# Patient Record
Sex: MALE | Race: WHITE | NOT HISPANIC OR LATINO | Employment: FULL TIME | URBAN - METROPOLITAN AREA
[De-identification: names, ages, dates, MRNs, and addresses within clinical notes are randomized per-mention and may not be internally consistent; named-entity substitution may affect disease eponyms.]

---

## 2022-04-08 ENCOUNTER — APPOINTMENT (OUTPATIENT)
Dept: CT IMAGING | Facility: HOSPITAL | Age: 57
End: 2022-04-08

## 2022-04-08 ENCOUNTER — HOSPITAL ENCOUNTER (EMERGENCY)
Facility: HOSPITAL | Age: 57
Discharge: HOME | End: 2022-04-08
Attending: EMERGENCY MEDICINE | Admitting: EMERGENCY MEDICINE

## 2022-04-08 VITALS
OXYGEN SATURATION: 96 % | DIASTOLIC BLOOD PRESSURE: 90 MMHG | HEIGHT: 73 IN | RESPIRATION RATE: 18 BRPM | HEART RATE: 96 BPM | BODY MASS INDEX: 38.43 KG/M2 | WEIGHT: 290 LBS | TEMPERATURE: 97.9 F | SYSTOLIC BLOOD PRESSURE: 157 MMHG

## 2022-04-08 DIAGNOSIS — R59.0 ABDOMINAL LYMPHADENOPATHY: ICD-10-CM

## 2022-04-08 DIAGNOSIS — N20.0 RIGHT KIDNEY STONE: ICD-10-CM

## 2022-04-08 DIAGNOSIS — N23 URETERAL COLIC: Primary | ICD-10-CM

## 2022-04-08 LAB
ALBUMIN SERPL-MCNC: 4 G/DL (ref 3.5–5.2)
ALBUMIN/GLOB SERPL: 1.1 G/DL
ALP SERPL-CCNC: 118 U/L (ref 39–117)
ALT SERPL W P-5'-P-CCNC: 25 U/L (ref 1–41)
ANION GAP SERPL CALCULATED.3IONS-SCNC: 11 MMOL/L (ref 5–15)
AST SERPL-CCNC: 21 U/L (ref 1–40)
BACTERIA UR QL AUTO: ABNORMAL /HPF
BASOPHILS # BLD AUTO: 0.02 10*3/MM3 (ref 0–0.2)
BASOPHILS NFR BLD AUTO: 0.4 % (ref 0–1.5)
BILIRUB SERPL-MCNC: 0.5 MG/DL (ref 0–1.2)
BILIRUB UR QL STRIP: NEGATIVE
BUN SERPL-MCNC: 27 MG/DL (ref 6–20)
BUN/CREAT SERPL: 14.3 (ref 7–25)
CALCIUM SPEC-SCNC: 10 MG/DL (ref 8.6–10.5)
CHLORIDE SERPL-SCNC: 101 MMOL/L (ref 98–107)
CLARITY UR: CLEAR
CO2 SERPL-SCNC: 24 MMOL/L (ref 22–29)
COLOR UR: YELLOW
CREAT SERPL-MCNC: 1.89 MG/DL (ref 0.76–1.27)
D-LACTATE SERPL-SCNC: 1.6 MMOL/L (ref 0.5–2)
DEPRECATED RDW RBC AUTO: 41 FL (ref 37–54)
EGFRCR SERPLBLD CKD-EPI 2021: 41.1 ML/MIN/1.73
EOSINOPHIL # BLD AUTO: 0.24 10*3/MM3 (ref 0–0.4)
EOSINOPHIL NFR BLD AUTO: 5.2 % (ref 0.3–6.2)
ERYTHROCYTE [DISTWIDTH] IN BLOOD BY AUTOMATED COUNT: 12.4 % (ref 12.3–15.4)
GLOBULIN UR ELPH-MCNC: 3.8 GM/DL
GLUCOSE SERPL-MCNC: 186 MG/DL (ref 65–99)
GLUCOSE UR STRIP-MCNC: ABNORMAL MG/DL
HCT VFR BLD AUTO: 37.1 % (ref 37.5–51)
HGB BLD-MCNC: 12.5 G/DL (ref 13–17.7)
HGB UR QL STRIP.AUTO: ABNORMAL
HOLD SPECIMEN: NORMAL
HYALINE CASTS UR QL AUTO: ABNORMAL /LPF
IMM GRANULOCYTES # BLD AUTO: 0.02 10*3/MM3 (ref 0–0.05)
IMM GRANULOCYTES NFR BLD AUTO: 0.4 % (ref 0–0.5)
KETONES UR QL STRIP: NEGATIVE
LEUKOCYTE ESTERASE UR QL STRIP.AUTO: NEGATIVE
LIPASE SERPL-CCNC: 28 U/L (ref 13–60)
LYMPHOCYTES # BLD AUTO: 0.73 10*3/MM3 (ref 0.7–3.1)
LYMPHOCYTES NFR BLD AUTO: 15.7 % (ref 19.6–45.3)
MCH RBC QN AUTO: 30.5 PG (ref 26.6–33)
MCHC RBC AUTO-ENTMCNC: 33.7 G/DL (ref 31.5–35.7)
MCV RBC AUTO: 90.5 FL (ref 79–97)
MONOCYTES # BLD AUTO: 0.44 10*3/MM3 (ref 0.1–0.9)
MONOCYTES NFR BLD AUTO: 9.5 % (ref 5–12)
NEUTROPHILS NFR BLD AUTO: 3.2 10*3/MM3 (ref 1.7–7)
NEUTROPHILS NFR BLD AUTO: 68.8 % (ref 42.7–76)
NITRITE UR QL STRIP: NEGATIVE
NRBC BLD AUTO-RTO: 0 /100 WBC (ref 0–0.2)
PH UR STRIP.AUTO: <=5 [PH] (ref 5–8)
PLATELET # BLD AUTO: 194 10*3/MM3 (ref 140–450)
PMV BLD AUTO: 10.2 FL (ref 6–12)
POTASSIUM SERPL-SCNC: 4.9 MMOL/L (ref 3.5–5.2)
PROT SERPL-MCNC: 7.8 G/DL (ref 6–8.5)
PROT UR QL STRIP: ABNORMAL
RBC # BLD AUTO: 4.1 10*6/MM3 (ref 4.14–5.8)
RBC # UR STRIP: ABNORMAL /HPF
REF LAB TEST METHOD: ABNORMAL
SODIUM SERPL-SCNC: 136 MMOL/L (ref 136–145)
SP GR UR STRIP: 1.02 (ref 1–1.03)
SQUAMOUS #/AREA URNS HPF: ABNORMAL /HPF
UROBILINOGEN UR QL STRIP: ABNORMAL
WBC # UR STRIP: ABNORMAL /HPF
WBC NRBC COR # BLD: 4.65 10*3/MM3 (ref 3.4–10.8)
WHOLE BLOOD HOLD SPECIMEN: NORMAL
WHOLE BLOOD HOLD SPECIMEN: NORMAL

## 2022-04-08 PROCEDURE — 74177 CT ABD & PELVIS W/CONTRAST: CPT

## 2022-04-08 PROCEDURE — 96375 TX/PRO/DX INJ NEW DRUG ADDON: CPT

## 2022-04-08 PROCEDURE — 96374 THER/PROPH/DIAG INJ IV PUSH: CPT

## 2022-04-08 PROCEDURE — 25010000002 ONDANSETRON PER 1 MG: Performed by: EMERGENCY MEDICINE

## 2022-04-08 PROCEDURE — 81001 URINALYSIS AUTO W/SCOPE: CPT | Performed by: EMERGENCY MEDICINE

## 2022-04-08 PROCEDURE — 25010000002 IOPAMIDOL 61 % SOLUTION: Performed by: EMERGENCY MEDICINE

## 2022-04-08 PROCEDURE — 85025 COMPLETE CBC W/AUTO DIFF WBC: CPT

## 2022-04-08 PROCEDURE — 80053 COMPREHEN METABOLIC PANEL: CPT | Performed by: EMERGENCY MEDICINE

## 2022-04-08 PROCEDURE — 83605 ASSAY OF LACTIC ACID: CPT | Performed by: EMERGENCY MEDICINE

## 2022-04-08 PROCEDURE — 25010000002 KETOROLAC TROMETHAMINE PER 15 MG: Performed by: NURSE PRACTITIONER

## 2022-04-08 PROCEDURE — 25010000002 HYDROMORPHONE PER 4 MG: Performed by: EMERGENCY MEDICINE

## 2022-04-08 PROCEDURE — 99283 EMERGENCY DEPT VISIT LOW MDM: CPT

## 2022-04-08 PROCEDURE — 83690 ASSAY OF LIPASE: CPT | Performed by: EMERGENCY MEDICINE

## 2022-04-08 RX ORDER — METOPROLOL SUCCINATE 50 MG/1
50 TABLET, EXTENDED RELEASE ORAL DAILY
COMMUNITY
End: 2022-06-03 | Stop reason: DRUGHIGH

## 2022-04-08 RX ORDER — GLIMEPIRIDE 4 MG/1
4 TABLET ORAL 2 TIMES DAILY
COMMUNITY
End: 2022-04-14 | Stop reason: SDUPTHER

## 2022-04-08 RX ORDER — ONDANSETRON 4 MG/1
4 TABLET, ORALLY DISINTEGRATING ORAL EVERY 6 HOURS PRN
Qty: 20 TABLET | Refills: 0 | Status: SHIPPED | OUTPATIENT
Start: 2022-04-08

## 2022-04-08 RX ORDER — HYDROCODONE BITARTRATE AND ACETAMINOPHEN 7.5; 325 MG/1; MG/1
1 TABLET ORAL EVERY 6 HOURS PRN
Qty: 20 TABLET | Refills: 0 | Status: SHIPPED | OUTPATIENT
Start: 2022-04-08

## 2022-04-08 RX ORDER — SODIUM CHLORIDE 9 MG/ML
10 INJECTION INTRAVENOUS AS NEEDED
Status: DISCONTINUED | OUTPATIENT
Start: 2022-04-08 | End: 2022-04-09 | Stop reason: HOSPADM

## 2022-04-08 RX ORDER — KETOROLAC TROMETHAMINE 15 MG/ML
15 INJECTION, SOLUTION INTRAMUSCULAR; INTRAVENOUS ONCE
Status: COMPLETED | OUTPATIENT
Start: 2022-04-08 | End: 2022-04-08

## 2022-04-08 RX ORDER — LISINOPRIL 40 MG/1
40 TABLET ORAL DAILY
COMMUNITY

## 2022-04-08 RX ORDER — ONDANSETRON 2 MG/ML
4 INJECTION INTRAMUSCULAR; INTRAVENOUS ONCE
Status: COMPLETED | OUTPATIENT
Start: 2022-04-08 | End: 2022-04-08

## 2022-04-08 RX ORDER — CLOPIDOGREL BISULFATE 75 MG/1
75 TABLET ORAL DAILY
COMMUNITY
End: 2022-04-14 | Stop reason: SDUPTHER

## 2022-04-08 RX ORDER — HYDROMORPHONE HYDROCHLORIDE 1 MG/ML
0.5 INJECTION, SOLUTION INTRAMUSCULAR; INTRAVENOUS; SUBCUTANEOUS ONCE
Status: COMPLETED | OUTPATIENT
Start: 2022-04-08 | End: 2022-04-08

## 2022-04-08 RX ORDER — DULAGLUTIDE 3 MG/.5ML
INJECTION, SOLUTION SUBCUTANEOUS
COMMUNITY
End: 2022-06-03 | Stop reason: DRUGHIGH

## 2022-04-08 RX ADMIN — SODIUM CHLORIDE 1000 ML: 9 INJECTION, SOLUTION INTRAVENOUS at 20:23

## 2022-04-08 RX ADMIN — ONDANSETRON 4 MG: 2 INJECTION INTRAMUSCULAR; INTRAVENOUS at 20:24

## 2022-04-08 RX ADMIN — KETOROLAC TROMETHAMINE 15 MG: 15 INJECTION, SOLUTION INTRAMUSCULAR; INTRAVENOUS at 22:33

## 2022-04-08 RX ADMIN — IOPAMIDOL 100 ML: 612 INJECTION, SOLUTION INTRAVENOUS at 20:44

## 2022-04-08 RX ADMIN — HYDROMORPHONE HYDROCHLORIDE 0.5 MG: 1 INJECTION, SOLUTION INTRAMUSCULAR; INTRAVENOUS; SUBCUTANEOUS at 20:24

## 2022-04-09 NOTE — DISCHARGE INSTRUCTIONS
Home to rest.  Maintain your very best hydration and nutrition striving to drink 3 L of water a day.  Use the prescription pain medication for that pain that breaks through ibuprofen 600 mg every 8 hours.  Do not take prescription pain medication on empty stomach to avoid nausea.  Nausea medicine has also been sent to your personal pharmacy.  Call the office of urologist, Dr. Rodrigo reddy on Monday if symptoms persist.  Meanwhile, over the weekend, for worsening symptoms or concerns or intractable pain or intractable vomiting not responsive to the prescription medications, return to the emergency department.    Regarding the lymph node presents in your torso as we discussed, Dr. Eden welcomes you to call on Monday morning for follow-up in their office next week.  Thank you

## 2022-04-09 NOTE — ED PROVIDER NOTES
EMERGENCY DEPARTMENT ENCOUNTER    Pt Name: Alvaro Sinha  MRN: [id number]  Pt :   1965  Room Number:    Date of encounter:  2022  PCP: Sylvie Bartlett APRN  ED Provider: LUZMARIA Chand    Historian: patient      HPI:  Chief Complaint: Right lower quadrant abdominal pain        Context: Alvaro Sinha is a 56 y.o. male who presents to the ED c/o right lower quadrant abdominal pain since Saturday evening.  He had some worsening of symptoms on .  Has had poor appetite.  At 4 PM, his symptoms became much worse.  He has had some nausea without vomiting.    Review of systems is negative for fever chills or recent illness.  Negative for chest pain or cough or shortness of breath.  Positive for nausea without vomiting or diarrhea.  Poor appetite has been common.   systems are negative including no dysuria or hematuria.  Negative for abdominal pain or flank pain.  No profound weakness, dizziness or syncope.  No weight loss.  No neurosensory complaints or focal weakness.      PAST MEDICAL HISTORY  Past Medical History:   Diagnosis Date   • Diabetes mellitus (HCC)    • Hypertension          PAST SURGICAL HISTORY  Past Surgical History:   Procedure Laterality Date   • BONE RESECTION, RIB           FAMILY HISTORY  History reviewed. No pertinent family history.      SOCIAL HISTORY  Social History     Socioeconomic History   • Marital status:    Tobacco Use   • Smoking status: Never Smoker   • Smokeless tobacco: Never Used   Vaping Use   • Vaping Use: Never used   Substance and Sexual Activity   • Alcohol use: Yes     Comment: OCCASIONALLY   • Drug use: Not Currently   • Sexual activity: Defer         ALLERGIES  Patient has no known allergies.        REVIEW OF SYSTEMS  Review of Systems       All systems reviewed and negative except for those discussed in HPI.       PHYSICAL EXAM    I have reviewed the triage vital signs and nursing notes.    ED Triage Vitals [22 1905]   Temp  Heart Rate Resp BP SpO2   97.9 °F (36.6 °C) 96 18 (!) 168/126 99 %      Temp src Heart Rate Source Patient Position BP Location FiO2 (%)   Oral Monitor Sitting Right arm --       Physical Exam  GENERAL:   Appears uncomfortable.  He is hypertensive.  He is a very good historian  HENT: Nares patent.  EYES: No scleral icterus.  CV: Regular rhythm, heart rate in the 90s.  No peripheral edema  RESPIRATORY: Normal effort.  No audible wheezes, rales or rhonchi.  ABDOMEN: Soft, mild tenderness over the right lower quadrant.  No guarding.  No CVA tenderness.  MUSCULOSKELETAL: No deformities.   NEURO: Alert, moves all extremities, follows commands.  SKIN: Warm, dry, no rash visualized.        LAB RESULTS  No results found for this or any previous visit (from the past 24 hour(s)).    If labs were ordered, I independently reviewed the results.        RADIOLOGY  No Radiology Exams Resulted Within Past 24 Hours      PROCEDURES    Procedures    No orders to display       MEDICATIONS GIVEN IN ER    Medications   HYDROmorphone (DILAUDID) injection 0.5 mg (0.5 mg Intravenous Given 4/8/22 2024)   ondansetron (ZOFRAN) injection 4 mg (4 mg Intravenous Given 4/8/22 2024)   sodium chloride 0.9 % bolus 1,000 mL (0 mL Intravenous Stopped 4/8/22 2100)   iopamidol (ISOVUE-300) 61 % injection 100 mL (100 mL Intravenous Given 4/8/22 2044)   ketorolac (TORADOL) injection 15 mg (15 mg Intravenous Given 4/8/22 2233)           ED Course as of 04/11/22 0108   Fri Apr 08, 2022 2057 RBC, UA(!): 7-12 [MS]      ED Course User Index  [MS] Carmen Son [place], APRN           AS OF 01:08 EDT VITALS:    BP - 157/90  HR - 96  TEMP - 97.9 °F (36.6 °C) (Oral)  O2 SATS - 96%                  DIAGNOSIS  Final diagnoses:   Ureteral colic   Right kidney stone   Abdominal lymphadenopathy         DISPOSITION    DISCHARGE    Patient discharged in stable condition.    Reviewed implications of results, diagnosis, meds, responsibility to follow up, warning signs and  symptoms of possible worsening, potential complications and reasons to return to ER.    Patient/Family voiced understanding of above instructions.    Discussed plan for discharge, as there is no emergent indication for admission.  Pt/family is agreeable and understands need for follow up and possible repeat testing.  Pt/family is aware that discharge does not mean that nothing is wrong but that it indicates no emergency is currently present that requires admission and they must continue care with follow-up as given below or with a physician of their choice.     FOLLOW-UP  Brett Eden MD  [address]  [address]  [facility] [id number] [phone]          Rodrigo Sethi MD  [address]  [address]  [place] [address]  [phone]               Medication List      New Prescriptions    HYDROcodone-acetaminophen 7.5-325 MG per tablet  Commonly known as: NORCO  Take 1 tablet by mouth Every 6 (Six) Hours As Needed for Moderate Pain .     ondansetron ODT 4 MG disintegrating tablet  Commonly known as: ZOFRAN-ODT  Place 1 tablet on the tongue Every 6 (Six) Hours As Needed for Nausea.           Where to Get Your Medications      These medications were sent to [facility]/pharmacy #[id number] - [place], [place] - [address] AT [facility] - [phone]  - [phone] FX  [address] [address]    Phone: [phone]   · HYDROcodone-acetaminophen 7.5-325 MG per tablet  · ondansetron ODT 4 MG disintegrating tablet                  Carmen Son, LUZMARIA  04/11/22 0109

## 2022-04-11 ENCOUNTER — TELEPHONE (OUTPATIENT)
Dept: ONCOLOGY | Facility: CLINIC | Age: 57
End: 2022-04-11

## 2022-04-11 NOTE — TELEPHONE ENCOUNTER
Caller: keshav harris    Relationship to patient: Emergency Contact (WIFE)    Best call back number: [phone]    Chief complaint: NEEDING A LATER TIME IN THE DAY DUE TO WORK SCHEDULE .    Type of visit: NEW PT HEMATOLOGY APPOINTMENT DR ARROYO     Requested date: 04/27 WOULD LIKE TO REQUEST A LATER TIME DUE TO WORK SCHEDULE , IF CAN COME IN AROUND 11AM WOULD WORK BEST,     IF NOT WILL KEEP SAME APPOINTMENT .     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

## 2022-04-11 NOTE — TELEPHONE ENCOUNTER
I called patient's wife back and let her know Dr. Eden leaves at 12 on Wednesday's and didn't have anything later. She states that is fine and wants to keep this appointment.

## 2022-04-14 ENCOUNTER — OFFICE VISIT (OUTPATIENT)
Dept: UROLOGY | Facility: CLINIC | Age: 57
End: 2022-04-14

## 2022-04-14 VITALS — WEIGHT: 290 LBS | BODY MASS INDEX: 38.43 KG/M2 | HEIGHT: 73 IN

## 2022-04-14 DIAGNOSIS — N20.0 NEPHROLITHIASIS: Primary | ICD-10-CM

## 2022-04-14 PROCEDURE — 99203 OFFICE O/P NEW LOW 30 MIN: CPT | Performed by: UROLOGY

## 2022-04-14 RX ORDER — METOPROLOL SUCCINATE 50 MG/1
50 TABLET, EXTENDED RELEASE ORAL DAILY
COMMUNITY
End: 2022-06-03 | Stop reason: DRUGHIGH

## 2022-04-14 RX ORDER — CLOPIDOGREL BISULFATE 75 MG/1
75 TABLET ORAL DAILY
COMMUNITY

## 2022-04-14 RX ORDER — ERGOCALCIFEROL 1.25 MG/1
1250 CAPSULE ORAL DAILY
COMMUNITY

## 2022-04-14 RX ORDER — COLCHICINE 0.6 MG/1
0.6 TABLET ORAL AS NEEDED
COMMUNITY
Start: 2022-02-19

## 2022-04-14 RX ORDER — LISINOPRIL 40 MG/1
40 TABLET ORAL DAILY
COMMUNITY
End: 2022-04-14 | Stop reason: SDUPTHER

## 2022-04-14 RX ORDER — GLIMEPIRIDE 4 MG/1
4 TABLET ORAL DAILY
COMMUNITY

## 2022-04-14 NOTE — PROGRESS NOTES
Office Note Kidney Stone      Patient Name: Alvaro Sinha  : 1965   MRN: [id number]     Chief Complaint: History of Nephrolithiasis/Ureterolithiasis     Referring Provider: No ref. provider found    History of Present Illness: Alvaro Sinha is a 56 y.o. male who presents today for evaluation due to recent acute stone episode.  He presented to [facility] emergency room 2022 with ureteral colic, right flank pain.  He reported associated nausea and emesis.  CT imaging demonstrated approximately 5 mm distal right ureteral stone with mild hydroureteronephrosis.  He reports that after hospital discharge his right flank pain significantly improved.  He did not capture stone, has not had recurrence in symptoms.  Denies baseline lower urinary tract symptoms.  Denies hematuria.  Denies past urologic evaluation including instrumentation or procedure.          Subjective      Review of System: Review of Systems   Constitutional: Negative for chills, fatigue, fever and unexpected weight change.   HENT: Negative for sore throat.    Eyes: Negative for visual disturbance.   Respiratory: Negative for cough, chest tightness and shortness of breath.    Cardiovascular: Negative for chest pain and leg swelling.   Gastrointestinal: Negative for blood in stool, constipation, diarrhea, nausea, rectal pain and vomiting.   Genitourinary: Negative for decreased urine volume, difficulty urinating, dysuria, enuresis, flank pain, frequency, genital sores, hematuria and urgency.   Musculoskeletal: Negative for back pain and joint swelling.   Skin: Negative for rash and wound.   Neurological: Negative for seizures, speech difficulty, weakness and headaches.   Psychiatric/Behavioral: Negative for confusion, sleep disturbance and suicidal ideas. The patient is not nervous/anxious.         I have reviewed the ROS documented by my clinical staff, updated as appropriate and I agree. Rodrigo Sethi MD    Past Medical  History:   Past Medical History:   Diagnosis Date   • Diabetes mellitus (HCC)    • Hypertension        Past Surgical History:   Past Surgical History:   Procedure Laterality Date   • BONE RESECTION, RIB         Family History: History reviewed. No pertinent family history.    Social History:   Social History     Socioeconomic History   • Marital status:    Tobacco Use   • Smoking status: Never Smoker   • Smokeless tobacco: Never Used   Vaping Use   • Vaping Use: Never used   Substance and Sexual Activity   • Alcohol use: Yes     Comment: OCCASIONALLY   • Drug use: Not Currently   • Sexual activity: Defer       Medications:     Current Outpatient Medications:   •  clopidogrel (Plavix) 75 MG tablet, Take 75 mg by mouth Daily., Disp: , Rfl:   •  colchicine 0.6 MG tablet, Take 0.6 mg by mouth As Needed., Disp: , Rfl:   •  Dulaglutide (Trulicity) 3 MG/0.5ML solution pen-injector, Inject  under the skin into the appropriate area as directed. 3MG ONCE A WEEK, Disp: , Rfl:   •  ergocalciferol (ERGOCALCIFEROL) 1.25 MG (39225 UT) capsule, Take 1,250 mcg by mouth Daily., Disp: , Rfl:   •  glimepiride (AMARYL) 4 MG tablet, Take 4 mg by mouth Daily., Disp: , Rfl:   •  HYDROcodone-acetaminophen (NORCO) 7.5-325 MG per tablet, Take 1 tablet by mouth Every 6 (Six) Hours As Needed for Moderate Pain ., Disp: 20 tablet, Rfl: 0  •  lisinopril (PRINIVIL,ZESTRIL) 40 MG tablet, Take 40 mg by mouth Daily., Disp: , Rfl:   •  metFORMIN (GLUCOPHAGE) 1000 MG tablet, Take 1,000 mg by mouth 2 (Two) Times a Day With Meals., Disp: , Rfl:   •  metoprolol succinate XL (TOPROL-XL) 50 MG 24 hr tablet, Take 50 mg by mouth Daily., Disp: , Rfl:   •  metoprolol succinate XL (TOPROL-XL) 50 MG 24 hr tablet, Take 50 mg by mouth Daily., Disp: , Rfl:   •  ondansetron ODT (ZOFRAN-ODT) 4 MG disintegrating tablet, Place 1 tablet on the tongue Every 6 (Six) Hours As Needed for Nausea., Disp: 20 tablet, Rfl: 0    Allergies:   No Known Allergies    Objective  "    Physical Exam:   Vital Signs:   Vitals:    04/14/22 1134   Weight: 132 kg (290 lb)   Height: 185.4 cm (73\")   PainSc: 0-No pain     Body mass index is 38.26 kg/m².     Physical Exam  Vitals and nursing note reviewed.   Constitutional:       Appearance: Normal appearance.   HENT:      Head: Normocephalic and atraumatic.   Cardiovascular:      Comments: Well perfused  Pulmonary:      Effort: Pulmonary effort is normal.   Abdominal:      General: Abdomen is flat.      Palpations: Abdomen is soft.   Musculoskeletal:         General: Normal range of motion.   Skin:     General: Skin is warm and dry.   Neurological:      General: No focal deficit present.      Mental Status: He is alert and oriented to person, place, and time. Mental status is at baseline.   Psychiatric:         Mood and Affect: Mood normal.         Behavior: Behavior normal.         Thought Content: Thought content normal.         Judgment: Judgment normal.         Labs:   Brief Urine Lab Results  (Last result in the past 365 days)      Color   Clarity   Blood   Leuk Est   Nitrite   Protein   CREAT   Urine HCG        04/08/22 2028 Yellow   Clear   Small (1+)   Negative   Negative   30 mg/dL (1+)                      Lab Results   Component Value Date    GLUCOSE 186 (H) 04/08/2022    CALCIUM 10.0 04/08/2022     04/08/2022    K 4.9 04/08/2022    CO2 24.0 04/08/2022     04/08/2022    BUN 27 (H) 04/08/2022    CREATININE 1.89 (H) 04/08/2022    BCR 14.3 04/08/2022    ANIONGAP 11.0 04/08/2022       Lab Results   Component Value Date    WBC 4.65 04/08/2022    HGB 12.5 (L) 04/08/2022    HCT 37.1 (L) 04/08/2022    MCV 90.5 04/08/2022     04/08/2022         Images:   CT Abdomen Pelvis With Contrast    Result Date: 4/8/2022   5 mm distal right ureteral stone causing moderate-large amount of hydronephrosis.        Measures:   Tobacco:   Alvaro Sinha  reports that he has never smoked. He has never used smokeless tobacco.. I have educated " him on the risk of diseases from using tobacco products.    Assessment / Plan      Assessment/Plan:   Alvaro Sinha is a 56 y.o. male who presented today with nephrolithiasis/ureterolithiasis.  Presentation to [facility] urgency room on 4/8/2022 with acute right flank pain.  Imaging demonstrating 5 mm distal ureteral stone.  He reports resolution in pain after hospital discharge.  He has not captured stone.  Reports no return in symptoms.  No prior stone episode, urologic evaluation including instrumentation or procedure.  Today we discussed the indication for repeat renal ultrasound to ensure improvement in hydronephrosis, resolution, passage of stone.  He is understanding agreeable.  He will follow-up in 4 weeks with renal ultrasound.    Diagnoses and all orders for this visit:    1. Nephrolithiasis (Primary)  -     US Renal Bilateral; Future             Follow Up:   Return in about 1 month (around 5/14/2022) for Recheck.    I spent approximately 30 minutes providing clinical care for this patient; including review of patient's chart and provider documentation, face to face time spent with patient in examination room (obtaining history, performing physical exam, discussing diagnosis and management options), placing orders, and completing patient documentation.     Rodrigo Sethi MD  [facility] [place]

## 2022-04-15 PROBLEM — N20.0 NEPHROLITHIASIS: Status: ACTIVE | Noted: 2022-04-15

## 2022-04-27 ENCOUNTER — CONSULT (OUTPATIENT)
Dept: ONCOLOGY | Facility: CLINIC | Age: 57
End: 2022-04-27

## 2022-04-27 ENCOUNTER — LAB (OUTPATIENT)
Dept: LAB | Facility: HOSPITAL | Age: 57
End: 2022-04-27

## 2022-04-27 VITALS
HEART RATE: 84 BPM | BODY MASS INDEX: 38.3 KG/M2 | TEMPERATURE: 98.1 F | HEIGHT: 73 IN | SYSTOLIC BLOOD PRESSURE: 187 MMHG | RESPIRATION RATE: 18 BRPM | WEIGHT: 289 LBS | OXYGEN SATURATION: 98 % | DIASTOLIC BLOOD PRESSURE: 112 MMHG

## 2022-04-27 DIAGNOSIS — R59.1 LYMPHADENOPATHY: Primary | ICD-10-CM

## 2022-04-27 DIAGNOSIS — R59.1 LYMPHADENOPATHY: ICD-10-CM

## 2022-04-27 LAB
ALBUMIN SERPL-MCNC: 4.1 G/DL (ref 3.5–5.2)
ALBUMIN/GLOB SERPL: 1.1 G/DL
ALP SERPL-CCNC: 155 U/L (ref 39–117)
ALT SERPL W P-5'-P-CCNC: 32 U/L (ref 1–41)
ANION GAP SERPL CALCULATED.3IONS-SCNC: 11 MMOL/L (ref 5–15)
AST SERPL-CCNC: 28 U/L (ref 1–40)
BILIRUB SERPL-MCNC: 0.2 MG/DL (ref 0–1.2)
BUN SERPL-MCNC: 29 MG/DL (ref 6–20)
BUN/CREAT SERPL: 16.5 (ref 7–25)
CALCIUM SPEC-SCNC: 10 MG/DL (ref 8.6–10.5)
CHLORIDE SERPL-SCNC: 104 MMOL/L (ref 98–107)
CO2 SERPL-SCNC: 23 MMOL/L (ref 22–29)
CREAT SERPL-MCNC: 1.76 MG/DL (ref 0.76–1.27)
CYTOLOGIST CVX/VAG CYTO: NORMAL
EGFRCR SERPLBLD CKD-EPI 2021: 44.5 ML/MIN/1.73
ERYTHROCYTE [DISTWIDTH] IN BLOOD BY AUTOMATED COUNT: 13.2 % (ref 12.3–15.4)
GLOBULIN UR ELPH-MCNC: 3.8 GM/DL
GLUCOSE SERPL-MCNC: 197 MG/DL (ref 65–99)
HCT VFR BLD AUTO: 37 % (ref 37.5–51)
HGB BLD-MCNC: 11.5 G/DL (ref 13–17.7)
LDH SERPL-CCNC: 130 U/L (ref 135–225)
LYMPHOCYTES # BLD AUTO: 0.7 10*3/MM3 (ref 0.7–3.1)
LYMPHOCYTES NFR BLD AUTO: 15.8 % (ref 19.6–45.3)
MCH RBC QN AUTO: 27.7 PG (ref 26.6–33)
MCHC RBC AUTO-ENTMCNC: 31.1 G/DL (ref 31.5–35.7)
MCV RBC AUTO: 89.1 FL (ref 79–97)
MONOCYTES # BLD AUTO: 0.3 10*3/MM3 (ref 0.1–0.9)
MONOCYTES NFR BLD AUTO: 6.2 % (ref 5–12)
NEUTROPHILS NFR BLD AUTO: 3.3 10*3/MM3 (ref 1.7–7)
NEUTROPHILS NFR BLD AUTO: 78 % (ref 42.7–76)
PATH INTERP BLD-IMP: NORMAL
PLATELET # BLD AUTO: 205 10*3/MM3 (ref 140–450)
PMV BLD AUTO: 7.3 FL (ref 6–12)
POTASSIUM SERPL-SCNC: 4.6 MMOL/L (ref 3.5–5.2)
PROT SERPL-MCNC: 7.9 G/DL (ref 6–8.5)
RBC # BLD AUTO: 4.15 10*6/MM3 (ref 4.14–5.8)
SODIUM SERPL-SCNC: 138 MMOL/L (ref 136–145)
WBC NRBC COR # BLD: 4.2 10*3/MM3 (ref 3.4–10.8)

## 2022-04-27 PROCEDURE — 83615 LACTATE (LD) (LDH) ENZYME: CPT

## 2022-04-27 PROCEDURE — 85060 BLOOD SMEAR INTERPRETATION: CPT

## 2022-04-27 PROCEDURE — 85025 COMPLETE CBC W/AUTO DIFF WBC: CPT

## 2022-04-27 PROCEDURE — 99204 OFFICE O/P NEW MOD 45 MIN: CPT | Performed by: INTERNAL MEDICINE

## 2022-04-27 PROCEDURE — 80053 COMPREHEN METABOLIC PANEL: CPT

## 2022-04-27 PROCEDURE — 36415 COLL VENOUS BLD VENIPUNCTURE: CPT

## 2022-04-27 RX ORDER — SILDENAFIL 100 MG/1
100 TABLET, FILM COATED ORAL DAILY PRN
COMMUNITY
Start: 2022-04-07

## 2022-04-27 NOTE — PROGRESS NOTES
New Patient Office Visit      Date: 2022     Patient Name: Alvaro Sinha  MRN: [id number]  : 1965  Referring Physician: ER follow-up    Chief Complaint: Establish care for lymphadenopathy    History of Present Illness: Alvaro Sinha is a pleasant 57 y.o. male past medical history of type 2 diabetes, hypertension, gout who presents today for evaluation of lymphadenopathy. The patient is accompanied by their wife who contributes to the history of their care.  The patient was recently seen in the ED for significant back and abdominal discomfort.  He had a CT abdomen/pelvis which was notable for a 5 mm kidney stone along with nonspecific retroperitoneal and inguinal adenopathy.  He denies feeling any enlarged lymph nodes in his groin area.  He denies any unexplained fevers, night sweats, weight loss.  He denies any family history of blood related cancers.  He does note a family history of a grandfather with prostate cancer and may be a family member with colon cancer.  He is not up-to-date on colon cancer screening.  He denies ever having a PSA checked.  He is a non-smoker.  He otherwise feels well today and is compliant with his home medications    Oncology History:    Oncology/Hematology History    No history exists.       Subjective      Review of Systems:     Constitutional: Negative for fevers, chills, or weight loss  Eyes: Negative for blurred vision or discharge         Ear/Nose/Throat: Negative for difficulty swallowing, sore throat, LAD                                                       Respiratory: Negative for cough, SOA, wheezing                                                                                        Cardiovascular: Negative for chest pain or palpitations                                                                  Gastrointestinal: Negative for nausea, vomiting or diarrhea                                                                     Genitourinary:  Negative for dysuria or hematuria                                                                                           Musculoskeletal: Negative for any joint pains or muscle aches                                                                        Neurologic: Negative for any weakness, headaches, dizziness                                                                         Hematologic: Negative for any easy bleeding or bruising                                                                                   Psychiatric: Negative for anxiety or depression                             Past Medical History:   Past Medical History:   Diagnosis Date   • Diabetes mellitus (HCC)    • Hypertension        Past Surgical History:   Past Surgical History:   Procedure Laterality Date   • BONE RESECTION, RIB         Family History: History reviewed. No pertinent family history.    Social History:   Social History     Socioeconomic History   • Marital status:    Tobacco Use   • Smoking status: Never Smoker   • Smokeless tobacco: Never Used   Vaping Use   • Vaping Use: Never used   Substance and Sexual Activity   • Alcohol use: Yes     Comment: OCCASIONALLY   • Drug use: Not Currently   • Sexual activity: Defer       Medications:     Current Outpatient Medications:   •  clopidogrel (PLAVIX) 75 MG tablet, Take 75 mg by mouth Daily., Disp: , Rfl:   •  colchicine 0.6 MG tablet, Take 0.6 mg by mouth As Needed., Disp: , Rfl:   •  Dulaglutide (Trulicity) 3 MG/0.5ML solution pen-injector, Inject  under the skin into the appropriate area as directed. 3MG ONCE A WEEK, Disp: , Rfl:   •  ergocalciferol (ERGOCALCIFEROL) 1.25 MG (38974 UT) capsule, Take 1,250 mcg by mouth Daily., Disp: , Rfl:   •  glimepiride (AMARYL) 4 MG tablet, Take 4 mg by mouth Daily., Disp: , Rfl:   •  HYDROcodone-acetaminophen (NORCO) 7.5-325 MG per tablet, Take 1 tablet by mouth Every 6 (Six) Hours As Needed for Moderate Pain ., Disp: 20 tablet, Rfl:  "0  •  lisinopril (PRINIVIL,ZESTRIL) 40 MG tablet, Take 40 mg by mouth Daily., Disp: , Rfl:   •  metFORMIN (GLUCOPHAGE) 1000 MG tablet, Take 1,000 mg by mouth 2 (Two) Times a Day With Meals., Disp: , Rfl:   •  metoprolol succinate XL (TOPROL-XL) 50 MG 24 hr tablet, Take 50 mg by mouth Daily., Disp: , Rfl:   •  metoprolol succinate XL (TOPROL-XL) 50 MG 24 hr tablet, Take 50 mg by mouth Daily., Disp: , Rfl:   •  ondansetron ODT (ZOFRAN-ODT) 4 MG disintegrating tablet, Place 1 tablet on the tongue Every 6 (Six) Hours As Needed for Nausea., Disp: 20 tablet, Rfl: 0  •  sildenafil (VIAGRA) 100 MG tablet, Take 100 mg by mouth Daily As Needed., Disp: , Rfl:     Allergies:   Allergies   Allergen Reactions   • Statins Myalgia       Objective     Physical Exam:  Vital Signs:   Vitals:    04/27/22 0930   BP: (!) 187/112   Pulse: 84   Resp: 18   Temp: 98.1 °F (36.7 °C)   SpO2: 98%   Weight: 131 kg (289 lb)   Height: 185.4 cm (73\")   PainSc: 0-No pain     Pain Score    04/27/22 0930   PainSc: 0-No pain     ECOG Performance Status: 0 - Asymptomatic    Constitutional: NAD, ECOG 0  Eyes: PERRLA, scleral anicteric  ENT: No LAD, no thyromegaly  Respiratory: CTAB, no wheezing, rales, rhonchi  Cardiovascular: RRR, no murmurs, pulses 2+ bilaterally  Abdomen: soft, NT/ND, no HSM  Musculoskeletal: strength 5/5 bilaterally, no c/c/e  Neurologic: A&O x 3, CN II-XII intact grossly  Psych: mood and affect congruent, no SI or HI    Results Review:   No visits with results within 2 Week(s) from this visit.   Latest known visit with results is:   Admission on 04/08/2022, Discharged on 04/08/2022   Component Date Value Ref Range Status   • Glucose 04/08/2022 186 (A) 65 - 99 mg/dL Final   • BUN 04/08/2022 27 (A) 6 - 20 mg/dL Final   • Creatinine 04/08/2022 1.89 (A) 0.76 - 1.27 mg/dL Final   • Sodium 04/08/2022 136  136 - 145 mmol/L Final   • Potassium 04/08/2022 4.9  3.5 - 5.2 mmol/L Final   • Chloride 04/08/2022 101  98 - 107 mmol/L Final   • CO2 " 04/08/2022 24.0  22.0 - 29.0 mmol/L Final   • Calcium 04/08/2022 10.0  8.6 - 10.5 mg/dL Final   • Total Protein 04/08/2022 7.8  6.0 - 8.5 g/dL Final   • Albumin 04/08/2022 4.00  3.50 - 5.20 g/dL Final   • ALT (SGPT) 04/08/2022 25  1 - 41 U/L Final   • AST (SGOT) 04/08/2022 21  1 - 40 U/L Final   • Alkaline Phosphatase 04/08/2022 118 (A) 39 - 117 U/L Final   • Total Bilirubin 04/08/2022 0.5  0.0 - 1.2 mg/dL Final   • Globulin 04/08/2022 3.8  gm/dL Final    Calculated Result   • A/G Ratio 04/08/2022 1.1  g/dL Final   • BUN/Creatinine Ratio 04/08/2022 14.3  7.0 - 25.0 Final   • Anion Gap 04/08/2022 11.0  5.0 - 15.0 mmol/L Final   • eGFR 04/08/2022 41.1 (A) >60.0 mL/min/1.73 Final    National Kidney Foundation and American Society of Nephrology (ASN) Task Force recommended calculation based on the Chronic Kidney Disease Epidemiology Collaboration (CKD-EPI) equation refit without adjustment for race.   • Lipase 04/08/2022 28  13 - 60 U/L Final   • Color, UA 04/08/2022 Yellow  Yellow, Straw Final   • Appearance, UA 04/08/2022 Clear  Clear Final   • pH, UA 04/08/2022 <=5.0  5.0 - 8.0 Final   • Specific Gravity, UA 04/08/2022 1.017  1.001 - 1.030 Final   • Glucose, UA 04/08/2022 100 mg/dL (Trace) (A) Negative Final   • Ketones, UA 04/08/2022 Negative  Negative Final   • Bilirubin, UA 04/08/2022 Negative  Negative Final   • Blood, UA 04/08/2022 Small (1+) (A) Negative Final   • Protein, UA 04/08/2022 30 mg/dL (1+) (A) Negative Final   • Leuk Esterase, UA 04/08/2022 Negative  Negative Final   • Nitrite, UA 04/08/2022 Negative  Negative Final   • Urobilinogen, UA 04/08/2022 0.2 E.U./dL  0.2 - 1.0 E.U./dL Final   • Lactate 04/08/2022 1.6  0.5 - 2.0 mmol/L Final    Falsely depressed results may occur on samples drawn from patients receiving N-Acetylcysteine (NAC) or Metamizole.   • Extra Tube 04/08/2022 Hold for add-ons.   Final    Auto resulted.   • Extra Tube 04/08/2022 hold for add-on   Final    Auto resulted   • Extra Tube  04/08/2022 Hold for add-ons.   Final    Auto resulted.   • Extra Tube 04/08/2022 Hold for add-ons.   Final    Auto resulted.   • Extra Tube 04/08/2022 hold for add-on   Final    Auto resulted   • WBC 04/08/2022 4.65  3.40 - 10.80 10*3/mm3 Final   • RBC 04/08/2022 4.10 (A) 4.14 - 5.80 10*6/mm3 Final   • Hemoglobin 04/08/2022 12.5 (A) 13.0 - 17.7 g/dL Final   • Hematocrit 04/08/2022 37.1 (A) 37.5 - 51.0 % Final   • MCV 04/08/2022 90.5  79.0 - 97.0 fL Final   • MCH 04/08/2022 30.5  26.6 - 33.0 pg Final   • MCHC 04/08/2022 33.7  31.5 - 35.7 g/dL Final   • RDW 04/08/2022 12.4  12.3 - 15.4 % Final   • RDW-SD 04/08/2022 41.0  37.0 - 54.0 fl Final   • MPV 04/08/2022 10.2  6.0 - 12.0 fL Final   • Platelets 04/08/2022 194  140 - 450 10*3/mm3 Final   • Neutrophil % 04/08/2022 68.8  42.7 - 76.0 % Final   • Lymphocyte % 04/08/2022 15.7 (A) 19.6 - 45.3 % Final   • Monocyte % 04/08/2022 9.5  5.0 - 12.0 % Final   • Eosinophil % 04/08/2022 5.2  0.3 - 6.2 % Final   • Basophil % 04/08/2022 0.4  0.0 - 1.5 % Final   • Immature Grans % 04/08/2022 0.4  0.0 - 0.5 % Final   • Neutrophils, Absolute 04/08/2022 3.20  1.70 - 7.00 10*3/mm3 Final   • Lymphocytes, Absolute 04/08/2022 0.73  0.70 - 3.10 10*3/mm3 Final   • Monocytes, Absolute 04/08/2022 0.44  0.10 - 0.90 10*3/mm3 Final   • Eosinophils, Absolute 04/08/2022 0.24  0.00 - 0.40 10*3/mm3 Final   • Basophils, Absolute 04/08/2022 0.02  0.00 - 0.20 10*3/mm3 Final   • Immature Grans, Absolute 04/08/2022 0.02  0.00 - 0.05 10*3/mm3 Final   • nRBC 04/08/2022 0.0  0.0 - 0.2 /100 WBC Final   • RBC, UA 04/08/2022 7-12 (A) None Seen, 0-2 /HPF Final   • WBC, UA 04/08/2022 None Seen  None Seen, 0-2 /HPF Final   • Bacteria, UA 04/08/2022 None Seen  None Seen, Trace /HPF Final   • Squamous Epithelial Cells, UA 04/08/2022 None Seen  None Seen, 0-2 /HPF Final   • Hyaline Casts, UA 04/08/2022 None Seen  0 - 6 /LPF Final   • Methodology 04/08/2022 Automated Microscopy   Final       CT Abdomen Pelvis With  Contrast    Result Date: 4/8/2022  Narrative: CT ABDOMEN PELVIS W CONTRAST-  Date of Exam: 4/8/2022 8:31 PM  Indication: RLQ abd pain.   Comparison: None available.  Technique: Contiguous axial CT images were obtained from the lung bases to the pubic symphysis following uneventful administration of intravenous contrast. Sagittal and coronal reconstructions were performed. Automated exposure control and iterative reconstruction methods were utilized.  FINDINGS:  Enlarged hilar lymph nodes are partially imaged as well as enlarged nodes in the lower mediastinum. No pneumonia or pulmonary edema or pleural effusion.  The spleen is enlarged. Multiple enlarged lymph nodes are present in the abdomen. For example a retroperitoneal lymph node between the aorta and IVC measures 3.7 x 2.8 cm axial image 70 series 2. Lymph node near the morales hepatis measures 6.0 x 2.9 cm axial image 49.  Cholelithiasis is present but no cholecystitis. Liver appears within normal limits.  There is a stone in the distal right ureter at the iliac vessel crossing measuring 5 mm. This causes a moderate large amount of hydronephrosis.  Sigmoid diverticulosis but no diverticulitis. No bowel obstruction.  Mild enlarged lymph nodes are present along the iliac chain bilaterally. Normal appearance of the bladder.      Impression:  5 mm distal right ureteral stone causing moderate-large amount of hydronephrosis.  Adenopathy throughout the abdomen and pelvis and also in the lower chest, and splenomegaly. Lymphoma suspected. Metastatic disease in the differential.  Cholelithiasis but no cholecystitis.  This report was finalized on 4/8/2022 9:11 PM by Sergio Bacon.        Assessment / Plan      Assessment/Plan:   1. Lymphadenopathy (Primary)  -Unclear etiology.  Could be reactive secondary to his recent nephrolithiasis  -Currently asymptomatic with no constitutional symptoms  -We will check labs as below to rule out other secondary causes as well as repeat a  CT scan  -We will consider further testing and possible biopsy based on results  -     CBC & Differential; Future  -     Comprehensive Metabolic Panel; Future  -     Lactate Dehydrogenase; Future  -     Flow Cytometry, Blood; Future  -     Peripheral Blood Smear; Future  -     CT Abdomen Pelvis With Contrast; Future    2.  Health maintenance  - Recommend screening colonoscopy to be ordered through his PCP      Follow Up:   Follow-up in 2 weeks     Brett Eden MD  Hematology and Oncology     Please note that portions of this note may have been completed with a voice recognition program. Efforts were made to edit the dictations, but occasionally words are mistranscribed.

## 2022-04-28 LAB — REF LAB TEST METHOD: NORMAL

## 2022-05-12 ENCOUNTER — HOSPITAL ENCOUNTER (OUTPATIENT)
Dept: CT IMAGING | Facility: HOSPITAL | Age: 57
Discharge: HOME | End: 2022-05-12
Admitting: INTERNAL MEDICINE

## 2022-05-12 DIAGNOSIS — R59.1 LYMPHADENOPATHY: ICD-10-CM

## 2022-05-12 LAB — CREAT BLDA-MCNC: 1.5 MG/DL (ref 0.6–1.3)

## 2022-05-12 PROCEDURE — 82565 ASSAY OF CREATININE: CPT

## 2022-05-12 PROCEDURE — 74177 CT ABD & PELVIS W/CONTRAST: CPT

## 2022-05-12 PROCEDURE — 25010000002 IOPAMIDOL 61 % SOLUTION: Performed by: INTERNAL MEDICINE

## 2022-05-12 RX ADMIN — IOPAMIDOL 100 ML: 612 INJECTION, SOLUTION INTRAVENOUS at 15:46

## 2022-05-13 ENCOUNTER — APPOINTMENT (OUTPATIENT)
Dept: ULTRASOUND IMAGING | Facility: HOSPITAL | Age: 57
End: 2022-05-13

## 2022-05-13 ENCOUNTER — OFFICE VISIT (OUTPATIENT)
Dept: ONCOLOGY | Facility: CLINIC | Age: 57
End: 2022-05-13

## 2022-05-13 VITALS
OXYGEN SATURATION: 96 % | TEMPERATURE: 97.1 F | HEART RATE: 98 BPM | RESPIRATION RATE: 18 BRPM | WEIGHT: 291 LBS | BODY MASS INDEX: 38.57 KG/M2 | DIASTOLIC BLOOD PRESSURE: 99 MMHG | HEIGHT: 73 IN | SYSTOLIC BLOOD PRESSURE: 148 MMHG

## 2022-05-13 DIAGNOSIS — C85.98 LYMPHOMA OF LYMPH NODES OF MULTIPLE REGIONS, UNSPECIFIED LYMPHOMA TYPE: Primary | ICD-10-CM

## 2022-05-13 PROBLEM — C85.90 LYMPHOMA: Status: ACTIVE | Noted: 2022-05-13

## 2022-05-13 PROCEDURE — 99214 OFFICE O/P EST MOD 30 MIN: CPT | Performed by: INTERNAL MEDICINE

## 2022-05-13 NOTE — PROGRESS NOTES
Follow Up Office Visit      Date: 2022     Patient Name: Alvaro Sinha  MRN: [id number]  : 1965  Referring Physician: ER follow-up     Chief Complaint:  Follow-up for lymphadenopathy     History of Present Illness: Alvaro Sinha is a pleasant 57 y.o. male past medical history of type 2 diabetes, hypertension, gout who presents today for evaluation of lymphadenopathy. The patient is accompanied by their wife who contributes to the history of their care.  The patient was recently seen in the ED for significant back and abdominal discomfort.  He had a CT abdomen/pelvis which was notable for a 5 mm kidney stone along with nonspecific retroperitoneal and inguinal adenopathy.  He denies feeling any enlarged lymph nodes in his groin area.  He denies any unexplained fevers, night sweats, weight loss.  He denies any family history of blood related cancers.  He does note a family history of a grandfather with prostate cancer and may be a family member with colon cancer.  He is not up-to-date on colon cancer screening.  He denies ever having a PSA checked.  He is a non-smoker.  He otherwise feels well today and is compliant with his home medications    Interval History:  Presents clinic for follow-up.  Overall continues to do well.  Denies any recent fevers, chills, night sweats, weight loss    Oncology History:    Oncology/Hematology History    No history exists.       Subjective      Review of Systems:   Constitutional: Negative for fevers, chills, or weight loss  Eyes: Negative for blurred vision or discharge         Ear/Nose/Throat: Negative for difficulty swallowing, sore throat, LAD                                                       Respiratory: Negative for cough, SOA, wheezing                                                                                        Cardiovascular: Negative for chest pain or palpitations                                                                   Gastrointestinal: Negative for nausea, vomiting or diarrhea                                                                     Genitourinary: Negative for dysuria or hematuria                                                                                           Musculoskeletal: Negative for any joint pains or muscle aches                                                                        Neurologic: Negative for any weakness, headaches, dizziness                                                                         Hematologic: Negative for any easy bleeding or bruising                                                                                   Psychiatric: Negative for anxiety or depression                          Past Medical History/Past Surgical History/ Family History/ Social History: Reviewed by me and unchanged from my previous documentation done on April 2022.     Medications:     Current Outpatient Medications:   •  clopidogrel (PLAVIX) 75 MG tablet, Take 75 mg by mouth Daily., Disp: , Rfl:   •  colchicine 0.6 MG tablet, Take 0.6 mg by mouth As Needed., Disp: , Rfl:   •  Dulaglutide (Trulicity) 3 MG/0.5ML solution pen-injector, Inject  under the skin into the appropriate area as directed. 3MG ONCE A WEEK, Disp: , Rfl:   •  ergocalciferol (ERGOCALCIFEROL) 1.25 MG (76937 UT) capsule, Take 1,250 mcg by mouth Daily., Disp: , Rfl:   •  glimepiride (AMARYL) 4 MG tablet, Take 4 mg by mouth Daily., Disp: , Rfl:   •  HYDROcodone-acetaminophen (NORCO) 7.5-325 MG per tablet, Take 1 tablet by mouth Every 6 (Six) Hours As Needed for Moderate Pain ., Disp: 20 tablet, Rfl: 0  •  lisinopril (PRINIVIL,ZESTRIL) 40 MG tablet, Take 40 mg by mouth Daily., Disp: , Rfl:   •  metFORMIN (GLUCOPHAGE) 1000 MG tablet, Take 1,000 mg by mouth 2 (Two) Times a Day With Meals., Disp: , Rfl:   •  metoprolol succinate XL (TOPROL-XL) 50 MG 24 hr tablet, Take 50 mg by mouth Daily., Disp: , Rfl:   •  metoprolol succinate XL  "(TOPROL-XL) 50 MG 24 hr tablet, Take 50 mg by mouth Daily., Disp: , Rfl:   •  ondansetron ODT (ZOFRAN-ODT) 4 MG disintegrating tablet, Place 1 tablet on the tongue Every 6 (Six) Hours As Needed for Nausea., Disp: 20 tablet, Rfl: 0  •  sildenafil (VIAGRA) 100 MG tablet, Take 100 mg by mouth Daily As Needed., Disp: , Rfl:   No current facility-administered medications for this visit.    Allergies:   Allergies   Allergen Reactions   • Statins Myalgia       Objective     Physical Exam:  Vital Signs:   Vitals:    05/13/22 1421   BP: 148/99   Pulse: 98   Resp: 18   Temp: 97.1 °F (36.2 °C)   TempSrc: Temporal   SpO2: 96%   Weight: 132 kg (291 lb)   Height: 185.4 cm (72.99\")   PainSc: 0-No pain     Pain Score    05/13/22 1421   PainSc: 0-No pain     ECOG Performance Status: 0 - Asymptomatic    Constitutional: NAD, ECOG 0  Eyes: PERRLA, scleral anicteric  ENT: No LAD, no thyromegaly  Respiratory: CTAB, no wheezing, rales, rhonchi  Cardiovascular: RRR, no murmurs, pulses 2+ bilaterally  Abdomen: soft, NT/ND, no HSM  Musculoskeletal: strength 5/5 bilaterally, no c/c/e  Neurologic: A&O x 3, CN II-XII intact grossly    Results Review:   Hospital Outpatient Visit on 05/12/2022   Component Date Value Ref Range Status   • Creatinine 05/12/2022 1.50 (A) 0.60 - 1.30 mg/dL Final    Serial Number: [id number]:  [id number]       CT Abdomen Pelvis With Contrast    Result Date: 5/12/2022  Narrative: CT ABDOMEN PELVIS W CONTRAST Date of Exam: 5/12/2022 15:10 EDT Indication: lymphadenopathy. Comparison Exams: 4/8/2022 Technique: Axial CT images were obtained of the abdomen and pelvis after the administration of 100 mL Isovue-300. Automatic exposure control and iterative reconstructive algorithms were utilized. FINDINGS: Limited views of the lung bases demonstrate small nodular densities in the left lung base measuring up to 1.4 cm. These were not definitely present on prior exam. There are at least 3. There are enlarged lymph nodes in the " lower mediastinum, there is a paraesophageal lymph node which measures 1.8 cm in diameter, previously 1.9 cm it appears smaller than on prior exam. There are lymph nodes in the pericardial fat measuring up to 1.4 cm, previously 1.4 cm. The liver is normal in appearance though there is enlargement of the liver. There is cholelithiasis without evidence of cholecystitis. Bilateral adrenal glands are unremarkable. The pancreas is unremarkable. Splenomegaly is again identified measuring 19 x 8.4 cm, previously 19 x 9.1 cm. There is nonspecific stranding surrounding the bilateral kidneys. There is resolution of the right-sided hydronephrosis and hydroureter. Obstructing stone has resolved. The bladder is decompressed. Symmetric enhancement of the kidneys bilaterally. Colon and appendix are normal. The small bowel is unremarkable. There is redemonstration of upper abdominal adenopathy. Representative celiac lymph node measures 3.2 cm in diameter, previously 3.2 cm. There are enlarged aortocaval lymph nodes which are unchanged from prior no new abnormal lymph nodes are identified. Overall the size and appearance of the lymph nodes is not significantly changed compared to prior exam. There are also a few enlarged pelvic sidewall lymph nodes which are unchanged. There are slightly enlarged inguinal lymph nodes which are not significant changed compared to prior exam. No aggressive appearing lytic or sclerotic bone lesions are identified.     Impression: 1. No significant change in splenomegaly and adenopathy within the lower chest and in the abdomen and pelvis. Lymphoma remains highest on differential. 2. Interval development of at least 3 spiculated nodules in the left lung base, which were not present 1 month prior. Favor infectious or inflammatory etiology. Electronically Signed: Elvis Medrano MD 5/12/2022 18:16 EDT      Assessment / Plan      Assessment/Plan:   1. 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 Efforts were made to edit the dictations, but occasionally words are mistranscribed.

## 2022-05-27 ENCOUNTER — HOSPITAL ENCOUNTER (OUTPATIENT)
Dept: PET IMAGING | Facility: HOSPITAL | Age: 57
Discharge: HOME | End: 2022-05-27

## 2022-05-27 DIAGNOSIS — C85.98 LYMPHOMA OF LYMPH NODES OF MULTIPLE REGIONS, UNSPECIFIED LYMPHOMA TYPE: ICD-10-CM

## 2022-05-27 LAB — GLUCOSE BLDC GLUCOMTR-MCNC: 146 MG/DL (ref 70–130)

## 2022-05-27 PROCEDURE — 78816 PET IMAGE W/CT FULL BODY: CPT

## 2022-05-27 PROCEDURE — 0 FLUDEOXYGLUCOSE F18 SOLUTION: Performed by: INTERNAL MEDICINE

## 2022-05-27 PROCEDURE — 82962 GLUCOSE BLOOD TEST: CPT

## 2022-05-27 PROCEDURE — A9552 F18 FDG: HCPCS | Performed by: INTERNAL MEDICINE

## 2022-05-27 RX ADMIN — FLUDEOXYGLUCOSE F18 1 DOSE: 300 INJECTION INTRAVENOUS at 08:19

## 2022-06-03 ENCOUNTER — TELEPHONE (OUTPATIENT)
Dept: ONCOLOGY | Facility: CLINIC | Age: 57
End: 2022-06-03

## 2022-06-03 ENCOUNTER — PREP FOR SURGERY (OUTPATIENT)
Dept: OTHER | Facility: HOSPITAL | Age: 57
End: 2022-06-03

## 2022-06-03 ENCOUNTER — OFFICE VISIT (OUTPATIENT)
Dept: ONCOLOGY | Facility: CLINIC | Age: 57
End: 2022-06-03

## 2022-06-03 VITALS
DIASTOLIC BLOOD PRESSURE: 90 MMHG | WEIGHT: 284.1 LBS | HEIGHT: 73 IN | HEART RATE: 107 BPM | BODY MASS INDEX: 37.65 KG/M2 | RESPIRATION RATE: 16 BRPM | TEMPERATURE: 97.8 F | OXYGEN SATURATION: 98 % | SYSTOLIC BLOOD PRESSURE: 139 MMHG

## 2022-06-03 DIAGNOSIS — R59.1 LYMPHADENOPATHY: Primary | ICD-10-CM

## 2022-06-03 DIAGNOSIS — C85.98 LYMPHOMA OF LYMPH NODES OF MULTIPLE REGIONS, UNSPECIFIED LYMPHOMA TYPE: Primary | ICD-10-CM

## 2022-06-03 PROCEDURE — 99214 OFFICE O/P EST MOD 30 MIN: CPT | Performed by: INTERNAL MEDICINE

## 2022-06-03 RX ORDER — ROSUVASTATIN CALCIUM 20 MG/1
20 TABLET, COATED ORAL DAILY
COMMUNITY
Start: 2022-05-24

## 2022-06-03 RX ORDER — LIDOCAINE HYDROCHLORIDE 40 MG/ML
4 INJECTION, SOLUTION RETROBULBAR; TOPICAL ONCE
OUTPATIENT
Start: 2022-06-03 | End: 2022-06-03

## 2022-06-03 RX ORDER — SODIUM CHLORIDE 9 MG/ML
125 INJECTION, SOLUTION INTRAVENOUS CONTINUOUS
OUTPATIENT
Start: 2022-06-03

## 2022-06-03 RX ORDER — DAPAGLIFLOZIN 5 MG/1
5 TABLET, FILM COATED ORAL DAILY
COMMUNITY
Start: 2022-05-26

## 2022-06-03 RX ORDER — SODIUM CHLORIDE 0.9 % (FLUSH) 0.9 %
3 SYRINGE (ML) INJECTION EVERY 12 HOURS SCHEDULED
OUTPATIENT
Start: 2022-06-03

## 2022-06-03 RX ORDER — DULAGLUTIDE 4.5 MG/.5ML
INJECTION, SOLUTION SUBCUTANEOUS
COMMUNITY
Start: 2022-05-25

## 2022-06-03 RX ORDER — CEPHALEXIN 500 MG/1
CAPSULE ORAL
COMMUNITY
Start: 2022-05-24

## 2022-06-03 RX ORDER — SODIUM CHLORIDE 0.9 % (FLUSH) 0.9 %
10 SYRINGE (ML) INJECTION AS NEEDED
OUTPATIENT
Start: 2022-06-03

## 2022-06-03 RX ORDER — METOPROLOL SUCCINATE 100 MG/1
100 TABLET, EXTENDED RELEASE ORAL DAILY
COMMUNITY
Start: 2022-05-24

## 2022-06-03 RX ORDER — UBIDECARENONE 100 MG
CAPSULE ORAL
COMMUNITY

## 2022-06-03 NOTE — TELEPHONE ENCOUNTER
Caller: MAC     Relationship: DR THOMPSON OFFICE     Best call back number: [phone]    DIRECT LINE     What is the best time to reach you: ANYTIME 8-4:30    Who are you requesting to speak with (clinical staff, provider,  specific staff member): DR ARROYO         What was the call regarding:     DR ARROYO  HAD REFERRED PATIENT OVER TO DR THOMPSON TODAY 06/03 AND GOT THE PATIENT SCHEDULED AND WHEN CALLED TO TELL PATIENT HE ASKED TO JUST CANCEL THIS AND NOT RESCHEDULE     WANTED TO LET DR ARROYO BE AWARE OF THIS  AND FOLLOW UP IF NEEDED .            Do you require a callback: YES

## 2022-06-03 NOTE — PROGRESS NOTES
Follow Up Office Visit      Date: 2022     Patient Name: Alvaro Sinha  MRN: [id number]  : 1965  Referring Physician: ER follow-up     Chief Complaint:  Follow-up for lymphadenopathy     History of Present Illness: Alvaro Sinha is a pleasant 57 y.o. male past medical history of type 2 diabetes, hypertension, gout who presents today for evaluation of lymphadenopathy. The patient is accompanied by their wife who contributes to the history of their care.  The patient was recently seen in the ED for significant back and abdominal discomfort.  He had a CT abdomen/pelvis which was notable for a 5 mm kidney stone along with nonspecific retroperitoneal and inguinal adenopathy.  He denies feeling any enlarged lymph nodes in his groin area.  He denies any unexplained fevers, night sweats, weight loss.  He denies any family history of blood related cancers.  He does note a family history of a grandfather with prostate cancer and may be a family member with colon cancer.  He is not up-to-date on colon cancer screening.  He denies ever having a PSA checked.  He is a non-smoker.  He otherwise feels well today and is compliant with his home medications     Interval History:  Presents clinic for follow-up.  Overall doing well at this time.  Denies any unexplained fevers, chills, night sweats, weight loss.    Oncology History:    Oncology/Hematology History    No history exists.       Subjective      Review of Systems:   Constitutional: Negative for fevers, chills, or weight loss  Eyes: Negative for blurred vision or discharge         Ear/Nose/Throat: Negative for difficulty swallowing, sore throat, LAD                                                       Respiratory: Negative for cough, SOA, wheezing                                                                                        Cardiovascular: Negative for chest pain or palpitations                                                                   Gastrointestinal: Negative for nausea, vomiting or diarrhea                                                                     Genitourinary: Negative for dysuria or hematuria                                                                                           Musculoskeletal: Negative for any joint pains or muscle aches                                                                        Neurologic: Negative for any weakness, headaches, dizziness                                                                         Hematologic: Negative for any easy bleeding or bruising                                                                                   Psychiatric: Negative for anxiety or depression                          Past Medical History/Past Surgical History/ Family History/ Social History: Reviewed by me and unchanged from my previous documentation done on May 2022.     Medications:     Current Outpatient Medications:   •  cephalexin (KEFLEX) 500 MG capsule, TAKE 1 CAPSULE BY MOUTH THREE TIMES A DAY FOR 10 DAYS, Disp: , Rfl:   •  clopidogrel (PLAVIX) 75 MG tablet, Take 75 mg by mouth Daily., Disp: , Rfl:   •  coenzyme Q10 100 MG capsule, CoQ-10 100 mg capsule  Take 1 capsule every day by oral route for 90 days.  take with statin to prevent muscle aches, Disp: , Rfl:   •  colchicine 0.6 MG tablet, Take 0.6 mg by mouth As Needed., Disp: , Rfl:   •  ergocalciferol (ERGOCALCIFEROL) 1.25 MG (83752 UT) capsule, Take 1,250 mcg by mouth Daily., Disp: , Rfl:   •  Farxiga 5 MG tablet tablet, Take 5 mg by mouth Daily., Disp: , Rfl:   •  glimepiride (AMARYL) 4 MG tablet, Take 4 mg by mouth Daily., Disp: , Rfl:   •  HYDROcodone-acetaminophen (NORCO) 7.5-325 MG per tablet, Take 1 tablet by mouth Every 6 (Six) Hours As Needed for Moderate Pain ., Disp: 20 tablet, Rfl: 0  •  lisinopril (PRINIVIL,ZESTRIL) 40 MG tablet, Take 40 mg by mouth Daily., Disp: , Rfl:   •  metFORMIN (GLUCOPHAGE) 1000 MG tablet, Take  "1,000 mg by mouth 2 (Two) Times a Day With Meals., Disp: , Rfl:   •  metoprolol succinate XL (TOPROL-XL) 100 MG 24 hr tablet, Take 100 mg by mouth Daily., Disp: , Rfl:   •  ondansetron ODT (ZOFRAN-ODT) 4 MG disintegrating tablet, Place 1 tablet on the tongue Every 6 (Six) Hours As Needed for Nausea., Disp: 20 tablet, Rfl: 0  •  rosuvastatin (CRESTOR) 20 MG tablet, Take 20 mg by mouth Daily., Disp: , Rfl:   •  sildenafil (VIAGRA) 100 MG tablet, Take 100 mg by mouth Daily As Needed., Disp: , Rfl:   •  silver sulfadiazine (SILVADENE, SSD) 1 % cream, , Disp: , Rfl:   •  SITagliptin (JANUVIA) 25 MG tablet, Take 25 mg by mouth Daily., Disp: , Rfl:   •  Trulicity 4.5 MG/0.5ML solution pen-injector, INJECT 0.5 ML EVERY WEEK BY SUBCUTANEOUS ROUTE FOR 90 DAYS., Disp: , Rfl:     Allergies:   Allergies   Allergen Reactions   • Statins Myalgia       Objective     Physical Exam:  Vital Signs:   Vitals:    06/03/22 1527   BP: 139/90   Pulse: 107   Resp: 16   Temp: 97.8 °F (36.6 °C)   TempSrc: Temporal   SpO2: 98%   Weight: 129 kg (284 lb 1.6 oz)   Height: 185.4 cm (73\")   PainSc: 0-No pain     Pain Score    06/03/22 1527   PainSc: 0-No pain     ECOG Performance Status: 0 - Asymptomatic    Constitutional: NAD, ECOG 0  Eyes: PERRLA, scleral anicteric  ENT: No LAD, no thyromegaly  Respiratory: CTAB, no wheezing, rales, rhonchi  Cardiovascular: RRR, no murmurs, pulses 2+ bilaterally  Abdomen: soft, NT/ND, no HSM  Musculoskeletal: strength 5/5 bilaterally, no c/c/e  Neurologic: A&O x 3, CN II-XII intact grossly    Results Review:   Hospital Outpatient Visit on 05/27/2022   Component Date Value Ref Range Status   • Glucose 05/27/2022 146 (A) 70 - 130 mg/dL Final    Meter: [id number] : [id number] Stoll       CT Abdomen Pelvis With Contrast    Result Date: 5/12/2022  Narrative: CT ABDOMEN PELVIS W CONTRAST Date of Exam: 5/12/2022 15:10 EDT Indication: lymphadenopathy. Comparison Exams: 4/8/2022 Technique: Axial CT images were " obtained of the abdomen and pelvis after the administration of 100 mL Isovue-300. Automatic exposure control and iterative reconstructive algorithms were utilized. FINDINGS: Limited views of the lung bases demonstrate small nodular densities in the left lung base measuring up to 1.4 cm. These were not definitely present on prior exam. There are at least 3. There are enlarged lymph nodes in the lower mediastinum, there is a paraesophageal lymph node which measures 1.8 cm in diameter, previously 1.9 cm it appears smaller than on prior exam. There are lymph nodes in the pericardial fat measuring up to 1.4 cm, previously 1.4 cm. The liver is normal in appearance though there is enlargement of the liver. There is cholelithiasis without evidence of cholecystitis. Bilateral adrenal glands are unremarkable. The pancreas is unremarkable. Splenomegaly is again identified measuring 19 x 8.4 cm, previously 19 x 9.1 cm. There is nonspecific stranding surrounding the bilateral kidneys. There is resolution of the right-sided hydronephrosis and hydroureter. Obstructing stone has resolved. The bladder is decompressed. Symmetric enhancement of the kidneys bilaterally. Colon and appendix are normal. The small bowel is unremarkable. There is redemonstration of upper abdominal adenopathy. Representative celiac lymph node measures 3.2 cm in diameter, previously 3.2 cm. There are enlarged aortocaval lymph nodes which are unchanged from prior no new abnormal lymph nodes are identified. Overall the size and appearance of the lymph nodes is not significantly changed compared to prior exam. There are also a few enlarged pelvic sidewall lymph nodes which are unchanged. There are slightly enlarged inguinal lymph nodes which are not significant changed compared to prior exam. No aggressive appearing lytic or sclerotic bone lesions are identified.     Impression: 1. No significant change in splenomegaly and adenopathy within the lower chest and  in the abdomen and pelvis. Lymphoma remains highest on differential. 2. Interval development of at least 3 spiculated nodules in the left lung base, which were not present 1 month prior. Favor infectious or inflammatory etiology. Electronically Signed: Elvis Medrano MD 5/12/2022 18:16 EDT    NM PET/CT Whole Body    Result Date: 5/27/2022  Narrative: DATE OF EXAM: 5/27/2022 8:18 AM  PROCEDURE: NM PET/CT WHOLE BODY-  INDICATIONS: lymphoma; C85.98-Non-Hodgkin lymphoma, unspecified, lymph nodes of multiple sites  COMPARISON: CT abdomen pelvis 5/12/2022.  TECHNIQUE: 10.07 mCi of F-18 labeled FDG was administered intravenously followed by PET imaging from the vertex of the calvarium through the toes. Low-dose non contrast CT imaging of the same body region was performed. Fused PET-CT images and 3D MIP PET images were utilized for interpretation. Blood glucose level at the time of injection was 146 mg/dl.   FINDINGS: The evaluation of the head and neck soft tissues demonstrates no evidence for significant abnormal radiopharmaceutical accumulation to indicate malignancy or metastatic disease.  The evaluation of the thoracic soft tissues demonstrates evidence for abnormal radiopharmaceutical accumulation corresponding to multiple enlarged mediastinal lymph nodes. The SUV maximum for this activity is 3.40 corresponding to a large subcarinal lymph node which measures approximately 3 cm in transverse dimension.  The evaluation of the abdominal and pelvic soft tissues demonstrates evidence for abnormal radiopharmaceutical accumulation corresponding to multiple enlarged lymph nodes within the upper aspect of the abdomen including the morales hepatis region. There is a large lymph node or lymph node mass seen just inferior to the proximal body and head of the pancreas within the upper abdomen. The SUV maximum for this activity is 4.80 corresponding to this large lymph node just inferior to the pancreas. This lymph node measures  approximately 3.3 x 5.7 cm in transverse by longitudinal dimensions on image #199. Note is also made of enlarged retroperitoneal lymph nodes with increased radiopharmaceutical activity. The expected physiologic activity throughout the liver and spleen, GI tract, and collecting system is noted.  The evaluation of the proximal appendicular and axial skeleton demonstrates no significant abnormal radiopharmaceutical accumulation to indicate osseous malignancy or metastatic disease. Likewise, the evaluation of the peripheral cutaneous soft tissues demonstrates no evidence for abnormal radiopharmaceutical accumulation to indicate a cutaneous soft tissue malignancy or metastatic focus.  The review of the CT images demonstrates evidence for lymphadenopathy throughout the mediastinum and upper to mid abdomen. Prominent lymph nodes are noted along the anterior inferior margin of the mediastinum anterior to the heart. There is scattered abnormal radiopharmaceutical accumulation corresponding to lymph nodes on PET imaging. Gallstones are noted in the gallbladder. No is also made of splenomegaly.      Impression: 1.  Evidence for malignant lymphadenopathy throughout the mediastinum as well as the upper to mid abdomen. The findings correlate with the presenting history of non-Hodgkin's lymphoma. 2.  Note is also made of splenomegaly which is nonspecific. The presence of splenomegaly may indicate involvement of the spleen. No significant focal abnormal radiopharmaceutical accumulation is seen within the spleen. 3.  No evidence for additional malignant involvement throughout the remainder of the study.  This report was finalized on 5/27/2022 10:51 AM by Jeremi Starkey MD.        Assessment / Plan      Assessment/Plan:   1. 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  I discussed his case with Dr. Bull who could potentially perform the procedure next week  -Patient hesitant about biopsy as he has had friends who have had biopsies and then had significant clinical decline  -Reassured the siphonous of a bronchoscopy however patient still hesitant  -Advised patient to speak with Dr. Bull to further assess the risk/benefit of the procedure         Follow Up:   Follow-up after bronchoscopy and biopsy     Brett Eden MD  Hematology and Oncology     Please note that portions of this note may have been completed with a voice recognition program. Efforts were made to edit the dictations, but occasionally words are mistranscribed.

## 2022-06-07 ENCOUNTER — APPOINTMENT (OUTPATIENT)
Dept: PREADMISSION TESTING | Facility: HOSPITAL | Age: 57
End: 2022-06-07

## 2024-04-02 ENCOUNTER — APPOINTMENT (OUTPATIENT)
Dept: GENERAL RADIOLOGY | Facility: HOSPITAL | Age: 59
DRG: 617 | End: 2024-04-02
Payer: COMMERCIAL

## 2024-04-02 ENCOUNTER — APPOINTMENT (OUTPATIENT)
Dept: MRI IMAGING | Facility: HOSPITAL | Age: 59
DRG: 617 | End: 2024-04-02
Payer: COMMERCIAL

## 2024-04-02 ENCOUNTER — HOSPITAL ENCOUNTER (INPATIENT)
Facility: HOSPITAL | Age: 59
LOS: 13 days | Discharge: HOME HEALTH | DRG: 617 | End: 2024-04-15
Attending: EMERGENCY MEDICINE | Admitting: INTERNAL MEDICINE
Payer: COMMERCIAL

## 2024-04-02 DIAGNOSIS — E13.621 DIABETIC ULCER OF RIGHT FOOT ASSOCIATED WITH OTHER SPECIFIED DIABETES MELLITUS, UNSPECIFIED PART OF FOOT, UNSPECIFIED ULCER STAGE: ICD-10-CM

## 2024-04-02 DIAGNOSIS — M86.9 OSTEOMYELITIS OF RIGHT FOOT, UNSPECIFIED TYPE: Primary | ICD-10-CM

## 2024-04-02 DIAGNOSIS — L97.519 DIABETIC ULCER OF RIGHT FOOT ASSOCIATED WITH OTHER SPECIFIED DIABETES MELLITUS, UNSPECIFIED PART OF FOOT, UNSPECIFIED ULCER STAGE: ICD-10-CM

## 2024-04-02 DIAGNOSIS — Z86.39 HISTORY OF DIABETES MELLITUS: ICD-10-CM

## 2024-04-02 DIAGNOSIS — L03.115 CELLULITIS OF RIGHT FOOT: ICD-10-CM

## 2024-04-02 PROBLEM — E11.51: Status: ACTIVE | Noted: 2022-05-24

## 2024-04-02 PROBLEM — E83.52 HYPERCALCEMIA: Status: ACTIVE | Noted: 2022-08-23

## 2024-04-02 PROBLEM — R76.8 ELEVATED SERUM IMMUNOGLOBULIN FREE LIGHT CHAINS: Status: ACTIVE | Noted: 2023-11-06

## 2024-04-02 PROBLEM — E11.21 MICROALBUMINURIC DIABETIC NEPHROPATHY: Status: ACTIVE | Noted: 2022-05-26

## 2024-04-02 PROBLEM — E78.5 HYPERLIPIDEMIA: Status: ACTIVE | Noted: 2022-05-24

## 2024-04-02 PROBLEM — Z87.39: Status: ACTIVE | Noted: 2022-11-28

## 2024-04-02 PROBLEM — E11.621 DIABETIC FOOT ULCER: Status: ACTIVE | Noted: 2022-02-08

## 2024-04-02 PROBLEM — N18.31 STAGE 3A CHRONIC KIDNEY DISEASE: Status: ACTIVE | Noted: 2023-02-26

## 2024-04-02 PROBLEM — L97.509 DIABETIC FOOT ULCER: Status: ACTIVE | Noted: 2022-02-08

## 2024-04-02 PROBLEM — R70.0 ELEVATED ERYTHROCYTE SEDIMENTATION RATE: Status: ACTIVE | Noted: 2022-08-23

## 2024-04-02 PROBLEM — E11.65 TYPE 2 DIABETES MELLITUS WITH HYPERGLYCEMIA: Status: ACTIVE | Noted: 2022-05-24

## 2024-04-02 PROBLEM — M10.9 GOUT: Status: ACTIVE | Noted: 2022-05-24

## 2024-04-02 LAB
ALBUMIN SERPL-MCNC: 3.7 G/DL (ref 3.5–5.2)
ALBUMIN/GLOB SERPL: 1 G/DL
ALP SERPL-CCNC: 90 U/L (ref 39–117)
ALT SERPL W P-5'-P-CCNC: 21 U/L (ref 1–41)
ANION GAP SERPL CALCULATED.3IONS-SCNC: 14 MMOL/L (ref 5–15)
AST SERPL-CCNC: 20 U/L (ref 1–40)
BASOPHILS # BLD AUTO: 0.01 10*3/MM3 (ref 0–0.2)
BASOPHILS NFR BLD AUTO: 0.1 % (ref 0–1.5)
BILIRUB SERPL-MCNC: 0.6 MG/DL (ref 0–1.2)
BUN SERPL-MCNC: 29 MG/DL (ref 6–20)
BUN/CREAT SERPL: 20.3 (ref 7–25)
CALCIUM SPEC-SCNC: 9 MG/DL (ref 8.6–10.5)
CHLORIDE SERPL-SCNC: 100 MMOL/L (ref 98–107)
CO2 SERPL-SCNC: 20 MMOL/L (ref 22–29)
CREAT SERPL-MCNC: 1.43 MG/DL (ref 0.76–1.27)
CRP SERPL-MCNC: 16.12 MG/DL (ref 0–0.5)
D-LACTATE SERPL-SCNC: 1.3 MMOL/L (ref 0.5–2)
DEPRECATED RDW RBC AUTO: 43.9 FL (ref 37–54)
EGFRCR SERPLBLD CKD-EPI 2021: 56.8 ML/MIN/1.73
EOSINOPHIL # BLD AUTO: 0.13 10*3/MM3 (ref 0–0.4)
EOSINOPHIL NFR BLD AUTO: 1.9 % (ref 0.3–6.2)
ERYTHROCYTE [DISTWIDTH] IN BLOOD BY AUTOMATED COUNT: 12.9 % (ref 12.3–15.4)
ERYTHROCYTE [SEDIMENTATION RATE] IN BLOOD: 94 MM/HR (ref 0–20)
GLOBULIN UR ELPH-MCNC: 3.6 GM/DL
GLUCOSE SERPL-MCNC: 152 MG/DL (ref 65–99)
HCT VFR BLD AUTO: 32.7 % (ref 37.5–51)
HGB BLD-MCNC: 10.8 G/DL (ref 13–17.7)
IMM GRANULOCYTES # BLD AUTO: 0.04 10*3/MM3 (ref 0–0.05)
IMM GRANULOCYTES NFR BLD AUTO: 0.6 % (ref 0–0.5)
LYMPHOCYTES # BLD AUTO: 0.81 10*3/MM3 (ref 0.7–3.1)
LYMPHOCYTES NFR BLD AUTO: 11.8 % (ref 19.6–45.3)
MCH RBC QN AUTO: 30.7 PG (ref 26.6–33)
MCHC RBC AUTO-ENTMCNC: 33 G/DL (ref 31.5–35.7)
MCV RBC AUTO: 92.9 FL (ref 79–97)
MONOCYTES # BLD AUTO: 0.58 10*3/MM3 (ref 0.1–0.9)
MONOCYTES NFR BLD AUTO: 8.4 % (ref 5–12)
MRSA DNA SPEC QL NAA+PROBE: NEGATIVE
NEUTROPHILS NFR BLD AUTO: 5.32 10*3/MM3 (ref 1.7–7)
NEUTROPHILS NFR BLD AUTO: 77.2 % (ref 42.7–76)
NRBC BLD AUTO-RTO: 0 /100 WBC (ref 0–0.2)
PLATELET # BLD AUTO: 162 10*3/MM3 (ref 140–450)
PMV BLD AUTO: 10.2 FL (ref 6–12)
POTASSIUM SERPL-SCNC: 4.2 MMOL/L (ref 3.5–5.2)
PROT SERPL-MCNC: 7.3 G/DL (ref 6–8.5)
RBC # BLD AUTO: 3.52 10*6/MM3 (ref 4.14–5.8)
SODIUM SERPL-SCNC: 134 MMOL/L (ref 136–145)
WBC NRBC COR # BLD AUTO: 6.89 10*3/MM3 (ref 3.4–10.8)

## 2024-04-02 PROCEDURE — 87186 SC STD MICRODIL/AGAR DIL: CPT | Performed by: EMERGENCY MEDICINE

## 2024-04-02 PROCEDURE — 85652 RBC SED RATE AUTOMATED: CPT | Performed by: EMERGENCY MEDICINE

## 2024-04-02 PROCEDURE — 87205 SMEAR GRAM STAIN: CPT | Performed by: EMERGENCY MEDICINE

## 2024-04-02 PROCEDURE — 73630 X-RAY EXAM OF FOOT: CPT

## 2024-04-02 PROCEDURE — 25010000002 VANCOMYCIN 10 G RECONSTITUTED SOLUTION: Performed by: EMERGENCY MEDICINE

## 2024-04-02 PROCEDURE — 83036 HEMOGLOBIN GLYCOSYLATED A1C: CPT | Performed by: STUDENT IN AN ORGANIZED HEALTH CARE EDUCATION/TRAINING PROGRAM

## 2024-04-02 PROCEDURE — 87040 BLOOD CULTURE FOR BACTERIA: CPT | Performed by: EMERGENCY MEDICINE

## 2024-04-02 PROCEDURE — G0378 HOSPITAL OBSERVATION PER HR: HCPCS

## 2024-04-02 PROCEDURE — 73718 MRI LOWER EXTREMITY W/O DYE: CPT

## 2024-04-02 PROCEDURE — 87641 MR-STAPH DNA AMP PROBE: CPT

## 2024-04-02 PROCEDURE — 99285 EMERGENCY DEPT VISIT HI MDM: CPT

## 2024-04-02 PROCEDURE — 80053 COMPREHEN METABOLIC PANEL: CPT | Performed by: EMERGENCY MEDICINE

## 2024-04-02 PROCEDURE — 87147 CULTURE TYPE IMMUNOLOGIC: CPT | Performed by: EMERGENCY MEDICINE

## 2024-04-02 PROCEDURE — 25810000003 SODIUM CHLORIDE 0.9 % SOLUTION: Performed by: EMERGENCY MEDICINE

## 2024-04-02 PROCEDURE — 25010000002 CEFTRIAXONE PER 250 MG: Performed by: EMERGENCY MEDICINE

## 2024-04-02 PROCEDURE — 87070 CULTURE OTHR SPECIMN AEROBIC: CPT | Performed by: EMERGENCY MEDICINE

## 2024-04-02 PROCEDURE — 85025 COMPLETE CBC W/AUTO DIFF WBC: CPT | Performed by: EMERGENCY MEDICINE

## 2024-04-02 PROCEDURE — 86140 C-REACTIVE PROTEIN: CPT | Performed by: EMERGENCY MEDICINE

## 2024-04-02 PROCEDURE — 87077 CULTURE AEROBIC IDENTIFY: CPT | Performed by: EMERGENCY MEDICINE

## 2024-04-02 PROCEDURE — 87185 SC STD ENZYME DETCJ PER NZM: CPT | Performed by: EMERGENCY MEDICINE

## 2024-04-02 PROCEDURE — 36415 COLL VENOUS BLD VENIPUNCTURE: CPT

## 2024-04-02 PROCEDURE — 83605 ASSAY OF LACTIC ACID: CPT | Performed by: EMERGENCY MEDICINE

## 2024-04-02 PROCEDURE — 99222 1ST HOSP IP/OBS MODERATE 55: CPT | Performed by: STUDENT IN AN ORGANIZED HEALTH CARE EDUCATION/TRAINING PROGRAM

## 2024-04-02 PROCEDURE — 84550 ASSAY OF BLOOD/URIC ACID: CPT | Performed by: STUDENT IN AN ORGANIZED HEALTH CARE EDUCATION/TRAINING PROGRAM

## 2024-04-02 RX ORDER — BISACODYL 5 MG/1
5 TABLET, DELAYED RELEASE ORAL DAILY PRN
Status: DISCONTINUED | OUTPATIENT
Start: 2024-04-02 | End: 2024-04-15 | Stop reason: HOSPADM

## 2024-04-02 RX ORDER — ACETAMINOPHEN 325 MG/1
650 TABLET ORAL EVERY 4 HOURS PRN
Status: DISCONTINUED | OUTPATIENT
Start: 2024-04-02 | End: 2024-04-15 | Stop reason: HOSPADM

## 2024-04-02 RX ORDER — BISACODYL 10 MG
10 SUPPOSITORY, RECTAL RECTAL DAILY PRN
Status: DISCONTINUED | OUTPATIENT
Start: 2024-04-02 | End: 2024-04-15 | Stop reason: HOSPADM

## 2024-04-02 RX ORDER — HYDROCODONE BITARTRATE AND ACETAMINOPHEN 5; 325 MG/1; MG/1
1 TABLET ORAL EVERY 4 HOURS PRN
Status: DISPENSED | OUTPATIENT
Start: 2024-04-02 | End: 2024-04-11

## 2024-04-02 RX ORDER — AMOXICILLIN 250 MG
2 CAPSULE ORAL 2 TIMES DAILY PRN
Status: DISCONTINUED | OUTPATIENT
Start: 2024-04-02 | End: 2024-04-15 | Stop reason: HOSPADM

## 2024-04-02 RX ORDER — POLYETHYLENE GLYCOL 3350 17 G/17G
17 POWDER, FOR SOLUTION ORAL DAILY PRN
Status: DISCONTINUED | OUTPATIENT
Start: 2024-04-02 | End: 2024-04-15 | Stop reason: HOSPADM

## 2024-04-02 RX ORDER — ALLOPURINOL 300 MG/1
300 TABLET ORAL DAILY
COMMUNITY

## 2024-04-02 RX ORDER — PIOGLITAZONEHYDROCHLORIDE 30 MG/1
30 TABLET ORAL DAILY
COMMUNITY

## 2024-04-02 RX ADMIN — CEFTRIAXONE 2000 MG: 2 INJECTION, POWDER, FOR SOLUTION INTRAMUSCULAR; INTRAVENOUS at 19:22

## 2024-04-02 RX ADMIN — VANCOMYCIN HYDROCHLORIDE 3000 MG: 10 INJECTION, POWDER, LYOPHILIZED, FOR SOLUTION INTRAVENOUS at 19:56

## 2024-04-02 NOTE — ED PROVIDER NOTES
[facility]    EMERGENCY DEPARTMENT ENCOUNTER      Pt Name: Alvaro Sinha  MRN: [id number]  YOB: 1965  Date of evaluation: 4/2/2024  Provider: Hank Sinha MD    CHIEF COMPLAINT       Chief Complaint   Patient presents with    Wound Check         HISTORY OF PRESENT ILLNESS   Alvaro Sinha is a 58 y.o. male who presents to the emergency department with complaint of possible infection in the R foot. Has had two small ulcerations that had been watched by podiatrist since December. Over the past wk, has developed significant redness/swelling beginning in the second toe on the R and extending proximally. Has not had any fever, chills. He is a diabetic.       Nursing notes were reviewed.    REVIEW OF SYSTEMS     ROS:  A chief complaint appropriate review of systems was completed and is negative except as noted in the HPI.      PAST MEDICAL HISTORY     Past Medical History:   Diagnosis Date    Diabetes mellitus     Hypertension          SURGICAL HISTORY       Past Surgical History:   Procedure Laterality Date    BONE RESECTION, RIB           CURRENT MEDICATIONS       Current Facility-Administered Medications:     acetaminophen (TYLENOL) tablet 650 mg, 650 mg, Oral, Q4H PRN, Niko Santiago MD, 650 mg at 04/04/24 2022    sennosides-docusate (PERICOLACE) 8.6-50 MG per tablet 2 tablet, 2 tablet, Oral, BID PRN **AND** polyethylene glycol (MIRALAX) packet 17 g, 17 g, Oral, Daily PRN **AND** bisacodyl (DULCOLAX) EC tablet 5 mg, 5 mg, Oral, Daily PRN, 5 mg at 04/05/24 0827 **AND** bisacodyl (DULCOLAX) suppository 10 mg, 10 mg, Rectal, Daily PRN, Niko Santiago MD    Calcium Replacement - Follow Nurse / BPA Driven Protocol, , Does not apply, PRN, Niko Santiago MD    [Held by provider] clopidogrel (PLAVIX) tablet 75 mg, 75 mg, Oral, Daily, Niko Santiago MD    colchicine tablet 0.6 mg, 0.6 mg, Oral, Daily, Francia Pitts MD, 0.6 mg at 04/06/24 0915    DAPTOmycin (CUBICIN) 800 mg in  sodium chloride 0.9 % 50 mL IVPB, 8 mg/kg (Adjusted), Intravenous, Q24H, Aung Jameson MD, Last Rate: 100 mL/hr at 04/05/24 1128, 800 mg at 04/05/24 1128    dextrose (D50W) (25 g/50 mL) IV injection 25 g, 25 g, Intravenous, Q15 Min PRN, Niko Santiago MD    dextrose (GLUTOSE) oral gel 15 g, 15 g, Oral, Q15 Min PRN, Niko Santiago MD    glipizide (GLUCOTROL) tablet 10 mg, 10 mg, Oral, QAM AC, Niko Santiago MD, 10 mg at 04/06/24 0915    glucagon (GLUCAGEN) injection 1 mg, 1 mg, Intramuscular, Q15 Min PRN, Niko Santiago MD    heparin (porcine) 5000 UNIT/ML injection 5,000 Units, 5,000 Units, Subcutaneous, Q8H, Niko Santiago MD, 5,000 Units at 04/06/24 0556    HYDROcodone-acetaminophen (NORCO) 5-325 MG per tablet 1 tablet, 1 tablet, Oral, Q4H PRN, Niko Santiago MD, 1 tablet at 04/05/24 2145    HYDROmorphone (DILAUDID) injection 0.5 mg, 0.5 mg, Intravenous, Q3H PRN, Francia Pitts MD    Insulin Lispro (humaLOG) injection 2-7 Units, 2-7 Units, Subcutaneous, 4x Daily AC & at Bedtime, Niko Santiago MD, 2 Units at 04/05/24 2148    lisinopril (PRINIVIL,ZESTRIL) tablet 40 mg, 40 mg, Oral, Daily, Niko Santigao MD, 40 mg at 04/06/24 0915    Magnesium Standard Dose Replacement - Follow Nurse / BPA Driven Protocol, , Does not apply, Pamela MICHELLE John L, MD    metoprolol succinate XL (TOPROL-XL) 24 hr tablet 100 mg, 100 mg, Oral, Daily, Niko Santiago MD, 100 mg at 04/06/24 0916    Phosphorus Replacement - Follow Nurse / BPA Driven Protocol, , Does not apply, PRN, Niko Santiago MD    pioglitazone (ACTOS) tablet 30 mg, 30 mg, Oral, Daily, Niko Santiago MD, 30 mg at 04/06/24 0916    piperacillin-tazobactam (ZOSYN) 3.375 g IVPB in 100 mL NS MBP (CD), 3.375 g, Intravenous, Q8H, Niko Santiago MD, 3.375 g at 04/06/24 0323    Potassium Replacement - Follow Nurse / BPA Driven Protocol, , Does not apply, PRN, Niko Santiago MD    sodium chloride 0.9 % flush 10 mL, 10 mL,  Intravenous, Q12H, Niko Santiago MD, 10 mL at 04/06/24 0916    sodium chloride 0.9 % flush 10 mL, 10 mL, Intravenous, Pamela MICHELLE John L, MD    sodium chloride 0.9 % infusion 40 mL, 40 mL, Intravenous, PRN, Niko Santiago MD    ALLERGIES     Statins    FAMILY HISTORY     History reviewed. No pertinent family history.       SOCIAL HISTORY       Social History     Socioeconomic History    Marital status:    Tobacco Use    Smoking status: Never    Smokeless tobacco: Never   Vaping Use    Vaping status: Never Used   Substance and Sexual Activity    Alcohol use: Yes     Comment: OCCASIONALLY    Drug use: Not Currently    Sexual activity: Defer         PHYSICAL EXAM    (up to 7 for level 4, 8 or more for level 5)     Vitals:    04/06/24 0500 04/06/24 0718 04/06/24 0915 04/06/24 1115   BP:  137/81 152/90 151/88   BP Location:  Left arm Left arm Left arm   Patient Position:  Lying Lying Lying   Pulse: 69 81 74 76   Resp:  18  18   Temp:  99.2 °F (37.3 °C)  98.8 °F (37.1 °C)   TempSrc:  Oral  Oral   SpO2: 99% 98% 93% 94%   Weight:       Height:           General: Awake, alert, no acute distress.  HEENT: Conjunctivae normal.  Neck: Trachea midline.  Cardiac: Heart regular rate, rhythm, no murmurs, rubs, or gallops  Lungs: Lungs are clear to auscultation, there is no wheezing, rhonchi, or rales. There is no use of accessory muscles.  Chest wall: There is no tenderness to palpation over the chest wall or over ribs  Abdomen: Abdomen is soft, nontender, nondistended. There are no firm or pulsatile masses, no rebound rigidity or guarding.   Musculoskeletal: No deformity.  Neuro: Alert and oriented x 4.  Dermatology: Significant redness/swelling to the second toe on the R extending up the dorsal foot. Small ulceration at plantar base of the second digit and ball of the foot as well. These are small in diameter but appear to extend deep. There is no crepitus, blistering, or necrosis.  Psych: Mentation is grossly  normal, cognition is grossly normal. Affect is appropriate.        DIAGNOSTIC RESULTS     EKG: All EKGs are interpreted by the Emergency Department Physician who either signs or Co-signs this chart in the absence of a cardiologist.    No orders to display         RADIOLOGY:   [x] Radiologist's Report Reviewed:  CT Angio Abdominal Aorta Bilateral Iliofem Runoff   Final Result   Impression:   1.No significant arterial stenosis identified. There is bilateral three-vessel runoff.   2.There is below-knee medial sclerosis presumably related to diabetic vasculopathy.   3.Nonspecific abdominal pelvic lymphadenopathy.   4.Other incidental nonemergent findings as detailed above.            Electronically Signed: Yannick Srivastava MD     4/4/2024 12:20 PM EDT     Workstation ID: [id number]      MRI Foot Right Without Contrast   Final Result   Impression:   Swelling of the second and third toes as well as a small soft tissue wound along the plantar aspect of the second metatarsophalangeal joint. There is minimal marrow edema seen within the second and third middle and distal phalanges as well as a small    joint effusion and subchondral edema seen at the second metatarsophalangeal joint. No definite loss of T1 cortical signal to suggest erosive changes. These findings most like represent reactive edema/effusion. However early osteomyelitis cannot be fully    excluded. Recommend continued clinical and imaging follow-up as warranted.            Electronically Signed: Shilo Torres MD     4/2/2024 9:15 PM EDT     Workstation ID: [id number]      XR Foot 3+ View Right   Final Result   No evidence of osteomyelitis. Questionable fracture at the base of the second proximal phalanx.         Electronically Signed: Harry Kim MD     4/2/2024 6:19 PM EDT     Workstation ID: [id number]          I ordered and independently reviewed the above noted radiographic studies.        LABS:    I have reviewed and interpreted all of the currently  available lab results from this visit (if applicable):  Results for orders placed or performed during the hospital encounter of 04/02/24   Wound Culture - Wound, Foot, Right    Specimen: Foot, Right; Wound   Result Value Ref Range    Wound Culture (A)      Heavy growth (4+) Streptococcus agalactiae (Group B)    Wound Culture Heavy growth (4+) Streptococcus mitis / oralis (A)     Wound Culture Moderate growth (3+) Normal Skin Flor     Gram Stain Moderate (3+) WBCs per low power field     Gram Stain Few (2+) Epithelial cells per low power field     Gram Stain Many (4+) Gram negative bacilli     Gram Stain Many (4+) Gram positive cocci        Susceptibility    Streptococcus mitis / oralis - BRYCE     Ceftriaxone  Susceptible ug/ml     Penicillin G  Susceptible ug/ml     Vancomycin  Susceptible ug/ml   Blood Culture - Blood, Arm, Right    Specimen: Arm, Right; Blood   Result Value Ref Range    Blood Culture No growth at 3 days    Blood Culture - Blood, Arm, Right    Specimen: Arm, Right; Blood   Result Value Ref Range    Blood Culture No growth at 3 days    MRSA Screen, PCR (Inpatient) - Swab, Nares    Specimen: Nares; Swab   Result Value Ref Range    MRSA PCR Negative Negative   Comprehensive Metabolic Panel    Specimen: Blood   Result Value Ref Range    Glucose 152 (H) 65 - 99 mg/dL    BUN 29 (H) 6 - 20 mg/dL    Creatinine 1.43 (H) 0.76 - 1.27 mg/dL    Sodium 134 (L) 136 - 145 mmol/L    Potassium 4.2 3.5 - 5.2 mmol/L    Chloride 100 98 - 107 mmol/L    CO2 20.0 (L) 22.0 - 29.0 mmol/L    Calcium 9.0 8.6 - 10.5 mg/dL    Total Protein 7.3 6.0 - 8.5 g/dL    Albumin 3.7 3.5 - 5.2 g/dL    ALT (SGPT) 21 1 - 41 U/L    AST (SGOT) 20 1 - 40 U/L    Alkaline Phosphatase 90 39 - 117 U/L    Total Bilirubin 0.6 0.0 - 1.2 mg/dL    Globulin 3.6 gm/dL    A/G Ratio 1.0 g/dL    BUN/Creatinine Ratio 20.3 7.0 - 25.0    Anion Gap 14.0 5.0 - 15.0 mmol/L    eGFR 56.8 (L) >60.0 mL/min/1.73   Sedimentation Rate    Specimen: Blood   Result Value  Ref Range    Sed Rate 94 (H) 0 - 20 mm/hr   C-reactive Protein    Specimen: Blood   Result Value Ref Range    C-Reactive Protein 16.12 (H) 0.00 - 0.50 mg/dL   Lactic Acid, Plasma    Specimen: Blood   Result Value Ref Range    Lactate 1.3 0.5 - 2.0 mmol/L   CBC Auto Differential    Specimen: Blood   Result Value Ref Range    WBC 6.89 3.40 - 10.80 10*3/mm3    RBC 3.52 (L) 4.14 - 5.80 10*6/mm3    Hemoglobin 10.8 (L) 13.0 - 17.7 g/dL    Hematocrit 32.7 (L) 37.5 - 51.0 %    MCV 92.9 79.0 - 97.0 fL    MCH 30.7 26.6 - 33.0 pg    MCHC 33.0 31.5 - 35.7 g/dL    RDW 12.9 12.3 - 15.4 %    RDW-SD 43.9 37.0 - 54.0 fl    MPV 10.2 6.0 - 12.0 fL    Platelets 162 140 - 450 10*3/mm3    Neutrophil % 77.2 (H) 42.7 - 76.0 %    Lymphocyte % 11.8 (L) 19.6 - 45.3 %    Monocyte % 8.4 5.0 - 12.0 %    Eosinophil % 1.9 0.3 - 6.2 %    Basophil % 0.1 0.0 - 1.5 %    Immature Grans % 0.6 (H) 0.0 - 0.5 %    Neutrophils, Absolute 5.32 1.70 - 7.00 10*3/mm3    Lymphocytes, Absolute 0.81 0.70 - 3.10 10*3/mm3    Monocytes, Absolute 0.58 0.10 - 0.90 10*3/mm3    Eosinophils, Absolute 0.13 0.00 - 0.40 10*3/mm3    Basophils, Absolute 0.01 0.00 - 0.20 10*3/mm3    Immature Grans, Absolute 0.04 0.00 - 0.05 10*3/mm3    nRBC 0.0 0.0 - 0.2 /100 WBC   Basic Metabolic Panel    Specimen: Blood   Result Value Ref Range    Glucose 108 (H) 65 - 99 mg/dL    BUN 24 (H) 6 - 20 mg/dL    Creatinine 1.18 0.76 - 1.27 mg/dL    Sodium 139 136 - 145 mmol/L    Potassium 4.3 3.5 - 5.2 mmol/L    Chloride 106 98 - 107 mmol/L    CO2 22.0 22.0 - 29.0 mmol/L    Calcium 8.5 (L) 8.6 - 10.5 mg/dL    BUN/Creatinine Ratio 20.3 7.0 - 25.0    Anion Gap 11.0 5.0 - 15.0 mmol/L    eGFR 71.5 >60.0 mL/min/1.73   CBC (No Diff)    Specimen: Blood   Result Value Ref Range    WBC 4.83 3.40 - 10.80 10*3/mm3    RBC 3.28 (L) 4.14 - 5.80 10*6/mm3    Hemoglobin 10.0 (L) 13.0 - 17.7 g/dL    Hematocrit 30.4 (L) 37.5 - 51.0 %    MCV 92.7 79.0 - 97.0 fL    MCH 30.5 26.6 - 33.0 pg    MCHC 32.9 31.5 - 35.7 g/dL     RDW 12.8 12.3 - 15.4 %    RDW-SD 43.5 37.0 - 54.0 fl    MPV 10.3 6.0 - 12.0 fL    Platelets 148 140 - 450 10*3/mm3   Magnesium    Specimen: Blood   Result Value Ref Range    Magnesium 1.7 1.6 - 2.6 mg/dL   Hemoglobin A1c    Specimen: Blood   Result Value Ref Range    Hemoglobin A1C 8.50 (H) 4.80 - 5.60 %   Uric Acid    Specimen: Blood   Result Value Ref Range    Uric Acid 7.4 (H) 3.4 - 7.0 mg/dL   CK    Specimen: Blood   Result Value Ref Range    Creatine Kinase 88 20 - 200 U/L   CK    Specimen: Blood   Result Value Ref Range    Creatine Kinase 45 20 - 200 U/L   Comprehensive Metabolic Panel    Specimen: Blood   Result Value Ref Range    Glucose 105 (H) 65 - 99 mg/dL    BUN 16 6 - 20 mg/dL    Creatinine 1.23 0.76 - 1.27 mg/dL    Sodium 140 136 - 145 mmol/L    Potassium 4.6 3.5 - 5.2 mmol/L    Chloride 105 98 - 107 mmol/L    CO2 26.0 22.0 - 29.0 mmol/L    Calcium 8.7 8.6 - 10.5 mg/dL    Total Protein 6.2 6.0 - 8.5 g/dL    Albumin 3.5 3.5 - 5.2 g/dL    ALT (SGPT) 23 1 - 41 U/L    AST (SGOT) 22 1 - 40 U/L    Alkaline Phosphatase 98 39 - 117 U/L    Total Bilirubin 0.6 0.0 - 1.2 mg/dL    Globulin 2.7 gm/dL    A/G Ratio 1.3 g/dL    BUN/Creatinine Ratio 13.0 7.0 - 25.0    Anion Gap 9.0 5.0 - 15.0 mmol/L    eGFR 68.1 >60.0 mL/min/1.73   Magnesium    Specimen: Blood   Result Value Ref Range    Magnesium 1.8 1.6 - 2.6 mg/dL   Phosphorus    Specimen: Blood   Result Value Ref Range    Phosphorus 3.2 2.5 - 4.5 mg/dL   C-reactive Protein    Specimen: Blood   Result Value Ref Range    C-Reactive Protein 9.71 (H) 0.00 - 0.50 mg/dL   CBC Auto Differential    Specimen: Blood   Result Value Ref Range    WBC 5.10 3.40 - 10.80 10*3/mm3    RBC 3.44 (L) 4.14 - 5.80 10*6/mm3    Hemoglobin 10.3 (L) 13.0 - 17.7 g/dL    Hematocrit 31.6 (L) 37.5 - 51.0 %    MCV 91.9 79.0 - 97.0 fL    MCH 29.9 26.6 - 33.0 pg    MCHC 32.6 31.5 - 35.7 g/dL    RDW 12.9 12.3 - 15.4 %    RDW-SD 43.4 37.0 - 54.0 fl    MPV 10.2 6.0 - 12.0 fL    Platelets 178 140 -  450 10*3/mm3    Neutrophil % 71.0 42.7 - 76.0 %    Lymphocyte % 14.1 (L) 19.6 - 45.3 %    Monocyte % 8.6 5.0 - 12.0 %    Eosinophil % 5.1 0.3 - 6.2 %    Basophil % 0.6 0.0 - 1.5 %    Immature Grans % 0.6 (H) 0.0 - 0.5 %    Neutrophils, Absolute 3.62 1.70 - 7.00 10*3/mm3    Lymphocytes, Absolute 0.72 0.70 - 3.10 10*3/mm3    Monocytes, Absolute 0.44 0.10 - 0.90 10*3/mm3    Eosinophils, Absolute 0.26 0.00 - 0.40 10*3/mm3    Basophils, Absolute 0.03 0.00 - 0.20 10*3/mm3    Immature Grans, Absolute 0.03 0.00 - 0.05 10*3/mm3    nRBC 0.0 0.0 - 0.2 /100 WBC   Sedimentation Rate    Specimen: Blood   Result Value Ref Range    Sed Rate 100 (H) 0 - 20 mm/hr   Comprehensive Metabolic Panel    Specimen: Blood   Result Value Ref Range    Glucose 121 (H) 65 - 99 mg/dL    BUN 19 6 - 20 mg/dL    Creatinine 1.47 (H) 0.76 - 1.27 mg/dL    Sodium 136 136 - 145 mmol/L    Potassium 4.5 3.5 - 5.2 mmol/L    Chloride 103 98 - 107 mmol/L    CO2 24.0 22.0 - 29.0 mmol/L    Calcium 8.7 8.6 - 10.5 mg/dL    Total Protein 6.3 6.0 - 8.5 g/dL    Albumin 3.5 3.5 - 5.2 g/dL    ALT (SGPT) 22 1 - 41 U/L    AST (SGOT) 21 1 - 40 U/L    Alkaline Phosphatase 109 39 - 117 U/L    Total Bilirubin 0.7 0.0 - 1.2 mg/dL    Globulin 2.8 gm/dL    A/G Ratio 1.3 g/dL    BUN/Creatinine Ratio 12.9 7.0 - 25.0    Anion Gap 9.0 5.0 - 15.0 mmol/L    eGFR 54.9 (L) >60.0 mL/min/1.73   Magnesium    Specimen: Blood   Result Value Ref Range    Magnesium 1.5 (L) 1.6 - 2.6 mg/dL   Phosphorus    Specimen: Blood   Result Value Ref Range    Phosphorus 3.4 2.5 - 4.5 mg/dL   C-reactive Protein    Specimen: Blood   Result Value Ref Range    C-Reactive Protein 11.91 (H) 0.00 - 0.50 mg/dL   Sedimentation Rate    Specimen: Blood   Result Value Ref Range    Sed Rate 98 (H) 0 - 20 mm/hr   CBC Auto Differential    Specimen: Blood   Result Value Ref Range    WBC 5.88 3.40 - 10.80 10*3/mm3    RBC 3.40 (L) 4.14 - 5.80 10*6/mm3    Hemoglobin 10.5 (L) 13.0 - 17.7 g/dL    Hematocrit 31.7 (L) 37.5 -  51.0 %    MCV 93.2 79.0 - 97.0 fL    MCH 30.9 26.6 - 33.0 pg    MCHC 33.1 31.5 - 35.7 g/dL    RDW 12.8 12.3 - 15.4 %    RDW-SD 43.8 37.0 - 54.0 fl    MPV 10.5 6.0 - 12.0 fL    Platelets 183 140 - 450 10*3/mm3    Neutrophil % 70.8 42.7 - 76.0 %    Lymphocyte % 15.1 (L) 19.6 - 45.3 %    Monocyte % 8.7 5.0 - 12.0 %    Eosinophil % 3.7 0.3 - 6.2 %    Basophil % 0.5 0.0 - 1.5 %    Immature Grans % 1.2 (H) 0.0 - 0.5 %    Neutrophils, Absolute 4.16 1.70 - 7.00 10*3/mm3    Lymphocytes, Absolute 0.89 0.70 - 3.10 10*3/mm3    Monocytes, Absolute 0.51 0.10 - 0.90 10*3/mm3    Eosinophils, Absolute 0.22 0.00 - 0.40 10*3/mm3    Basophils, Absolute 0.03 0.00 - 0.20 10*3/mm3    Immature Grans, Absolute 0.07 (H) 0.00 - 0.05 10*3/mm3    nRBC 0.0 0.0 - 0.2 /100 WBC   Magnesium    Specimen: Blood   Result Value Ref Range    Magnesium 2.2 1.6 - 2.6 mg/dL   Comprehensive Metabolic Panel    Specimen: Blood   Result Value Ref Range    Glucose 114 (H) 65 - 99 mg/dL    BUN 19 6 - 20 mg/dL    Creatinine 1.20 0.76 - 1.27 mg/dL    Sodium 133 (L) 136 - 145 mmol/L    Potassium 4.2 3.5 - 5.2 mmol/L    Chloride 100 98 - 107 mmol/L    CO2 24.0 22.0 - 29.0 mmol/L    Calcium 8.8 8.6 - 10.5 mg/dL    Total Protein 6.1 6.0 - 8.5 g/dL    Albumin 3.4 (L) 3.5 - 5.2 g/dL    ALT (SGPT) 23 1 - 41 U/L    AST (SGOT) 21 1 - 40 U/L    Alkaline Phosphatase 127 (H) 39 - 117 U/L    Total Bilirubin 0.5 0.0 - 1.2 mg/dL    Globulin 2.7 gm/dL    A/G Ratio 1.3 g/dL    BUN/Creatinine Ratio 15.8 7.0 - 25.0    Anion Gap 9.0 5.0 - 15.0 mmol/L    eGFR 70.1 >60.0 mL/min/1.73   C-reactive Protein    Specimen: Blood   Result Value Ref Range    C-Reactive Protein 9.73 (H) 0.00 - 0.50 mg/dL   CK    Specimen: Blood   Result Value Ref Range    Creatine Kinase 36 20 - 200 U/L   CBC Auto Differential    Specimen: Blood   Result Value Ref Range    WBC 5.40 3.40 - 10.80 10*3/mm3    RBC 3.44 (L) 4.14 - 5.80 10*6/mm3    Hemoglobin 10.5 (L) 13.0 - 17.7 g/dL    Hematocrit 31.8 (L) 37.5 - 51.0  %    MCV 92.4 79.0 - 97.0 fL    MCH 30.5 26.6 - 33.0 pg    MCHC 33.0 31.5 - 35.7 g/dL    RDW 13.0 12.3 - 15.4 %    RDW-SD 43.9 37.0 - 54.0 fl    MPV 10.1 6.0 - 12.0 fL    Platelets 210 140 - 450 10*3/mm3    Neutrophil % 70.4 42.7 - 76.0 %    Lymphocyte % 16.1 (L) 19.6 - 45.3 %    Monocyte % 7.2 5.0 - 12.0 %    Eosinophil % 4.6 0.3 - 6.2 %    Basophil % 0.6 0.0 - 1.5 %    Immature Grans % 1.1 (H) 0.0 - 0.5 %    Neutrophils, Absolute 3.80 1.70 - 7.00 10*3/mm3    Lymphocytes, Absolute 0.87 0.70 - 3.10 10*3/mm3    Monocytes, Absolute 0.39 0.10 - 0.90 10*3/mm3    Eosinophils, Absolute 0.25 0.00 - 0.40 10*3/mm3    Basophils, Absolute 0.03 0.00 - 0.20 10*3/mm3    Immature Grans, Absolute 0.06 (H) 0.00 - 0.05 10*3/mm3    nRBC 0.0 0.0 - 0.2 /100 WBC   POC Glucose Once    Specimen: Blood   Result Value Ref Range    Glucose 103 70 - 130 mg/dL   POC Glucose Once    Specimen: Blood   Result Value Ref Range    Glucose 135 (H) 70 - 130 mg/dL   POC Glucose Once    Specimen: Blood   Result Value Ref Range    Glucose 162 (H) 70 - 130 mg/dL   POC Glucose Once    Specimen: Blood   Result Value Ref Range    Glucose 170 (H) 70 - 130 mg/dL   POC Glucose Once    Specimen: Blood   Result Value Ref Range    Glucose 116 70 - 130 mg/dL   POC Glucose Once    Specimen: Blood   Result Value Ref Range    Glucose 176 (H) 70 - 130 mg/dL   POC Glucose Once    Specimen: Blood   Result Value Ref Range    Glucose 118 70 - 130 mg/dL   POC Glucose Once    Specimen: Blood   Result Value Ref Range    Glucose 147 (H) 70 - 130 mg/dL   POC Glucose Once    Specimen: Blood   Result Value Ref Range    Glucose 125 70 - 130 mg/dL   POC Glucose Once    Specimen: Blood   Result Value Ref Range    Glucose 190 (H) 70 - 130 mg/dL   POC Glucose Once    Specimen: Blood   Result Value Ref Range    Glucose 131 (H) 70 - 130 mg/dL   POC Glucose Once    Specimen: Blood   Result Value Ref Range    Glucose 158 (H) 70 - 130 mg/dL   POC Glucose Once    Specimen: Blood   Result  Value Ref Range    Glucose 138 (H) 70 - 130 mg/dL   POC Glucose Once    Specimen: Blood   Result Value Ref Range    Glucose 170 (H) 70 - 130 mg/dL   Doppler Ankle Brachial Index Single Level CAR   Result Value Ref Range    Upper arterial right arm brachial sys max 137     Upper arterial left arm brachial sys max 150     RIGHT POST TIBIAL SYS MAX nc     LEFT POST TIBIAL SYS MAX nc     RIGHT DORSALIS PEDIS SYS MAX nc     LEFT DORSALIS PEDIS SYS MAX nc     RIGHT GREAT TOE SYS      LEFT GREAT TOE SYS      RIGHT YRN RATIO nc     LEFT YRN RATIO nc     RIGHT TBI RATIO 0.85     LEFT TBI RATIO 0.85         If labs were ordered, I independently reviewed the results and considered them in treating the patient.      EMERGENCY DEPARTMENT COURSE and DIFFERENTIAL DIAGNOSIS/MDM:   Vitals:  AS OF 11:37 EDT    BP - 151/88  HR - 76  TEMP - 98.8 °F (37.1 °C) (Oral)  O2 SATS - 94%        Discussion below represents my analysis of pertinent findings related to patient's condition, differential diagnosis, treatment plan and final disposition.      Differential diagnosis:  The differential diagnosis associated with the patient's presentation includes: cellulitis, osteomyelitis, sepsis      Independent interpretations (ECG/rhythm strip/X-ray/US/CT scan): I independently interpreted the pt R foot XR and cardiac monitor - there is no evidence of fracture and the pt is in NSR      Patient's care impacted by:   [x] Diabetes   [] Hypertension   [] Coronary Artery Disease   [] Cancer   [] Other:     Care significantly affected by Social Determinants of Health (housing and economic circumstances, unemployment)    [] Yes     [x] No   If yes, Patient's care significantly limited by  Social Determinants of Health including:    [] Inadequate housing    [] Low income    [] Alcoholism and drug addiction in family    [] Problems related to primary support group    [] Unemployment    [] Problems related to employment    [] Other Social  Determinants of Health:       Consideration of admission/observation vs discharge: Pt w findings concerning for osteo and req admission      I considered prescription management with:    [] Pain medication:   [] Antiviral:   [x] Antibiotic: Started Vanc and rocephin   [] Other:    ED Course:    ED Course as of 04/06/24 1137   Tue Apr 02, 2024 2126 I spoke with the transfer physician at [facility].  I discussed history, presentation, workup.  They are on divert at this time and cannot accept the patient. [NS]   2149 I spoke with [facility].  They have no available beds at this time.  Will place the patient on a wait list.  We discussed all of the patient's findings including physical exam, labs, and imaging. [NS]   2209 I discussed case with hospitalist Dr. Jauregui.  I discussed patient history, presentation, workup.  Excess patient for admission.  Relayed since that he has accepted the patient and placed him on a wait list. [NS]      ED Course User Index  [NS] Hank Sinha MD           PROCEDURES:  Procedures    CRITICAL CARE TIME        FINAL IMPRESSION      1. Osteomyelitis of right foot, unspecified type    2. Cellulitis of right foot    3. Diabetic ulcer of right foot associated with other specified diabetes mellitus, unspecified part of foot, unspecified ulcer stage    4. History of diabetes mellitus          DISPOSITION/PLAN     ED Disposition       ED Disposition   Decision to Admit    Condition   --    Comment   Level of Care: Telemetry [5]   Diagnosis: Osteomyelitis [[id number]]   Admitting Physician: CELSO JAUREGUI [[id number]]   Attending Physician: CELSO JAUREGUI [[id number]]                   Comment: Please note this report has been produced using speech recognition software.      Hank Sinha MD  Attending Emergency Physician             Hank Sinha MD  04/06/24 1139

## 2024-04-02 NOTE — Clinical Note
Level of Care: Telemetry [5]   Diagnosis: Osteomyelitis [[id number]]   Admitting Physician: CELSO JAUREGUI [[id number]]   Attending Physician: CELSO JAUREGUI [[id number]]

## 2024-04-03 ENCOUNTER — APPOINTMENT (OUTPATIENT)
Dept: CARDIOLOGY | Facility: HOSPITAL | Age: 59
DRG: 617 | End: 2024-04-03
Payer: COMMERCIAL

## 2024-04-03 LAB
ANION GAP SERPL CALCULATED.3IONS-SCNC: 11 MMOL/L (ref 5–15)
BH CV LOWER ARTERIAL LEFT ABI RATIO: NORMAL
BH CV LOWER ARTERIAL LEFT DORSALIS PEDIS SYS MAX: NORMAL
BH CV LOWER ARTERIAL LEFT GREAT TOE SYS MAX: 127
BH CV LOWER ARTERIAL LEFT POST TIBIAL SYS MAX: NORMAL
BH CV LOWER ARTERIAL LEFT TBI RATIO: 0.85
BH CV LOWER ARTERIAL RIGHT ABI RATIO: NORMAL
BH CV LOWER ARTERIAL RIGHT DORSALIS PEDIS SYS MAX: NORMAL
BH CV LOWER ARTERIAL RIGHT GREAT TOE SYS MAX: 128
BH CV LOWER ARTERIAL RIGHT POST TIBIAL SYS MAX: NORMAL
BH CV LOWER ARTERIAL RIGHT TBI RATIO: 0.85
BUN SERPL-MCNC: 24 MG/DL (ref 6–20)
BUN/CREAT SERPL: 20.3 (ref 7–25)
CALCIUM SPEC-SCNC: 8.5 MG/DL (ref 8.6–10.5)
CHLORIDE SERPL-SCNC: 106 MMOL/L (ref 98–107)
CK SERPL-CCNC: 88 U/L (ref 20–200)
CO2 SERPL-SCNC: 22 MMOL/L (ref 22–29)
CREAT SERPL-MCNC: 1.18 MG/DL (ref 0.76–1.27)
DEPRECATED RDW RBC AUTO: 43.5 FL (ref 37–54)
EGFRCR SERPLBLD CKD-EPI 2021: 71.5 ML/MIN/1.73
ERYTHROCYTE [DISTWIDTH] IN BLOOD BY AUTOMATED COUNT: 12.8 % (ref 12.3–15.4)
GLUCOSE BLDC GLUCOMTR-MCNC: 103 MG/DL (ref 70–130)
GLUCOSE BLDC GLUCOMTR-MCNC: 135 MG/DL (ref 70–130)
GLUCOSE BLDC GLUCOMTR-MCNC: 162 MG/DL (ref 70–130)
GLUCOSE BLDC GLUCOMTR-MCNC: 170 MG/DL (ref 70–130)
GLUCOSE SERPL-MCNC: 108 MG/DL (ref 65–99)
HBA1C MFR BLD: 8.5 % (ref 4.8–5.6)
HCT VFR BLD AUTO: 30.4 % (ref 37.5–51)
HGB BLD-MCNC: 10 G/DL (ref 13–17.7)
MAGNESIUM SERPL-MCNC: 1.7 MG/DL (ref 1.6–2.6)
MCH RBC QN AUTO: 30.5 PG (ref 26.6–33)
MCHC RBC AUTO-ENTMCNC: 32.9 G/DL (ref 31.5–35.7)
MCV RBC AUTO: 92.7 FL (ref 79–97)
PLATELET # BLD AUTO: 148 10*3/MM3 (ref 140–450)
PMV BLD AUTO: 10.3 FL (ref 6–12)
POTASSIUM SERPL-SCNC: 4.3 MMOL/L (ref 3.5–5.2)
RBC # BLD AUTO: 3.28 10*6/MM3 (ref 4.14–5.8)
SODIUM SERPL-SCNC: 139 MMOL/L (ref 136–145)
UPPER ARTERIAL LEFT ARM BRACHIAL SYS MAX: 150
UPPER ARTERIAL RIGHT ARM BRACHIAL SYS MAX: 137
URATE SERPL-MCNC: 7.4 MG/DL (ref 3.4–7)
WBC NRBC COR # BLD AUTO: 4.83 10*3/MM3 (ref 3.4–10.8)

## 2024-04-03 PROCEDURE — 85027 COMPLETE CBC AUTOMATED: CPT | Performed by: STUDENT IN AN ORGANIZED HEALTH CARE EDUCATION/TRAINING PROGRAM

## 2024-04-03 PROCEDURE — 94799 UNLISTED PULMONARY SVC/PX: CPT

## 2024-04-03 PROCEDURE — 25010000002 HEPARIN (PORCINE) PER 1000 UNITS: Performed by: STUDENT IN AN ORGANIZED HEALTH CARE EDUCATION/TRAINING PROGRAM

## 2024-04-03 PROCEDURE — 25010000002 DAPTOMYCIN PER 1 MG: Performed by: INTERNAL MEDICINE

## 2024-04-03 PROCEDURE — 80048 BASIC METABOLIC PNL TOTAL CA: CPT | Performed by: STUDENT IN AN ORGANIZED HEALTH CARE EDUCATION/TRAINING PROGRAM

## 2024-04-03 PROCEDURE — 25010000002 DAPTOMYCIN PER 1 MG: Performed by: SURGERY

## 2024-04-03 PROCEDURE — 93922 UPR/L XTREMITY ART 2 LEVELS: CPT | Performed by: INTERNAL MEDICINE

## 2024-04-03 PROCEDURE — 99232 SBSQ HOSP IP/OBS MODERATE 35: CPT | Performed by: INTERNAL MEDICINE

## 2024-04-03 PROCEDURE — 82550 ASSAY OF CK (CPK): CPT | Performed by: INTERNAL MEDICINE

## 2024-04-03 PROCEDURE — 25010000002 HEPARIN (PORCINE) PER 1000 UNITS: Performed by: SURGERY

## 2024-04-03 PROCEDURE — 93922 UPR/L XTREMITY ART 2 LEVELS: CPT

## 2024-04-03 PROCEDURE — 82948 REAGENT STRIP/BLOOD GLUCOSE: CPT

## 2024-04-03 PROCEDURE — 97162 PT EVAL MOD COMPLEX 30 MIN: CPT

## 2024-04-03 PROCEDURE — 83735 ASSAY OF MAGNESIUM: CPT | Performed by: STUDENT IN AN ORGANIZED HEALTH CARE EDUCATION/TRAINING PROGRAM

## 2024-04-03 PROCEDURE — 94660 CPAP INITIATION&MGMT: CPT

## 2024-04-03 PROCEDURE — 25010000002 PIPERACILLIN SOD-TAZOBACTAM PER 1 G: Performed by: STUDENT IN AN ORGANIZED HEALTH CARE EDUCATION/TRAINING PROGRAM

## 2024-04-03 RX ORDER — CLOPIDOGREL BISULFATE 75 MG/1
75 TABLET ORAL DAILY
Status: DISCONTINUED | OUTPATIENT
Start: 2024-04-03 | End: 2024-04-08

## 2024-04-03 RX ORDER — LISINOPRIL 20 MG/1
40 TABLET ORAL DAILY
Status: DISCONTINUED | OUTPATIENT
Start: 2024-04-03 | End: 2024-04-15 | Stop reason: HOSPADM

## 2024-04-03 RX ORDER — PIOGLITAZONEHYDROCHLORIDE 15 MG/1
30 TABLET ORAL DAILY
Status: DISCONTINUED | OUTPATIENT
Start: 2024-04-03 | End: 2024-04-15 | Stop reason: HOSPADM

## 2024-04-03 RX ORDER — NICOTINE POLACRILEX 4 MG
15 LOZENGE BUCCAL
Status: DISCONTINUED | OUTPATIENT
Start: 2024-04-03 | End: 2024-04-15 | Stop reason: HOSPADM

## 2024-04-03 RX ORDER — HEPARIN SODIUM 5000 [USP'U]/ML
5000 INJECTION, SOLUTION INTRAVENOUS; SUBCUTANEOUS EVERY 8 HOURS SCHEDULED
Status: DISCONTINUED | OUTPATIENT
Start: 2024-04-03 | End: 2024-04-15 | Stop reason: HOSPADM

## 2024-04-03 RX ORDER — DEXTROSE MONOHYDRATE 25 G/50ML
25 INJECTION, SOLUTION INTRAVENOUS
Status: DISCONTINUED | OUTPATIENT
Start: 2024-04-03 | End: 2024-04-15 | Stop reason: HOSPADM

## 2024-04-03 RX ORDER — GLIPIZIDE 5 MG/1
10 TABLET ORAL
Status: DISCONTINUED | OUTPATIENT
Start: 2024-04-03 | End: 2024-04-15 | Stop reason: HOSPADM

## 2024-04-03 RX ORDER — SODIUM CHLORIDE 0.9 % (FLUSH) 0.9 %
10 SYRINGE (ML) INJECTION EVERY 12 HOURS SCHEDULED
Status: DISCONTINUED | OUTPATIENT
Start: 2024-04-03 | End: 2024-04-15 | Stop reason: HOSPADM

## 2024-04-03 RX ORDER — ALLOPURINOL 300 MG/1
300 TABLET ORAL DAILY
Status: DISCONTINUED | OUTPATIENT
Start: 2024-04-03 | End: 2024-04-04

## 2024-04-03 RX ORDER — METOPROLOL SUCCINATE 50 MG/1
100 TABLET, EXTENDED RELEASE ORAL DAILY
Status: DISCONTINUED | OUTPATIENT
Start: 2024-04-03 | End: 2024-04-15 | Stop reason: HOSPADM

## 2024-04-03 RX ORDER — INSULIN LISPRO 100 [IU]/ML
2-7 INJECTION, SOLUTION INTRAVENOUS; SUBCUTANEOUS
Status: DISCONTINUED | OUTPATIENT
Start: 2024-04-03 | End: 2024-04-08

## 2024-04-03 RX ORDER — IBUPROFEN 600 MG/1
1 TABLET ORAL
Status: DISCONTINUED | OUTPATIENT
Start: 2024-04-03 | End: 2024-04-15 | Stop reason: HOSPADM

## 2024-04-03 RX ORDER — SODIUM CHLORIDE 9 MG/ML
40 INJECTION, SOLUTION INTRAVENOUS AS NEEDED
Status: DISCONTINUED | OUTPATIENT
Start: 2024-04-02 | End: 2024-04-15 | Stop reason: HOSPADM

## 2024-04-03 RX ORDER — SODIUM CHLORIDE 0.9 % (FLUSH) 0.9 %
10 SYRINGE (ML) INJECTION AS NEEDED
Status: DISCONTINUED | OUTPATIENT
Start: 2024-04-02 | End: 2024-04-15 | Stop reason: HOSPADM

## 2024-04-03 RX ADMIN — PIPERACILLIN AND TAZOBACTAM 3.38 G: 3; .375 INJECTION, POWDER, LYOPHILIZED, FOR SOLUTION INTRAVENOUS at 02:02

## 2024-04-03 RX ADMIN — LISINOPRIL 40 MG: 40 TABLET ORAL at 08:27

## 2024-04-03 RX ADMIN — PIPERACILLIN AND TAZOBACTAM 3.38 G: 3; .375 INJECTION, POWDER, LYOPHILIZED, FOR SOLUTION INTRAVENOUS at 08:27

## 2024-04-03 RX ADMIN — METOPROLOL SUCCINATE 100 MG: 50 TABLET, EXTENDED RELEASE ORAL at 08:26

## 2024-04-03 RX ADMIN — HEPARIN SODIUM 5000 UNITS: 5000 INJECTION INTRAVENOUS; SUBCUTANEOUS at 20:26

## 2024-04-03 RX ADMIN — PIPERACILLIN AND TAZOBACTAM 3.38 G: 3; .375 INJECTION, POWDER, LYOPHILIZED, FOR SOLUTION INTRAVENOUS at 14:04

## 2024-04-03 RX ADMIN — HEPARIN SODIUM 5000 UNITS: 5000 INJECTION INTRAVENOUS; SUBCUTANEOUS at 14:04

## 2024-04-03 RX ADMIN — DAPTOMYCIN 800 MG: 500 INJECTION, POWDER, LYOPHILIZED, FOR SOLUTION INTRAVENOUS at 12:14

## 2024-04-03 RX ADMIN — PIOGLITAZONE 30 MG: 15 TABLET ORAL at 08:27

## 2024-04-03 RX ADMIN — HEPARIN SODIUM 5000 UNITS: 5000 INJECTION INTRAVENOUS; SUBCUTANEOUS at 05:20

## 2024-04-03 RX ADMIN — GLIPIZIDE 10 MG: 10 TABLET ORAL at 08:27

## 2024-04-03 RX ADMIN — ALLOPURINOL 300 MG: 300 TABLET ORAL at 08:27

## 2024-04-03 RX ADMIN — PIPERACILLIN AND TAZOBACTAM 3.38 G: 3; .375 INJECTION, POWDER, LYOPHILIZED, FOR SOLUTION INTRAVENOUS at 22:41

## 2024-04-03 NOTE — PLAN OF CARE
Goal Outcome Evaluation:  Plan of Care Reviewed With: (P) patient        Progress: (P) no change  Outcome Evaluation: (P) PT completed evaluation for pt presenting with RLE foot wound and NWB status. Pt practiced hopping with FWW for 10 ft and ambulated 90 ft with knee scooter in hallway with CGA x1. Pt with impulsivity and was reminded of NWB status throughout session. Pt would benefit from continued skilled IPPT care. PT recommends D/C to home with assist when medically appropriate.      Anticipated Discharge Disposition (PT): (P) home with assist

## 2024-04-03 NOTE — H&P
[facility] Medicine Services  HISTORY AND PHYSICAL    Patient Name: Alvaro Sinha  : 1965  MRN: [id number]  Primary Care Physician: Martha Mckee MD  Date of admission: 2024      Subjective   Subjective     Chief Complaint:  Wound    HPI:  Alvaro Sinha is a 58 y.o. male with past medical history of lymphoma, type 2 diabetes, hypertension, hyperlipidemia, CKD, gout who is presenting with wound of right foot.  He reports that he has been following at [facility] for chronic ulceration of the right foot, has been mostly unchanged until 2 days ago when he noticed increased in erythema of the right foot extending to the leg.  He reports no fever but associated with full body chills.  He states that he has been using a boot to offload the foot.  Sugars at home running anywhere from 1  but can occasionally get into the 300s.  He is on 3 oral medicines right now for diabetes, has not taken his Trulicity in a while due to insurance coverage.  He does not smoke.  Currently feels well, foot is a little sore.  Reports no claudication type symptoms.  Otherwise no chest pain, shortness of breath, abdominal pain.  MRI showed concerns for early osteomyelitis of the second and third middle and distal phalanges of the right foot.  Due to lack of orthopedic coverage here, transfer was attempted at multiple locations and he was excepted at [facility] for surgical evaluation, however he was placed on a wait list and admitted to [facility] for IV antibiotics until then.      Personal History     Past Medical History:   Diagnosis Date    Diabetes mellitus     Hypertension          Oncology Problem List:  Lymphoma (2022; Status: Active)    Oncology/Hematology History     Past Surgical History:   Procedure Laterality Date    BONE RESECTION, RIB         Family History: family history is not on file.     Social History:  reports that he  has never smoked. He has never used smokeless tobacco. He reports current alcohol use. He reports that he does not currently use drugs.  Social History     Social History Narrative    Not on file       Medications:  Available home medication information reviewed.  Dulaglutide, HYDROcodone-acetaminophen, SITagliptin, allopurinol, cephalexin, clopidogrel, coenzyme Q10, colchicine, dapagliflozin, ergocalciferol, glimepiride, lisinopril, metFORMIN, metoprolol succinate XL, ondansetron ODT, pioglitazone, rosuvastatin, sildenafil, and silver sulfadiazine    Allergies   Allergen Reactions    Statins Myalgia       Objective   Objective     Vital Signs:   Temp:  [98.9 °F (37.2 °C)-99.2 °F (37.3 °C)] 99.2 °F (37.3 °C)  Heart Rate:  [67-97] 71  Resp:  [18] 18  BP: (130-162)/(68-91) 137/79       Physical Exam   Awake alert and oriented x 3  Resting comfortably in bed  Mucous membranes moist  Heart regular rate and rhythm  Lungs are clear to auscultation bilaterally  There is no abdominal tenderness or distention  There is right lower extremity edema and erythema, warm and tender to touch.  No areas of fluctuance or crepitus.  There is an open wound between the right second and third toes as well as an ulceration on the right plantar aspect of the foot.  The foot itself is warm with good capillary refill however due to edema dorsalis pedis and tibialis pulses are faint.  There is also Charcot deformity of the feet bilaterally.    Result Review:  I have personally reviewed the results from the time of this admission to 4/3/2024 00:14 EDT and agree with these findings:  [x]  Laboratory list / accordion  [x]  Microbiology  [x]  Radiology  []  EKG/Telemetry   []  Cardiology/Vascular   []  Pathology  [x]  Old records  []  Other:  Most notable findings include: See assessment and plan      LAB RESULTS:      Lab 04/02/24  1832   WBC 6.89   HEMOGLOBIN 10.8*   HEMATOCRIT 32.7*   PLATELETS 162   NEUTROS ABS 5.32   IMMATURE GRANS (ABS)  0.04   LYMPHS ABS 0.81   MONOS ABS 0.58   EOS ABS 0.13   MCV 92.9   SED RATE 94*   CRP 16.12*   LACTATE 1.3         Lab 04/02/24  1832   SODIUM 134*   POTASSIUM 4.2   CHLORIDE 100   CO2 20.0*   ANION GAP 14.0   BUN 29*   CREATININE 1.43*   EGFR 56.8*   GLUCOSE 152*   CALCIUM 9.0         Lab 04/02/24  1832   TOTAL PROTEIN 7.3   ALBUMIN 3.7   GLOBULIN 3.6   ALT (SGPT) 21   AST (SGOT) 20   BILIRUBIN 0.6   ALK PHOS 90                         Microbiology Results (last 10 days)       Procedure Component Value - Date/Time    MRSA Screen, PCR (Inpatient) - Swab, Nares [[id number]]  (Normal) Collected: 04/02/24 1903    Lab Status: Final result Specimen: Swab from Nares Updated: 04/02/24 2047     MRSA PCR Negative    Narrative:      The negative predictive value of this diagnostic test is high and should only be used to consider de-escalating anti-MRSA therapy. A positive result may indicate colonization with MRSA and must be correlated clinically.  MRSA Negative    Wound Culture - Wound, Foot, Right [[id number]] Collected: 04/02/24 1900    Lab Status: Preliminary result Specimen: Wound from Foot, Right Updated: 04/02/24 2117     Gram Stain Moderate (3+) WBCs per low power field      Few (2+) Epithelial cells per low power field      Many (4+) Gram negative bacilli      Many (4+) Gram positive cocci            MRI Foot Right Without Contrast    Result Date: 4/2/2024  MRI FOOT RIGHT WO CONTRAST Date of Exam: 4/2/2024 8:30 PM EDT Indication: eval for osteo R foot.  Comparison: Same day foot radiograph Technique:  Routine multiplanar/multisequence sequence images of the right foot were obtained without contrast administration. Findings: Bones and joints: Degenerative subchondral edema and cystic changes throughout the midfoot. Degenerative subchondral edema and cystic change of the first metatarsophalangeal joint as well as the first interphalangeal joint. Small effusion with subchondral edema at the second metatarsophalangeal  joint. Small bone marrow edema within the second and third distal and middle phalanges. No loss of normal T1 cortical or marrow signal. No evidence of acute fracture. No suspicious marrow placing lesions. Soft tissues: Diffuse subcutaneous edema. There is more pronounced edema seen in the second and third toes. No soft tissue mass or fluid collection seen. Fatty and edematous changes of the intrinsic foot muscles suggestive of neuropathic changes. Soft tissue wound seen along the plantar aspect of the second metatarsophalangeal joint. Visualized flexor and extensor tendons appear grossly intact. Plantar plates appear grossly intact. Lisfranc ligament appears grossly intact. No evidence of intermetatarsal bursitis or neuroma.     Impression: Impression: Swelling of the second and third toes as well as a small soft tissue wound along the plantar aspect of the second metatarsophalangeal joint. There is minimal marrow edema seen within the second and third middle and distal phalanges as well as a small joint effusion and subchondral edema seen at the second metatarsophalangeal joint. No definite loss of T1 cortical signal to suggest erosive changes. These findings most like represent reactive edema/effusion. However early osteomyelitis cannot be fully excluded. Recommend continued clinical and imaging follow-up as warranted. Electronically Signed: Shilo Torres MD  4/2/2024 9:15 PM EDT  Workstation ID: [id number]    XR Foot 3+ View Right    Result Date: 4/2/2024  XR FOOT 3+ VW RIGHT Date of Exam: 4/2/2024 6:03 PM EDT Indication: eval for osteo Comparison: None available. Findings: Spurring at the insertion of the Achilles tendon. Calcaneal spur. Osteoarthritic changes. The tarsal and metatarsal bones appear intact. Severe osteoarthritic changes of the first metatarsophalangeal joint. Questionable fracture at the base of the second proximal phalanx. No lytic or blastic disease.     Impression: No evidence of  osteomyelitis. Questionable fracture at the base of the second proximal phalanx. Electronically Signed: Harry Kim MD  4/2/2024 6:19 PM EDT  Workstation ID: [id number]         Assessment & Plan   Assessment & Plan       Osteomyelitis    Lymphoma    Type 2 diabetes mellitus with hyperglycemia    Diabetic foot ulcer    History of myalgia due to HMG CoA reductase inhibitor    Hypertensive disorder    Hyperlipidemia    Microalbuminuric diabetic nephropathy    Stage 3a chronic kidney disease    Nonhealing diabetic ulcer of the right foot with overlying cellulitis and concern for osteomyelitis  -Inflammatory markers are elevated otherwise patient is a normal white blood count normal blood pressure, normal heart rate and is afebrile.  He is not septic appearing.  MRI showed concerns for early osteomyelitis of the second and third middle and distal phalanges of the right foot. MRSA nares is negative, wound cultures polymicrobial so far.  -He is currently accepted at [facility] and on their wait list due to no orthopedic coverage this week at this location.  -In the meantime we will consult infectious disease, dietary, wound  -Will start on Zosyn and follow cultures  -ABIs ordered for the morning  -Holding home Plavix in anticipation of possible surgical intervention    Type 2 diabetes  -Updated A1c pending.  Sugars fluctuating at home.  Has been off Trulicity due to insurance coverage.  Will continue oral medications for now and use corrective factor insulin to keep in goal range    Stage III CKD due to diabetes  Hypertension  -Creatinine currently near baseline, continue to monitor  -Continue home lisinopril    Lymphoma  -History of lymphoma in chart, has been following with [facility] for this.  Ongoing workup, is not currently on any immunotherapy or immunosuppressive medications    Statin intolerance  -Rosuvastatin listed in medical history however no recent statin.  History of myalgia.  Would likely  benefit from some sort of therapy, consider initiating simvastatin with vitamin D supplementation.    Gout  -Continue allopurinol, check uric acid      DVT prophylaxis: [facility]  Medical DVT prophylaxis orders are present.          CODE STATUS:    Code Status and Medical Interventions:   Ordered at: 04/03/24 0013     Code Status (Patient has no pulse and is not breathing):    CPR (Attempt to Resuscitate)     Medical Interventions (Patient has pulse or is breathing):    Full Support       Expected Discharge   Expected discharge date/ time has not been documented.     Niko Santiago MD  04/03/24

## 2024-04-03 NOTE — PLAN OF CARE
Goal Outcome Evaluation:  Plan of Care Reviewed With: patient        Progress: improving     VSS, RA, A/Ox4. IV antibx infused per order. No complaints at this time. Will continue plan of care.   Problem: Adult Inpatient Plan of Care  Goal: Plan of Care Review  Outcome: Ongoing, Progressing  Flowsheets (Taken 4/3/2024 1508)  Progress: improving  Plan of Care Reviewed With: patient  Goal: Patient-Specific Goal (Individualized)  Outcome: Ongoing, Progressing  Goal: Absence of Hospital-Acquired Illness or Injury  Outcome: Ongoing, Progressing  Intervention: Identify and Manage Fall Risk  Recent Flowsheet Documentation  Taken 4/3/2024 1400 by Clementina Taylor RN  Safety Promotion/Fall Prevention:   activity supervised   assistive device/personal items within reach   clutter free environment maintained   fall prevention program maintained   room organization consistent   safety round/check completed   toileting scheduled   nonskid shoes/slippers when out of bed   lighting adjusted  Taken 4/3/2024 1200 by Clementina Taylor RN  Safety Promotion/Fall Prevention:   assistive device/personal items within reach   activity supervised   clutter free environment maintained   fall prevention program maintained   safety round/check completed   room organization consistent   toileting scheduled   nonskid shoes/slippers when out of bed   lighting adjusted  Taken 4/3/2024 1000 by Clementina Taylor, RN  Safety Promotion/Fall Prevention:   clutter free environment maintained   activity supervised   assistive device/personal items within reach   fall prevention program maintained   room organization consistent   safety round/check completed   toileting scheduled   nonskid shoes/slippers when out of bed   lighting adjusted  Taken 4/3/2024 0800 by Clementina Taylor, RN  Safety Promotion/Fall Prevention:   clutter free environment maintained   assistive device/personal items within reach   activity supervised   fall prevention program maintained    safety round/check completed   room organization consistent   toileting scheduled   nonskid shoes/slippers when out of bed   lighting adjusted  Intervention: Prevent Skin Injury  Recent Flowsheet Documentation  Taken 4/3/2024 1400 by Clementina Taylor RN  Body Position: position changed independently  Taken 4/3/2024 1200 by Clementina Taylor RN  Body Position: position changed independently  Taken 4/3/2024 1000 by Clementina Taylor RN  Body Position: supine  Taken 4/3/2024 0800 by Clementina Taylor RN  Body Position: supine  Intervention: Prevent and Manage VTE (Venous Thromboembolism) Risk  Recent Flowsheet Documentation  Taken 4/3/2024 1400 by Clementina Taylor RN  Activity Management: activity encouraged  Taken 4/3/2024 1200 by Clementina Taylor RN  Activity Management: activity encouraged  Taken 4/3/2024 1000 by Clementina Taylor RN  Activity Management: activity encouraged  Taken 4/3/2024 0800 by Clementina Taylor RN  Activity Management: activity encouraged  VTE Prevention/Management:   bilateral   sequential compression devices off  Range of Motion: active ROM (range of motion) encouraged  Intervention: Prevent Infection  Recent Flowsheet Documentation  Taken 4/3/2024 1400 by Clementina Taylor RN  Infection Prevention:   visitors restricted/screened   single patient room provided   rest/sleep promoted   hand hygiene promoted  Taken 4/3/2024 1200 by Clementina Taylor RN  Infection Prevention:   rest/sleep promoted   visitors restricted/screened   single patient room provided   hand hygiene promoted  Taken 4/3/2024 1000 by Clementina Taylor RN  Infection Prevention:   single patient room provided   rest/sleep promoted   hand hygiene promoted   visitors restricted/screened  Taken 4/3/2024 0800 by Clementina Taylor RN  Infection Prevention:   rest/sleep promoted   single patient room provided   visitors restricted/screened   hand hygiene promoted  Goal: Optimal Comfort and Wellbeing  Outcome: Ongoing, Progressing  Intervention: Provide  Person-Centered Care  Recent Flowsheet Documentation  Taken 4/3/2024 0800 by Clementina Taylor RN  Trust Relationship/Rapport:   care explained   choices provided  Goal: Readiness for Transition of Care  Outcome: Ongoing, Progressing     Problem: Diabetes Comorbidity  Goal: Blood Glucose Level Within Targeted Range  Outcome: Ongoing, Progressing     Problem: Hypertension Comorbidity  Goal: Blood Pressure in Desired Range  Outcome: Ongoing, Progressing  Intervention: Maintain Blood Pressure Management  Recent Flowsheet Documentation  Taken 4/3/2024 1400 by Clementina Taylor RN  Medication Review/Management: medications reviewed  Taken 4/3/2024 1200 by Clementina Taylor RN  Medication Review/Management: medications reviewed  Taken 4/3/2024 1000 by Clementina Taylor RN  Medication Review/Management: medications reviewed  Taken 4/3/2024 0800 by Clementina Taylor RN  Medication Review/Management: medications reviewed

## 2024-04-03 NOTE — THERAPY EVALUATION
Patient Name: Alvaro Sinha  : 1965    MRN: [id number]                              Today's Date: 4/3/2024       Admit Date: 2024    Visit Dx:     ICD-10-CM ICD-9-CM   1. Osteomyelitis of right foot, unspecified type  M86.9 730.27   2. Cellulitis of right foot  L03.115 682.7   3. Diabetic ulcer of right foot associated with other specified diabetes mellitus, unspecified part of foot, unspecified ulcer stage  E13.621 250.80    L97.519 707.15   4. History of diabetes mellitus  Z86.39 V12.29     Patient Active Problem List   Diagnosis    Nephrolithiasis    Lymphadenopathy    Lymphoma    Osteomyelitis    Type 2 diabetes mellitus with hyperglycemia    Elevated serum immunoglobulin free light chains    Elevated erythrocyte sedimentation rate    Diabetic foot ulcer    History of myalgia due to HMG CoA reductase inhibitor    Gout    Hypertensive disorder    Hyperlipidemia    Hypercalcemia    Microalbuminuric diabetic nephropathy    Peripheral angiopathy due to diabetes mellitus    Stage 3a chronic kidney disease     Past Medical History:   Diagnosis Date    Diabetes mellitus     Hypertension      Past Surgical History:   Procedure Laterality Date    BONE RESECTION, RIB        General Information       Row Name 24 1543          Physical Therapy Time and Intention    Document Type evaluation (P)   -TB     Mode of Treatment physical therapy (P)   -TB       Row Name 24 1543          General Information    Patient Profile Reviewed yes (P)   -TB     Prior Level of Function independent:;all household mobility;community mobility;gait;transfer;bed mobility;ADL's (P)   -TB     Existing Precautions/Restrictions non-weight bearing;right;other (see comments) (P)   osteomyelitis on RLE  -TB     Barriers to Rehab medically complex;impaired sensation (P)   -TB       Row Name 24 1543          Living Environment    People in Home spouse (P)   -TB       Row Name 24 1543          Home Main Entrance     Number of Stairs, Main Entrance two (P)   -TB     Stair Railings, Main Entrance railings safe and in good condition (P)   -TB       Row Name 04/03/24 1543          Stairs Within Home, Primary    Stairs, Within Home, Primary one level (P)   -TB     Number of Stairs, Within Home, Primary none (P)   -TB       Row Name 04/03/24 1543          Cognition    Orientation Status (Cognition) oriented x 4 (P)   -TB       Row Name 04/03/24 1543          Safety Issues, Functional Mobility    Safety Issues Affecting Function (Mobility) ability to follow commands;at risk behavior observed;safety precaution awareness;safety precautions follow-through/compliance;impulsivity;insight into deficits/self-awareness;judgment;unable to maintain weight-bearing restrictions (P)   -TB     Impairments Affecting Function (Mobility) endurance/activity tolerance;coordination;sensation/sensory awareness;pain;postural/trunk control (P)   -TB               User Key  (r) = Recorded By, (t) = Taken By, (c) = Cosigned By      Initials Name Provider Type    TB Lexi Grady, PT Student PT Student                   Mobility       Row Name 04/03/24 1546          Bed Mobility    Bed Mobility supine-sit (P)   -TB     Supine-Sit [place] (Bed Mobility) supervision;verbal cues (P)   -TB     Assistive Device (Bed Mobility) head of bed elevated (P)   -TB     Comment, (Bed Mobility) VCs for sequencing (P)   -TB       Row Name 04/03/24 1546          Transfers    Comment, (Transfers) VCs for sequencing and safety awareness. (P)   -TB       Row Name 04/03/24 1546          Sit-Stand Transfer    Sit-Stand [place] (Transfers) supervision;verbal cues (P)   -TB     Comment, (Sit-Stand Transfer) VCs to maintain NWB on RLE with pt standing prior to therapist instruction and non compliance with WB precautions. Pt put weight through heel. (P)   -TB       Row Name 04/03/24 1546          Gait/Stairs (Locomotion)    [place] Level (Gait) contact guard;verbal  cues (P)   -TB     Assistive Device (Gait) walker, front-wheeled;walker, knee scooter (P)   -TB     Distance in Feet (Gait) 100 (P)   -TB     Deviations/Abnormal Patterns (Gait) bilateral deviations;franklyn decreased (P)   -TB     Bilateral Gait Deviations forward flexed posture (P)   -TB     Comment, (Gait/Stairs) Pt practiced hopping with FWW for 10ft with CGA demonstrating good mechanics with forward flexed posture. Switched to knee scooter in hallway and ambulated additional 90ft with CGA practicing forward motion and turning mechanics. Pt able to make turns and propel with unaffected LE. Pt maintained NWB status with ADs and would benefit from FWW and knee scooter for compliance. (P)   -TB       Row Name 04/03/24 1546          Mobility    Extremity Weight-bearing Status right lower extremity (P)   -TB     Right Lower Extremity (Weight-bearing Status) non weight-bearing (NWB) (P)   -TB               User Key  (r) = Recorded By, (t) = Taken By, (c) = Cosigned By      Initials Name Provider Type    TB Lexi Grady, PT Student PT Student                   Obj/Interventions       Row Name 04/03/24 1551          Range of Motion Comprehensive    General Range of Motion no range of motion deficits identified (P)   -TB     Comment, General Range of Motion BLEs WFL (P)   -TB       Row Name 04/03/24 1551          Strength Comprehensive (MMT)    General Manual Muscle Testing (MMT) Assessment no strength deficits identified (P)   -TB     Comment, General Manual Muscle Testing (MMT) Assessment BLEs grossly 4+/5 (P)   -TB       Row Name 04/03/24 1551          Balance    Balance Assessment sitting static balance;standing static balance;standing dynamic balance (P)   -TB     Static Sitting Balance supervision (P)   -TB     Position, Sitting Balance unsupported;sitting edge of bed (P)   -TB     Static Standing Balance contact guard;verbal cues (P)   -TB     Dynamic Standing Balance contact guard;verbal cues (P)   -TB      Position/Device Used, Standing Balance supported;walker, front-wheeled (P)   -TB     Balance Interventions sitting;standing;sit to stand;supported;occupation based/functional task;single limb stance;moderate challenge (P)   -TB     Comment, Balance no LOB throughout but moderate instability without AD. (P)   -TB               User Key  (r) = Recorded By, (t) = Taken By, (c) = Cosigned By      Initials Name Provider Type    TB Lexi Grady, PT Student PT Student                   Goals/Plan       Row Name 04/03/24 1558          Bed Mobility Goal 1 (PT)    Activity/Assistive Device (Bed Mobility Goal 1, PT) sit to supine;supine to sit;scooting;bridging;rolling to left;rolling to right (P)   -TB     [place] Level/Cues Needed (Bed Mobility Goal 1, PT) independent (P)   -TB     Time Frame (Bed Mobility Goal 1, PT) long term goal (LTG);10 days (P)   -TB     Progress/Outcomes (Bed Mobility Goal 1, PT) goal ongoing (P)   -TB       Row Name 04/03/24 3418          Transfer Goal 1 (PT)    Activity/Assistive Device (Transfer Goal 1, PT) sit-to-stand/stand-to-sit;bed-to-chair/chair-to-bed;walker, standard (P)   -TB     [place] Level/Cues Needed (Transfer Goal 1, PT) modified independence (P)   -TB     Time Frame (Transfer Goal 1, PT) long term goal (LTG);10 days (P)   -TB     Progress/Outcome (Transfer Goal 1, PT) goal ongoing (P)   -TB       Row Name 04/03/24 1555          Gait Training Goal 1 (PT)    Activity/Assistive Device (Gait Training Goal 1, PT) gait (walking locomotion);assistive device use;walker, standard;maintain weight-bearing status (P)   -TB     [place] Level (Gait Training Goal 1, PT) modified independence (P)   -TB     Distance (Gait Training Goal 1, PT) 200 (P)   -TB     Time Frame (Gait Training Goal 1, PT) long term goal (LTG);10 days (P)   -TB     Strategies/Barriers (Gait Training Goal 1, PT) NWB RLE (P)   -TB     Progress/Outcome (Gait Training Goal 1, PT) goal ongoing (P)   -TB        [facility] Name 04/03/24 1558          Stairs Goal 1 (PT)    Activity/Assistive Device (Stairs Goal 1, PT) stairs, all skills;assistive device use;using handrail, left;using handrail, right (P)   -TB     [place] Level/Cues Needed (Stairs Goal 1, PT) modified independence (P)   -TB     Number of Stairs (Stairs Goal 1, PT) 2 (P)   -TB     Time Frame (Stairs Goal 1, PT) long term goal (LTG);10 days (P)   -TB     Progress/Outcome (Stairs Goal 1, PT) goal ongoing (P)   -TB       Row Name 04/03/24 1558          Therapy Assessment/Plan (PT)    Planned Therapy Interventions (PT) patient/family education;strengthening;stair training;gait training;bed mobility training;balance training;postural re-education;transfer training;neuromuscular re-education (P)   -TB               User Key  (r) = Recorded By, (t) = Taken By, (c) = Cosigned By      Initials Name Provider Type    TB Lexi Grady, PT Student PT Student                   Clinical Impression       Row Name 04/03/24 1553          Pain    Pain Intervention(s) Ambulation/increased activity;Repositioned (P)   -TB     Additional Documentation Pain Scale: FACES Pre/Post-Treatment (Group) (P)   -TB       Row Name 04/03/24 8663          Pain Scale: FACES Pre/Post-Treatment    Pain: FACES Scale, Pretreatment 2-->hurts little bit (P)   -TB     Posttreatment Pain Rating 2-->hurts little bit (P)   -TB     Pain Location generalized (P)   -TB     Pain Location - foot (P)   -TB     Pre/Posttreatment Pain Comment RN aware and managing. (P)   -TB       Row Name 04/03/24 1553          Plan of Care Review    Plan of Care Reviewed With patient (P)   -TB     Progress no change (P)   -TB     Outcome Evaluation PT completed evaluation for pt presenting with RLE foot wound and NWB status. Pt practiced hopping with FWW for 10 ft and ambulated 90 ft with knee scooter in hallway with CGA x1. Pt with impulsivity and was reminded of NWB status throughout session. Pt would benefit from continued  skilled IPPT care. PT recommends D/C to home with assist when medically appropriate. (P)   -TB       Row Name 04/03/24 1553          Therapy Assessment/Plan (PT)    Patient/Family Therapy Goals Statement (PT) return to PLOF (P)   -TB     Rehab Potential (PT) good, to achieve stated therapy goals (P)   -TB     Criteria for Skilled Interventions Met (PT) yes;meets criteria;skilled treatment is necessary (P)   -TB     Therapy Frequency (PT) daily (P)   -TB       Row Name 04/03/24 1553          Vital Signs    O2 Delivery Pre Treatment room air (P)   -TB     O2 Delivery Intra Treatment room air (P)   -TB     O2 Delivery Post Treatment room air (P)   -TB     Pre Patient Position Supine (P)   -TB     Intra Patient Position Standing (P)   -TB     Post Patient Position Sitting (P)   -TB       Row Name 04/03/24 1553          Positioning and Restraints    Pre-Treatment Position in bed (P)   -TB     Post Treatment Position bed (P)   -TB     In Bed notified nsg;sitting EOB;call light within reach;encouraged to call for assist;side rails up x2 (P)   -TB               User Key  (r) = Recorded By, (t) = Taken By, (c) = Cosigned By      Initials Name Provider Type    TB Lexi Grady, PT Student PT Student                   Outcome Measures       Row Name 04/03/24 1559 04/03/24 0800       How much help from another person do you currently need...    Turning from your back to your side while in flat bed without using bedrails? 4 (P)   -TB 4  -CN    Moving from lying on back to sitting on the side of a flat bed without bedrails? 4 (P)   -TB 4  -CN    Moving to and from a bed to a chair (including a wheelchair)? 3 (P)   -TB 3  -CN    Standing up from a chair using your arms (e.g., wheelchair, bedside chair)? 3 (P)   -TB 4  -CN    Climbing 3-5 steps with a railing? 2 (P)   -TB 3  -CN    To walk in hospital room? 3 (P)   -TB 3  -CN    AM-PAC 6 Clicks Score (PT) 19 (P)   -TB 21  -CN    Highest Level of Mobility Goal 6 --> Walk 10  steps or more (P)   -TB 6 --> Walk 10 steps or more  -CN      Row Name 04/03/24 1559          Functional Assessment    Outcome Measure Options AM-PAC 6 Clicks Basic Mobility (PT) (P)   -TB               User Key  (r) = Recorded By, (t) = Taken By, (c) = Cosigned By      Initials Name Provider Type    CN Clementina Taylor, RN Registered Nurse    TB Lexi Grady, PT Student PT Student                                 Physical Therapy Education       Title: PT OT SLP Therapies (Done)       Topic: Physical Therapy (Done)       Point: Mobility training (Done)       Learning Progress Summary             Patient Acceptance, E,TB, VU by TB at 4/3/2024 1520                         Point: Home exercise program (Done)       Learning Progress Summary             Patient Acceptance, E,TB, VU by TB at 4/3/2024 1520                         Point: Body mechanics (Done)       Learning Progress Summary             Patient Acceptance, E,TB, VU by TB at 4/3/2024 1520                         Point: Precautions (Done)       Learning Progress Summary             Patient Acceptance, E,TB, VU by TB at 4/3/2024 1520                                         User Key       Initials Effective Dates Name Provider Type Discipline     01/16/24 -  Lexi Grady, PT Student PT Student PT                  PT Recommendation and Plan  Planned Therapy Interventions (PT): (P) patient/family education, strengthening, stair training, gait training, bed mobility training, balance training, postural re-education, transfer training, neuromuscular re-education  Plan of Care Reviewed With: (P) patient  Progress: (P) no change  Outcome Evaluation: (P) PT completed evaluation for pt presenting with RLE foot wound and NWB status. Pt practiced hopping with FWW for 10 ft and ambulated 90 ft with knee scooter in hallway with CGA x1. Pt with impulsivity and was reminded of NWB status throughout session. Pt would benefit from continued skilled IPPT care. PT  recommends D/C to home with assist when medically appropriate.     Time Calculation:   PT Evaluation Complexity  History, PT Evaluation Complexity: (P) 3 or more personal factors and/or comorbidities  Examination of Body Systems (PT Eval Complexity): (P) total of 4 or more elements  Clinical Presentation (PT Evaluation Complexity): (P) evolving  Clinical Decision Making (PT Evaluation Complexity): (P) moderate complexity  Overall Complexity (PT Evaluation Complexity): (P) moderate complexity     PT Charges       Row Name 04/03/24 1600             Time Calculation    Start Time 1520 (P)   -TB      PT Received On 04/03/24 (P)   -TB      PT Goal Re-Cert Due Date 04/13/24 (P)   -TB         Untimed Charges    PT Eval/Re-eval Minutes 60 (P)   -TB         Total Minutes    Untimed Charges Total Minutes 60 (P)   -TB       Total Minutes 60 (P)   -TB                User Key  (r) = Recorded By, (t) = Taken By, (c) = Cosigned By      Initials Name Provider Type    TB Lexi Grady, PT Student PT Student                  Therapy Charges for Today       Code Description Service Date Service Provider Modifiers Qty    [id number] HC PT EVAL MOD COMPLEXITY 4 4/3/2024 Lexi Grady, PT Student GP 1            PT G-Codes  Outcome Measure Options: (P) AM-PAC 6 Clicks Basic Mobility (PT)  AM-PAC 6 Clicks Score (PT): (P) 19  PT Discharge Summary  Anticipated Discharge Disposition (PT): (P) home with assist    Lexi Grady PT Student  4/3/2024

## 2024-04-03 NOTE — PROGRESS NOTES
[facility] Medicine Services  PROGRESS NOTE    Patient Name: Alvaro Sinha  : 1965  MRN: [id number]    Date of Admission: 2024  Primary Care Physician: Martha Mckee MD    Subjective   Subjective     CC:  Follow-up for osteomyelitis    HPI:  Patient seen and examined this morning.  Comfortable in bed.  No fever or chills.  Awaiting bed at [facility]      Objective   Objective     Vital Signs:   Temp:  [98.6 °F (37 °C)-99.2 °F (37.3 °C)] 98.6 °F (37 °C)  Heart Rate:  [61-97] 61  Resp:  [18-20] 20  BP: (128-162)/(65-91) 128/65  Flow (L/min):  [1] 1     Physical Exam:  General: Comfortable, not in distress, conversant and cooperative  Head: Atraumatic and normocephalic  Eyes: No Icterus. No pallor  Ears:  Ears appear intact with no abnormalities noted  Throat: No oral lesions, no thrush  Neck: Supple, trachea midline  Lungs: Clear to auscultation bilaterally, equal air entry, no wheezing or crackles  Heart:  Normal S1 and S2, no murmur, no gallop, No JVD, no lower extremity swelling  Abdomen:  Soft, no tenderness, no organomegaly, normal bowel sounds, no organomegaly  Extremities: Right foot purulent infection on the plantar aspect of the foot at the base of the 1st-3rd toe with surrounding erythema  Skin: No bleeding, bruising or rash, normal skin turgor and elasticity  Neurologic: Cranial nerves appear intact with no evidence of facial asymmetry, normal motor and sensory functions in all 4 extremities  Psych: Alert and oriented x 3, normal mood    Results Reviewed:  LAB RESULTS:      Lab 24  0534 24  1832   WBC 4.83 6.89   HEMOGLOBIN 10.0* 10.8*   HEMATOCRIT 30.4* 32.7*   PLATELETS 148 162   NEUTROS ABS  --  5.32   IMMATURE GRANS (ABS)  --  0.04   LYMPHS ABS  --  0.81   MONOS ABS  --  0.58   EOS ABS  --  0.13   MCV 92.7 92.9   SED RATE  --  94*   CRP  --  16.12*   LACTATE  --  1.3         Lab 24  0534 24  1832   SODIUM  139 134*   POTASSIUM 4.3 4.2   CHLORIDE 106 100   CO2 22.0 20.0*   ANION GAP 11.0 14.0   BUN 24* 29*   CREATININE 1.18 1.43*   EGFR 71.5 56.8*   GLUCOSE 108* 152*   CALCIUM 8.5* 9.0   MAGNESIUM 1.7  --    HEMOGLOBIN A1C  --  8.50*         Lab 04/02/24  1832   TOTAL PROTEIN 7.3   ALBUMIN 3.7   GLOBULIN 3.6   ALT (SGPT) 21   AST (SGOT) 20   BILIRUBIN 0.6   ALK PHOS 90                     Brief Urine Lab Results       None            Microbiology Results Abnormal       Procedure Component Value - Date/Time    Wound Culture - Wound, Foot, Right [[id number]] Collected: 04/02/24 1900    Lab Status: Preliminary result Specimen: Wound from Foot, Right Updated: 04/02/24 2117     Gram Stain Moderate (3+) WBCs per low power field      Few (2+) Epithelial cells per low power field      Many (4+) Gram negative bacilli      Many (4+) Gram positive cocci    MRSA Screen, PCR (Inpatient) - Swab, Nares [[id number]]  (Normal) Collected: 04/02/24 1903    Lab Status: Final result Specimen: Swab from Nares Updated: 04/02/24 2047     MRSA PCR Negative    Narrative:      The negative predictive value of this diagnostic test is high and should only be used to consider de-escalating anti-MRSA therapy. A positive result may indicate colonization with MRSA and must be correlated clinically.  MRSA Negative            MRI Foot Right Without Contrast    Result Date: 4/2/2024  MRI FOOT RIGHT WO CONTRAST Date of Exam: 4/2/2024 8:30 PM EDT Indication: eval for osteo R foot.  Comparison: Same day foot radiograph Technique:  Routine multiplanar/multisequence sequence images of the right foot were obtained without contrast administration. Findings: Bones and joints: Degenerative subchondral edema and cystic changes throughout the midfoot. Degenerative subchondral edema and cystic change of the first metatarsophalangeal joint as well as the first interphalangeal joint. Small effusion with subchondral edema at the second metatarsophalangeal joint. Small  bone marrow edema within the second and third distal and middle phalanges. No loss of normal T1 cortical or marrow signal. No evidence of acute fracture. No suspicious marrow placing lesions. Soft tissues: Diffuse subcutaneous edema. There is more pronounced edema seen in the second and third toes. No soft tissue mass or fluid collection seen. Fatty and edematous changes of the intrinsic foot muscles suggestive of neuropathic changes. Soft tissue wound seen along the plantar aspect of the second metatarsophalangeal joint. Visualized flexor and extensor tendons appear grossly intact. Plantar plates appear grossly intact. Lisfranc ligament appears grossly intact. No evidence of intermetatarsal bursitis or neuroma.     Impression: Impression: Swelling of the second and third toes as well as a small soft tissue wound along the plantar aspect of the second metatarsophalangeal joint. There is minimal marrow edema seen within the second and third middle and distal phalanges as well as a small joint effusion and subchondral edema seen at the second metatarsophalangeal joint. No definite loss of T1 cortical signal to suggest erosive changes. These findings most like represent reactive edema/effusion. However early osteomyelitis cannot be fully excluded. Recommend continued clinical and imaging follow-up as warranted. Electronically Signed: Shilo Torres MD  4/2/2024 9:15 PM EDT  Workstation ID: [id number]    XR Foot 3+ View Right    Result Date: 4/2/2024  XR FOOT 3+ VW RIGHT Date of Exam: 4/2/2024 6:03 PM EDT Indication: eval for osteo Comparison: None available. Findings: Spurring at the insertion of the Achilles tendon. Calcaneal spur. Osteoarthritic changes. The tarsal and metatarsal bones appear intact. Severe osteoarthritic changes of the first metatarsophalangeal joint. Questionable fracture at the base of the second proximal phalanx. No lytic or blastic disease.     Impression: No evidence of osteomyelitis.  Questionable fracture at the base of the second proximal phalanx. Electronically Signed: Harry Kim MD  4/2/2024 6:19 PM EDT  Workstation ID: [id number]         Current medications:  Scheduled Meds:allopurinol, 300 mg, Oral, Daily  [Held by provider] clopidogrel, 75 mg, Oral, Daily  glipizide, 10 mg, Oral, QAM AC  heparin (porcine), 5,000 Units, Subcutaneous, Q8H  insulin lispro, 2-7 Units, Subcutaneous, 4x Daily AC & at Bedtime  lisinopril, 40 mg, Oral, Daily  metoprolol succinate XL, 100 mg, Oral, Daily  pioglitazone, 30 mg, Oral, Daily  piperacillin-tazobactam, 3.375 g, Intravenous, Q8H  sodium chloride, 10 mL, Intravenous, Q12H      Continuous Infusions:   PRN Meds:.  acetaminophen    senna-docusate sodium **AND** polyethylene glycol **AND** bisacodyl **AND** bisacodyl    Calcium Replacement - Follow Nurse / BPA Driven Protocol    dextrose    dextrose    glucagon (human recombinant)    HYDROcodone-acetaminophen    Magnesium Standard Dose Replacement - Follow Nurse / BPA Driven Protocol    Phosphorus Replacement - Follow Nurse / BPA Driven Protocol    Potassium Replacement - Follow Nurse / BPA Driven Protocol    sodium chloride    sodium chloride    Assessment & Plan   Assessment & Plan     Active Hospital Problems    Diagnosis  POA    **Osteomyelitis [M86.9]  Yes    Stage 3a chronic kidney disease [N18.31]  Yes    History of myalgia due to HMG CoA reductase inhibitor [Z87.39]  Not Applicable    Microalbuminuric diabetic nephropathy [E11.21]  Yes    Hyperlipidemia [E78.5]  Yes    Type 2 diabetes mellitus with hyperglycemia [E11.65]  Yes    Lymphoma [C85.90]  Yes    Diabetic foot ulcer [E11.621, L97.509]  Yes    Hypertensive disorder [I10]  Yes      Resolved Hospital Problems   No resolved problems to display.        Brief Hospital Course to date:  Alvaro Sinha is a 58 y.o. male with past medical history of lymphoma, type 2 diabetes, hypertension, hyperlipidemia, CKD, gout, chronic ulceration of the right  foot who is presenting with worsening wound of right foot. MRI showed concerns for early osteomyelitis of the second and third middle and distal phalanges of the right rei    Nonhealing diabetic ulcer of the right foot with overlying cellulitis   Concern for foot osteomyelitis  Patient with chronic foot ulcer, currently with worsening cellulitis and possible osteomyelitis per MRI  Currently on Zosyn and daptomycin per ID  Holding Plavix in case any surgical intervention  Arterial duplex ordered  Unfortunately, we do not have surgical coverage and currently patient is awaiting bed at [facility]    Addendum: Patient declined transfer to [facility]. Will continue IV Abx per ID and awaiting ortho eval on Monday when Dr Hays is back      Type 2 diabetes  A1c 8.5%  Has been off Trulicity due to insurance coverage  Will continue oral medications for now   SSI     Stage III CKD due to diabetes  Hypertension  Creatinine currently near baseline, continue to monitor  Continue home lisinopril     Lymphoma  History of lymphoma in chart, has been following with [facility] for this  Ongoing workup, is not currently on any immunotherapy or immunosuppressive medications     Statin intolerance  Rosuvastatin listed in medical history however no recent statin.  History of myalgia     History of gout  Continue allopurinol            Expected Discharge Location and Transportation: Awaiting bed at [facility]  Expected Discharge as soon as bed is available  Expected discharge date/ time has not been documented.     DVT prophylaxis:  Medical DVT prophylaxis orders are present.         AM-PAC 6 Clicks Score (PT): 21 (04/02/24 7076)    CODE STATUS:   Code Status and Medical Interventions:   Ordered at: 04/03/24 0013     Code Status (Patient has no pulse and is not breathing):    CPR (Attempt to Resuscitate)     Medical Interventions (Patient has pulse or is breathing):    Full Support        Francia Pitts MD  04/03/24

## 2024-04-03 NOTE — CONSULTS
[facility]    INFECTIOUS DISEASE CONSULT/INITIAL HOSPITAL VISIT    Alvaro Sinha  1965  [id number]    Date of consult: 4/3/2024    Admit date: 4/2/2024    Requesting Provider: No ref. provider found  Evaluating physician: Aung Jameson MD  Reason for Consultation: Right foot diabetic wound ulcers with underlying osteomyelitis second and third toe  Chief Complaint: Above      Subjective   History of present illness:  Patient is a  58 y.o.  Yr old male with a history of diabetes mellitus type 2, essential hypertension, hyperlipidemia, CKD stage IIIa, gout, with chronic right foot ulceration being followed at the [facility] which worsened 3/31/2024.  He has been using a boot to offload his foot, but was also working at [employer], although he has been off work recently.  MRI of the foot 4/2/2024 was consistent with possible early osteomyelitis second and third middle and distal phalanges.  He has a plantar draining fistulous track below his second and third metatarsal and an anterior web skin breakdown between his second and third toe on his foot.  He was awaiting possible transfer to the [facility] for further orthopedic evaluation.  He has no other localizing signs or symptoms of infection.  I was consulted on 4/3/2024 for further evaluation and treatment.    Past Medical History:   Diagnosis Date    Diabetes mellitus     Hypertension        Past Surgical History:   Procedure Laterality Date    BONE RESECTION, RIB         Pediatric History   Patient Parents    Not on file     Other Topics Concern    Not on file   Social History Narrative    Not on file   No cigarettes, occasional alcohol, no drug use,  to [facility] with 1 child.  Works at [employer].    family history is not on file.    Allergies   Allergen Reactions    Statins Myalgia       Immunization History   Administered Date(s) Administered    COVID-19 (MODERNA) 1st,2nd,3rd Dose  Monovalent 09/23/2021, 11/02/2021       Medication:  @Scheduled Meds:allopurinol, 300 mg, Oral, Daily  [Held by provider] clopidogrel, 75 mg, Oral, Daily  glipizide, 10 mg, Oral, QAM AC  heparin (porcine), 5,000 Units, Subcutaneous, Q8H  insulin lispro, 2-7 Units, Subcutaneous, 4x Daily AC & at Bedtime  lisinopril, 40 mg, Oral, Daily  metoprolol succinate XL, 100 mg, Oral, Daily  pioglitazone, 30 mg, Oral, Daily  piperacillin-tazobactam, 3.375 g, Intravenous, Q8H  sodium chloride, 10 mL, Intravenous, Q12H      Continuous Infusions:   PRN Meds:.  acetaminophen    senna-docusate sodium **AND** polyethylene glycol **AND** bisacodyl **AND** bisacodyl    Calcium Replacement - Follow Nurse / BPA Driven Protocol    dextrose    dextrose    glucagon (human recombinant)    HYDROcodone-acetaminophen    Magnesium Standard Dose Replacement - Follow Nurse / BPA Driven Protocol    Phosphorus Replacement - Follow Nurse / BPA Driven Protocol    Potassium Replacement - Follow Nurse / BPA Driven Protocol    sodium chloride    sodium chloride     Please refer to the medical record for a full medication list    Review of Systems:    Constitutional-- No Fever, chills or sweats.  Appetite good, and no malaise. No fatigue.  HEENT-- No new vision, hearing or throat complaints.  No epistaxis or oral sores.  Denies odynophagia or dysphagia.  No odynophagia or dysphagia. No headache, photophobia or neck stiffness.  CV-- No chest pain, palpitation or syncope  Resp-- No SOB/cough/Hemoptysis  GI- No nausea, vomiting, or diarrhea.  No hematochezia, melena, or hematemesis. Denies jaundice or chronic liver disease.  -- No dysuria, hematuria, or flank pain.  Denies hesitancy, urgency.  Lymph- no swollen lymph nodes in neck/axilla or groin.   Heme- No active bruising or bleeding; no Hx of DVT or PE.  MS-- no swelling or pain in the bones or joints of arms/legs.  No new back pain.  Neuro-- No acute focal weakness or numbness in the arms or legs.  No  "seizures.  Skin--No rashes or lesions, except right foot as per HPI    Physical Exam:   Vital Signs   Temp:  [98.4 °F (36.9 °C)-99.2 °F (37.3 °C)] 98.4 °F (36.9 °C)  Heart Rate:  [61-97] 70  Resp:  [16-20] 16  BP: (122-162)/(65-91) 122/71    Blood pressure 122/71, pulse 70, temperature 98.4 °F (36.9 °C), temperature source Oral, resp. rate 16, height 185.4 cm (73\"), weight 136 kg (300 lb), SpO2 98%.  GENERAL: Awake and alert, in moderate distress. Appears older than stated age.  Resting in bed.  HEENT:  Normocephalic, atraumatic.  Oropharynx without thrush. Dentition in good repair. No cervical adenopathy. No neck masses.  Ears externally normal, Nose externally normal.  EYES: PERRL. No conjunctival injection. No icterus. EOM full.  LYMPHATICS: No lymphadenopathy of the neck or axillary or inguinal regions.   HEART: No murmur, gallop, or pericardial friction rub. Reg rate rhythm, No JVD at 45 degrees.  LUNGS: Clear to auscultation and percussion. No respiratory distress, no use of accessory muscles.  ABDOMEN: Soft, nontender, nondistended. No appreciable HSM.  Bowel sounds normal.  Obese.  GENITAL: No external lesions, breasts without masses, back straight, no CVAT, rectal external without lesions.   SKIN: Warm and dry without cutaneous eruptions.  No nodules.  Right second and third webspace with increased erythema and purulent discharge, and plantar fistulous tract between the right second and third metatarsal probes to near bone.  PSYCHIATRIC: Mental status lucid. No confusion.  EXT:  No cellulitic change.  NEURO: Oriented to name, CN 2 to 12 intact, DTR 1 + and symmetric, sensory decreased to LT upper and lower extremity, motor 5/5 upper and lower extremity, cerebellar and gait not tested.              4/3/24 right foot wounds    Results Review:   I reviewed the patient's new clinical results.  I reviewed the patient's new imaging results and agree with the interpretation.  I reviewed the patient's other test " "results and agree with the interpretation    Results from last 7 days   Lab Units 04/03/24  0534 04/02/24  1832   WBC 10*3/mm3 4.83 6.89   HEMOGLOBIN g/dL 10.0* 10.8*   HEMATOCRIT % 30.4* 32.7*   PLATELETS 10*3/mm3 148 162     Results from last 7 days   Lab Units 04/03/24  0534   SODIUM mmol/L 139   POTASSIUM mmol/L 4.3   CHLORIDE mmol/L 106   CO2 mmol/L 22.0   BUN mg/dL 24*   CREATININE mg/dL 1.18   GLUCOSE mg/dL 108*   CALCIUM mg/dL 8.5*     Results from last 7 days   Lab Units 04/02/24  1832   ALK PHOS U/L 90   BILIRUBIN mg/dL 0.6   ALT (SGPT) U/L 21   AST (SGOT) U/L 20     Results from last 7 days   Lab Units 04/02/24  1832   SED RATE mm/hr 94*     Results from last 7 days   Lab Units 04/02/24  1832   CRP mg/dL 16.12*         Results from last 7 days   Lab Units 04/02/24  1832   LACTATE mmol/L 1.3     Estimated Creatinine Clearance: 98.4 mL/min (by C-G formula based on SCr of 1.18 mg/dL).     Procalitonin Results:       Brief Urine Lab Results       None           No results found for: \"SITE\", \"ALLENTEST\", \"PHART\", \"XHU1SUM\", \"PO2ART\", \"VVP8MSG\", \"BASEEXCESS\", \"J0YNERFD\", \"HGBBG\", \"HCTABG\", \"OXYHEMOGLOBI\", \"METHHGBN\", \"CARBOXYHGB\", \"CO2CT\", \"BAROMETRIC\", \"MODALITY\", \"FIO2\"     Microbiology:    Right foot wound culture 4/2/2024 with gram-positive cocci, gram-negative rods on Gram stain, MRSA screen negative, blood cultures x 2 from 4/2 negative    No results found for this or any previous visit.           Brief Urine Lab Results       None            No results found for: \"ACANTHNAEG\", \"AFBCX\", \"BPERTUSSISCX\", \"BLOODCX\"  No results found for: \"BCIDPCR\", \"CXREFLEX\", \"CSFCX\", \"CULTURETIS\"  No results found for: \"CULTURES\", \"HSVCX\", \"URCX\"  No results found for: \"EYECULTURE\", \"GCCX\", \"HSVCULTURE\", \"LABHSV\"  No results found for: \"LEGIONELLA\", \"MRSACX\", \"MUMPSCX\", \"MYCOPLASCX\"  No results found for: \"NOCARDIACX\", \"STOOLCX\"  No results found for: \"THROATCX\", \"UNSTIMCULT\", \"URINECX\", \"CULTURE\", \"VZVCULTUR\"  No results " "found for: \"VIRALCULTU\", \"WOUNDCX\"     No results found for: \"TISSCXQ\", \"CULTURES\", \"CULTURE\", \"BLOODCX\", \"ANACX\", \"BALCX\", \"ACIDFASTCX\", \"BODYFLDCX\", \"CXREFLEX\", \"FUNGUSCX\", \"RESPCX\", \"MRSACX\", \"ROUTCX\", \"BRCHWSHCLT\", \"TISSUECX\", \"URINECX\", \"CMVCX\", \"WOUNDCX\", \"BCIDPCR\", \"BFCULTURE\", \"BLOODCULT\"(      Radiology:  Imaging Results (Last 72 Hours)       Procedure Component Value Units Date/Time    MRI Foot Right Without Contrast [[id number]] Collected: 04/02/24 2106     Updated: 04/02/24 2118    Narrative:        MRI FOOT RIGHT WO CONTRAST    Date of Exam: 4/2/2024 8:30 PM EDT    Indication: eval for osteo R foot.     Comparison: Same day foot radiograph    Technique:  Routine multiplanar/multisequence sequence images of the right foot were obtained without contrast administration.      Findings:  Bones and joints: Degenerative subchondral edema and cystic changes throughout the midfoot. Degenerative subchondral edema and cystic change of the first metatarsophalangeal joint as well as the first interphalangeal joint. Small effusion with   subchondral edema at the second metatarsophalangeal joint. Small bone marrow edema within the second and third distal and middle phalanges. No loss of normal T1 cortical or marrow signal. No evidence of acute fracture. No suspicious marrow placing   lesions.    Soft tissues: Diffuse subcutaneous edema. There is more pronounced edema seen in the second and third toes. No soft tissue mass or fluid collection seen. Fatty and edematous changes of the intrinsic foot muscles suggestive of neuropathic changes. Soft   tissue wound seen along the plantar aspect of the second metatarsophalangeal joint. Visualized flexor and extensor tendons appear grossly intact. Plantar plates appear grossly intact. Lisfranc ligament appears grossly intact. No evidence of   intermetatarsal bursitis or neuroma.      Impression:      Impression:  Swelling of the second and third toes as well as a small soft " tissue wound along the plantar aspect of the second metatarsophalangeal joint. There is minimal marrow edema seen within the second and third middle and distal phalanges as well as a small   joint effusion and subchondral edema seen at the second metatarsophalangeal joint. No definite loss of T1 cortical signal to suggest erosive changes. These findings most like represent reactive edema/effusion. However early osteomyelitis cannot be fully   excluded. Recommend continued clinical and imaging follow-up as warranted.        Electronically Signed: Shilo Torres MD    4/2/2024 9:15 PM EDT    Workstation ID: [id number]    XR Foot 3+ View Right [[id number]] Collected: 04/02/24 1817     Updated: 04/02/24 1822    Narrative:      XR FOOT 3+ VW RIGHT    Date of Exam: 4/2/2024 6:03 PM EDT    Indication: eval for osteo    Comparison: None available.    Findings: Spurring at the insertion of the Achilles tendon. Calcaneal spur. Osteoarthritic changes. The tarsal and metatarsal bones appear intact. Severe osteoarthritic changes of the first metatarsophalangeal joint. Questionable fracture at the base of   the second proximal phalanx. No lytic or blastic disease.      Impression:      No evidence of osteomyelitis. Questionable fracture at the base of the second proximal phalanx.      Electronically Signed: Harry Kim MD    4/2/2024 6:19 PM EDT    Workstation ID: [id number]            IMPRESSION:     Second and third distal and middle phalanges MRI changes with bone marrow edema in the setting of diabetes and chronic wounds of the right foot with increased pretest probability for underlying osteomyelitis, versus reactive edema.  I would favor osteomyelitis given the way his right foot looks with a fistulous tract that probes deep to bone.  No erosions in the cortical structure noted.  Usual organisms will be mixed terri.  MRSA screen negative, wound culture Gram stain with gram-negative rods and gram-positive cocci, blood  cultures x 2 from 4/2 are negative to date  Right foot cellulitis and wound infection associated with diabetes, despite use of a surgical boot.  Diabetes mellitus type 2 with increased risk for infection.  Chronic kidney disease stage IIIa, creatinine 1.18 on 4/3/2024.  Anemia, chronic disease.  Hypocalcemia 8.5.    RECOMMENDATIONS:    Diagnostically, continue to follow patient's physical exam, CBC, CMP, CRP, and radiographic studies.  Therapeutically, consider using daptomycin and piperacillin/tazobactam awaiting further culture data.  This would provide coverage for MRSA, Pseudomonas, versus other.  Supportive care.  Wound care.  Nonweightbearing on right foot.    I discussed the patient's findings and my recommendations with patient, family, and nursing staff    Thank you for asking me to see Alvaro Sinha.  Our group would be pleased to follow this patient over the course of their hospitalization and assist with outpatient antimicrobial therapy, as indicated.  Further recommendations depend on the results of the cultures and clinical course.  Increased risk for adverse drug reactions, complications of IV access, readmission, loss of limb.  Side effects discussed.    Aung Jameson MD  4/3/2024

## 2024-04-03 NOTE — PLAN OF CARE
Goal Outcome Evaluation:  Plan of Care Reviewed With: patient   - VSS, RA (cpap at night), A&Ox4  - IV abx infusing  - No complaints of pain or nausea  - Pt resting comfortably     Progress: no change

## 2024-04-03 NOTE — NURSING NOTE
"Reason for Wound, Ostomy and Continence (WOC) Nursing Consultation: \"DM wound R Foot\"    Patient lying in bed.  Family/support person at bedside, attentive.     Wound Assessment  Wound Type: Diabetic ulcers, infection  Location: R foot, toes, plantar surface, medial metatarsal head  Wound Bed: moist, necroitc, and white  Periwound Skin: edematous , redness, swelling, warm, and purple    Drainage Characteristics/Odor: serosanguineous  Drainage Amount: small  Pain: No   Care provided: Cleansed foot with NS. Patted dry. Applied betadine paint to wound beds. Covered with 4x4, wrapped with kerlix and secured with netting.   Notes: Pt relates that he has been seeing his podiatrist for a while for diabetic ulcer, had been in boot for awhile, then cast. He had run a fever and went to podiatrist who examined foot and sent him to ED immediately. 2nd and 3rd toes purple, edematous dorsal area, with necrotic wound to underside of second toe, along with diabetic ulcer with necrosis to plantar aspect  Wound Image:             Recommendation(s) for management of wound:   -Cleanse with NS if drainage noted, pat dry, paint with betadine. If draining, cover with 4x4, wrap with kerlix and secure with netting.   -Off load heels, heel boots  -Practice pressure injury prevention protocol.    Most recent Chicho Scale score:  Sensory Perception: 3-->slightly limited  Moisture: 4-->rarely moist  Activity: 3-->walks occasionally  Mobility: 3-->slightly limited  Nutrition: 3-->adequate  Friction and Shear: 3-->no apparent problem  Chicho Score: 19 (04/03/24 0800)     Pressure Injury Prevention Protocol (initiate for Chicho Score of 18 or less):   *Keep skin dry, turn q 2 hr, keep heels elevated and offloaded with offloading heel boots.    *Apply z-guard to bottom and bony prominences daily and as needed with incontinence episodes.  *Follow C.A.R.E protocol if medical devices (Bipap, hitchcock, Ng tube, etc) are being used.  *Reduce layers under " patient (one sheet as drawsheet and two incontinence pads) to allow waffle or DEVORAH to improve microclimate   *Clean skin gently with no-rinse PH-balanced cleanser and soft, disposable cloth (barrier wipes-blue pack).   *Raise knee-gatch before elevating HOB to reduce shearing      WOC Team will continue to follow.  Please re-consult if the wound(s) worsens.

## 2024-04-03 NOTE — ED NOTES
Alvaro Sinha    Nursing Report ED to Floor:  Mental status: alert and oriented  x4  Ambulatory status: 1 assist due to wound being on foot  Oxygen Therapy:  room air  Cardiac Rhythm: normal sinus rhythm  Admitted from: ED  Safety Concerns:  none noted  Social Issues: none noted  ED Room #:  20    ED Nurse Phone Extension - [phone] or may call [phone].      HPI:   Chief Complaint   Patient presents with    Wound Check       Past Medical History:  Past Medical History:   Diagnosis Date    Diabetes mellitus     Hypertension         Past Surgical History:  Past Surgical History:   Procedure Laterality Date    BONE RESECTION, RIB          Admitting Doctor:   iNko Santiago MD    Consulting Provider(s):  Consults       No orders found from 3/4/2024 to 4/3/2024.             Admitting Diagnosis:   The primary encounter diagnosis was Osteomyelitis of right foot, unspecified type. Diagnoses of Cellulitis of right foot, Diabetic ulcer of right foot associated with other specified diabetes mellitus, unspecified part of foot, unspecified ulcer stage, and History of diabetes mellitus were also pertinent to this visit.    Most Recent Vitals:   Vitals:    04/02/24 2118 04/02/24 2131 04/02/24 2156 04/02/24 2201   BP:  136/82     BP Location:       Patient Position:       Pulse: 71 72 68 68   Resp:       Temp:       TempSrc:       SpO2: 98% 95% 94% 94%   Weight:       Height:           Active LDAs/IV Access:   Lines, Drains & Airways       Active LDAs       Name Placement date Placement time Site Days    Peripheral IV 04/02/24 1900 Anterior;Right Forearm 04/02/24  1900  Forearm  less than 1                    Labs (abnormal labs have a star):   Labs Reviewed   COMPREHENSIVE METABOLIC PANEL - Abnormal; Notable for the following components:       Result Value    Glucose 152 (*)     BUN 29 (*)     Creatinine 1.43 (*)     Sodium 134 (*)     CO2 20.0 (*)     eGFR 56.8 (*)     All other components within normal limits    Narrative:      GFR Normal >60  Chronic Kidney Disease <60  Kidney Failure <15     SEDIMENTATION RATE - Abnormal; Notable for the following components:    Sed Rate 94 (*)     All other components within normal limits   C-REACTIVE PROTEIN - Abnormal; Notable for the following components:    C-Reactive Protein 16.12 (*)     All other components within normal limits   CBC WITH AUTO DIFFERENTIAL - Abnormal; Notable for the following components:    RBC 3.52 (*)     Hemoglobin 10.8 (*)     Hematocrit 32.7 (*)     Neutrophil % 77.2 (*)     Lymphocyte % 11.8 (*)     Immature Grans % 0.6 (*)     All other components within normal limits   MRSA SCREEN, PCR - Normal    Narrative:     The negative predictive value of this diagnostic test is high and should only be used to consider de-escalating anti-MRSA therapy. A positive result may indicate colonization with MRSA and must be correlated clinically.  MRSA Negative   LACTIC ACID, PLASMA - Normal   WOUND CULTURE   BLOOD CULTURE   BLOOD CULTURE   CBC AND DIFFERENTIAL    Narrative:     The following orders were created for panel order CBC & Differential.  Procedure                               Abnormality         Status                     ---------                               -----------         ------                     CBC Auto Differential[[id number]]        Abnormal            Final result                 Please view results for these tests on the individual orders.       Meds Given in ED:   Medications   vancomycin 3000 mg/500 mL 0.9% NS IVPB ([facility]) (3,000 mg Intravenous New Bag 4/2/24 1956)   cefTRIAXone (ROCEPHIN) 2,000 mg in sodium chloride 0.9 % 100 mL MBP (0 mg Intravenous Stopped 4/2/24 1955)           Last NIH score:                                                          Dysphagia screening results:        [place] Coma Scale:  No data recorded     CIWA:        Restraint Type:            Isolation Status:  No active isolations

## 2024-04-03 NOTE — CASE MANAGEMENT/SOCIAL WORK
Discharge Planning Assessment  [facility]     Patient Name: Alvaro Sinha  MRN: [id number]  Today's Date: 4/3/2024    Admit Date: 4/2/2024    Plan: IDP   Discharge Needs Assessment    No documentation.                  Discharge Plan       Row Name 04/03/24 1230       Plan    Plan IDP    Patient/Family in Agreement with Plan yes    Plan Comments IDP. Lives in single story house in [facility] with spouse. Ind. No HH. CPAP. PCP Martha Mckee (on maternity leave). Insurance verified as [employer] with scripts filled at [facility]. Plan is  [facility]. Pt and spouse concerned that insurance not in network. Spouse advised to contact insurnce company to verify. Pt verbalized not wanting to be transported to  via ambulance with spouse agreeing to transport. CM will cont to follow.    Final Discharge Disposition Code 02 - short term hospital for  care                  Continued Care and Services - Admitted Since 4/2/2024    No active coordination exists for this encounter.          Demographic Summary    No documentation.                  Functional Status    No documentation.                  Psychosocial    No documentation.                  Abuse/Neglect    No documentation.                  Legal    No documentation.                  Substance Abuse    No documentation.                  Patient Forms    No documentation.                     Mela Jean-Baptiste RN

## 2024-04-03 NOTE — DISCHARGE SUMMARY
[facility] Medicine Services  DISCHARGE SUMMARY    Patient Name: Alvaro Sinha  : 1965  MRN: [id number]    Date of Admission: 2024  5:12 PM  Date of Discharge:  ***  Primary Care Physician: Martha Mckee MD    Consults       Date and Time Order Name Status Description    4/3/2024 12:01 AM Inpatient Infectious Diseases Consult              Hospital Course     Presenting Problem: Right foot wound    Active Hospital Problems    Diagnosis  POA    **Osteomyelitis [M86.9]  Yes    Stage 3a chronic kidney disease [N18.31]  Yes    History of myalgia due to HMG CoA reductase inhibitor [Z87.39]  Not Applicable    Microalbuminuric diabetic nephropathy [E11.21]  Yes    Hyperlipidemia [E78.5]  Yes    Type 2 diabetes mellitus with hyperglycemia [E11.65]  Yes    Lymphoma [C85.90]  Yes    Diabetic foot ulcer [E11.621, L97.509]  Yes    Hypertensive disorder [I10]  Yes      Resolved Hospital Problems   No resolved problems to display.          Hospital Course:  Alvaro Sinha is a 58 y.o. male with past medical history of lymphoma, type 2 diabetes, hypertension, hyperlipidemia, CKD, gout who is presenting with wound of right foot.  He reports that he has been following at [facility] for chronic ulceration of the right foot, has been mostly unchanged until 2 days prior to admission when he noticed increased in erythema of the right foot extending to the leg.  He reported no fever but associated with full body chills. WBC and vitals are within normal limits, inflammatory markers were elevated. MRI showed concerns for early osteomyelitis of the second and third middle and distal phalanges of the right foot.  Due to lack of orthopedic coverage here, transfer was attempted at multiple locations and he was excepted at [facility] for surgical evaluation, however he was placed on a wait list and admitted to [facility] until then. He received broad  spectrum IV antibiotics and ID was consulted. ***    Nonhealing diabetic ulcer of the right foot with overlying cellulitis and concern for osteomyelitis  -Inflammatory markers are elevated otherwise patient is a normal white blood count normal blood pressure, normal heart rate and is afebrile.  He is not septic appearing.  MRI showed concerns for early osteomyelitis of the second and third middle and distal phalanges of the right foot. MRSA nares is negative, wound cultures polymicrobial so far.  -He is currently accepted at [facility] and on their wait list due to no orthopedic coverage this week at this location.  -In the meantime we will consult infectious disease, dietary, wound  -Will start on Zosyn and follow cultures  -ABIs ordered for the morning  -Holding home Plavix in anticipation of possible surgical intervention  ***     Type 2 diabetes  -A1c ***.  Sugars fluctuating at home.  Has been off Trulicity due to insurance coverage.  Will continue oral medications for now and use corrective factor insulin to keep in goal range     Stage III CKD due to diabetes  Hypertension  -Creatinine currently near baseline, continue to monitor  -Continue home lisinopril     Lymphoma  -History of lymphoma in chart, has been following with [facility] for this.  Ongoing workup, is not currently on any immunotherapy or immunosuppressive medications     Statin intolerance  -Rosuvastatin listed in medical history however no recent statin.  History of myalgia.  Would likely benefit from some sort of therapy, consider initiating simvastatin with vitamin D supplementation.     Gout  -Continue allopurinol, check uric acid    SYLWIA  -c/w qhs cpap    Discharge Follow Up Recommendations for outpatient labs/diagnostics:   ***    Day of Discharge     HPI:   ***    Review of Systems  ***    Vital Signs:   Temp:  [98.6 °F (37 °C)-99.2 °F (37.3 °C)] 98.6 °F (37 °C)  Heart Rate:  [61-97] 61  Resp:  [18-20] 20  BP:  (128-162)/(65-91) 128/65  Flow (L/min):  [1] 1      Physical Exam:  ***    Pertinent  and/or Most Recent Results     LAB RESULTS:      Lab 04/02/24  1832   WBC 6.89   HEMOGLOBIN 10.8*   HEMATOCRIT 32.7*   PLATELETS 162   NEUTROS ABS 5.32   IMMATURE GRANS (ABS) 0.04   LYMPHS ABS 0.81   MONOS ABS 0.58   EOS ABS 0.13   MCV 92.9   SED RATE 94*   CRP 16.12*   LACTATE 1.3         Lab 04/02/24  1832   SODIUM 134*   POTASSIUM 4.2   CHLORIDE 100   CO2 20.0*   ANION GAP 14.0   BUN 29*   CREATININE 1.43*   EGFR 56.8*   GLUCOSE 152*   CALCIUM 9.0   HEMOGLOBIN A1C 8.50*         Lab 04/02/24  1832   TOTAL PROTEIN 7.3   ALBUMIN 3.7   GLOBULIN 3.6   ALT (SGPT) 21   AST (SGOT) 20   BILIRUBIN 0.6   ALK PHOS 90                     Brief Urine Lab Results       None          Microbiology Results (last 10 days)       Procedure Component Value - Date/Time    MRSA Screen, PCR (Inpatient) - Swab, Nares [[id number]]  (Normal) Collected: 04/02/24 1903    Lab Status: Final result Specimen: Swab from Nares Updated: 04/02/24 2047     MRSA PCR Negative    Narrative:      The negative predictive value of this diagnostic test is high and should only be used to consider de-escalating anti-MRSA therapy. A positive result may indicate colonization with MRSA and must be correlated clinically.  MRSA Negative    Wound Culture - Wound, Foot, Right [[id number]] Collected: 04/02/24 1900    Lab Status: Preliminary result Specimen: Wound from Foot, Right Updated: 04/02/24 2117     Gram Stain Moderate (3+) WBCs per low power field      Few (2+) Epithelial cells per low power field      Many (4+) Gram negative bacilli      Many (4+) Gram positive cocci            MRI Foot Right Without Contrast    Result Date: 4/2/2024  MRI FOOT RIGHT WO CONTRAST Date of Exam: 4/2/2024 8:30 PM EDT Indication: eval for osteo R foot.  Comparison: Same day foot radiograph Technique:  Routine multiplanar/multisequence sequence images of the right foot were obtained without contrast  administration. Findings: Bones and joints: Degenerative subchondral edema and cystic changes throughout the midfoot. Degenerative subchondral edema and cystic change of the first metatarsophalangeal joint as well as the first interphalangeal joint. Small effusion with subchondral edema at the second metatarsophalangeal joint. Small bone marrow edema within the second and third distal and middle phalanges. No loss of normal T1 cortical or marrow signal. No evidence of acute fracture. No suspicious marrow placing lesions. Soft tissues: Diffuse subcutaneous edema. There is more pronounced edema seen in the second and third toes. No soft tissue mass or fluid collection seen. Fatty and edematous changes of the intrinsic foot muscles suggestive of neuropathic changes. Soft tissue wound seen along the plantar aspect of the second metatarsophalangeal joint. Visualized flexor and extensor tendons appear grossly intact. Plantar plates appear grossly intact. Lisfranc ligament appears grossly intact. No evidence of intermetatarsal bursitis or neuroma.     Impression: Swelling of the second and third toes as well as a small soft tissue wound along the plantar aspect of the second metatarsophalangeal joint. There is minimal marrow edema seen within the second and third middle and distal phalanges as well as a small joint effusion and subchondral edema seen at the second metatarsophalangeal joint. No definite loss of T1 cortical signal to suggest erosive changes. These findings most like represent reactive edema/effusion. However early osteomyelitis cannot be fully excluded. Recommend continued clinical and imaging follow-up as warranted. Electronically Signed: Shilo Torres MD  4/2/2024 9:15 PM EDT  Workstation ID: [id number]    XR Foot 3+ View Right    Result Date: 4/2/2024  XR FOOT 3+ VW RIGHT Date of Exam: 4/2/2024 6:03 PM EDT Indication: eval for osteo Comparison: None available. Findings: Spurring at the insertion of the  Achilles tendon. Calcaneal spur. Osteoarthritic changes. The tarsal and metatarsal bones appear intact. Severe osteoarthritic changes of the first metatarsophalangeal joint. Questionable fracture at the base of the second proximal phalanx. No lytic or blastic disease.     No evidence of osteomyelitis. Questionable fracture at the base of the second proximal phalanx. Electronically Signed: Harry Kim MD  4/2/2024 6:19 PM EDT  Workstation ID: [id number]                 Plan for Follow-up of Pending Labs/Results: ***  Pending Labs       Order Current Status    Blood Culture - Blood, Arm, Right In process    Blood Culture - Blood, Arm, Right In process    Wound Culture - Wound, Foot, Right Preliminary result          Discharge Details        Discharge Medications        ASK your doctor about these medications        Instructions Start Date   allopurinol 300 MG tablet  Commonly known as: ZYLOPRIM   300 mg, Oral, Daily      cephalexin 500 MG capsule  Commonly known as: KEFLEX   TAKE 1 CAPSULE BY MOUTH THREE TIMES A DAY FOR 10 DAYS      clopidogrel 75 MG tablet  Commonly known as: PLAVIX   75 mg, Oral, Daily      coenzyme Q10 100 MG capsule   CoQ-10 100 mg capsule   Take 1 capsule every day by oral route for 90 days.   take with statin to prevent muscle aches      colchicine 0.6 MG tablet   0.6 mg, Oral, As Needed      ergocalciferol 1.25 MG (48752 UT) capsule  Commonly known as: ERGOCALCIFEROL   1,250 mcg, Oral, Daily      Farxiga 5 MG tablet tablet  Generic drug: dapagliflozin   5 mg, Oral, Daily      glimepiride 4 MG tablet  Commonly known as: AMARYL   4 mg, Oral, Daily      HYDROcodone-acetaminophen 7.5-325 MG per tablet  Commonly known as: NORCO   1 tablet, Oral, Every 6 Hours PRN      lisinopril 40 MG tablet  Commonly known as: PRINIVIL,ZESTRIL   40 mg, Oral, Daily      metFORMIN 1000 MG tablet  Commonly known as: GLUCOPHAGE   1,000 mg, Oral, 2 Times Daily With Meals      metoprolol succinate  MG 24 hr  tablet  Commonly known as: TOPROL-XL   100 mg, Oral, Daily      ondansetron ODT 4 MG disintegrating tablet  Commonly known as: ZOFRAN-ODT   4 mg, Translingual, Every 6 Hours PRN      pioglitazone 30 MG tablet  Commonly known as: ACTOS   30 mg, Oral, Daily      rosuvastatin 20 MG tablet  Commonly known as: CRESTOR   20 mg, Oral, Daily      sildenafil 100 MG tablet  Commonly known as: VIAGRA   100 mg, Oral, Daily PRN      silver sulfadiazine 1 % cream  Commonly known as: SILVADENE, SSD   No dose, route, or frequency recorded.      SITagliptin 25 MG tablet  Commonly known as: JANUVIA   25 mg, Oral, Daily      Trulicity 4.5 MG/0.5ML solution pen-injector  Generic drug: Dulaglutide   INJECT 0.5 ML EVERY WEEK BY SUBCUTANEOUS ROUTE FOR 90 DAYS.               Allergies   Allergen Reactions    Statins Myalgia         Discharge Disposition:      Diet:  Hospital:  Diet Order   Procedures    Diet: Diabetic; Consistent Carbohydrate; Fluid Consistency: Thin (IDDSI 0)            Activity:      Restrictions or Other Recommendations:  ***       CODE STATUS:    Code Status and Medical Interventions:   Ordered at: 04/03/24 0013     Code Status (Patient has no pulse and is not breathing):    CPR (Attempt to Resuscitate)     Medical Interventions (Patient has pulse or is breathing):    Full Support       No future appointments.              Niko Santiago MD  04/03/24      Time Spent on Discharge:  I spent  ***  minutes on this discharge activity which included: face-to-face encounter with the patient, reviewing the data in the system, coordination of the care with the nursing staff as well as consultants, documentation, and entering orders.

## 2024-04-04 ENCOUNTER — APPOINTMENT (OUTPATIENT)
Dept: CT IMAGING | Facility: HOSPITAL | Age: 59
DRG: 617 | End: 2024-04-04
Payer: COMMERCIAL

## 2024-04-04 LAB
ALBUMIN SERPL-MCNC: 3.5 G/DL (ref 3.5–5.2)
ALBUMIN/GLOB SERPL: 1.3 G/DL
ALP SERPL-CCNC: 98 U/L (ref 39–117)
ALT SERPL W P-5'-P-CCNC: 23 U/L (ref 1–41)
ANION GAP SERPL CALCULATED.3IONS-SCNC: 9 MMOL/L (ref 5–15)
AST SERPL-CCNC: 22 U/L (ref 1–40)
BASOPHILS # BLD AUTO: 0.03 10*3/MM3 (ref 0–0.2)
BASOPHILS NFR BLD AUTO: 0.6 % (ref 0–1.5)
BILIRUB SERPL-MCNC: 0.6 MG/DL (ref 0–1.2)
BUN SERPL-MCNC: 16 MG/DL (ref 6–20)
BUN/CREAT SERPL: 13 (ref 7–25)
CALCIUM SPEC-SCNC: 8.7 MG/DL (ref 8.6–10.5)
CHLORIDE SERPL-SCNC: 105 MMOL/L (ref 98–107)
CK SERPL-CCNC: 45 U/L (ref 20–200)
CO2 SERPL-SCNC: 26 MMOL/L (ref 22–29)
CREAT SERPL-MCNC: 1.23 MG/DL (ref 0.76–1.27)
CRP SERPL-MCNC: 9.71 MG/DL (ref 0–0.5)
DEPRECATED RDW RBC AUTO: 43.4 FL (ref 37–54)
EGFRCR SERPLBLD CKD-EPI 2021: 68.1 ML/MIN/1.73
EOSINOPHIL # BLD AUTO: 0.26 10*3/MM3 (ref 0–0.4)
EOSINOPHIL NFR BLD AUTO: 5.1 % (ref 0.3–6.2)
ERYTHROCYTE [DISTWIDTH] IN BLOOD BY AUTOMATED COUNT: 12.9 % (ref 12.3–15.4)
ERYTHROCYTE [SEDIMENTATION RATE] IN BLOOD: 100 MM/HR (ref 0–20)
GLOBULIN UR ELPH-MCNC: 2.7 GM/DL
GLUCOSE BLDC GLUCOMTR-MCNC: 116 MG/DL (ref 70–130)
GLUCOSE BLDC GLUCOMTR-MCNC: 118 MG/DL (ref 70–130)
GLUCOSE BLDC GLUCOMTR-MCNC: 147 MG/DL (ref 70–130)
GLUCOSE BLDC GLUCOMTR-MCNC: 176 MG/DL (ref 70–130)
GLUCOSE SERPL-MCNC: 105 MG/DL (ref 65–99)
HCT VFR BLD AUTO: 31.6 % (ref 37.5–51)
HGB BLD-MCNC: 10.3 G/DL (ref 13–17.7)
IMM GRANULOCYTES # BLD AUTO: 0.03 10*3/MM3 (ref 0–0.05)
IMM GRANULOCYTES NFR BLD AUTO: 0.6 % (ref 0–0.5)
LYMPHOCYTES # BLD AUTO: 0.72 10*3/MM3 (ref 0.7–3.1)
LYMPHOCYTES NFR BLD AUTO: 14.1 % (ref 19.6–45.3)
MAGNESIUM SERPL-MCNC: 1.8 MG/DL (ref 1.6–2.6)
MCH RBC QN AUTO: 29.9 PG (ref 26.6–33)
MCHC RBC AUTO-ENTMCNC: 32.6 G/DL (ref 31.5–35.7)
MCV RBC AUTO: 91.9 FL (ref 79–97)
MONOCYTES # BLD AUTO: 0.44 10*3/MM3 (ref 0.1–0.9)
MONOCYTES NFR BLD AUTO: 8.6 % (ref 5–12)
NEUTROPHILS NFR BLD AUTO: 3.62 10*3/MM3 (ref 1.7–7)
NEUTROPHILS NFR BLD AUTO: 71 % (ref 42.7–76)
NRBC BLD AUTO-RTO: 0 /100 WBC (ref 0–0.2)
PHOSPHATE SERPL-MCNC: 3.2 MG/DL (ref 2.5–4.5)
PLATELET # BLD AUTO: 178 10*3/MM3 (ref 140–450)
PMV BLD AUTO: 10.2 FL (ref 6–12)
POTASSIUM SERPL-SCNC: 4.6 MMOL/L (ref 3.5–5.2)
PROT SERPL-MCNC: 6.2 G/DL (ref 6–8.5)
RBC # BLD AUTO: 3.44 10*6/MM3 (ref 4.14–5.8)
SODIUM SERPL-SCNC: 140 MMOL/L (ref 136–145)
WBC NRBC COR # BLD AUTO: 5.1 10*3/MM3 (ref 3.4–10.8)

## 2024-04-04 PROCEDURE — 75635 CT ANGIO ABDOMINAL ARTERIES: CPT

## 2024-04-04 PROCEDURE — 85652 RBC SED RATE AUTOMATED: CPT | Performed by: INTERNAL MEDICINE

## 2024-04-04 PROCEDURE — 85025 COMPLETE CBC W/AUTO DIFF WBC: CPT | Performed by: INTERNAL MEDICINE

## 2024-04-04 PROCEDURE — 83735 ASSAY OF MAGNESIUM: CPT | Performed by: INTERNAL MEDICINE

## 2024-04-04 PROCEDURE — 25010000002 PIPERACILLIN SOD-TAZOBACTAM PER 1 G: Performed by: STUDENT IN AN ORGANIZED HEALTH CARE EDUCATION/TRAINING PROGRAM

## 2024-04-04 PROCEDURE — 94660 CPAP INITIATION&MGMT: CPT

## 2024-04-04 PROCEDURE — 94799 UNLISTED PULMONARY SVC/PX: CPT

## 2024-04-04 PROCEDURE — 86140 C-REACTIVE PROTEIN: CPT | Performed by: INTERNAL MEDICINE

## 2024-04-04 PROCEDURE — 25510000001 IOPAMIDOL PER 1 ML: Performed by: INTERNAL MEDICINE

## 2024-04-04 PROCEDURE — 63710000001 INSULIN LISPRO (HUMAN) PER 5 UNITS: Performed by: STUDENT IN AN ORGANIZED HEALTH CARE EDUCATION/TRAINING PROGRAM

## 2024-04-04 PROCEDURE — 25010000002 HEPARIN (PORCINE) PER 1000 UNITS: Performed by: STUDENT IN AN ORGANIZED HEALTH CARE EDUCATION/TRAINING PROGRAM

## 2024-04-04 PROCEDURE — 82550 ASSAY OF CK (CPK): CPT | Performed by: INTERNAL MEDICINE

## 2024-04-04 PROCEDURE — 25010000002 DAPTOMYCIN PER 1 MG: Performed by: INTERNAL MEDICINE

## 2024-04-04 PROCEDURE — 80053 COMPREHEN METABOLIC PANEL: CPT | Performed by: INTERNAL MEDICINE

## 2024-04-04 PROCEDURE — 82948 REAGENT STRIP/BLOOD GLUCOSE: CPT

## 2024-04-04 PROCEDURE — 84100 ASSAY OF PHOSPHORUS: CPT | Performed by: INTERNAL MEDICINE

## 2024-04-04 PROCEDURE — 99232 SBSQ HOSP IP/OBS MODERATE 35: CPT | Performed by: INTERNAL MEDICINE

## 2024-04-04 RX ORDER — HYDROMORPHONE HYDROCHLORIDE 1 MG/ML
0.5 INJECTION, SOLUTION INTRAMUSCULAR; INTRAVENOUS; SUBCUTANEOUS
Status: ACTIVE | OUTPATIENT
Start: 2024-04-04 | End: 2024-04-12

## 2024-04-04 RX ORDER — COLCHICINE 0.6 MG/1
1.2 TABLET ORAL ONCE
Status: COMPLETED | OUTPATIENT
Start: 2024-04-04 | End: 2024-04-04

## 2024-04-04 RX ORDER — COLCHICINE 0.6 MG/1
0.6 TABLET ORAL DAILY
Status: DISCONTINUED | OUTPATIENT
Start: 2024-04-04 | End: 2024-04-15 | Stop reason: HOSPADM

## 2024-04-04 RX ADMIN — IOPAMIDOL 150 ML: 755 INJECTION, SOLUTION INTRAVENOUS at 11:32

## 2024-04-04 RX ADMIN — COLCHICINE 0.6 MG: 0.6 TABLET ORAL at 11:17

## 2024-04-04 RX ADMIN — HEPARIN SODIUM 5000 UNITS: 5000 INJECTION INTRAVENOUS; SUBCUTANEOUS at 15:16

## 2024-04-04 RX ADMIN — INSULIN LISPRO 2 UNITS: 100 INJECTION, SOLUTION INTRAVENOUS; SUBCUTANEOUS at 11:17

## 2024-04-04 RX ADMIN — PIOGLITAZONE 30 MG: 15 TABLET ORAL at 08:13

## 2024-04-04 RX ADMIN — HYDROCODONE BITARTRATE AND ACETAMINOPHEN 1 TABLET: 5; 325 TABLET ORAL at 12:11

## 2024-04-04 RX ADMIN — ACETAMINOPHEN 650 MG: 325 TABLET ORAL at 20:22

## 2024-04-04 RX ADMIN — PIPERACILLIN AND TAZOBACTAM 3.38 G: 3; .375 INJECTION, POWDER, LYOPHILIZED, FOR SOLUTION INTRAVENOUS at 15:17

## 2024-04-04 RX ADMIN — COLCHICINE 1.2 MG: 0.6 TABLET ORAL at 09:22

## 2024-04-04 RX ADMIN — PIPERACILLIN AND TAZOBACTAM 3.38 G: 3; .375 INJECTION, POWDER, LYOPHILIZED, FOR SOLUTION INTRAVENOUS at 20:20

## 2024-04-04 RX ADMIN — HEPARIN SODIUM 5000 UNITS: 5000 INJECTION INTRAVENOUS; SUBCUTANEOUS at 08:14

## 2024-04-04 RX ADMIN — LISINOPRIL 40 MG: 40 TABLET ORAL at 08:13

## 2024-04-04 RX ADMIN — HEPARIN SODIUM 5000 UNITS: 5000 INJECTION INTRAVENOUS; SUBCUTANEOUS at 20:21

## 2024-04-04 RX ADMIN — HYDROCODONE BITARTRATE AND ACETAMINOPHEN 1 TABLET: 5; 325 TABLET ORAL at 00:13

## 2024-04-04 RX ADMIN — ACETAMINOPHEN 650 MG: 325 TABLET ORAL at 00:06

## 2024-04-04 RX ADMIN — GLIPIZIDE 10 MG: 10 TABLET ORAL at 08:13

## 2024-04-04 RX ADMIN — Medication 10 ML: at 08:16

## 2024-04-04 RX ADMIN — METOPROLOL SUCCINATE 100 MG: 50 TABLET, EXTENDED RELEASE ORAL at 08:13

## 2024-04-04 RX ADMIN — DAPTOMYCIN 800 MG: 500 INJECTION, POWDER, LYOPHILIZED, FOR SOLUTION INTRAVENOUS at 13:15

## 2024-04-04 RX ADMIN — ALLOPURINOL 300 MG: 300 TABLET ORAL at 08:13

## 2024-04-04 RX ADMIN — PIPERACILLIN AND TAZOBACTAM 3.38 G: 3; .375 INJECTION, POWDER, LYOPHILIZED, FOR SOLUTION INTRAVENOUS at 08:14

## 2024-04-04 NOTE — PROGRESS NOTES
[facility] Medicine Services  PROGRESS NOTE    Patient Name: Alvaro Sinha  : 1965  MRN: [id number]    Date of Admission: 2024  Primary Care Physician: Martha Mckee MD    Subjective   Subjective     CC:  Follow-up for osteomyelitis    HPI:  Patient seen and examined this morning.  Complaining of pain in his right hand and left ankle, consistent with gout flare.  Changed his mind and wants to stay till he sees Dr. Hays      Objective   Objective     Vital Signs:   Temp:  [97.8 °F (36.6 °C)-98.9 °F (37.2 °C)] 98.7 °F (37.1 °C)  Heart Rate:  [63-90] 84  Resp:  [16-20] 18  BP: (116-149)/(73-85) 143/85     Physical Exam:  General: Comfortable, not in distress, conversant and cooperative  Head: Atraumatic and normocephalic  Eyes: No Icterus. No pallor  Ears:  Ears appear intact with no abnormalities noted  Throat: No oral lesions, no thrush  Neck: Supple, trachea midline  Lungs: Clear to auscultation bilaterally, equal air entry, no wheezing or crackles  Heart:  Normal S1 and S2, no murmur, no gallop, No JVD, no lower extremity swelling  Abdomen:  Soft, no tenderness, no organomegaly, normal bowel sounds, no organomegaly  Extremities: Right foot purulent infection on the plantar aspect of the foot at the base of the 1st-3rd toe with surrounding erythema  Skin: No bleeding, bruising or rash, normal skin turgor and elasticity  Neurologic: Cranial nerves appear intact with no evidence of facial asymmetry, normal motor and sensory functions in all 4 extremities  Psych: Alert and oriented x 3, normal mood    Results Reviewed:  LAB RESULTS:      Lab 24  0626 24  0534 24  1832   WBC 5.10 4.83 6.89   HEMOGLOBIN 10.3* 10.0* 10.8*   HEMATOCRIT 31.6* 30.4* 32.7*   PLATELETS 178 148 162   NEUTROS ABS 3.62  --  5.32   IMMATURE GRANS (ABS) 0.03  --  0.04   LYMPHS ABS 0.72  --  0.81   MONOS ABS 0.44  --  0.58   EOS ABS 0.26  --  0.13   MCV 91.9 92.7 92.9    SED RATE  --   --  94*   CRP 9.71*  --  16.12*   LACTATE  --   --  1.3         Lab 04/04/24  0626 04/03/24  0534 04/02/24  1832   SODIUM 140 139 134*   POTASSIUM 4.6 4.3 4.2   CHLORIDE 105 106 100   CO2 26.0 22.0 20.0*   ANION GAP 9.0 11.0 14.0   BUN 16 24* 29*   CREATININE 1.23 1.18 1.43*   EGFR 68.1 71.5 56.8*   GLUCOSE 105* 108* 152*   CALCIUM 8.7 8.5* 9.0   MAGNESIUM 1.8 1.7  --    PHOSPHORUS 3.2  --   --    HEMOGLOBIN A1C  --   --  8.50*         Lab 04/04/24  0626 04/02/24  1832   TOTAL PROTEIN 6.2 7.3   ALBUMIN 3.5 3.7   GLOBULIN 2.7 3.6   ALT (SGPT) 23 21   AST (SGOT) 22 20   BILIRUBIN 0.6 0.6   ALK PHOS 98 90                     Brief Urine Lab Results       None            Microbiology Results Abnormal       Procedure Component Value - Date/Time    Blood Culture - Blood, Arm, Right [[id number]]  (Normal) Collected: 04/02/24 1900    Lab Status: Preliminary result Specimen: Blood from Arm, Right Updated: 04/03/24 1946     Blood Culture No growth at 24 hours    Blood Culture - Blood, Arm, Right [[id number]]  (Normal) Collected: 04/02/24 1833    Lab Status: Preliminary result Specimen: Blood from Arm, Right Updated: 04/03/24 1900     Blood Culture No growth at 24 hours    Narrative:      Less than seven (7) mL's of blood was collected.  Insufficient quantity may yield false negative results.    MRSA Screen, PCR (Inpatient) - Swab, Nares [[id number]]  (Normal) Collected: 04/02/24 1903    Lab Status: Final result Specimen: Swab from Nares Updated: 04/02/24 2047     MRSA PCR Negative    Narrative:      The negative predictive value of this diagnostic test is high and should only be used to consider de-escalating anti-MRSA therapy. A positive result may indicate colonization with MRSA and must be correlated clinically.  MRSA Negative            CT Angio Abdominal Aorta Bilateral Iliofem Runoff    Result Date: 4/4/2024  CT ANGIO ABDOMINAL AORTA BILAT ILIOFEM RUNOFF Date of Exam: 4/4/2024 11:28 AM EDT Indication:  PERIPHERAL ARTERIAL DISEASE. Comparison: None available. Technique: CTA of the abdomen, pelvis and both lower extremities was performed after the uneventful intravenous administration of 150 cc Isovue-370. Reconstructed coronal and sagittal images were also obtained. In addition, a 3-D volume rendered image was created for interpretation. Automated exposure control and iterative reconstruction methods were used. Findings: AORTA:  Normal in caliber throughout. No dissection or penetrating ulcer identified.No significant atherosclerotic disease identified. MESENTERIC/RENAL VESSELS:  Normal in caliber. No dissection or significant stenosis identified. PELVIC VESSELS: Normal in caliber. No dissection or significant stenosis identified. IVC:  Normal caliber. RIGHT LOWER EXTREMITY:  Common femoral artery, superficial femoral artery, and popliteal artery are normal in caliber. No significant stenosis.  There is below-knee medial sclerosis presumably related to diabetic vasculopathy. Lower extremity arteries are of normal caliber with three-vessel runoff. LEFT LOWER EXTREMITY:  Common femoral artery, superficial femoral artery, and popliteal artery are normal in caliber. No significant stenosis.  There is below-knee medial sclerosis presumably related to diabetic vasculopathy. Lower extremity arteries are  of normal caliber with three-vessel runoff. LOWER THORAX: Unremarkable LIVER: Unremarkable parenchyma without focal lesion. BILIARY/GALLBLADDER: There are calcified gallstones. SPLEEN: Enlarged measuring 19 cm in length PANCREAS:  Unremarkable ADRENAL:  Unremarkable KIDNEYS:  Unremarkable parenchyma with no solid mass identified. No obstruction.  No calculus identified. GASTROINTESTINAL/MESENTERY:  No evidence of obstruction nor inflammation.  RETROPERITONEUM/LYMPH NODES: There are numerous enlarged lymph nodes, the largest of which is a 2.1 x 2.1 cm aortocaval node (image 69, series 4). There is a 2.3 x 1.8 cm right  inguinal lymph node (image 109, series 4). There is a 1.9 x 1.5 cm right external iliac node (image 154, series 4). REPRODUCTIVE:  Unremarkable BLADDER:  Unremarkable OSSEUS STRUCTURES:  Typical for age with no acute process identified.     Impression: Impression: 1.No significant arterial stenosis identified. There is bilateral three-vessel runoff. 2.There is below-knee medial sclerosis presumably related to diabetic vasculopathy. 3.Nonspecific abdominal pelvic lymphadenopathy. 4.Other incidental nonemergent findings as detailed above. Electronically Signed: Yannick Srivastava MD  4/4/2024 12:20 PM EDT  Workstation ID: [id number]    Doppler Ankle Brachial Index Single Level CAR    Result Date: 4/3/2024    Right Conclusion: The right YRN is unable to be assessed due to vessel incompressibility. Normal digital pressures.   Left Conclusion: The left YRN is unable to be assessed due to vessel incompressibility. Normal digital pressures.   Waveforms and normal digital pressures suggest no significant flow obstructive PAD     MRI Foot Right Without Contrast    Result Date: 4/2/2024  MRI FOOT RIGHT WO CONTRAST Date of Exam: 4/2/2024 8:30 PM EDT Indication: eval for osteo R foot.  Comparison: Same day foot radiograph Technique:  Routine multiplanar/multisequence sequence images of the right foot were obtained without contrast administration. Findings: Bones and joints: Degenerative subchondral edema and cystic changes throughout the midfoot. Degenerative subchondral edema and cystic change of the first metatarsophalangeal joint as well as the first interphalangeal joint. Small effusion with subchondral edema at the second metatarsophalangeal joint. Small bone marrow edema within the second and third distal and middle phalanges. No loss of normal T1 cortical or marrow signal. No evidence of acute fracture. No suspicious marrow placing lesions. Soft tissues: Diffuse subcutaneous edema. There is more pronounced edema seen in the  second and third toes. No soft tissue mass or fluid collection seen. Fatty and edematous changes of the intrinsic foot muscles suggestive of neuropathic changes. Soft tissue wound seen along the plantar aspect of the second metatarsophalangeal joint. Visualized flexor and extensor tendons appear grossly intact. Plantar plates appear grossly intact. Lisfranc ligament appears grossly intact. No evidence of intermetatarsal bursitis or neuroma.     Impression: Impression: Swelling of the second and third toes as well as a small soft tissue wound along the plantar aspect of the second metatarsophalangeal joint. There is minimal marrow edema seen within the second and third middle and distal phalanges as well as a small joint effusion and subchondral edema seen at the second metatarsophalangeal joint. No definite loss of T1 cortical signal to suggest erosive changes. These findings most like represent reactive edema/effusion. However early osteomyelitis cannot be fully excluded. Recommend continued clinical and imaging follow-up as warranted. Electronically Signed: Shilo Torres MD  4/2/2024 9:15 PM EDT  Workstation ID: [id number]    XR Foot 3+ View Right    Result Date: 4/2/2024  XR FOOT 3+ VW RIGHT Date of Exam: 4/2/2024 6:03 PM EDT Indication: eval for osteo Comparison: None available. Findings: Spurring at the insertion of the Achilles tendon. Calcaneal spur. Osteoarthritic changes. The tarsal and metatarsal bones appear intact. Severe osteoarthritic changes of the first metatarsophalangeal joint. Questionable fracture at the base of the second proximal phalanx. No lytic or blastic disease.     Impression: No evidence of osteomyelitis. Questionable fracture at the base of the second proximal phalanx. Electronically Signed: Harry Kim MD  4/2/2024 6:19 PM EDT  Workstation ID: [id number]         Current medications:  Scheduled Meds:[Held by provider] clopidogrel, 75 mg, Oral, Daily  colchicine, 0.6 mg, Oral,  Daily  DAPTOmycin, 8 mg/kg (Adjusted), Intravenous, Q24H  glipizide, 10 mg, Oral, QAM AC  heparin (porcine), 5,000 Units, Subcutaneous, Q8H  insulin lispro, 2-7 Units, Subcutaneous, 4x Daily AC & at Bedtime  lisinopril, 40 mg, Oral, Daily  metoprolol succinate XL, 100 mg, Oral, Daily  pioglitazone, 30 mg, Oral, Daily  piperacillin-tazobactam, 3.375 g, Intravenous, Q8H  sodium chloride, 10 mL, Intravenous, Q12H      Continuous Infusions:   PRN Meds:.  acetaminophen    senna-docusate sodium **AND** polyethylene glycol **AND** bisacodyl **AND** bisacodyl    Calcium Replacement - Follow Nurse / BPA Driven Protocol    dextrose    dextrose    glucagon (human recombinant)    HYDROcodone-acetaminophen    HYDROmorphone    Magnesium Standard Dose Replacement - Follow Nurse / BPA Driven Protocol    Phosphorus Replacement - Follow Nurse / BPA Driven Protocol    Potassium Replacement - Follow Nurse / BPA Driven Protocol    sodium chloride    sodium chloride    Assessment & Plan   Assessment & Plan     Active Hospital Problems    Diagnosis  POA    **Osteomyelitis [M86.9]  Yes    Stage 3a chronic kidney disease [N18.31]  Yes    History of myalgia due to HMG CoA reductase inhibitor [Z87.39]  Not Applicable    Microalbuminuric diabetic nephropathy [E11.21]  Yes    Hyperlipidemia [E78.5]  Yes    Type 2 diabetes mellitus with hyperglycemia [E11.65]  Yes    Lymphoma [C85.90]  Yes    Diabetic foot ulcer [E11.621, L97.509]  Yes    Hypertensive disorder [I10]  Yes      Resolved Hospital Problems   No resolved problems to display.        Brief Hospital Course to date:  Alvaro Sinha is a 58 y.o. male with past medical history of lymphoma, type 2 diabetes, hypertension, hyperlipidemia, CKD, gout, chronic ulceration of the right foot who is presenting with worsening wound of right foot. MRI showed concerns for early osteomyelitis of the second and third middle and distal phalanges of the right rei    Nonhealing diabetic ulcer of the  right foot with overlying cellulitis   Concern for foot osteomyelitis  Patient with chronic foot ulcer, currently with worsening cellulitis and possible osteomyelitis per MRI  Currently on Zosyn and daptomycin per ID  Holding Plavix in case any surgical intervention  Arterial duplex ordered  Initial plan was for the patient to be transferred to [facility] since we do not have orthopedic coverage.  Later, patient change his mind and wants to stay till he sees Dr. Hays, hopefully Monday    Acute gout flare  Start colchicine  Holding allopurinol  Monitor inflammatory markers    Type 2 diabetes  A1c 8.5%  Has been off Trulicity due to insurance coverage  Will continue oral medications for now   SSI     Stage III CKD due to diabetes  Hypertension  Creatinine currently near baseline, continue to monitor  Continue home lisinopril     Lymphoma  History of lymphoma in chart, has been following with [facility] for this  Ongoing workup, is not currently on any immunotherapy or immunosuppressive medications     Statin intolerance  Rosuvastatin listed in medical history however no recent statin.  History of myalgia     History of gout  Continue allopurinol            Expected Discharge Location and Transportation: TBD expected Discharge   Expected Discharge Date: 4/9/2024; Expected Discharge Time:      DVT prophylaxis:  Medical DVT prophylaxis orders are present.         AM-PAC 6 Clicks Score (PT): 19 (04/04/24 0800)    CODE STATUS:   Code Status and Medical Interventions:   Ordered at: 04/03/24 0013     Code Status (Patient has no pulse and is not breathing):    CPR (Attempt to Resuscitate)     Medical Interventions (Patient has pulse or is breathing):    Full Support     Copied text in this note has been reviewed and is accurate as of 04/04/24.     Francia Pitts MD  04/04/24

## 2024-04-04 NOTE — PLAN OF CARE
Goal Outcome Evaluation:         -VSS, RA  -Dressing to R foot wound changed per orders  -IV ABX infusing  -PRN given for pain, colchicine started for gout  -Waiting to be seen by ortho sx

## 2024-04-04 NOTE — PROGRESS NOTES
[facility]    INFECTIOUS DISEASE CONSULT/INITIAL HOSPITAL VISIT    Alvaro Sinha  1965  [id number]    Date of consult: 4/3/2024    Admit date: 4/2/2024    Requesting Provider: No ref. provider found  Evaluating physician: Aung Jameson MD  Reason for Consultation: Right foot diabetic wound ulcers with underlying osteomyelitis second and third toe  Chief Complaint: Above      Subjective   History of present illness:  Patient is a  58 y.o.  Yr old male with a history of diabetes mellitus type 2, essential hypertension, hyperlipidemia, CKD stage IIIa, gout, with chronic right foot ulceration being followed at the [place] [facility] which worsened 3/31/2024.  He has been using a boot to offload his foot, but was also working at [email or web address], although he has been off work recently.  MRI of the foot 4/2/2024 was consistent with possible early osteomyelitis second and third middle and distal phalanges.  He has a plantar draining fistulous track below his second and third metatarsal and an anterior web skin breakdown between his second and third toe on his foot.  He was awaiting possible transfer to the [facility] for further orthopedic evaluation.  He has no other localizing signs or symptoms of infection.  I was consulted on 4/3/2024 for further evaluation and treatment.    4/4/2024 history reviewed.  No high fevers or chills.  Tolerating antibiotics for right foot osteomyelitis.    Past Medical History:   Diagnosis Date    Diabetes mellitus     Hypertension        Past Surgical History:   Procedure Laterality Date    BONE RESECTION, RIB         Pediatric History   Patient Parents    Not on file     Other Topics Concern    Not on file   Social History Narrative    Not on file   No cigarettes, occasional alcohol, no drug use,  to [facility] with 1 child.  Works at [email or web address].    family history is not on file.    Allergies   Allergen Reactions    Statins Myalgia        Immunization History   Administered Date(s) Administered    COVID-19 (MODERNA) 1st,2nd,3rd Dose Monovalent 09/23/2021, 11/02/2021       Medication:  @Scheduled Meds:[Held by provider] clopidogrel, 75 mg, Oral, Daily  colchicine, 0.6 mg, Oral, Daily  DAPTOmycin, 8 mg/kg (Adjusted), Intravenous, Q24H  glipizide, 10 mg, Oral, QAM AC  heparin (porcine), 5,000 Units, Subcutaneous, Q8H  insulin lispro, 2-7 Units, Subcutaneous, 4x Daily AC & at Bedtime  lisinopril, 40 mg, Oral, Daily  metoprolol succinate XL, 100 mg, Oral, Daily  pioglitazone, 30 mg, Oral, Daily  piperacillin-tazobactam, 3.375 g, Intravenous, Q8H  sodium chloride, 10 mL, Intravenous, Q12H      Continuous Infusions:   PRN Meds:.  acetaminophen    senna-docusate sodium **AND** polyethylene glycol **AND** bisacodyl **AND** bisacodyl    Calcium Replacement - Follow Nurse / BPA Driven Protocol    dextrose    dextrose    glucagon (human recombinant)    HYDROcodone-acetaminophen    HYDROmorphone    Magnesium Standard Dose Replacement - Follow Nurse / BPA Driven Protocol    Phosphorus Replacement - Follow Nurse / BPA Driven Protocol    Potassium Replacement - Follow Nurse / BPA Driven Protocol    sodium chloride    sodium chloride     Please refer to the medical record for a full medication list    Review of Systems:    Constitutional-- No Fever, chills or sweats.  Appetite good, and no malaise. No fatigue.  HEENT-- No new vision, hearing or throat complaints.  No epistaxis or oral sores.  Denies odynophagia or dysphagia.  No odynophagia or dysphagia. No headache, photophobia or neck stiffness.  CV-- No chest pain, palpitation or syncope  Resp-- No SOB/cough/Hemoptysis  GI- No nausea, vomiting, or diarrhea.  No hematochezia, melena, or hematemesis. Denies jaundice or chronic liver disease.  -- No dysuria, hematuria, or flank pain.  Denies hesitancy, urgency.  Lymph- no swollen lymph nodes in neck/axilla or groin.   Heme- No active bruising or bleeding; no  "Hx of DVT or PE.  MS-- no swelling or pain in the bones or joints of arms/legs.  No new back pain.  Neuro-- No acute focal weakness or numbness in the arms or legs.  No seizures.  Skin--No rashes or lesions, except right foot as per HPI, no nodules    Physical Exam:   Vital Signs   Temp:  [97.8 °F (36.6 °C)-98.9 °F (37.2 °C)] 98.7 °F (37.1 °C)  Heart Rate:  [63-90] 78  Resp:  [16-20] 18  BP: (116-149)/(73-85) 143/85    Blood pressure 143/85, pulse 78, temperature 98.7 °F (37.1 °C), temperature source Oral, resp. rate 18, height 185.4 cm (72.99\"), weight 136 kg (300 lb), SpO2 96%.  GENERAL: Awake and alert, in mild distress. Appears  stated age.  Resting in bed.  HEENT:  Normocephalic, atraumatic.  Oropharynx without thrush. Dentition in good repair. No cervical adenopathy. No neck masses.  Ears externally normal, Nose externally normal.  EYES:  No conjunctival injection. No icterus. EOM full.  LYMPHATICS: No lymphadenopathy of the neck or axillary or inguinal regions.   HEART: No murmur, gallop, or pericardial friction rub. Reg rate rhythm, No JVD at 45 degrees.  LUNGS: Clear to auscultation and percussion. No respiratory distress, no use of accessory muscles.  ABDOMEN: Soft, nontender, nondistended. No appreciable HSM.  Bowel sounds normal.  Obese.  SKIN: Warm and dry without cutaneous eruptions.  No nodules.  Right second and third webspace with increased erythema and purulent discharge, and plantar fistulous tract between the right second and third metatarsal probes to near bone.  PSYCHIATRIC: Mental status lucid. No confusion.  EXT:  No cellulitic change.  NEURO: Oriented to name, nonfocal.              4/3/24 right foot wounds    Results Review:   I reviewed the patient's new clinical results.  I reviewed the patient's new imaging results and agree with the interpretation.  I reviewed the patient's other test results and agree with the interpretation    Results from last 7 days   Lab Units 04/04/24  0626 " "04/03/24  0534 04/02/24  1832   WBC 10*3/mm3 5.10 4.83 6.89   HEMOGLOBIN g/dL 10.3* 10.0* 10.8*   HEMATOCRIT % 31.6* 30.4* 32.7*   PLATELETS 10*3/mm3 178 148 162     Results from last 7 days   Lab Units 04/04/24  0626   SODIUM mmol/L 140   POTASSIUM mmol/L 4.6   CHLORIDE mmol/L 105   CO2 mmol/L 26.0   BUN mg/dL 16   CREATININE mg/dL 1.23   GLUCOSE mg/dL 105*   CALCIUM mg/dL 8.7     Results from last 7 days   Lab Units 04/04/24  0626   ALK PHOS U/L 98   BILIRUBIN mg/dL 0.6   ALT (SGPT) U/L 23   AST (SGOT) U/L 22     Results from last 7 days   Lab Units 04/02/24  1832   SED RATE mm/hr 94*     Results from last 7 days   Lab Units 04/04/24  0626   CRP mg/dL 9.71*         Results from last 7 days   Lab Units 04/02/24  1832   LACTATE mmol/L 1.3     Estimated Creatinine Clearance: 94.4 mL/min (by C-G formula based on SCr of 1.23 mg/dL).  CPK          4/4/2024    06:26   Common Labs   Creatine Kinase 45       Procalitonin Results:       Brief Urine Lab Results       None           No results found for: \"SITE\", \"ALLENTEST\", \"PHART\", \"XDF2HDO\", \"PO2ART\", \"WKB5LFT\", \"BASEEXCESS\", \"G7PTLUTT\", \"HGBBG\", \"HCTABG\", \"OXYHEMOGLOBI\", \"METHHGBN\", \"CARBOXYHGB\", \"CO2CT\", \"BAROMETRIC\", \"MODALITY\", \"FIO2\"     Microbiology:    Right foot wound culture 4/2/2024 with gram-positive cocci, gram-negative rods on Gram stain, MRSA screen negative, blood cultures x 2 from 4/2 negative    Results for orders placed or performed during the hospital encounter of 04/02/24   Blood Culture - Blood, Arm, Right    Specimen: Arm, Right; Blood   Result Value Ref Range    Blood Culture No growth at 24 hours          Culture results from last 30 days   Lab 04/02/24  1900   WOUNDCX Heavy growth (4+) Streptococcus agalactiae (Group B)*  Scant growth (1+) Normal Skin Flor      Brief Urine Lab Results       None        Blood cultures x 2 from 4/2 are negative.  Wound culture right foot growing group B streptococcus to date, MRSA screen " negative.    Radiology:  Imaging Results (Last 72 Hours)       Procedure Component Value Units Date/Time    CT Angio Abdominal Aorta Bilateral Iliofem Runoff [[id number]] Resulted: 04/04/24 1151     Updated: 04/04/24 1136    MRI Foot Right Without Contrast [[id number]] Collected: 04/02/24 2106     Updated: 04/02/24 2118    Narrative:        MRI FOOT RIGHT WO CONTRAST    Date of Exam: 4/2/2024 8:30 PM EDT    Indication: eval for osteo R foot.     Comparison: Same day foot radiograph    Technique:  Routine multiplanar/multisequence sequence images of the right foot were obtained without contrast administration.      Findings:  Bones and joints: Degenerative subchondral edema and cystic changes throughout the midfoot. Degenerative subchondral edema and cystic change of the first metatarsophalangeal joint as well as the first interphalangeal joint. Small effusion with   subchondral edema at the second metatarsophalangeal joint. Small bone marrow edema within the second and third distal and middle phalanges. No loss of normal T1 cortical or marrow signal. No evidence of acute fracture. No suspicious marrow placing   lesions.    Soft tissues: Diffuse subcutaneous edema. There is more pronounced edema seen in the second and third toes. No soft tissue mass or fluid collection seen. Fatty and edematous changes of the intrinsic foot muscles suggestive of neuropathic changes. Soft   tissue wound seen along the plantar aspect of the second metatarsophalangeal joint. Visualized flexor and extensor tendons appear grossly intact. Plantar plates appear grossly intact. Lisfranc ligament appears grossly intact. No evidence of   intermetatarsal bursitis or neuroma.      Impression:      Impression:  Swelling of the second and third toes as well as a small soft tissue wound along the plantar aspect of the second metatarsophalangeal joint. There is minimal marrow edema seen within the second and third middle and distal phalanges as well  as a small   joint effusion and subchondral edema seen at the second metatarsophalangeal joint. No definite loss of T1 cortical signal to suggest erosive changes. These findings most like represent reactive edema/effusion. However early osteomyelitis cannot be fully   excluded. Recommend continued clinical and imaging follow-up as warranted.        Electronically Signed: Shilo Torres MD    4/2/2024 9:15 PM EDT    Workstation ID: [id number]    XR Foot 3+ View Right [[id number]] Collected: 04/02/24 1817     Updated: 04/02/24 1822    Narrative:      XR FOOT 3+ VW RIGHT    Date of Exam: 4/2/2024 6:03 PM EDT    Indication: eval for osteo    Comparison: None available.    Findings: Spurring at the insertion of the Achilles tendon. Calcaneal spur. Osteoarthritic changes. The tarsal and metatarsal bones appear intact. Severe osteoarthritic changes of the first metatarsophalangeal joint. Questionable fracture at the base of   the second proximal phalanx. No lytic or blastic disease.      Impression:      No evidence of osteomyelitis. Questionable fracture at the base of the second proximal phalanx.      Electronically Signed: Harry Kim MD    4/2/2024 6:19 PM EDT    Workstation ID: [id number]            IMPRESSION:     Second and third distal and middle phalanges MRI changes with bone marrow edema in the setting of diabetes and chronic wounds of the right foot with increased pretest probability for underlying osteomyelitis, versus reactive edema.  I would favor osteomyelitis given the way his right foot looks with a fistulous tract that probes deep to bone.  No erosions in the cortical structure noted.  Usual organisms will be mixed terri.  MRSA screen negative, wound culture Gram stain with gram-negative rods and gram-positive cocci, blood cultures x 2 from 4/2 are negative to date.  Group B streptococcus growing as of 4/4/2024.  Right foot cellulitis and wound infection associated with diabetes, despite use of a surgical  boot.  Diabetes mellitus type 2 with increased risk for infection.  Chronic kidney disease stage IIIa, creatinine 1.18 on 4/3/2024.  1.23 on 4/4/2024.  Anemia, chronic disease.    PLAN:    Diagnostically, continue to follow patient's physical exam, CBC, CMP, CRP.  Therapeutically, continue daptomycin and piperacillin/tazobactam awaiting further culture data.  This would provide coverage for MRSA, Pseudomonas, versus other.  Likely antibiotics will be adjusted depending upon culture data.  Supportive care.  Wound care.  Nonweightbearing on right foot.  Evaluation for surgical debridement, and at risk for metatarsal resections.    I discussed the patient's findings and my recommendations with patient, family, and nursing staff    Thank you for asking me to see Alvaro Sinha.  Our group would be pleased to follow this patient over the course of their hospitalization and assist with outpatient antimicrobial therapy, as indicated.  Further recommendations depend on the results of the cultures and clinical course.  Increased risk for adverse drug reactions, complications of IV access, readmission, loss of limb.  Side effects discussed.    Aung Jameson MD  4/4/2024

## 2024-04-04 NOTE — CONSULTS
"Diabetes Education  Assessment/Teaching    Patient Name:  Alvaro Sinha  YOB: 1965  MRN: [id number]  Admit Date:  4/2/2024      Assessment Date:  4/4/2024  Flowsheet Row Most Recent Value   General Information     Referral From: A1c   Height 185.4 cm (72.99\")   Height Method Stated   Weight 136 kg (300 lb)   Weight Method Stated   Are you currently involved in an activity/exercise program?  No   Do you have high blood pressure? yes   Are you currently being treated for high blood pressure? yes   Is patient pregnant? n/a   Pregnancy Assessment    Diabetes History    What type of diabetes do you have? Type 2   Current DM knowledge good   Do you test your blood sugar at home? yes   Frequency of checks BID   Who performs the test? self   Typical readings 130 fasting   Have you had low blood sugar? (<70mg/dl) yes   How often do you have low blood sugar? occasionally   Have you had high blood sugar? (>140mg/dl) yes   How often do you have high blood sugar? occasionally   How often do you check your feet? daily   When was the last time your doctor checked your feet? sees podiatrist for ongoing feet issues   Do you have any diabetes complications? nephropathy   Education Preferences    What areas of diabetes would you like to learn about? avoiding high blood sugar   Nutrition Information    Do any of the following issues affect your eating habits? occupation/shiftwork   What type of support do you currently use to help you with your health issues? wife   Assessment Topics    Healthy Eating - Assessment Competent  [RD has seen pt this hospital stay- reinforce concepts]   Being Active - Assessment Needs education   Taking Medication - Assessment Needs education   Problem Solving - Assessment Needs education   Reducing Risk - Assessment Needs education   Healthy Coping - Assessment Needs education   Monitoring - Assessment Needs education   DM Goals    Contact Plan Follow-up medical care      "       Flowsheet Row Most Recent Value   DM Education Needs    Meter Has own   Medication Oral, Other injectables   Problem Solving Hypoglycemia, Hyperglycemia, Signs, Symptoms, Treatment, Sick days   Reducing Risks A1C testing, Lipids, Blood pressure, Foot care   Healthy Eating RD consult, Reviewed meal plan   Healthy Coping Appropriate   Discharge Plan Home   Motivation Engaged   Teaching Method Explanation, Discussion, Handouts   Patient Response Verbalized understanding              Other Comments:  Total time spent reviewing chart, preparing education/materials, providing education at bedside, and coordinating care approx 35 minutes.    Consult for diabetes education received for A1c >7. Chart reviewed. Pt was seen at bedside today. Permission given for visit.     Discussed and taught Mr. Sinha about type 2 diabetes self-management, risk factors, and importance of blood glucose control to reduce complications. Target blood glucose readings and A1c goals per ADA were reviewed. Reviewed with patient current A1c 8.5  and discussed its significance. Signs, symptoms, and treatment of hyperglycemia and hypoglycemia were discussed.     SMBG typically twice per day. Reviewed times to test and target goals. Noted RD consult, briefly reviewed general nutrition concepts, usefulness of consistent carb intake in managing blood glucose levels, how fat, carbs, and protein all affect blood glucose levels in different ways. Discussed current infection and how this can affect blood glucose levels as well.    Lifestyle changes such as physical activity with MD approval and healthy eating were encouraged. Stressed the importance of strict blood sugar to prevent complications such as infection and to promote healing. Encouraged pt to monitor blood sugar at home 2 times per day and to call PCP if blood sugar is trending high. Encouraged to keep record of blood glucose readings to take to follow up appointment with PCP. Provided  "patient with copy of [employer] \"What is Diabetes\" handout, \"Blood Glucose Goals\" handout, and \"What is A1c\" handout.     Thank you for this consult.       Electronically signed by:  Madeleine Cuevas RN  04/04/24 15:21 EDT  "

## 2024-04-04 NOTE — PROGRESS NOTES
Clinical Nutrition     Reason for Visit: Identified at risk by screening criteria, Physician consult, Nonhealing wound or pressure ulcer, Need for education      Patient Name: Alvaro Sinha  YOB: 1965  MRN: [id number]  Date of Encounter: 04/04/24 12:49 EDT  Admission date: 4/2/2024  Nutrition Assessment   Admission Diagnosis:  Osteomyelitis [M86.9]      Problem List:    Osteomyelitis    Lymphoma    Type 2 diabetes mellitus with hyperglycemia    Diabetic foot ulcer    History of myalgia due to HMG CoA reductase inhibitor    Hypertensive disorder    Hyperlipidemia    Microalbuminuric diabetic nephropathy    Stage 3a chronic kidney disease        PMH:   He  has a past medical history of Diabetes mellitus and Hypertension.    PSH:  He  has a past surgical history that includes Bone Resection, Rib.      Applicable Nutrition Concerns:   Skin:R foot ulcers  GI: wnl      Reported/Observed/Food/Nutrition Related History:     Pt sitting up in bed, eating lunch, reports good appetite, states that he previous Ha1c 3 months ago was 6.7, and before that it was 6.4, he has had problems controlling blood sugars past several months, 2nd foot infection and gout flare ups which has required steroids, he normally checks blood sugars ~ 2x/day, takes actos, metformin, and glipizide, has not been able  to get his Trulicity shots lately, does not follow diabetic diet      Labs    Labs Reviewed: Yes     Results from last 7 days   Lab Units 04/04/24  0626 04/03/24  0534 04/02/24  1832   GLUCOSE mg/dL 105* 108* 152*   BUN mg/dL 16 24* 29*   CREATININE mg/dL 1.23 1.18 1.43*   SODIUM mmol/L 140 139 134*   CHLORIDE mmol/L 105 106 100   POTASSIUM mmol/L 4.6 4.3 4.2   PHOSPHORUS mg/dL 3.2  --   --    MAGNESIUM mg/dL 1.8 1.7  --    ALT (SGPT) U/L 23  --  21   LACTATE mmol/L  --   --  1.3       Results from last 7 days   Lab Units 04/04/24  0626 04/02/24  1832   ALBUMIN g/dL 3.5 3.7   CRP mg/dL 9.71* 16.12*  "      Results from last 7 days   Lab Units 04/04/24  1114 04/04/24  0716 04/03/24  2039 04/03/24  1622 04/03/24  1106 04/03/24  0742   GLUCOSE mg/dL 176* 116 170* 162* 135* 103     Lab Results   Lab Value Date/Time    HGBA1C 8.50 (H) 04/02/2024 1832     Results from last 7 days   Lab Units 04/02/24  1832   URIC ACID mg/dL 7.4*             Medications    Medications Reviewed: Yes      Intake/Ouptut 24 hrs (0701 - 0700)   I&O's Reviewed: Yes     Intake & Output (last day)         04/03 0701 04/04 0700 04/04 0701 04/05 0700    IV Piggyback      Total Intake(mL/kg)      Urine (mL/kg/hr) 1650 (0.5)     Total Output 1650     Net -1650                     Anthropometrics     Flowsheet Rows      Flowsheet Row First Filed Value   Admission Height 185.4 cm (73\") Documented at 04/02/2024 1710   Admission Weight 136 kg (300 lb) Documented at 04/02/2024 1710          Height: Height: 185.4 cm (72.99\")  Last Filed Weight: Weight: 136 kg (300 lb) (04/03/24 1533)  Method: Weight Method: Stated  BMI: BMI (Calculated): 39.6  BMI classification: Obese Class II: 35-39.9kg/m2      Nutrition Focused Physical Exam     Date:     Unable to perform exam due to: Defer pending indication    Current Nutrition Prescription     PO: Diet: Diabetic; Consistent Carbohydrate; Fluid Consistency: Thin (IDDSI 0)  Oral Nutrition Supplement:   Intake: Insufficient data      Nutrition Diagnosis   Date: 4-4--24 Updated:   Problem Increased nutrient needs   Etiology Poor Skin Integrity   Signs/Symptoms R foot ulcers     Date:  4-4-24 Updated:  Problem Food and nutrition knowledge deficit   Etiology Uncontrolled DM   Signs/Symptoms Ha1c = 8.5       Goal:   General: Nutrition to support treatment  PO: Establish PO  EN/PN: N/A    Education    Nutrition Intervention      Follow treatment progress, Care plan reviewed, Interview for preferences, Supplement provided, Education provided    DM diet Education provided    Will consult Diabetes Educator as Ha1c > " 8    Add Boost GC 2x/day to provide additional protein for wound healing    Monitoring/Evaluation:   Per protocol    Melissa Ashford RD  Time Spent: 45min

## 2024-04-04 NOTE — PLAN OF CARE
Goal Outcome Evaluation:           Progress: no change  Outcome Evaluation: VSS on room air, CPAP when sleeping. PRN given for R middle thumb pain. Antibiotics infusing. UC called and said pt had bed ready but pt refused. No other concerns at this time. Will continue with the plan of care.

## 2024-04-05 LAB
ALBUMIN SERPL-MCNC: 3.5 G/DL (ref 3.5–5.2)
ALBUMIN/GLOB SERPL: 1.3 G/DL
ALP SERPL-CCNC: 109 U/L (ref 39–117)
ALT SERPL W P-5'-P-CCNC: 22 U/L (ref 1–41)
ANION GAP SERPL CALCULATED.3IONS-SCNC: 9 MMOL/L (ref 5–15)
AST SERPL-CCNC: 21 U/L (ref 1–40)
BASOPHILS # BLD AUTO: 0.03 10*3/MM3 (ref 0–0.2)
BASOPHILS NFR BLD AUTO: 0.5 % (ref 0–1.5)
BILIRUB SERPL-MCNC: 0.7 MG/DL (ref 0–1.2)
BUN SERPL-MCNC: 19 MG/DL (ref 6–20)
BUN/CREAT SERPL: 12.9 (ref 7–25)
CALCIUM SPEC-SCNC: 8.7 MG/DL (ref 8.6–10.5)
CHLORIDE SERPL-SCNC: 103 MMOL/L (ref 98–107)
CO2 SERPL-SCNC: 24 MMOL/L (ref 22–29)
CREAT SERPL-MCNC: 1.47 MG/DL (ref 0.76–1.27)
CRP SERPL-MCNC: 11.91 MG/DL (ref 0–0.5)
DEPRECATED RDW RBC AUTO: 43.8 FL (ref 37–54)
EGFRCR SERPLBLD CKD-EPI 2021: 54.9 ML/MIN/1.73
EOSINOPHIL # BLD AUTO: 0.22 10*3/MM3 (ref 0–0.4)
EOSINOPHIL NFR BLD AUTO: 3.7 % (ref 0.3–6.2)
ERYTHROCYTE [DISTWIDTH] IN BLOOD BY AUTOMATED COUNT: 12.8 % (ref 12.3–15.4)
ERYTHROCYTE [SEDIMENTATION RATE] IN BLOOD: 98 MM/HR (ref 0–20)
GLOBULIN UR ELPH-MCNC: 2.8 GM/DL
GLUCOSE BLDC GLUCOMTR-MCNC: 125 MG/DL (ref 70–130)
GLUCOSE BLDC GLUCOMTR-MCNC: 131 MG/DL (ref 70–130)
GLUCOSE BLDC GLUCOMTR-MCNC: 158 MG/DL (ref 70–130)
GLUCOSE BLDC GLUCOMTR-MCNC: 190 MG/DL (ref 70–130)
GLUCOSE SERPL-MCNC: 121 MG/DL (ref 65–99)
HCT VFR BLD AUTO: 31.7 % (ref 37.5–51)
HGB BLD-MCNC: 10.5 G/DL (ref 13–17.7)
IMM GRANULOCYTES # BLD AUTO: 0.07 10*3/MM3 (ref 0–0.05)
IMM GRANULOCYTES NFR BLD AUTO: 1.2 % (ref 0–0.5)
LYMPHOCYTES # BLD AUTO: 0.89 10*3/MM3 (ref 0.7–3.1)
LYMPHOCYTES NFR BLD AUTO: 15.1 % (ref 19.6–45.3)
MAGNESIUM SERPL-MCNC: 1.5 MG/DL (ref 1.6–2.6)
MCH RBC QN AUTO: 30.9 PG (ref 26.6–33)
MCHC RBC AUTO-ENTMCNC: 33.1 G/DL (ref 31.5–35.7)
MCV RBC AUTO: 93.2 FL (ref 79–97)
MONOCYTES # BLD AUTO: 0.51 10*3/MM3 (ref 0.1–0.9)
MONOCYTES NFR BLD AUTO: 8.7 % (ref 5–12)
NEUTROPHILS NFR BLD AUTO: 4.16 10*3/MM3 (ref 1.7–7)
NEUTROPHILS NFR BLD AUTO: 70.8 % (ref 42.7–76)
NRBC BLD AUTO-RTO: 0 /100 WBC (ref 0–0.2)
PHOSPHATE SERPL-MCNC: 3.4 MG/DL (ref 2.5–4.5)
PLATELET # BLD AUTO: 183 10*3/MM3 (ref 140–450)
PMV BLD AUTO: 10.5 FL (ref 6–12)
POTASSIUM SERPL-SCNC: 4.5 MMOL/L (ref 3.5–5.2)
PROT SERPL-MCNC: 6.3 G/DL (ref 6–8.5)
RBC # BLD AUTO: 3.4 10*6/MM3 (ref 4.14–5.8)
SODIUM SERPL-SCNC: 136 MMOL/L (ref 136–145)
WBC NRBC COR # BLD AUTO: 5.88 10*3/MM3 (ref 3.4–10.8)

## 2024-04-05 PROCEDURE — 85652 RBC SED RATE AUTOMATED: CPT | Performed by: INTERNAL MEDICINE

## 2024-04-05 PROCEDURE — 63710000001 INSULIN LISPRO (HUMAN) PER 5 UNITS: Performed by: STUDENT IN AN ORGANIZED HEALTH CARE EDUCATION/TRAINING PROGRAM

## 2024-04-05 PROCEDURE — 94799 UNLISTED PULMONARY SVC/PX: CPT

## 2024-04-05 PROCEDURE — 94660 CPAP INITIATION&MGMT: CPT

## 2024-04-05 PROCEDURE — 25010000002 HEPARIN (PORCINE) PER 1000 UNITS: Performed by: STUDENT IN AN ORGANIZED HEALTH CARE EDUCATION/TRAINING PROGRAM

## 2024-04-05 PROCEDURE — 80053 COMPREHEN METABOLIC PANEL: CPT | Performed by: INTERNAL MEDICINE

## 2024-04-05 PROCEDURE — 84100 ASSAY OF PHOSPHORUS: CPT | Performed by: INTERNAL MEDICINE

## 2024-04-05 PROCEDURE — 25010000002 DAPTOMYCIN PER 1 MG: Performed by: INTERNAL MEDICINE

## 2024-04-05 PROCEDURE — 25010000002 PIPERACILLIN SOD-TAZOBACTAM PER 1 G: Performed by: STUDENT IN AN ORGANIZED HEALTH CARE EDUCATION/TRAINING PROGRAM

## 2024-04-05 PROCEDURE — 82948 REAGENT STRIP/BLOOD GLUCOSE: CPT

## 2024-04-05 PROCEDURE — 25010000002 MAGNESIUM SULFATE 2 GM/50ML SOLUTION: Performed by: INTERNAL MEDICINE

## 2024-04-05 PROCEDURE — 99232 SBSQ HOSP IP/OBS MODERATE 35: CPT | Performed by: INTERNAL MEDICINE

## 2024-04-05 PROCEDURE — 86140 C-REACTIVE PROTEIN: CPT | Performed by: INTERNAL MEDICINE

## 2024-04-05 PROCEDURE — 85025 COMPLETE CBC W/AUTO DIFF WBC: CPT | Performed by: INTERNAL MEDICINE

## 2024-04-05 PROCEDURE — 83735 ASSAY OF MAGNESIUM: CPT | Performed by: INTERNAL MEDICINE

## 2024-04-05 PROCEDURE — 97530 THERAPEUTIC ACTIVITIES: CPT

## 2024-04-05 RX ORDER — MAGNESIUM SULFATE HEPTAHYDRATE 40 MG/ML
2 INJECTION, SOLUTION INTRAVENOUS
Status: COMPLETED | OUTPATIENT
Start: 2024-04-05 | End: 2024-04-05

## 2024-04-05 RX ADMIN — PIPERACILLIN AND TAZOBACTAM 3.38 G: 3; .375 INJECTION, POWDER, LYOPHILIZED, FOR SOLUTION INTRAVENOUS at 17:12

## 2024-04-05 RX ADMIN — BISACODYL 5 MG: 5 TABLET, COATED ORAL at 08:27

## 2024-04-05 RX ADMIN — INSULIN LISPRO 2 UNITS: 100 INJECTION, SOLUTION INTRAVENOUS; SUBCUTANEOUS at 21:48

## 2024-04-05 RX ADMIN — MAGNESIUM SULFATE HEPTAHYDRATE 2 G: 2 INJECTION, SOLUTION INTRAVENOUS at 11:28

## 2024-04-05 RX ADMIN — HEPARIN SODIUM 5000 UNITS: 5000 INJECTION INTRAVENOUS; SUBCUTANEOUS at 13:19

## 2024-04-05 RX ADMIN — HEPARIN SODIUM 5000 UNITS: 5000 INJECTION INTRAVENOUS; SUBCUTANEOUS at 21:45

## 2024-04-05 RX ADMIN — HEPARIN SODIUM 5000 UNITS: 5000 INJECTION INTRAVENOUS; SUBCUTANEOUS at 06:37

## 2024-04-05 RX ADMIN — METOPROLOL SUCCINATE 100 MG: 50 TABLET, EXTENDED RELEASE ORAL at 08:28

## 2024-04-05 RX ADMIN — GLIPIZIDE 10 MG: 10 TABLET ORAL at 08:27

## 2024-04-05 RX ADMIN — HYDROCODONE BITARTRATE AND ACETAMINOPHEN 1 TABLET: 5; 325 TABLET ORAL at 02:56

## 2024-04-05 RX ADMIN — PIPERACILLIN AND TAZOBACTAM 3.38 G: 3; .375 INJECTION, POWDER, LYOPHILIZED, FOR SOLUTION INTRAVENOUS at 06:36

## 2024-04-05 RX ADMIN — MAGNESIUM SULFATE HEPTAHYDRATE 2 G: 2 INJECTION, SOLUTION INTRAVENOUS at 09:20

## 2024-04-05 RX ADMIN — COLCHICINE 0.6 MG: 0.6 TABLET ORAL at 08:28

## 2024-04-05 RX ADMIN — DAPTOMYCIN 800 MG: 500 INJECTION, POWDER, LYOPHILIZED, FOR SOLUTION INTRAVENOUS at 11:28

## 2024-04-05 RX ADMIN — LISINOPRIL 40 MG: 40 TABLET ORAL at 08:27

## 2024-04-05 RX ADMIN — INSULIN LISPRO 2 UNITS: 100 INJECTION, SOLUTION INTRAVENOUS; SUBCUTANEOUS at 13:24

## 2024-04-05 RX ADMIN — MAGNESIUM SULFATE HEPTAHYDRATE 2 G: 2 INJECTION, SOLUTION INTRAVENOUS at 13:19

## 2024-04-05 RX ADMIN — PIOGLITAZONE 30 MG: 15 TABLET ORAL at 08:27

## 2024-04-05 RX ADMIN — HYDROCODONE BITARTRATE AND ACETAMINOPHEN 1 TABLET: 5; 325 TABLET ORAL at 08:27

## 2024-04-05 RX ADMIN — HYDROCODONE BITARTRATE AND ACETAMINOPHEN 1 TABLET: 5; 325 TABLET ORAL at 21:45

## 2024-04-05 NOTE — PLAN OF CARE
Goal Outcome Evaluation:  Plan of Care Reviewed With: patient        Progress: improving            Problem: Adult Inpatient Plan of Care  Goal: Absence of Hospital-Acquired Illness or Injury  Intervention: Identify and Manage Fall Risk  Recent Flowsheet Documentation  Taken 4/4/2024 2000 by Lenora Bull RN  Safety Promotion/Fall Prevention: activity supervised  Intervention: Prevent Skin Injury  Recent Flowsheet Documentation  Taken 4/4/2024 2000 by Lenora Bull RN  Body Position: position changed independently  Intervention: Prevent and Manage VTE (Venous Thromboembolism) Risk  Recent Flowsheet Documentation  Taken 4/4/2024 2000 by Lenora Bull RN  Activity Management: activity encouraged     Problem: Adult Inpatient Plan of Care  Goal: Absence of Hospital-Acquired Illness or Injury  Intervention: Prevent Skin Injury  Recent Flowsheet Documentation  Taken 4/4/2024 2000 by Lenora Bull RN  Body Position: position changed independently     Problem: Adult Inpatient Plan of Care  Goal: Absence of Hospital-Acquired Illness or Injury  Intervention: Prevent and Manage VTE (Venous Thromboembolism) Risk  Recent Flowsheet Documentation  Taken 4/4/2024 2000 by Lenora Bull RN  Activity Management: activity encouraged

## 2024-04-05 NOTE — PROGRESS NOTES
[facility]    INFECTIOUS DISEASE CONSULT/INITIAL HOSPITAL VISIT    Alvaro Sinha  1965  [id number]    Date of consult: 4/3/2024    Admit date: 4/2/2024    Requesting Provider: No ref. provider found  Evaluating physician: Aung Jameson MD  Reason for Consultation: Right foot diabetic wound ulcers with underlying osteomyelitis second and third toe  Chief Complaint: Above      Subjective   History of present illness:  Patient is a  58 y.o.  Yr old male with a history of diabetes mellitus type 2, essential hypertension, hyperlipidemia, CKD stage IIIa, gout, with chronic right foot ulceration being followed at the [facility] which worsened 3/31/2024.  He has been using a boot to offload his foot, but was also working at [employer], although he has been off work recently.  MRI of the foot 4/2/2024 was consistent with possible early osteomyelitis second and third middle and distal phalanges.  He has a plantar draining fistulous track below his second and third metatarsal and an anterior web skin breakdown between his second and third toe on his foot.  He was awaiting possible transfer to the [facility] for further orthopedic evaluation.  He has no other localizing signs or symptoms of infection.  I was consulted on 4/3/2024 for further evaluation and treatment.    4/4/2024 history reviewed.  No high fevers or chills.  Tolerating antibiotics for right foot osteomyelitis.    4/5/2024 history reviewed.  Tolerating his antibiotics for right foot osteomyelitis second and third metatarsal.  No high fever.  Awaits surgical disposition.    Past Medical History:   Diagnosis Date    Diabetes mellitus     Hypertension        Past Surgical History:   Procedure Laterality Date    BONE RESECTION, RIB         Pediatric History   Patient Parents    Not on file     Other Topics Concern    Not on file   Social History Narrative    Not on file   No cigarettes, occasional  alcohol, no drug use,  to [facility] with 1 child.  Works at [employer].    family history is not on file.    Allergies   Allergen Reactions    Statins Myalgia       Immunization History   Administered Date(s) Administered    COVID-19 (MODERNA) 1st,2nd,3rd Dose Monovalent 09/23/2021, 11/02/2021       Medication:  @Scheduled Meds:[Held by provider] clopidogrel, 75 mg, Oral, Daily  colchicine, 0.6 mg, Oral, Daily  DAPTOmycin, 8 mg/kg (Adjusted), Intravenous, Q24H  glipizide, 10 mg, Oral, QAM AC  heparin (porcine), 5,000 Units, Subcutaneous, Q8H  insulin lispro, 2-7 Units, Subcutaneous, 4x Daily AC & at Bedtime  lisinopril, 40 mg, Oral, Daily  magnesium sulfate, 2 g, Intravenous, Q2H  metoprolol succinate XL, 100 mg, Oral, Daily  pioglitazone, 30 mg, Oral, Daily  piperacillin-tazobactam, 3.375 g, Intravenous, Q8H  sodium chloride, 10 mL, Intravenous, Q12H      Continuous Infusions:   PRN Meds:.  acetaminophen    senna-docusate sodium **AND** polyethylene glycol **AND** bisacodyl **AND** bisacodyl    Calcium Replacement - Follow Nurse / BPA Driven Protocol    dextrose    dextrose    glucagon (human recombinant)    HYDROcodone-acetaminophen    HYDROmorphone    Magnesium Standard Dose Replacement - Follow Nurse / BPA Driven Protocol    Phosphorus Replacement - Follow Nurse / BPA Driven Protocol    Potassium Replacement - Follow Nurse / BPA Driven Protocol    sodium chloride    sodium chloride     Please refer to the medical record for a full medication list    Review of Systems:    Constitutional-- No Fever, chills or sweats.  Appetite good, and no malaise. No fatigue.  HEENT-- No new vision, hearing or throat complaints.  No epistaxis or oral sores.  Denies odynophagia or dysphagia.  No odynophagia or dysphagia. No headache, photophobia or neck stiffness.  CV-- No chest pain, palpitation or syncope  Resp-- No SOB/cough/Hemoptysis  GI- No nausea, vomiting, or diarrhea.  No hematochezia, melena, or hematemesis. Denies  "jaundice or chronic liver disease.  -- No dysuria, hematuria, or flank pain.  Denies hesitancy, urgency.  Lymph- no swollen lymph nodes in neck/axilla or groin.   Heme- No active bruising or bleeding; no Hx of DVT or PE.  MS-- no swelling or pain in the bones or joints of arms/legs.  No new back pain.  Neuro-- No acute focal weakness or numbness in the arms or legs.  No seizures.  Skin--No rashes or lesions, except right foot as per HPI    Physical Exam:   Vital Signs   Temp:  [97.6 °F (36.4 °C)-101 °F (38.3 °C)] 97.6 °F (36.4 °C)  Heart Rate:  [] 81  Resp:  [18-20] 18  BP: (125-147)/(77-92) 136/79    Blood pressure 136/79, pulse 81, temperature 97.6 °F (36.4 °C), temperature source Oral, resp. rate 18, height 185.4 cm (72.99\"), weight 136 kg (300 lb), SpO2 98%.  GENERAL: Awake and alert, in minimal distress. Appears  stated age.  Resting in bed.  HEENT:  Normocephalic, atraumatic.  Oropharynx without thrush. Dentition in good repair. No cervical adenopathy. No neck masses.  Ears externally normal, Nose externally normal.  EYES:  No conjunctival injection. No icterus. EOM full.  LYMPHATICS: No lymphadenopathy of the neck or axillary or inguinal regions.   HEART: No murmur, gallop, or pericardial friction rub. Reg rate rhythm, No JVD at 45 degrees.  LUNGS: Clear to auscultation and percussion. No respiratory distress, no use of accessory muscles.  ABDOMEN: Soft, nontender, nondistended. No appreciable HSM.  Bowel sounds normal.  Obese.  No mass.  SKIN: Warm and dry without cutaneous eruptions.  No nodules.  Right second and third webspace with increased erythema and purulent discharge, and plantar fistulous tract between the right second and third metatarsal probes to near bone.  PSYCHIATRIC: Mental status lucid. No confusion.  EXT:  No cellulitic change.  NEURO: Oriented to name, nonfocal.              4/3/24 right foot wounds    Results Review:   I reviewed the patient's new clinical results.  I reviewed " "the patient's new imaging results and agree with the interpretation.  I reviewed the patient's other test results and agree with the interpretation    Results from last 7 days   Lab Units 04/05/24  0547 04/04/24  0626 04/03/24  0534   WBC 10*3/mm3 5.88 5.10 4.83   HEMOGLOBIN g/dL 10.5* 10.3* 10.0*   HEMATOCRIT % 31.7* 31.6* 30.4*   PLATELETS 10*3/mm3 183 178 148     Results from last 7 days   Lab Units 04/05/24  0547   SODIUM mmol/L 136   POTASSIUM mmol/L 4.5   CHLORIDE mmol/L 103   CO2 mmol/L 24.0   BUN mg/dL 19   CREATININE mg/dL 1.47*   GLUCOSE mg/dL 121*   CALCIUM mg/dL 8.7     Results from last 7 days   Lab Units 04/05/24  0547   ALK PHOS U/L 109   BILIRUBIN mg/dL 0.7   ALT (SGPT) U/L 22   AST (SGOT) U/L 21     Results from last 7 days   Lab Units 04/05/24  0547   SED RATE mm/hr 98*     Results from last 7 days   Lab Units 04/05/24  0547   CRP mg/dL 11.91*         Results from last 7 days   Lab Units 04/02/24  1832   LACTATE mmol/L 1.3     Estimated Creatinine Clearance: 79 mL/min (A) (by C-G formula based on SCr of 1.47 mg/dL (H)).  CPK          4/4/2024    06:26   Common Labs   Creatine Kinase 45       Procalitonin Results:       Brief Urine Lab Results       None           No results found for: \"SITE\", \"ALLENTEST\", \"PHART\", \"MRK2MKI\", \"PO2ART\", \"ZVJ8NMR\", \"BASEEXCESS\", \"A3AYRMQE\", \"HGBBG\", \"HCTABG\", \"OXYHEMOGLOBI\", \"METHHGBN\", \"CARBOXYHGB\", \"CO2CT\", \"BAROMETRIC\", \"MODALITY\", \"FIO2\"     Microbiology:    Right foot wound culture 4/2/2024 with gram-positive cocci, gram-negative rods on Gram stain, MRSA screen negative, blood cultures x 2 from 4/2 negative    Results for orders placed or performed during the hospital encounter of 04/02/24   Blood Culture - Blood, Arm, Right    Specimen: Arm, Right; Blood   Result Value Ref Range    Blood Culture No growth at 2 days          Culture results from last 30 days   Lab 04/02/24  1900   WOUNDCX Heavy growth (4+) Streptococcus agalactiae (Group B)*  Heavy growth (4+) " Streptococcus mitis / oralis*  Moderate growth (3+) Normal Skin Flor      Brief Urine Lab Results       None        Blood cultures x 2 from 4/2 are negative.  Wound culture right foot growing group B streptococcus to date, MRSA screen negative.    Radiology:  Imaging Results (Last 72 Hours)       Procedure Component Value Units Date/Time    CT Angio Abdominal Aorta Bilateral Iliofem Runoff [[id number]] Collected: 04/04/24 1151     Updated: 04/04/24 1223    Narrative:      CT ANGIO ABDOMINAL AORTA BILAT ILIOFEM RUNOFF    Date of Exam: 4/4/2024 11:28 AM EDT    Indication: PERIPHERAL ARTERIAL DISEASE.    Comparison: None available.    Technique: CTA of the abdomen, pelvis and both lower extremities was performed after the uneventful intravenous administration of 150 cc Isovue-370. Reconstructed coronal and sagittal images were also obtained. In addition, a 3-D volume rendered image   was created for interpretation. Automated exposure control and iterative reconstruction methods were used.      Findings:  AORTA:  Normal in caliber throughout. No dissection or penetrating ulcer identified.No significant atherosclerotic disease identified.  MESENTERIC/RENAL VESSELS:  Normal in caliber. No dissection or significant stenosis identified.  PELVIC VESSELS: Normal in caliber. No dissection or significant stenosis identified.  IVC:  Normal caliber.    RIGHT LOWER EXTREMITY:  Common femoral artery, superficial femoral artery, and popliteal artery are normal in caliber. No significant stenosis.  There is below-knee medial sclerosis presumably related to diabetic vasculopathy. Lower extremity arteries   are of normal caliber with three-vessel runoff.    LEFT LOWER EXTREMITY:  Common femoral artery, superficial femoral artery, and popliteal artery are normal in caliber. No significant stenosis.  There is below-knee medial sclerosis presumably related to diabetic vasculopathy. Lower extremity arteries are   of normal caliber with  three-vessel runoff.      LOWER THORAX: Unremarkable    LIVER: Unremarkable parenchyma without focal lesion.  BILIARY/GALLBLADDER: There are calcified gallstones.  SPLEEN: Enlarged measuring 19 cm in length  PANCREAS:  Unremarkable  ADRENAL:  Unremarkable  KIDNEYS:  Unremarkable parenchyma with no solid mass identified. No obstruction.  No calculus identified.  GASTROINTESTINAL/MESENTERY:  No evidence of obstruction nor inflammation.      RETROPERITONEUM/LYMPH NODES: There are numerous enlarged lymph nodes, the largest of which is a 2.1 x 2.1 cm aortocaval node (image 69, series 4). There is a 2.3 x 1.8 cm right inguinal lymph node (image 109, series 4). There is a 1.9 x 1.5 cm right   external iliac node (image 154, series 4).    REPRODUCTIVE:  Unremarkable  BLADDER:  Unremarkable    OSSEUS STRUCTURES:  Typical for age with no acute process identified.      Impression:      Impression:  1.No significant arterial stenosis identified. There is bilateral three-vessel runoff.  2.There is below-knee medial sclerosis presumably related to diabetic vasculopathy.  3.Nonspecific abdominal pelvic lymphadenopathy.  4.Other incidental nonemergent findings as detailed above.        Electronically Signed: Yannick Srivastava MD    4/4/2024 12:20 PM EDT    Workstation ID: [id number]    MRI Foot Right Without Contrast [[id number]] Collected: 04/02/24 2106     Updated: 04/02/24 2118    Narrative:        MRI FOOT RIGHT WO CONTRAST    Date of Exam: 4/2/2024 8:30 PM EDT    Indication: eval for osteo R foot.     Comparison: Same day foot radiograph    Technique:  Routine multiplanar/multisequence sequence images of the right foot were obtained without contrast administration.      Findings:  Bones and joints: Degenerative subchondral edema and cystic changes throughout the midfoot. Degenerative subchondral edema and cystic change of the first metatarsophalangeal joint as well as the first interphalangeal joint. Small effusion with   subchondral  edema at the second metatarsophalangeal joint. Small bone marrow edema within the second and third distal and middle phalanges. No loss of normal T1 cortical or marrow signal. No evidence of acute fracture. No suspicious marrow placing   lesions.    Soft tissues: Diffuse subcutaneous edema. There is more pronounced edema seen in the second and third toes. No soft tissue mass or fluid collection seen. Fatty and edematous changes of the intrinsic foot muscles suggestive of neuropathic changes. Soft   tissue wound seen along the plantar aspect of the second metatarsophalangeal joint. Visualized flexor and extensor tendons appear grossly intact. Plantar plates appear grossly intact. Lisfranc ligament appears grossly intact. No evidence of   intermetatarsal bursitis or neuroma.      Impression:      Impression:  Swelling of the second and third toes as well as a small soft tissue wound along the plantar aspect of the second metatarsophalangeal joint. There is minimal marrow edema seen within the second and third middle and distal phalanges as well as a small   joint effusion and subchondral edema seen at the second metatarsophalangeal joint. No definite loss of T1 cortical signal to suggest erosive changes. These findings most like represent reactive edema/effusion. However early osteomyelitis cannot be fully   excluded. Recommend continued clinical and imaging follow-up as warranted.        Electronically Signed: Shilo Torres MD    4/2/2024 9:15 PM EDT    Workstation ID: [id number]    XR Foot 3+ View Right [[id number]] Collected: 04/02/24 1817     Updated: 04/02/24 1822    Narrative:      XR FOOT 3+ VW RIGHT    Date of Exam: 4/2/2024 6:03 PM EDT    Indication: eval for osteo    Comparison: None available.    Findings: Spurring at the insertion of the Achilles tendon. Calcaneal spur. Osteoarthritic changes. The tarsal and metatarsal bones appear intact. Severe osteoarthritic changes of the first metatarsophalangeal  joint. Questionable fracture at the base of   the second proximal phalanx. No lytic or blastic disease.      Impression:      No evidence of osteomyelitis. Questionable fracture at the base of the second proximal phalanx.      Electronically Signed: Harry Kim MD    4/2/2024 6:19 PM EDT    Workstation ID: [id number]            IMPRESSION:     Second and third distal and middle phalanges MRI changes with bone marrow edema in the setting of diabetes and chronic wounds of the right foot with increased pretest probability for underlying osteomyelitis, versus reactive edema.  I would favor osteomyelitis given the way his right foot looks with a fistulous tract that probes deep to bone.  No erosions in the cortical structure noted.  Usual organisms will be mixed terri.  MRSA screen negative, wound culture Gram stain with gram-negative rods and gram-positive cocci, blood cultures x 2 from 4/2 are negative to date.  Group B streptococcus growing as of 4/4/2024, and Streptococcus mitis.  Right foot cellulitis and wound infection associated with diabetes, despite use of a surgical boot.  Diabetes mellitus type 2 with increased risk for infection.  Chronic kidney disease stage IIIa, creatinine 1.18 on 4/3/2024.  1.23 on 4/4/2024.  1.47 on 4/5.  Anemia, chronic disease.    PLAN:    Diagnostically, continue to follow patient's physical exam, CBC, CMP, CRP.  Therapeutically, continue daptomycin and piperacillin/tazobactam awaiting further culture data.  This would provide coverage for MRSA, Pseudomonas, versus other.  Likely antibiotics will be adjusted depending upon culture data.  Supportive care.  Wound care.  Nonweightbearing on right foot.  Evaluation for surgical debridement, and at risk for metatarsal resections.  Waits on surgical disposition.    I discussed the patient's findings and my recommendations with patient, family, and nursing staff    Thank you for asking me to see Alvaro Sinha.  Our group would be  pleased to follow this patient over the course of their hospitalization and assist with outpatient antimicrobial therapy, as indicated.  Further recommendations depend on the results of the cultures and clinical course.  Increased risk for adverse drug reactions, complications of IV access, readmission, loss of limb.  Side effects discussed.  See next on Monday, call sooner if needed.    Aung Jameson MD  4/5/2024

## 2024-04-05 NOTE — CASE MANAGEMENT/SOCIAL WORK
Continued Stay Note   [place]     Patient Name: Alvaro Sinha  MRN: [id number]  Today's Date: 4/5/2024    Admit Date: 4/2/2024    Plan: Home   Discharge Plan       Row Name 04/05/24 1120       Plan    Plan Home    Patient/Family in Agreement with Plan yes    Plan Comments Spoke with patient and his plan is to remain here until after surgery.  Per pt surgery for Monday evening. CM will cont to follow.    Final Discharge Disposition Code 01 - home or self-care                   Discharge Codes    No documentation.                 Expected Discharge Date and Time       Expected Discharge Date Expected Discharge Time    Apr 9, 2024               Mela Jean-Baptiste RN

## 2024-04-05 NOTE — PLAN OF CARE
Goal Outcome Evaluation:  Plan of Care Reviewed With: (P) patient        Progress: (P) no change  Outcome Evaluation: (P) Pt performed bed level strengthening exercises and demonstrated independent bed mobility skills. Pt with decreased motivation required max cueing for encouragement to further mobility. Pt declined ambulation and further mobility d/t additional fatigue and L knee pain. Pt will benefit from continued skilled IPPT care to address NWB status and practice mobility following potential toe amputations. PT recommends D/C to home with assist and home health PT.      Anticipated Discharge Disposition (PT): (P) home with assist, home with home health

## 2024-04-05 NOTE — CONSULTS
[facility]  [address] [facility] [address]  [phone]       Orthopedic Consult    Patient: Alvaro Sinha    Date of Admission: 4/2/2024  5:12 PM    YOB: 1965    Medical Record Number: [id number]    Attending Physician: Francia Pitts MD    Consulting Physician: JOHN Villavicencio    Chief Complaint: Osteomyelitis [M86.9]    History of Present Illness: 58 y.o. male admitted to [facility] with Osteomyelitis [M86.9]. I was consulted for further evaluation and treatment.  He has a past medical history significant for lymphoma, diabetes, hypertension, chronic kidney disease, gout who presented to the [facility] emergency department with concerns for worsening right foot wound.  He states that he has been undergoing local wound care at [facility] for his right plantar wound since November 2023.  He states that over the last week or so he noticed increasing redness, warmth, swelling of his second and third toes.  He was evaluated earlier this week at an outside facility and was advised to present to the emergency department.  He denies objective fevers but states that he has had chills for several days.  He has been offloading in a boot that was applied by wound care.  He denies prior oral or IV antibiotics.  At baseline he states that he has diminished sensation secondary to neuropathy.  Per his report he does not smoke or use nicotine products.  At baseline he ambulates without the use of any assistive devices, is independent for his activities of daily living.  He denies any other symptoms or concerns at this time.       Allergies   Allergen Reactions    Statins Myalgia        Home Medications:  Medications Prior to Admission   Medication Sig Dispense Refill Last Dose    allopurinol (ZYLOPRIM) 300 MG tablet Take 1 tablet by mouth Daily.       glimepiride (AMARYL) 4 MG tablet Take 1 tablet by mouth Daily.       lisinopril  (PRINIVIL,ZESTRIL) 40 MG tablet Take 1 tablet by mouth Daily.       metFORMIN (GLUCOPHAGE) 1000 MG tablet Take 1 tablet by mouth 2 (Two) Times a Day With Meals.       metoprolol succinate XL (TOPROL-XL) 100 MG 24 hr tablet Take 1 tablet by mouth Daily.       pioglitazone (ACTOS) 30 MG tablet Take 1 tablet by mouth Daily.       sildenafil (VIAGRA) 100 MG tablet Take 1 tablet by mouth Daily As Needed.       cephalexin (KEFLEX) 500 MG capsule TAKE 1 CAPSULE BY MOUTH THREE TIMES A DAY FOR 10 DAYS       clopidogrel (PLAVIX) 75 MG tablet Take 1 tablet by mouth Daily.       coenzyme Q10 100 MG capsule CoQ-10 100 mg capsule   Take 1 capsule every day by oral route for 90 days.   take with statin to prevent muscle aches       colchicine 0.6 MG tablet Take 1 tablet by mouth As Needed.       ergocalciferol (ERGOCALCIFEROL) 1.25 MG (97255 UT) capsule Take 1,250 mcg by mouth Daily.       Farxiga 5 MG tablet tablet Take 1 tablet by mouth Daily.       HYDROcodone-acetaminophen (NORCO) 7.5-325 MG per tablet Take 1 tablet by mouth Every 6 (Six) Hours As Needed for Moderate Pain . 20 tablet 0     ondansetron ODT (ZOFRAN-ODT) 4 MG disintegrating tablet Place 1 tablet on the tongue Every 6 (Six) Hours As Needed for Nausea. 20 tablet 0     rosuvastatin (CRESTOR) 20 MG tablet Take 1 tablet by mouth Daily.       silver sulfadiazine (SILVADENE, SSD) 1 % cream        SITagliptin (JANUVIA) 25 MG tablet Take 1 tablet by mouth Daily.       Trulicity 4.5 MG/0.5ML solution pen-injector INJECT 0.5 ML EVERY WEEK BY SUBCUTANEOUS ROUTE FOR 90 DAYS.          Current Medications:  Scheduled Meds:[Held by provider] clopidogrel, 75 mg, Oral, Daily  colchicine, 0.6 mg, Oral, Daily  DAPTOmycin, 8 mg/kg (Adjusted), Intravenous, Q24H  glipizide, 10 mg, Oral, QAM AC  heparin (porcine), 5,000 Units, Subcutaneous, Q8H  insulin lispro, 2-7 Units, Subcutaneous, 4x Daily AC & at Bedtime  lisinopril, 40 mg, Oral, Daily  magnesium sulfate, 2 g, Intravenous,  Q2H  metoprolol succinate XL, 100 mg, Oral, Daily  pioglitazone, 30 mg, Oral, Daily  piperacillin-tazobactam, 3.375 g, Intravenous, Q8H  sodium chloride, 10 mL, Intravenous, Q12H      Continuous Infusions:   PRN Meds:.  acetaminophen    senna-docusate sodium **AND** polyethylene glycol **AND** bisacodyl **AND** bisacodyl    Calcium Replacement - Follow Nurse / BPA Driven Protocol    dextrose    dextrose    glucagon (human recombinant)    HYDROcodone-acetaminophen    HYDROmorphone    Magnesium Standard Dose Replacement - Follow Nurse / BPA Driven Protocol    Phosphorus Replacement - Follow Nurse / BPA Driven Protocol    Potassium Replacement - Follow Nurse / BPA Driven Protocol    sodium chloride    sodium chloride    Past Medical History:   Diagnosis Date    Diabetes mellitus     Hypertension         Past Surgical History:   Procedure Laterality Date    BONE RESECTION, RIB          Social History     Occupational History    Not on file   Tobacco Use    Smoking status: Never    Smokeless tobacco: Never   Vaping Use    Vaping status: Never Used   Substance and Sexual Activity    Alcohol use: Yes     Comment: OCCASIONALLY    Drug use: Not Currently    Sexual activity: Defer      Social History     Social History Narrative    Not on file      History reviewed. No pertinent family history.      Review of Systems:   HEENT: Patient denies any headaches, vision changes, change in hearing, or tinnitus, Patient denies any rhinorrhea,epistaxis, sinus pain, mouth or dental problems, sore throat or hoarseness, or dysphagia  Pulmonary: Patient denies any cough, congestion, SOA, or wheezing  Cardiovascular: Patient denies any chest pain, dyspnea, palpitations, weakness, intolerance of exercise, varicosities, swelling of extremities, known murmur  Gastrointestinal:  Patient denies nausea, vomiting, diarrhea, constipation, loss  of appetite, change in appetite, dysphagia, gas, heartburn, melena, change in bowel habits, use of  laxatives or other drugs to alter the function of the gastrointestinal tract.  Genital/Urinary: Patient denies dysuria, change in color of urine, change in frequency of urination, pain with urgency, incontinence, retention, or nocturia.  Musculoskeletal: Patient denies increased warmth; redness; or swelling of joints; limitation of function; deformity; crepitation: pain in a joint or an extremity, the neck, or the back, especially with movement.  Neurological: Patient denies dizziness, tremor, ataxia, difficulty in speaking, change in speech, paresthesia, loss of sensation, seizures, syncope, changes in memory.  Endocrine system: Patient denies tremors, palpitations, intolerance of heat or cold, polyuria, polydipsia, polyphagia, diaphoresis, exophthalmos, or goiter.  Psychological: Patient denies thoughts/plans or harming self or other; depression,  insomnia, night terrors, laureen, memory loss, disorientation.  Skin: Patient denies any bruising, rashes, discoloration, pruritus, wounds, ulcers, decubiti, changes in the hair or nails  Hematopoietic: Patient denies history of spontaneous or excessive bleeding, epistaxis, hematuria, melena, fatigue, enlarged or tender lymph nodes, pallor, history of anemia.    Physical Exam: 58 y.o. male  General Appearance:    Alert, cooperative, in no acute distress                   Vitals:    04/04/24 2314 04/04/24 2319 04/05/24 0333 04/05/24 0714   BP:  125/80 135/77 129/82   BP Location:  Left arm Left arm Left arm   Patient Position:  Lying Lying Lying   Pulse: 72 78 65 68   Resp:  20 20 20   Temp:  99 °F (37.2 °C) 99.1 °F (37.3 °C) 98.6 °F (37 °C)   TempSrc:  Oral Oral Oral   SpO2: 97% 97% 98% 98%  Comment: spot check   Weight:       Height:            Head:    Normocephalic, without obvious abnormality, atraumatic   Eyes:            Lids and lashes normal, conjunctivae and sclerae normal, no icterus, no pallor, corneas clear, PERRLA   Ears:    Ears appear intact with no  abnormalities noted   Throat:   No oral lesions, no thrush, oral mucosa moist   Back:     No C-T-L spine tenderness   Lungs:     Clear to auscultation,respirations regular, even and                unlabored   Chest Wall:    No abnormalities observed   Abdomen:     Normal bowel sounds, no masses, no organomegaly, soft      non-tender, non-distended, no guarding, no rebound              tenderness   Extremities: Right lower extremity: Diffuse swelling, erythema of the second and third digits with erythema extending towards the midfoot.  Plantar wound present beneath the second metatarsal head, focal skin breakdown/ulceration beneath the second and third toes with associated slough.  No active purulent drainage, induration.   Pulses:   Pulses palpable but diminished right lower extremity   Skin:   No bleeding, bruising or rash   Lymph nodes:   No palpable adenopathy   Neurologic:  Cranial nerves 2 - 12 grossly intact, sensation intact, DTR     present and equal bilaterally           Diagnostic Tests:    Admission on 04/02/2024   Component Date Value Ref Range Status    Glucose 04/02/2024 152 (H)  65 - 99 mg/dL Final    BUN 04/02/2024 29 (H)  6 - 20 mg/dL Final    Creatinine 04/02/2024 1.43 (H)  0.76 - 1.27 mg/dL Final    Sodium 04/02/2024 134 (L)  136 - 145 mmol/L Final    Potassium 04/02/2024 4.2  3.5 - 5.2 mmol/L Final    Chloride 04/02/2024 100  98 - 107 mmol/L Final    CO2 04/02/2024 20.0 (L)  22.0 - 29.0 mmol/L Final    Calcium 04/02/2024 9.0  8.6 - 10.5 mg/dL Final    Total Protein 04/02/2024 7.3  6.0 - 8.5 g/dL Final    Albumin 04/02/2024 3.7  3.5 - 5.2 g/dL Final    ALT (SGPT) 04/02/2024 21  1 - 41 U/L Final    AST (SGOT) 04/02/2024 20  1 - 40 U/L Final    Alkaline Phosphatase 04/02/2024 90  39 - 117 U/L Final    Total Bilirubin 04/02/2024 0.6  0.0 - 1.2 mg/dL Final    Globulin 04/02/2024 3.6  gm/dL Final    Calculated Result    A/G Ratio 04/02/2024 1.0  g/dL Final    BUN/Creatinine Ratio 04/02/2024 20.3  7.0 -  25.0 Final    Anion Gap 04/02/2024 14.0  5.0 - 15.0 mmol/L Final    eGFR 04/02/2024 56.8 (L)  >60.0 mL/min/1.73 Final    Sed Rate 04/02/2024 94 (H)  0 - 20 mm/hr Final    C-Reactive Protein 04/02/2024 16.12 (H)  0.00 - 0.50 mg/dL Final    Wound Culture 04/02/2024 Heavy growth (4+) Streptococcus agalactiae (Group B) (A)   Preliminary      This organism is considered to be universally susceptible to penicillin.  No further antibiotic testing will be performed. If Clindamycin or Erythromycin is the drug of choice, notify the laboratory within 7 days to request susceptibility testing.    Wound Culture 04/02/2024 Scant growth (1+) Normal Skin Flor   Preliminary    Gram Stain 04/02/2024 Moderate (3+) WBCs per low power field   Preliminary    Gram Stain 04/02/2024 Few (2+) Epithelial cells per low power field   Preliminary    Gram Stain 04/02/2024 Many (4+) Gram negative bacilli   Preliminary    Gram Stain 04/02/2024 Many (4+) Gram positive cocci   Preliminary    Blood Culture 04/02/2024 No growth at 2 days   Preliminary    Blood Culture 04/02/2024 No growth at 2 days   Preliminary    Lactate 04/02/2024 1.3  0.5 - 2.0 mmol/L Final    Falsely depressed results may occur on samples drawn from patients receiving N-Acetylcysteine (NAC) or Metamizole.    WBC 04/02/2024 6.89  3.40 - 10.80 10*3/mm3 Final    RBC 04/02/2024 3.52 (L)  4.14 - 5.80 10*6/mm3 Final    Hemoglobin 04/02/2024 10.8 (L)  13.0 - 17.7 g/dL Final    Hematocrit 04/02/2024 32.7 (L)  37.5 - 51.0 % Final    MCV 04/02/2024 92.9  79.0 - 97.0 fL Final    MCH 04/02/2024 30.7  26.6 - 33.0 pg Final    MCHC 04/02/2024 33.0  31.5 - 35.7 g/dL Final    RDW 04/02/2024 12.9  12.3 - 15.4 % Final    RDW-SD 04/02/2024 43.9  37.0 - 54.0 fl Final    MPV 04/02/2024 10.2  6.0 - 12.0 fL Final    Platelets 04/02/2024 162  140 - 450 10*3/mm3 Final    Neutrophil % 04/02/2024 77.2 (H)  42.7 - 76.0 % Final    Lymphocyte % 04/02/2024 11.8 (L)  19.6 - 45.3 % Final    Monocyte % 04/02/2024  8.4  5.0 - 12.0 % Final    Eosinophil % 04/02/2024 1.9  0.3 - 6.2 % Final    Basophil % 04/02/2024 0.1  0.0 - 1.5 % Final    Immature Grans % 04/02/2024 0.6 (H)  0.0 - 0.5 % Final    Neutrophils, Absolute 04/02/2024 5.32  1.70 - 7.00 10*3/mm3 Final    Lymphocytes, Absolute 04/02/2024 0.81  0.70 - 3.10 10*3/mm3 Final    Monocytes, Absolute 04/02/2024 0.58  0.10 - 0.90 10*3/mm3 Final    Eosinophils, Absolute 04/02/2024 0.13  0.00 - 0.40 10*3/mm3 Final    Basophils, Absolute 04/02/2024 0.01  0.00 - 0.20 10*3/mm3 Final    Immature Grans, Absolute 04/02/2024 0.04  0.00 - 0.05 10*3/mm3 Final    nRBC 04/02/2024 0.0  0.0 - 0.2 /100 WBC Final    MRSA PCR 04/02/2024 Negative  Negative Final    Glucose 04/03/2024 108 (H)  65 - 99 mg/dL Final    BUN 04/03/2024 24 (H)  6 - 20 mg/dL Final    Creatinine 04/03/2024 1.18  0.76 - 1.27 mg/dL Final    Sodium 04/03/2024 139  136 - 145 mmol/L Final    Potassium 04/03/2024 4.3  3.5 - 5.2 mmol/L Final    Chloride 04/03/2024 106  98 - 107 mmol/L Final    CO2 04/03/2024 22.0  22.0 - 29.0 mmol/L Final    Calcium 04/03/2024 8.5 (L)  8.6 - 10.5 mg/dL Final    BUN/Creatinine Ratio 04/03/2024 20.3  7.0 - 25.0 Final    Anion Gap 04/03/2024 11.0  5.0 - 15.0 mmol/L Final    eGFR 04/03/2024 71.5  >60.0 mL/min/1.73 Final    WBC 04/03/2024 4.83  3.40 - 10.80 10*3/mm3 Final    RBC 04/03/2024 3.28 (L)  4.14 - 5.80 10*6/mm3 Final    Hemoglobin 04/03/2024 10.0 (L)  13.0 - 17.7 g/dL Final    Hematocrit 04/03/2024 30.4 (L)  37.5 - 51.0 % Final    MCV 04/03/2024 92.7  79.0 - 97.0 fL Final    MCH 04/03/2024 30.5  26.6 - 33.0 pg Final    MCHC 04/03/2024 32.9  31.5 - 35.7 g/dL Final    RDW 04/03/2024 12.8  12.3 - 15.4 % Final    RDW-SD 04/03/2024 43.5  37.0 - 54.0 fl Final    MPV 04/03/2024 10.3  6.0 - 12.0 fL Final    Platelets 04/03/2024 148  140 - 450 10*3/mm3 Final    Magnesium 04/03/2024 1.7  1.6 - 2.6 mg/dL Final    Upper arterial right arm brachial * 04/03/2024 137   Final    Upper arterial left arm  brachial s* 04/03/2024 150   Final    RIGHT POST TIBIAL SYS MAX 04/03/2024 nc   Final    LEFT POST TIBIAL SYS MAX 04/03/2024 nc   Final    RIGHT DORSALIS PEDIS SYS MAX 04/03/2024 nc   Final    LEFT DORSALIS PEDIS SYS MAX 04/03/2024 nc   Final    RIGHT GREAT TOE SYS MAX 04/03/2024 128   Final    LEFT GREAT TOE SYS MAX 04/03/2024 127   Final    RIGHT YRN RATIO 04/03/2024 nc   Final    LEFT YRN RATIO 04/03/2024 nc   Final    RIGHT TBI RATIO 04/03/2024 0.85   Final    LEFT TBI RATIO 04/03/2024 0.85   Final    Hemoglobin A1C 04/02/2024 8.50 (H)  4.80 - 5.60 % Final    Uric Acid 04/02/2024 7.4 (H)  3.4 - 7.0 mg/dL Final    Falsely depressed results may occur on samples drawn from patients receiving N-Acetylcysteine (NAC) or Metamizole.    Glucose 04/03/2024 103  70 - 130 mg/dL Final    Creatine Kinase 04/03/2024 88  20 - 200 U/L Final    Glucose 04/03/2024 135 (H)  70 - 130 mg/dL Final    Glucose 04/03/2024 162 (H)  70 - 130 mg/dL Final    Glucose 04/03/2024 170 (H)  70 - 130 mg/dL Final    Creatine Kinase 04/04/2024 45  20 - 200 U/L Final    Glucose 04/04/2024 105 (H)  65 - 99 mg/dL Final    BUN 04/04/2024 16  6 - 20 mg/dL Final    Creatinine 04/04/2024 1.23  0.76 - 1.27 mg/dL Final    Sodium 04/04/2024 140  136 - 145 mmol/L Final    Potassium 04/04/2024 4.6  3.5 - 5.2 mmol/L Final    Chloride 04/04/2024 105  98 - 107 mmol/L Final    CO2 04/04/2024 26.0  22.0 - 29.0 mmol/L Final    Calcium 04/04/2024 8.7  8.6 - 10.5 mg/dL Final    Total Protein 04/04/2024 6.2  6.0 - 8.5 g/dL Final    Albumin 04/04/2024 3.5  3.5 - 5.2 g/dL Final    ALT (SGPT) 04/04/2024 23  1 - 41 U/L Final    AST (SGOT) 04/04/2024 22  1 - 40 U/L Final    Alkaline Phosphatase 04/04/2024 98  39 - 117 U/L Final    Total Bilirubin 04/04/2024 0.6  0.0 - 1.2 mg/dL Final    Globulin 04/04/2024 2.7  gm/dL Final    Calculated Result    A/G Ratio 04/04/2024 1.3  g/dL Final    BUN/Creatinine Ratio 04/04/2024 13.0  7.0 - 25.0 Final    Anion Gap 04/04/2024 9.0  5.0 -  15.0 mmol/L Final    eGFR 04/04/2024 68.1  >60.0 mL/min/1.73 Final    Magnesium 04/04/2024 1.8  1.6 - 2.6 mg/dL Final    Phosphorus 04/04/2024 3.2  2.5 - 4.5 mg/dL Final    C-Reactive Protein 04/04/2024 9.71 (H)  0.00 - 0.50 mg/dL Final    WBC 04/04/2024 5.10  3.40 - 10.80 10*3/mm3 Final    RBC 04/04/2024 3.44 (L)  4.14 - 5.80 10*6/mm3 Final    Hemoglobin 04/04/2024 10.3 (L)  13.0 - 17.7 g/dL Final    Hematocrit 04/04/2024 31.6 (L)  37.5 - 51.0 % Final    MCV 04/04/2024 91.9  79.0 - 97.0 fL Final    MCH 04/04/2024 29.9  26.6 - 33.0 pg Final    MCHC 04/04/2024 32.6  31.5 - 35.7 g/dL Final    RDW 04/04/2024 12.9  12.3 - 15.4 % Final    RDW-SD 04/04/2024 43.4  37.0 - 54.0 fl Final    MPV 04/04/2024 10.2  6.0 - 12.0 fL Final    Platelets 04/04/2024 178  140 - 450 10*3/mm3 Final    Neutrophil % 04/04/2024 71.0  42.7 - 76.0 % Final    Lymphocyte % 04/04/2024 14.1 (L)  19.6 - 45.3 % Final    Monocyte % 04/04/2024 8.6  5.0 - 12.0 % Final    Eosinophil % 04/04/2024 5.1  0.3 - 6.2 % Final    Basophil % 04/04/2024 0.6  0.0 - 1.5 % Final    Immature Grans % 04/04/2024 0.6 (H)  0.0 - 0.5 % Final    Neutrophils, Absolute 04/04/2024 3.62  1.70 - 7.00 10*3/mm3 Final    Lymphocytes, Absolute 04/04/2024 0.72  0.70 - 3.10 10*3/mm3 Final    Monocytes, Absolute 04/04/2024 0.44  0.10 - 0.90 10*3/mm3 Final    Eosinophils, Absolute 04/04/2024 0.26  0.00 - 0.40 10*3/mm3 Final    Basophils, Absolute 04/04/2024 0.03  0.00 - 0.20 10*3/mm3 Final    Immature Grans, Absolute 04/04/2024 0.03  0.00 - 0.05 10*3/mm3 Final    nRBC 04/04/2024 0.0  0.0 - 0.2 /100 WBC Final    Glucose 04/04/2024 116  70 - 130 mg/dL Final    Glucose 04/04/2024 176 (H)  70 - 130 mg/dL Final    Sed Rate 04/04/2024 100 (H)  0 - 20 mm/hr Final    Glucose 04/04/2024 118  70 - 130 mg/dL Final    Glucose 04/04/2024 147 (H)  70 - 130 mg/dL Final    Glucose 04/05/2024 121 (H)  65 - 99 mg/dL Final    BUN 04/05/2024 19  6 - 20 mg/dL Final    Creatinine 04/05/2024 1.47 (H)  0.76 -  1.27 mg/dL Final    Sodium 04/05/2024 136  136 - 145 mmol/L Final    Potassium 04/05/2024 4.5  3.5 - 5.2 mmol/L Final    Chloride 04/05/2024 103  98 - 107 mmol/L Final    CO2 04/05/2024 24.0  22.0 - 29.0 mmol/L Final    Calcium 04/05/2024 8.7  8.6 - 10.5 mg/dL Final    Total Protein 04/05/2024 6.3  6.0 - 8.5 g/dL Final    Albumin 04/05/2024 3.5  3.5 - 5.2 g/dL Final    ALT (SGPT) 04/05/2024 22  1 - 41 U/L Final    AST (SGOT) 04/05/2024 21  1 - 40 U/L Final    Alkaline Phosphatase 04/05/2024 109  39 - 117 U/L Final    Total Bilirubin 04/05/2024 0.7  0.0 - 1.2 mg/dL Final    Globulin 04/05/2024 2.8  gm/dL Final    Calculated Result    A/G Ratio 04/05/2024 1.3  g/dL Final    BUN/Creatinine Ratio 04/05/2024 12.9  7.0 - 25.0 Final    Anion Gap 04/05/2024 9.0  5.0 - 15.0 mmol/L Final    eGFR 04/05/2024 54.9 (L)  >60.0 mL/min/1.73 Final    Magnesium 04/05/2024 1.5 (L)  1.6 - 2.6 mg/dL Final    Phosphorus 04/05/2024 3.4  2.5 - 4.5 mg/dL Final    C-Reactive Protein 04/05/2024 11.91 (H)  0.00 - 0.50 mg/dL Final    Sed Rate 04/05/2024 98 (H)  0 - 20 mm/hr Final    WBC 04/05/2024 5.88  3.40 - 10.80 10*3/mm3 Final    RBC 04/05/2024 3.40 (L)  4.14 - 5.80 10*6/mm3 Final    Hemoglobin 04/05/2024 10.5 (L)  13.0 - 17.7 g/dL Final    Hematocrit 04/05/2024 31.7 (L)  37.5 - 51.0 % Final    MCV 04/05/2024 93.2  79.0 - 97.0 fL Final    MCH 04/05/2024 30.9  26.6 - 33.0 pg Final    MCHC 04/05/2024 33.1  31.5 - 35.7 g/dL Final    RDW 04/05/2024 12.8  12.3 - 15.4 % Final    RDW-SD 04/05/2024 43.8  37.0 - 54.0 fl Final    MPV 04/05/2024 10.5  6.0 - 12.0 fL Final    Platelets 04/05/2024 183  140 - 450 10*3/mm3 Final    Neutrophil % 04/05/2024 70.8  42.7 - 76.0 % Final    Lymphocyte % 04/05/2024 15.1 (L)  19.6 - 45.3 % Final    Monocyte % 04/05/2024 8.7  5.0 - 12.0 % Final    Eosinophil % 04/05/2024 3.7  0.3 - 6.2 % Final    Basophil % 04/05/2024 0.5  0.0 - 1.5 % Final    Immature Grans % 04/05/2024 1.2 (H)  0.0 - 0.5 % Final    Neutrophils,  Absolute 04/05/2024 4.16  1.70 - 7.00 10*3/mm3 Final    Lymphocytes, Absolute 04/05/2024 0.89  0.70 - 3.10 10*3/mm3 Final    Monocytes, Absolute 04/05/2024 0.51  0.10 - 0.90 10*3/mm3 Final    Eosinophils, Absolute 04/05/2024 0.22  0.00 - 0.40 10*3/mm3 Final    Basophils, Absolute 04/05/2024 0.03  0.00 - 0.20 10*3/mm3 Final    Immature Grans, Absolute 04/05/2024 0.07 (H)  0.00 - 0.05 10*3/mm3 Final    nRBC 04/05/2024 0.0  0.0 - 0.2 /100 WBC Final    Glucose 04/05/2024 125  70 - 130 mg/dL Final       CT Angio Abdominal Aorta Bilateral Iliofem Runoff    Result Date: 4/4/2024  Narrative: CT ANGIO ABDOMINAL AORTA BILAT ILIOFEM RUNOFF Date of Exam: 4/4/2024 11:28 AM EDT Indication: PERIPHERAL ARTERIAL DISEASE. Comparison: None available. Technique: CTA of the abdomen, pelvis and both lower extremities was performed after the uneventful intravenous administration of 150 cc Isovue-370. Reconstructed coronal and sagittal images were also obtained. In addition, a 3-D volume rendered image was created for interpretation. Automated exposure control and iterative reconstruction methods were used. Findings: AORTA:  Normal in caliber throughout. No dissection or penetrating ulcer identified.No significant atherosclerotic disease identified. MESENTERIC/RENAL VESSELS:  Normal in caliber. No dissection or significant stenosis identified. PELVIC VESSELS: Normal in caliber. No dissection or significant stenosis identified. IVC:  Normal caliber. RIGHT LOWER EXTREMITY:  Common femoral artery, superficial femoral artery, and popliteal artery are normal in caliber. No significant stenosis.  There is below-knee medial sclerosis presumably related to diabetic vasculopathy. Lower extremity arteries are of normal caliber with three-vessel runoff. LEFT LOWER EXTREMITY:  Common femoral artery, superficial femoral artery, and popliteal artery are normal in caliber. No significant stenosis.  There is below-knee medial sclerosis presumably related  to diabetic vasculopathy. Lower extremity arteries are  of normal caliber with three-vessel runoff. LOWER THORAX: Unremarkable LIVER: Unremarkable parenchyma without focal lesion. BILIARY/GALLBLADDER: There are calcified gallstones. SPLEEN: Enlarged measuring 19 cm in length PANCREAS:  Unremarkable ADRENAL:  Unremarkable KIDNEYS:  Unremarkable parenchyma with no solid mass identified. No obstruction.  No calculus identified. GASTROINTESTINAL/MESENTERY:  No evidence of obstruction nor inflammation.  RETROPERITONEUM/LYMPH NODES: There are numerous enlarged lymph nodes, the largest of which is a 2.1 x 2.1 cm aortocaval node (image 69, series 4). There is a 2.3 x 1.8 cm right inguinal lymph node (image 109, series 4). There is a 1.9 x 1.5 cm right external iliac node (image 154, series 4). REPRODUCTIVE:  Unremarkable BLADDER:  Unremarkable OSSEUS STRUCTURES:  Typical for age with no acute process identified.     Impression: Impression: 1.No significant arterial stenosis identified. There is bilateral three-vessel runoff. 2.There is below-knee medial sclerosis presumably related to diabetic vasculopathy. 3.Nonspecific abdominal pelvic lymphadenopathy. 4.Other incidental nonemergent findings as detailed above. Electronically Signed: Yannick Srivastava MD  4/4/2024 12:20 PM EDT  Workstation ID: [id number]    Doppler Ankle Brachial Index Single Level CAR    Result Date: 4/3/2024  Narrative:   Right Conclusion: The right YRN is unable to be assessed due to vessel incompressibility. Normal digital pressures.   Left Conclusion: The left YRN is unable to be assessed due to vessel incompressibility. Normal digital pressures.   Waveforms and normal digital pressures suggest no significant flow obstructive PAD     MRI Foot Right Without Contrast    Result Date: 4/2/2024  Narrative: MRI FOOT RIGHT WO CONTRAST Date of Exam: 4/2/2024 8:30 PM EDT Indication: eval for osteo R foot.  Comparison: Same day foot radiograph Technique:  Routine  multiplanar/multisequence sequence images of the right foot were obtained without contrast administration. Findings: Bones and joints: Degenerative subchondral edema and cystic changes throughout the midfoot. Degenerative subchondral edema and cystic change of the first metatarsophalangeal joint as well as the first interphalangeal joint. Small effusion with subchondral edema at the second metatarsophalangeal joint. Small bone marrow edema within the second and third distal and middle phalanges. No loss of normal T1 cortical or marrow signal. No evidence of acute fracture. No suspicious marrow placing lesions. Soft tissues: Diffuse subcutaneous edema. There is more pronounced edema seen in the second and third toes. No soft tissue mass or fluid collection seen. Fatty and edematous changes of the intrinsic foot muscles suggestive of neuropathic changes. Soft tissue wound seen along the plantar aspect of the second metatarsophalangeal joint. Visualized flexor and extensor tendons appear grossly intact. Plantar plates appear grossly intact. Lisfranc ligament appears grossly intact. No evidence of intermetatarsal bursitis or neuroma.     Impression: Impression: Swelling of the second and third toes as well as a small soft tissue wound along the plantar aspect of the second metatarsophalangeal joint. There is minimal marrow edema seen within the second and third middle and distal phalanges as well as a small joint effusion and subchondral edema seen at the second metatarsophalangeal joint. No definite loss of T1 cortical signal to suggest erosive changes. These findings most like represent reactive edema/effusion. However early osteomyelitis cannot be fully excluded. Recommend continued clinical and imaging follow-up as warranted. Electronically Signed: hSilo Torres MD  4/2/2024 9:15 PM EDT  Workstation ID: [id number]    XR Foot 3+ View Right    Result Date: 4/2/2024  Narrative: XR FOOT 3+ VW RIGHT Date of Exam:  4/2/2024 6:03 PM EDT Indication: eval for osteo Comparison: None available. Findings: Spurring at the insertion of the Achilles tendon. Calcaneal spur. Osteoarthritic changes. The tarsal and metatarsal bones appear intact. Severe osteoarthritic changes of the first metatarsophalangeal joint. Questionable fracture at the base of the second proximal phalanx. No lytic or blastic disease.     Impression: No evidence of osteomyelitis. Questionable fracture at the base of the second proximal phalanx. Electronically Signed: Harry Kim MD  4/2/2024 6:19 PM EDT  Workstation ID: [id number]       Assessment:    Osteomyelitis    Lymphoma    Type 2 diabetes mellitus with hyperglycemia    Diabetic foot ulcer    History of myalgia due to HMG CoA reductase inhibitor    Hypertensive disorder    Hyperlipidemia    Microalbuminuric diabetic nephropathy    Stage 3a chronic kidney disease           Plan:      58-year-old male with multiple medical comorbidities, diabetes mellitus with right foot wound/infection, presumed osteomyelitis.    Right foot x-rays concerning for signs of Charcot arthropathy.  MRI of the right foot demonstrates evidence of early osteomyelitis of the second and third digits.  Given his longstanding history of plantar wound, clinical exam findings, I am concerned he may have more proximal infection that would benefit from surgical intervention.  As Dr. Hays is unavailable this week, transfer to the [facility] was attempted however transfer did not happen.  In Dr. Hays's absence, I was asked to evaluate the patient.    I had a discussion with the patient today at bedside regarding further management options including nonsurgical as well as surgical.  I anticipate he will benefit from definitive surgical management for his right foot infection/osteomyelitis.  We discussed various levels of amputation including second and third toe amputation, partial ray amputations, transmetatarsal amputation.   Given his plantar wound, anticipate he will benefit from minimum partial second and third metatarsal amputations if not transmetatarsal amputation.  I discussed these options with the patient.  He has expressed that he is interested in considering right below-knee amputation as he states that he would like definitive management for his infection, would like optimal functional use of his right lower extremity, does not wish to be at further risk for ulceration/new wound developments.  We have discussed that he will ultimately need surgical intervention for his right foot, and I have discussed with him that delaying surgery may increase risk of worsening infection, sepsis, persistent osteomyelitis.  He seems to understand and accept these risks and wishes to wait until further evaluation/recommendations per Dr. Hays.    In the meantime, plan to continue antibiotics per infectious disease.  Strict offloading right lower extremity.  Local wound care as per recommendations from PT/wound care; daily Betadine painting, 4 x 4, Kerlix dry dressing.    Will follow.  Further recommendations per Dr. Hays upon his return.    Date: 4/5/2024    JOHN Villavicencio

## 2024-04-05 NOTE — PLAN OF CARE
Goal Outcome Evaluation:      Pleasant gentleman here through the weekend to be evaluated by Dr. Hays on Monday. A/O. RA, CPAP at night. NSR.

## 2024-04-05 NOTE — PROGRESS NOTES
[facility] Medicine Services  PROGRESS NOTE    Patient Name: Alvaro Sinha  : 1965  MRN: [id number]    Date of Admission: 2024  Primary Care Physician: Martha Mckee MD    Subjective   Subjective     CC:  Follow-up for osteomyelitis    HPI:  Patient seen and examined this morning.  Comfortable in bed.  No pain or discomfort.  Gout symptoms seems to be improving after initiating colchicine yesterday.  He met with orthopedic team and agreeable to surgical intervention on Monday    Objective   Objective     Vital Signs:   Temp:  [98.5 °F (36.9 °C)-101 °F (38.3 °C)] 99.1 °F (37.3 °C)  Heart Rate:  [] 65  Resp:  [18-20] 20  BP: (125-147)/(77-92) 135/77     Physical Exam:  General: Comfortable, not in distress, conversant and cooperative  Head: Atraumatic and normocephalic  Eyes: No Icterus. No pallor  Ears:  Ears appear intact with no abnormalities noted  Throat: No oral lesions, no thrush  Neck: Supple, trachea midline  Lungs: Clear to auscultation bilaterally, equal air entry, no wheezing or crackles  Heart:  Normal S1 and S2, no murmur, no gallop, No JVD, no lower extremity swelling  Abdomen:  Soft, no tenderness, no organomegaly, normal bowel sounds, no organomegaly  Extremities: Right foot purulent infection on the plantar aspect of the foot at the base of the 1st-3rd toe with surrounding erythema  Skin: No bleeding, bruising or rash, normal skin turgor and elasticity  Neurologic: Cranial nerves appear intact with no evidence of facial asymmetry, normal motor and sensory functions in all 4 extremities  Psych: Alert and oriented x 3, normal mood    Results Reviewed:  LAB RESULTS:      Lab 24  0626 24  0534 24  1832   WBC 5.10 4.83 6.89   HEMOGLOBIN 10.3* 10.0* 10.8*   HEMATOCRIT 31.6* 30.4* 32.7*   PLATELETS 178 148 162   NEUTROS ABS 3.62  --  5.32   IMMATURE GRANS (ABS) 0.03  --  0.04   LYMPHS ABS 0.72  --  0.81   MONOS ABS 0.44  --   0.58   EOS ABS 0.26  --  0.13   MCV 91.9 92.7 92.9   SED RATE 100*  --  94*   CRP 9.71*  --  16.12*   LACTATE  --   --  1.3         Lab 04/04/24 0626 04/03/24  0534 04/02/24 1832   SODIUM 140 139 134*   POTASSIUM 4.6 4.3 4.2   CHLORIDE 105 106 100   CO2 26.0 22.0 20.0*   ANION GAP 9.0 11.0 14.0   BUN 16 24* 29*   CREATININE 1.23 1.18 1.43*   EGFR 68.1 71.5 56.8*   GLUCOSE 105* 108* 152*   CALCIUM 8.7 8.5* 9.0   MAGNESIUM 1.8 1.7  --    PHOSPHORUS 3.2  --   --    HEMOGLOBIN A1C  --   --  8.50*         Lab 04/04/24 0626 04/02/24 1832   TOTAL PROTEIN 6.2 7.3   ALBUMIN 3.5 3.7   GLOBULIN 2.7 3.6   ALT (SGPT) 23 21   AST (SGOT) 22 20   BILIRUBIN 0.6 0.6   ALK PHOS 98 90                     Brief Urine Lab Results       None            Microbiology Results Abnormal       Procedure Component Value - Date/Time    Blood Culture - Blood, Arm, Right [[id number]]  (Normal) Collected: 04/02/24 1900    Lab Status: Preliminary result Specimen: Blood from Arm, Right Updated: 04/04/24 1946     Blood Culture No growth at 2 days    Blood Culture - Blood, Arm, Right [[id number]]  (Normal) Collected: 04/02/24 1833    Lab Status: Preliminary result Specimen: Blood from Arm, Right Updated: 04/04/24 1900     Blood Culture No growth at 2 days    Narrative:      Less than seven (7) mL's of blood was collected.  Insufficient quantity may yield false negative results.    MRSA Screen, PCR (Inpatient) - Swab, Nares [[id number]]  (Normal) Collected: 04/02/24 1903    Lab Status: Final result Specimen: Swab from Nares Updated: 04/02/24 2047     MRSA PCR Negative    Narrative:      The negative predictive value of this diagnostic test is high and should only be used to consider de-escalating anti-MRSA therapy. A positive result may indicate colonization with MRSA and must be correlated clinically.  MRSA Negative            CT Angio Abdominal Aorta Bilateral Iliofem Runoff    Result Date: 4/4/2024  CT ANGIO ABDOMINAL AORTA BILAT ILIOFEM RUNOFF Date  of Exam: 4/4/2024 11:28 AM EDT Indication: PERIPHERAL ARTERIAL DISEASE. Comparison: None available. Technique: CTA of the abdomen, pelvis and both lower extremities was performed after the uneventful intravenous administration of 150 cc Isovue-370. Reconstructed coronal and sagittal images were also obtained. In addition, a 3-D volume rendered image was created for interpretation. Automated exposure control and iterative reconstruction methods were used. Findings: AORTA:  Normal in caliber throughout. No dissection or penetrating ulcer identified.No significant atherosclerotic disease identified. MESENTERIC/RENAL VESSELS:  Normal in caliber. No dissection or significant stenosis identified. PELVIC VESSELS: Normal in caliber. No dissection or significant stenosis identified. IVC:  Normal caliber. RIGHT LOWER EXTREMITY:  Common femoral artery, superficial femoral artery, and popliteal artery are normal in caliber. No significant stenosis.  There is below-knee medial sclerosis presumably related to diabetic vasculopathy. Lower extremity arteries are of normal caliber with three-vessel runoff. LEFT LOWER EXTREMITY:  Common femoral artery, superficial femoral artery, and popliteal artery are normal in caliber. No significant stenosis.  There is below-knee medial sclerosis presumably related to diabetic vasculopathy. Lower extremity arteries are  of normal caliber with three-vessel runoff. LOWER THORAX: Unremarkable LIVER: Unremarkable parenchyma without focal lesion. BILIARY/GALLBLADDER: There are calcified gallstones. SPLEEN: Enlarged measuring 19 cm in length PANCREAS:  Unremarkable ADRENAL:  Unremarkable KIDNEYS:  Unremarkable parenchyma with no solid mass identified. No obstruction.  No calculus identified. GASTROINTESTINAL/MESENTERY:  No evidence of obstruction nor inflammation.  RETROPERITONEUM/LYMPH NODES: There are numerous enlarged lymph nodes, the largest of which is a 2.1 x 2.1 cm aortocaval node (image 69,  series 4). There is a 2.3 x 1.8 cm right inguinal lymph node (image 109, series 4). There is a 1.9 x 1.5 cm right external iliac node (image 154, series 4). REPRODUCTIVE:  Unremarkable BLADDER:  Unremarkable OSSEUS STRUCTURES:  Typical for age with no acute process identified.     Impression: Impression: 1.No significant arterial stenosis identified. There is bilateral three-vessel runoff. 2.There is below-knee medial sclerosis presumably related to diabetic vasculopathy. 3.Nonspecific abdominal pelvic lymphadenopathy. 4.Other incidental nonemergent findings as detailed above. Electronically Signed: Yannick Srivastava MD  4/4/2024 12:20 PM EDT  Workstation ID: [id number]    Doppler Ankle Brachial Index Single Level CAR    Result Date: 4/3/2024    Right Conclusion: The right YRN is unable to be assessed due to vessel incompressibility. Normal digital pressures.   Left Conclusion: The left YRN is unable to be assessed due to vessel incompressibility. Normal digital pressures.   Waveforms and normal digital pressures suggest no significant flow obstructive PAD          Current medications:  Scheduled Meds:[Held by provider] clopidogrel, 75 mg, Oral, Daily  colchicine, 0.6 mg, Oral, Daily  DAPTOmycin, 8 mg/kg (Adjusted), Intravenous, Q24H  glipizide, 10 mg, Oral, QAM AC  heparin (porcine), 5,000 Units, Subcutaneous, Q8H  insulin lispro, 2-7 Units, Subcutaneous, 4x Daily AC & at Bedtime  lisinopril, 40 mg, Oral, Daily  metoprolol succinate XL, 100 mg, Oral, Daily  pioglitazone, 30 mg, Oral, Daily  piperacillin-tazobactam, 3.375 g, Intravenous, Q8H  sodium chloride, 10 mL, Intravenous, Q12H      Continuous Infusions:   PRN Meds:.  acetaminophen    senna-docusate sodium **AND** polyethylene glycol **AND** bisacodyl **AND** bisacodyl    Calcium Replacement - Follow Nurse / BPA Driven Protocol    dextrose    dextrose    glucagon (human recombinant)    HYDROcodone-acetaminophen    HYDROmorphone    Magnesium Standard Dose Replacement  - Follow Nurse / BPA Driven Protocol    Phosphorus Replacement - Follow Nurse / BPA Driven Protocol    Potassium Replacement - Follow Nurse / BPA Driven Protocol    sodium chloride    sodium chloride    Assessment & Plan   Assessment & Plan     Active Hospital Problems    Diagnosis  POA    **Osteomyelitis [M86.9]  Yes    Stage 3a chronic kidney disease [N18.31]  Yes    History of myalgia due to HMG CoA reductase inhibitor [Z87.39]  Not Applicable    Microalbuminuric diabetic nephropathy [E11.21]  Yes    Hyperlipidemia [E78.5]  Yes    Type 2 diabetes mellitus with hyperglycemia [E11.65]  Yes    Lymphoma [C85.90]  Yes    Diabetic foot ulcer [E11.621, L97.509]  Yes    Hypertensive disorder [I10]  Yes      Resolved Hospital Problems   No resolved problems to display.        Brief Hospital Course to date:  Alvaro Sinha is a 58 y.o. male with past medical history of lymphoma, type 2 diabetes, hypertension, hyperlipidemia, CKD, gout, chronic ulceration of the right foot who is presenting with worsening wound of right foot. MRI showed concerns for early osteomyelitis of the second and third middle and distal phalanges of the right rei    Nonhealing diabetic ulcer of the right foot with overlying cellulitis   Concern for foot osteomyelitis  Patient with chronic foot ulcer, currently with worsening cellulitis and possible osteomyelitis per MRI  Currently on Zosyn and daptomycin per ID  Holding Plavix in case any surgical intervention  Arterial duplex ordered  Orthopedic team evaluated the patient.  Tentative plan for surgery by Dr. Hays on Monday    Acute gout flare  Symptoms improving after initiating p.o. colchicine  Continue to hold allopurinol  CRP is stable.  Continue to trend with morning lab    Type 2 diabetes  A1c 8.5%  Has been off Trulicity due to insurance coverage  Will continue oral medications for now   SSI     Stage III CKD due to diabetes  Hypertension  Creatinine currently near baseline, continue  to monitor  Continue home lisinopril     Lymphoma  History of lymphoma in chart, has been [facility] with [facility] for [facility]  Ongoing workup, is not currently on any immunotherapy or immunosuppressive medications     Statin intolerance  Rosuvastatin listed in medical history however no recent statin.  History of myalgia         Expected Discharge Location and Transportation: TBD expected Discharge   Expected Discharge Date: 4/9/2024; Expected Discharge Time:      DVT prophylaxis:  Medical DVT prophylaxis orders are present.         AM-PAC 6 Clicks Score (PT): 19 (04/04/24 2000)    CODE STATUS:   Code Status and Medical Interventions:   Ordered at: 04/03/24 0013     Code Status (Patient has no pulse and is not breathing):    CPR (Attempt to Resuscitate)     Medical Interventions (Patient has pulse or is breathing):    Full Support     Copied text in this note has been reviewed and is accurate as of 04/05/24.     Francia Pitts MD  04/05/24

## 2024-04-05 NOTE — THERAPY TREATMENT NOTE
Patient Name: Alvaro Sinha  : 1965    MRN: [id number]                              Today's Date: 2024       Admit Date: 2024    Visit Dx:     ICD-10-CM ICD-9-CM   1. Osteomyelitis of right foot, unspecified type  M86.9 730.27   2. Cellulitis of right foot  L03.115 682.7   3. Diabetic ulcer of right foot associated with other specified diabetes mellitus, unspecified part of foot, unspecified ulcer stage  E13.621 250.80    L97.519 707.15   4. History of diabetes mellitus  Z86.39 V12.29     Patient Active Problem List   Diagnosis    Nephrolithiasis    Lymphadenopathy    Lymphoma    Osteomyelitis    Type 2 diabetes mellitus with hyperglycemia    Elevated serum immunoglobulin free light chains    Elevated erythrocyte sedimentation rate    Diabetic foot ulcer    History of myalgia due to HMG CoA reductase inhibitor    Gout    Hypertensive disorder    Hyperlipidemia    Hypercalcemia    Microalbuminuric diabetic nephropathy    Peripheral angiopathy due to diabetes mellitus    Stage 3a chronic kidney disease     Past Medical History:   Diagnosis Date    Diabetes mellitus     Hypertension      Past Surgical History:   Procedure Laterality Date    BONE RESECTION, RIB        General Information       Row Name 24 1435          Physical Therapy Time and Intention    Document Type therapy note (daily note) (P)   -TB     Mode of Treatment physical therapy (P)   -TB       Row Name 24 1435          General Information    Patient Profile Reviewed yes (P)   -TB     Existing Precautions/Restrictions non-weight bearing;right;other (see comments) (P)   osteomyelitis RLE  -TB     Barriers to Rehab medically complex (P)   -TB       Row Name 24 1435          Cognition    Orientation Status (Cognition) oriented x 4 (P)   -TB       Row Name 24 1435          Safety Issues, Functional Mobility    Safety Issues Affecting Function (Mobility) safety precaution awareness;safety precautions  follow-through/compliance;awareness of need for assistance;insight into deficits/self-awareness;judgment;unable to maintain weight-bearing restrictions (P)   -TB     Impairments Affecting Function (Mobility) endurance/activity tolerance;coordination;sensation/sensory awareness;pain;postural/trunk control;strength (P)   -TB               User Key  (r) = Recorded By, (t) = Taken By, (c) = Cosigned By      Initials Name Provider Type    TB Lexi Grady, PT Student PT Student                   Mobility       Row Name 04/05/24 1436          Bed Mobility    Bed Mobility rolling right;rolling left;scooting/bridging (P)   -TB     Rolling Left [place] (Bed Mobility) independent (P)   -TB     Rolling Right [place] (Bed Mobility) independent (P)   -TB     Scooting/Bridging [place] (Bed Mobility) independent (P)   -TB     Comment, (Bed Mobility) Pt with difficulty bridging d/t L knee pain. VCs for sequencing with independent pt demo. Pt feeling additional fatigue this date declining all OOB mobility. (P)   -TB       Row Name 04/05/24 1436          Gait/Stairs (Locomotion)    Comment, (Gait/Stairs) Pt declined to ambulate this date d/t increased fatigue. (P)   -TB       Row Name 04/05/24 1436          Mobility    Extremity Weight-bearing Status right lower extremity (P)   -TB     Right Lower Extremity (Weight-bearing Status) non weight-bearing (NWB) (P)   -TB               User Key  (r) = Recorded By, (t) = Taken By, (c) = Cosigned By      Initials Name Provider Type    TB Lexi Grady, PT Student PT Student                   Obj/Interventions       Row Name 04/05/24 1439          Motor Skills    Therapeutic Exercise hip;knee;ankle (P)   -TB       Row Name 04/05/24 1439          Hip (Therapeutic Exercise)    Hip (Therapeutic Exercise) strengthening exercise (P)   -TB     Hip Strengthening (Therapeutic Exercise) bilateral;supine;heel slides;10 repetitions;2 sets (P)   -TB       Row Name 04/05/24 1439           Knee (Therapeutic Exercise)    Knee (Therapeutic Exercise) strengthening exercise (P)   -TB     Knee Strengthening (Therapeutic Exercise) bilateral;supine;SLR (straight leg raise);10 repetitions;2 sets (P)   -TB       Row Name 04/05/24 1439          Ankle (Therapeutic Exercise)    Ankle (Therapeutic Exercise) strengthening exercise (P)   -TB     Ankle Strengthening (Therapeutic Exercise) bilateral;dorsiflexion;plantarflexion;10 repetitions;2 sets (P)   -TB               User Key  (r) = Recorded By, (t) = Taken By, (c) = Cosigned By      Initials Name Provider Type    TB Lexi Grady, PT Student PT Student                   Goals/Plan    No documentation.                  Clinical Impression       Row Name 04/05/24 1441          Pain    Pain Intervention(s) Ambulation/increased activity;Repositioned;Distraction;Cold pack (P)   -TB       Row Name 04/05/24 1441          Pain Scale: FACES Pre/Post-Treatment    Pain: FACES Scale, Pretreatment 0-->no hurt (P)   -TB     Posttreatment Pain Rating 2-->hurts little bit (P)   -TB     Pain Location generalized (P)   -TB     Pain Location - foot (P)   -TB     Pre/Posttreatment Pain Comment RN aware and managing. Pt was provided ice pack donned by therapist prior to session. (P)   -TB       Row Name 04/05/24 1441          Plan of Care Review    Plan of Care Reviewed With patient (P)   -TB     Progress no change (P)   -TB     Outcome Evaluation Pt performed bed level strengthening exercises and demonstrated independent bed mobility skills. Pt with decreased motivation required max cueing for encouragement to further mobility. Pt declined ambulation and further mobility d/t additional fatigue and L knee pain. Pt will benefit from continued skilled IPPT care to address NWB status and practice mobility following potential toe amputations. PT recommends D/C to home with assist and home health PT. (P)   -TB       Row Name 04/05/24 1441          Therapy Assessment/Plan (PT)     Patient/Family Therapy Goals Statement (PT) return to PLOF (P)   -TB     Rehab Potential (PT) good, to achieve stated therapy goals (P)   -TB     Criteria for Skilled Interventions Met (PT) yes;meets criteria;skilled treatment is necessary (P)   -TB       Row Name 04/05/24 1441          Vital Signs    Pre Systolic BP Rehab 136 (P)   -TB     Pre Treatment Diastolic BP 79 (P)   -TB     Pretreatment Heart Rate (beats/min) 79 (P)   -TB     Posttreatment Heart Rate (beats/min) 82 (P)   -TB     Pre SpO2 (%) 94 (P)   -TB     O2 Delivery Pre Treatment room air (P)   -TB     O2 Delivery Intra Treatment room air (P)   -TB     Post SpO2 (%) 95 (P)   -TB     O2 Delivery Post Treatment room air (P)   -TB     Pre Patient Position Supine (P)   -TB     Intra Patient Position Supine (P)   -TB     Post Patient Position Supine (P)   -TB       Row Name 04/05/24 1441          Positioning and Restraints    Pre-Treatment Position in bed (P)   -TB     Post Treatment Position bed (P)   -TB     In Bed notified nsg;side lying right;call light within reach;encouraged to call for assist;side rails up x2 (P)   -TB               User Key  (r) = Recorded By, (t) = Taken By, (c) = Cosigned By      Initials Name Provider Type    TB Lexi Grady, PT Student PT Student                   Outcome Measures       Row Name 04/05/24 1450          How much help from another person do you currently need...    Turning from your back to your side while in flat bed without using bedrails? 4 (P)   -TB     Moving from lying on back to sitting on the side of a flat bed without bedrails? 4 (P)   -TB     Moving to and from a bed to a chair (including a wheelchair)? 3 (P)   -TB     Standing up from a chair using your arms (e.g., wheelchair, bedside chair)? 3 (P)   -TB     Climbing 3-5 steps with a railing? 2 (P)   -TB     To walk in hospital room? 3 (P)   -TB     AM-PAC 6 Clicks Score (PT) 19 (P)   -TB     Highest Level of Mobility Goal 6 --> Walk 10 steps or  more (P)   -TB               User Key  (r) = Recorded By, (t) = Taken By, (c) = Cosigned By      Initials Name Provider Type    TB Lexi Grady, PT Student PT Student                                 Physical Therapy Education       Title: PT OT SLP Therapies (Done)       Topic: Physical Therapy (Done)       Point: Mobility training (Done)       Learning Progress Summary             Patient Acceptance, E,TB, VU by TB at 4/5/2024 1410    Acceptance, E,TB, VU by TB at 4/3/2024 1520                         Point: Home exercise program (Done)       Learning Progress Summary             Patient Acceptance, E,TB, VU by TB at 4/5/2024 1410    Acceptance, E,TB, VU by TB at 4/3/2024 1520                         Point: Body mechanics (Done)       Learning Progress Summary             Patient Acceptance, E,TB, VU by TB at 4/5/2024 1410    Acceptance, E,TB, VU by TB at 4/3/2024 1520                         Point: Precautions (Done)       Learning Progress Summary             Patient Acceptance, E,TB, VU by TB at 4/5/2024 1410    Acceptance, E,TB, VU by TB at 4/3/2024 1520                                         User Key       Initials Effective Dates Name Provider Type Discipline     01/16/24 -  Lexi Grady, PT Student PT Student PT                  PT Recommendation and Plan  Planned Therapy Interventions (PT): patient/family education, strengthening, stair training, gait training, bed mobility training, balance training, postural re-education, transfer training, neuromuscular re-education  Plan of Care Reviewed With: (P) patient  Progress: (P) no change  Outcome Evaluation: (P) Pt performed bed level strengthening exercises and demonstrated independent bed mobility skills. Pt with decreased motivation required max cueing for encouragement to further mobility. Pt declined ambulation and further mobility d/t additional fatigue and L knee pain. Pt will benefit from continued skilled IPPT care to address NWB status  and practice mobility following potential toe amputations. PT recommends D/C to home with assist and home health PT.     Time Calculation:   PT Evaluation Complexity  History, PT Evaluation Complexity: 3 or more personal factors and/or comorbidities  Examination of Body Systems (PT Eval Complexity): total of 4 or more elements  Clinical Presentation (PT Evaluation Complexity): evolving  Clinical Decision Making (PT Evaluation Complexity): moderate complexity  Overall Complexity (PT Evaluation Complexity): moderate complexity     PT Charges       Row Name 04/05/24 1451             Time Calculation    Start Time 1410 (P)   -TB      PT Received On 04/05/24 (P)   -TB      PT Goal Re-Cert Due Date 04/13/24 (P)   -TB         Time Calculation- PT    Total Timed Code Minutes- PT 25 minute(s) (P)   -TB         Timed Charges    96844 - PT Therapeutic Activity Minutes 25 (P)   -TB         Total Minutes    Timed Charges Total Minutes 25 (P)   -TB       Total Minutes 25 (P)   -TB                User Key  (r) = Recorded By, (t) = Taken By, (c) = Cosigned By      Initials Name Provider Type    TB Lexi Grady, PT Student PT Student                  Therapy Charges for Today       Code Description Service Date Service Provider Modifiers Qty    88912509057 HC PT THERAPEUTIC ACT EA 15 MIN 4/5/2024 Lexi Grady, PT Student GP 2            PT G-Codes  Outcome Measure Options: AM-PAC 6 Clicks Basic Mobility (PT)  AM-PAC 6 Clicks Score (PT): (P) 19  PT Discharge Summary  Anticipated Discharge Disposition (PT): (P) home with assist, home with home health    Lexi Grady, PT Student  4/5/2024

## 2024-04-06 LAB
ALBUMIN SERPL-MCNC: 3.4 G/DL (ref 3.5–5.2)
ALBUMIN/GLOB SERPL: 1.3 G/DL
ALP SERPL-CCNC: 127 U/L (ref 39–117)
ALT SERPL W P-5'-P-CCNC: 23 U/L (ref 1–41)
ANION GAP SERPL CALCULATED.3IONS-SCNC: 9 MMOL/L (ref 5–15)
AST SERPL-CCNC: 21 U/L (ref 1–40)
BASOPHILS # BLD AUTO: 0.03 10*3/MM3 (ref 0–0.2)
BASOPHILS NFR BLD AUTO: 0.6 % (ref 0–1.5)
BILIRUB SERPL-MCNC: 0.5 MG/DL (ref 0–1.2)
BUN SERPL-MCNC: 19 MG/DL (ref 6–20)
BUN/CREAT SERPL: 15.8 (ref 7–25)
CALCIUM SPEC-SCNC: 8.8 MG/DL (ref 8.6–10.5)
CHLORIDE SERPL-SCNC: 100 MMOL/L (ref 98–107)
CK SERPL-CCNC: 36 U/L (ref 20–200)
CO2 SERPL-SCNC: 24 MMOL/L (ref 22–29)
CREAT SERPL-MCNC: 1.2 MG/DL (ref 0.76–1.27)
CRP SERPL-MCNC: 9.73 MG/DL (ref 0–0.5)
DEPRECATED RDW RBC AUTO: 43.9 FL (ref 37–54)
EGFRCR SERPLBLD CKD-EPI 2021: 70.1 ML/MIN/1.73
EOSINOPHIL # BLD AUTO: 0.25 10*3/MM3 (ref 0–0.4)
EOSINOPHIL NFR BLD AUTO: 4.6 % (ref 0.3–6.2)
ERYTHROCYTE [DISTWIDTH] IN BLOOD BY AUTOMATED COUNT: 13 % (ref 12.3–15.4)
GLOBULIN UR ELPH-MCNC: 2.7 GM/DL
GLUCOSE BLDC GLUCOMTR-MCNC: 138 MG/DL (ref 70–130)
GLUCOSE BLDC GLUCOMTR-MCNC: 164 MG/DL (ref 70–130)
GLUCOSE BLDC GLUCOMTR-MCNC: 170 MG/DL (ref 70–130)
GLUCOSE BLDC GLUCOMTR-MCNC: 300 MG/DL (ref 70–130)
GLUCOSE SERPL-MCNC: 114 MG/DL (ref 65–99)
HCT VFR BLD AUTO: 31.8 % (ref 37.5–51)
HGB BLD-MCNC: 10.5 G/DL (ref 13–17.7)
IMM GRANULOCYTES # BLD AUTO: 0.06 10*3/MM3 (ref 0–0.05)
IMM GRANULOCYTES NFR BLD AUTO: 1.1 % (ref 0–0.5)
LYMPHOCYTES # BLD AUTO: 0.87 10*3/MM3 (ref 0.7–3.1)
LYMPHOCYTES NFR BLD AUTO: 16.1 % (ref 19.6–45.3)
MAGNESIUM SERPL-MCNC: 2.2 MG/DL (ref 1.6–2.6)
MCH RBC QN AUTO: 30.5 PG (ref 26.6–33)
MCHC RBC AUTO-ENTMCNC: 33 G/DL (ref 31.5–35.7)
MCV RBC AUTO: 92.4 FL (ref 79–97)
MONOCYTES # BLD AUTO: 0.39 10*3/MM3 (ref 0.1–0.9)
MONOCYTES NFR BLD AUTO: 7.2 % (ref 5–12)
NEUTROPHILS NFR BLD AUTO: 3.8 10*3/MM3 (ref 1.7–7)
NEUTROPHILS NFR BLD AUTO: 70.4 % (ref 42.7–76)
NRBC BLD AUTO-RTO: 0 /100 WBC (ref 0–0.2)
PLATELET # BLD AUTO: 210 10*3/MM3 (ref 140–450)
PMV BLD AUTO: 10.1 FL (ref 6–12)
POTASSIUM SERPL-SCNC: 4.2 MMOL/L (ref 3.5–5.2)
PROT SERPL-MCNC: 6.1 G/DL (ref 6–8.5)
RBC # BLD AUTO: 3.44 10*6/MM3 (ref 4.14–5.8)
SODIUM SERPL-SCNC: 133 MMOL/L (ref 136–145)
WBC NRBC COR # BLD AUTO: 5.4 10*3/MM3 (ref 3.4–10.8)

## 2024-04-06 PROCEDURE — 94799 UNLISTED PULMONARY SVC/PX: CPT

## 2024-04-06 PROCEDURE — 86140 C-REACTIVE PROTEIN: CPT | Performed by: INTERNAL MEDICINE

## 2024-04-06 PROCEDURE — 80053 COMPREHEN METABOLIC PANEL: CPT | Performed by: INTERNAL MEDICINE

## 2024-04-06 PROCEDURE — 83735 ASSAY OF MAGNESIUM: CPT | Performed by: INTERNAL MEDICINE

## 2024-04-06 PROCEDURE — 25010000002 DAPTOMYCIN PER 1 MG: Performed by: INTERNAL MEDICINE

## 2024-04-06 PROCEDURE — 25010000002 PIPERACILLIN SOD-TAZOBACTAM PER 1 G: Performed by: STUDENT IN AN ORGANIZED HEALTH CARE EDUCATION/TRAINING PROGRAM

## 2024-04-06 PROCEDURE — 25010000002 HEPARIN (PORCINE) PER 1000 UNITS: Performed by: STUDENT IN AN ORGANIZED HEALTH CARE EDUCATION/TRAINING PROGRAM

## 2024-04-06 PROCEDURE — 63710000001 INSULIN LISPRO (HUMAN) PER 5 UNITS: Performed by: STUDENT IN AN ORGANIZED HEALTH CARE EDUCATION/TRAINING PROGRAM

## 2024-04-06 PROCEDURE — 82948 REAGENT STRIP/BLOOD GLUCOSE: CPT

## 2024-04-06 PROCEDURE — 85025 COMPLETE CBC W/AUTO DIFF WBC: CPT | Performed by: INTERNAL MEDICINE

## 2024-04-06 PROCEDURE — 82550 ASSAY OF CK (CPK): CPT | Performed by: INTERNAL MEDICINE

## 2024-04-06 PROCEDURE — 99232 SBSQ HOSP IP/OBS MODERATE 35: CPT | Performed by: INTERNAL MEDICINE

## 2024-04-06 PROCEDURE — 94660 CPAP INITIATION&MGMT: CPT

## 2024-04-06 PROCEDURE — 25010000002 METHYLPREDNISOLONE PER 125 MG: Performed by: INTERNAL MEDICINE

## 2024-04-06 RX ORDER — METHYLPREDNISOLONE SODIUM SUCCINATE 125 MG/2ML
60 INJECTION, POWDER, LYOPHILIZED, FOR SOLUTION INTRAMUSCULAR; INTRAVENOUS DAILY
Status: DISCONTINUED | OUTPATIENT
Start: 2024-04-06 | End: 2024-04-06

## 2024-04-06 RX ORDER — METHYLPREDNISOLONE SODIUM SUCCINATE 125 MG/2ML
60 INJECTION, POWDER, LYOPHILIZED, FOR SOLUTION INTRAMUSCULAR; INTRAVENOUS ONCE
Status: COMPLETED | OUTPATIENT
Start: 2024-04-06 | End: 2024-04-06

## 2024-04-06 RX ADMIN — METHYLPREDNISOLONE SODIUM SUCCINATE 60 MG: 125 INJECTION, POWDER, FOR SOLUTION INTRAMUSCULAR; INTRAVENOUS at 14:05

## 2024-04-06 RX ADMIN — HEPARIN SODIUM 5000 UNITS: 5000 INJECTION INTRAVENOUS; SUBCUTANEOUS at 14:04

## 2024-04-06 RX ADMIN — HEPARIN SODIUM 5000 UNITS: 5000 INJECTION INTRAVENOUS; SUBCUTANEOUS at 21:00

## 2024-04-06 RX ADMIN — LISINOPRIL 40 MG: 40 TABLET ORAL at 09:15

## 2024-04-06 RX ADMIN — INSULIN LISPRO 5 UNITS: 100 INJECTION, SOLUTION INTRAVENOUS; SUBCUTANEOUS at 20:59

## 2024-04-06 RX ADMIN — METOPROLOL SUCCINATE 100 MG: 50 TABLET, EXTENDED RELEASE ORAL at 09:16

## 2024-04-06 RX ADMIN — PIPERACILLIN AND TAZOBACTAM 3.38 G: 3; .375 INJECTION, POWDER, LYOPHILIZED, FOR SOLUTION INTRAVENOUS at 03:23

## 2024-04-06 RX ADMIN — INSULIN LISPRO 2 UNITS: 100 INJECTION, SOLUTION INTRAVENOUS; SUBCUTANEOUS at 16:40

## 2024-04-06 RX ADMIN — GLIPIZIDE 10 MG: 10 TABLET ORAL at 09:15

## 2024-04-06 RX ADMIN — COLCHICINE 0.6 MG: 0.6 TABLET ORAL at 09:15

## 2024-04-06 RX ADMIN — PIPERACILLIN AND TAZOBACTAM 3.38 G: 3; .375 INJECTION, POWDER, LYOPHILIZED, FOR SOLUTION INTRAVENOUS at 16:37

## 2024-04-06 RX ADMIN — HEPARIN SODIUM 5000 UNITS: 5000 INJECTION INTRAVENOUS; SUBCUTANEOUS at 05:56

## 2024-04-06 RX ADMIN — PIPERACILLIN AND TAZOBACTAM 3.38 G: 3; .375 INJECTION, POWDER, LYOPHILIZED, FOR SOLUTION INTRAVENOUS at 11:37

## 2024-04-06 RX ADMIN — Medication 10 ML: at 09:16

## 2024-04-06 RX ADMIN — DAPTOMYCIN 800 MG: 500 INJECTION, POWDER, LYOPHILIZED, FOR SOLUTION INTRAVENOUS at 15:24

## 2024-04-06 RX ADMIN — PIOGLITAZONE 30 MG: 15 TABLET ORAL at 09:16

## 2024-04-06 RX ADMIN — INSULIN LISPRO 2 UNITS: 100 INJECTION, SOLUTION INTRAVENOUS; SUBCUTANEOUS at 12:21

## 2024-04-06 NOTE — PROGRESS NOTES
[facility] Medicine Services  PROGRESS NOTE    Patient Name: Alvaro Sinha  : 1965  MRN: [id number]    Date of Admission: 2024  Primary Care Physician: Martha Mckee MD    Subjective   Subjective     CC:  Follow-up for osteomyelitis    HPI:  Patient seen and examined this morning.  Still complaining of gout in his hand and foot despite being on colchicine.    Objective   Objective     Vital Signs:   Temp:  [96.3 °F (35.7 °C)-100 °F (37.8 °C)] 98.1 °F (36.7 °C)  Heart Rate:  [63-96] 69  Resp:  [18-20] 18  BP: (128-156)/(78-97) 156/97  Flow (L/min):  [1] 1     Physical Exam:  General: Comfortable, not in distress, conversant and cooperative  Head: Atraumatic and normocephalic  Eyes: No Icterus. No pallor  Ears:  Ears appear intact with no abnormalities noted  Throat: No oral lesions, no thrush  Neck: Supple, trachea midline  Lungs: Clear to auscultation bilaterally, equal air entry, no wheezing or crackles  Heart:  Normal S1 and S2, no murmur, no gallop, No JVD, no lower extremity swelling  Abdomen:  Soft, no tenderness, no organomegaly, normal bowel sounds, no organomegaly  Extremities: Right foot purulent infection on the plantar aspect of the foot at the base of the 1st-3rd toe with surrounding erythema  Skin: No bleeding, bruising or rash, normal skin turgor and elasticity  Neurologic: Cranial nerves appear intact with no evidence of facial asymmetry, normal motor and sensory functions in all 4 extremities  Psych: Alert and oriented x 3, normal mood    Results Reviewed:  LAB RESULTS:      Lab 24  0547 24  0626 24  0534 24  1832   WBC 5.88 5.10 4.83 6.89   HEMOGLOBIN 10.5* 10.3* 10.0* 10.8*   HEMATOCRIT 31.7* 31.6* 30.4* 32.7*   PLATELETS 183 178 148 162   NEUTROS ABS 4.16 3.62  --  5.32   IMMATURE GRANS (ABS) 0.07* 0.03  --  0.04   LYMPHS ABS 0.89 0.72  --  0.81   MONOS ABS 0.51 0.44  --  0.58   EOS ABS 0.22 0.26  --  0.13   MCV  93.2 91.9 92.7 92.9   SED RATE 98* 100*  --  94*   CRP 11.91* 9.71*  --  16.12*   LACTATE  --   --   --  1.3         Lab 04/06/24  0001 04/05/24  0547 04/04/24  0626 04/03/24  0534 04/02/24  1832   SODIUM  --  136 140 139 134*   POTASSIUM  --  4.5 4.6 4.3 4.2   CHLORIDE  --  103 105 106 100   CO2  --  24.0 26.0 22.0 20.0*   ANION GAP  --  9.0 9.0 11.0 14.0   BUN  --  19 16 24* 29*   CREATININE  --  1.47* 1.23 1.18 1.43*   EGFR  --  54.9* 68.1 71.5 56.8*   GLUCOSE  --  121* 105* 108* 152*   CALCIUM  --  8.7 8.7 8.5* 9.0   MAGNESIUM 2.2 1.5* 1.8 1.7  --    PHOSPHORUS  --  3.4 3.2  --   --    HEMOGLOBIN A1C  --   --   --   --  8.50*         Lab 04/05/24  0547 04/04/24  0626 04/02/24  1832   TOTAL PROTEIN 6.3 6.2 7.3   ALBUMIN 3.5 3.5 3.7   GLOBULIN 2.8 2.7 3.6   ALT (SGPT) 22 23 21   AST (SGOT) 21 22 20   BILIRUBIN 0.7 0.6 0.6   ALK PHOS 109 98 90                     Brief Urine Lab Results       None            Microbiology Results Abnormal       Procedure Component Value - Date/Time    Blood Culture - Blood, Arm, Right [[id number]]  (Normal) Collected: 04/02/24 1900    Lab Status: Preliminary result Specimen: Blood from Arm, Right Updated: 04/05/24 1946     Blood Culture No growth at 3 days    Blood Culture - Blood, Arm, Right [[id number]]  (Normal) Collected: 04/02/24 1833    Lab Status: Preliminary result Specimen: Blood from Arm, Right Updated: 04/05/24 1900     Blood Culture No growth at 3 days    Narrative:      Less than seven (7) mL's of blood was collected.  Insufficient quantity may yield false negative results.    MRSA Screen, PCR (Inpatient) - Swab, Nares [[id number]]  (Normal) Collected: 04/02/24 1903    Lab Status: Final result Specimen: Swab from Nares Updated: 04/02/24 2047     MRSA PCR Negative    Narrative:      The negative predictive value of this diagnostic test is high and should only be used to consider de-escalating anti-MRSA therapy. A positive result may indicate colonization with MRSA and must  be correlated clinically.  MRSA Negative            CT Angio Abdominal Aorta Bilateral Iliofem Runoff    Result Date: 4/4/2024  CT ANGIO ABDOMINAL AORTA BILAT ILIOFEM RUNOFF Date of Exam: 4/4/2024 11:28 AM EDT Indication: PERIPHERAL ARTERIAL DISEASE. Comparison: None available. Technique: CTA of the abdomen, pelvis and both lower extremities was performed after the uneventful intravenous administration of 150 cc Isovue-370. Reconstructed coronal and sagittal images were also obtained. In addition, a 3-D volume rendered image was created for interpretation. Automated exposure control and iterative reconstruction methods were used. Findings: AORTA:  Normal in caliber throughout. No dissection or penetrating ulcer identified.No significant atherosclerotic disease identified. MESENTERIC/RENAL VESSELS:  Normal in caliber. No dissection or significant stenosis identified. PELVIC VESSELS: Normal in caliber. No dissection or significant stenosis identified. IVC:  Normal caliber. RIGHT LOWER EXTREMITY:  Common femoral artery, superficial femoral artery, and popliteal artery are normal in caliber. No significant stenosis.  There is below-knee medial sclerosis presumably related to diabetic vasculopathy. Lower extremity arteries are of normal caliber with three-vessel runoff. LEFT LOWER EXTREMITY:  Common femoral artery, superficial femoral artery, and popliteal artery are normal in caliber. No significant stenosis.  There is below-knee medial sclerosis presumably related to diabetic vasculopathy. Lower extremity arteries are  of normal caliber with three-vessel runoff. LOWER THORAX: Unremarkable LIVER: Unremarkable parenchyma without focal lesion. BILIARY/GALLBLADDER: There are calcified gallstones. SPLEEN: Enlarged measuring 19 cm in length PANCREAS:  Unremarkable ADRENAL:  Unremarkable KIDNEYS:  Unremarkable parenchyma with no solid mass identified. No obstruction.  No calculus identified. GASTROINTESTINAL/MESENTERY:  No  evidence of obstruction nor inflammation.  RETROPERITONEUM/LYMPH NODES: There are numerous enlarged lymph nodes, the largest of which is a 2.1 x 2.1 cm aortocaval node (image 69, series 4). There is a 2.3 x 1.8 cm right inguinal lymph node (image 109, series 4). There is a 1.9 x 1.5 cm right external iliac node (image 154, series 4). REPRODUCTIVE:  Unremarkable BLADDER:  Unremarkable OSSEUS STRUCTURES:  Typical for age with no acute process identified.     Impression: Impression: 1.No significant arterial stenosis identified. There is bilateral three-vessel runoff. 2.There is below-knee medial sclerosis presumably related to diabetic vasculopathy. 3.Nonspecific abdominal pelvic lymphadenopathy. 4.Other incidental nonemergent findings as detailed above. Electronically Signed: Yannick Srivastava MD  4/4/2024 12:20 PM EDT  Workstation ID: [id number]         Current medications:  Scheduled Meds:[Held by provider] clopidogrel, 75 mg, Oral, Daily  colchicine, 0.6 mg, Oral, Daily  DAPTOmycin, 8 mg/kg (Adjusted), Intravenous, Q24H  glipizide, 10 mg, Oral, QAM AC  heparin (porcine), 5,000 Units, Subcutaneous, Q8H  insulin lispro, 2-7 Units, Subcutaneous, 4x Daily AC & at Bedtime  lisinopril, 40 mg, Oral, Daily  metoprolol succinate XL, 100 mg, Oral, Daily  pioglitazone, 30 mg, Oral, Daily  piperacillin-tazobactam, 3.375 g, Intravenous, Q8H  sodium chloride, 10 mL, Intravenous, Q12H      Continuous Infusions:   PRN Meds:.  acetaminophen    senna-docusate sodium **AND** polyethylene glycol **AND** bisacodyl **AND** bisacodyl    Calcium Replacement - Follow Nurse / BPA Driven Protocol    dextrose    dextrose    glucagon (human recombinant)    HYDROcodone-acetaminophen    HYDROmorphone    Magnesium Standard Dose Replacement - Follow Nurse / BPA Driven Protocol    Phosphorus Replacement - Follow Nurse / BPA Driven Protocol    Potassium Replacement - Follow Nurse / BPA Driven Protocol    sodium chloride    sodium chloride    Assessment &  Plan   Assessment & Plan     Active Hospital Problems    Diagnosis  POA    **Osteomyelitis [M86.9]  Yes    Stage 3a chronic kidney disease [N18.31]  Yes    History of myalgia due to HMG CoA reductase inhibitor [Z87.39]  Not Applicable    Microalbuminuric diabetic nephropathy [E11.21]  Yes    Hyperlipidemia [E78.5]  Yes    Type 2 diabetes mellitus with hyperglycemia [E11.65]  Yes    Lymphoma [C85.90]  Yes    Diabetic foot ulcer [E11.621, L97.509]  Yes    Hypertensive disorder [I10]  Yes      Resolved Hospital Problems   No resolved problems to display.        Brief Hospital Course to date:  Alvaro Sinha is a 58 y.o. male with past medical history of lymphoma, type 2 diabetes, hypertension, hyperlipidemia, CKD, gout, chronic ulceration of the right foot who is presenting with worsening wound of right foot. MRI showed concerns for early osteomyelitis of the second and third middle and distal phalanges of the right rei    Nonhealing diabetic ulcer of the right foot with overlying cellulitis   Concern for foot osteomyelitis  Patient with chronic foot ulcer, currently with worsening cellulitis and possible osteomyelitis per MRI  Currently on Zosyn and daptomycin per ID  Holding Plavix in case any surgical intervention  Arterial duplex and CTA runoff with no evidence of significant peripheral arterial disease  Orthopedic team evaluated the patient.  Tentative plan for surgery by Dr. Hays on Monday    Acute gout flare  Not improving with colchicine.  Will add IV Solu-Medrol 60 mg daily  Continue to hold allopurinol  CRP is trending down    Type 2 diabetes  A1c 8.5%  Has been off Trulicity due to insurance coverage  Will continue oral medications for now   SSI     Stage III CKD due to diabetes  Hypertension  Creatinine currently near baseline, continue to monitor  Continue home lisinopril     Lymphoma  History of lymphoma in chart, has been following with [facility] for this  Ongoing workup, is not currently  on any immunotherapy or immunosuppressive medications     Statin intolerance  Rosuvastatin listed in medical history however no recent statin.  History of myalgia         Expected Discharge Location and Transportation: TBD expected Discharge   Expected Discharge Date: 4/9/2024; Expected Discharge Time:      DVT prophylaxis:  Medical DVT prophylaxis orders are present.         AM-PAC 6 Clicks Score (PT): 19 (04/06/24 0000)    CODE STATUS:   Code Status and Medical Interventions:   Ordered at: 04/03/24 0013     Code Status (Patient has no pulse and is not breathing):    CPR (Attempt to Resuscitate)     Medical Interventions (Patient has pulse or is breathing):    Full Support     Copied text in this note has been reviewed and is accurate as of 04/06/24.     Francia Pitts MD  04/06/24

## 2024-04-07 LAB
ALBUMIN SERPL-MCNC: 3.5 G/DL (ref 3.5–5.2)
ALBUMIN/GLOB SERPL: 1.1 G/DL
ALP SERPL-CCNC: 162 U/L (ref 39–117)
ALT SERPL W P-5'-P-CCNC: 28 U/L (ref 1–41)
ANION GAP SERPL CALCULATED.3IONS-SCNC: 11 MMOL/L (ref 5–15)
AST SERPL-CCNC: 24 U/L (ref 1–40)
BACTERIA SPEC AEROBE CULT: ABNORMAL
BACTERIA SPEC AEROBE CULT: NORMAL
BACTERIA SPEC AEROBE CULT: NORMAL
BASOPHILS # BLD AUTO: 0.02 10*3/MM3 (ref 0–0.2)
BASOPHILS NFR BLD AUTO: 0.5 % (ref 0–1.5)
BILIRUB SERPL-MCNC: 0.4 MG/DL (ref 0–1.2)
BUN SERPL-MCNC: 28 MG/DL (ref 6–20)
BUN/CREAT SERPL: 21.7 (ref 7–25)
CALCIUM SPEC-SCNC: 9.1 MG/DL (ref 8.6–10.5)
CHLORIDE SERPL-SCNC: 103 MMOL/L (ref 98–107)
CO2 SERPL-SCNC: 23 MMOL/L (ref 22–29)
CREAT SERPL-MCNC: 1.29 MG/DL (ref 0.76–1.27)
CRP SERPL-MCNC: 7.45 MG/DL (ref 0–0.5)
DEPRECATED RDW RBC AUTO: 41.9 FL (ref 37–54)
EGFRCR SERPLBLD CKD-EPI 2021: 64.3 ML/MIN/1.73
EOSINOPHIL # BLD AUTO: 0.01 10*3/MM3 (ref 0–0.4)
EOSINOPHIL NFR BLD AUTO: 0.2 % (ref 0.3–6.2)
ERYTHROCYTE [DISTWIDTH] IN BLOOD BY AUTOMATED COUNT: 12.4 % (ref 12.3–15.4)
GLOBULIN UR ELPH-MCNC: 3.1 GM/DL
GLUCOSE BLDC GLUCOMTR-MCNC: 167 MG/DL (ref 70–130)
GLUCOSE BLDC GLUCOMTR-MCNC: 231 MG/DL (ref 70–130)
GLUCOSE BLDC GLUCOMTR-MCNC: 251 MG/DL (ref 70–130)
GLUCOSE BLDC GLUCOMTR-MCNC: 298 MG/DL (ref 70–130)
GLUCOSE SERPL-MCNC: 256 MG/DL (ref 65–99)
GRAM STN SPEC: ABNORMAL
HCT VFR BLD AUTO: 33.9 % (ref 37.5–51)
HGB BLD-MCNC: 10.9 G/DL (ref 13–17.7)
IMM GRANULOCYTES # BLD AUTO: 0.05 10*3/MM3 (ref 0–0.05)
IMM GRANULOCYTES NFR BLD AUTO: 1.2 % (ref 0–0.5)
LYMPHOCYTES # BLD AUTO: 0.48 10*3/MM3 (ref 0.7–3.1)
LYMPHOCYTES NFR BLD AUTO: 11.6 % (ref 19.6–45.3)
MAGNESIUM SERPL-MCNC: 2.1 MG/DL (ref 1.6–2.6)
MCH RBC QN AUTO: 29.5 PG (ref 26.6–33)
MCHC RBC AUTO-ENTMCNC: 32.2 G/DL (ref 31.5–35.7)
MCV RBC AUTO: 91.9 FL (ref 79–97)
MONOCYTES # BLD AUTO: 0.21 10*3/MM3 (ref 0.1–0.9)
MONOCYTES NFR BLD AUTO: 5.1 % (ref 5–12)
NEUTROPHILS NFR BLD AUTO: 3.36 10*3/MM3 (ref 1.7–7)
NEUTROPHILS NFR BLD AUTO: 81.4 % (ref 42.7–76)
NRBC BLD AUTO-RTO: 0 /100 WBC (ref 0–0.2)
PHOSPHATE SERPL-MCNC: 3.3 MG/DL (ref 2.5–4.5)
PLATELET # BLD AUTO: 201 10*3/MM3 (ref 140–450)
PMV BLD AUTO: 10.2 FL (ref 6–12)
POTASSIUM SERPL-SCNC: 5.2 MMOL/L (ref 3.5–5.2)
PROT SERPL-MCNC: 6.6 G/DL (ref 6–8.5)
RBC # BLD AUTO: 3.69 10*6/MM3 (ref 4.14–5.8)
SODIUM SERPL-SCNC: 137 MMOL/L (ref 136–145)
WBC NRBC COR # BLD AUTO: 4.13 10*3/MM3 (ref 3.4–10.8)

## 2024-04-07 PROCEDURE — 63710000001 PREDNISONE PER 1 MG: Performed by: INTERNAL MEDICINE

## 2024-04-07 PROCEDURE — 99232 SBSQ HOSP IP/OBS MODERATE 35: CPT | Performed by: INTERNAL MEDICINE

## 2024-04-07 PROCEDURE — 25010000002 DAPTOMYCIN PER 1 MG: Performed by: INTERNAL MEDICINE

## 2024-04-07 PROCEDURE — 94799 UNLISTED PULMONARY SVC/PX: CPT

## 2024-04-07 PROCEDURE — 63710000001 INSULIN LISPRO (HUMAN) PER 5 UNITS: Performed by: STUDENT IN AN ORGANIZED HEALTH CARE EDUCATION/TRAINING PROGRAM

## 2024-04-07 PROCEDURE — 84100 ASSAY OF PHOSPHORUS: CPT | Performed by: INTERNAL MEDICINE

## 2024-04-07 PROCEDURE — 83735 ASSAY OF MAGNESIUM: CPT | Performed by: INTERNAL MEDICINE

## 2024-04-07 PROCEDURE — 25010000002 HEPARIN (PORCINE) PER 1000 UNITS: Performed by: STUDENT IN AN ORGANIZED HEALTH CARE EDUCATION/TRAINING PROGRAM

## 2024-04-07 PROCEDURE — 94660 CPAP INITIATION&MGMT: CPT

## 2024-04-07 PROCEDURE — 82948 REAGENT STRIP/BLOOD GLUCOSE: CPT

## 2024-04-07 PROCEDURE — 63710000001 INSULIN DETEMIR PER 5 UNITS: Performed by: INTERNAL MEDICINE

## 2024-04-07 PROCEDURE — 85025 COMPLETE CBC W/AUTO DIFF WBC: CPT | Performed by: INTERNAL MEDICINE

## 2024-04-07 PROCEDURE — 86140 C-REACTIVE PROTEIN: CPT | Performed by: INTERNAL MEDICINE

## 2024-04-07 PROCEDURE — 25010000002 PIPERACILLIN SOD-TAZOBACTAM PER 1 G: Performed by: STUDENT IN AN ORGANIZED HEALTH CARE EDUCATION/TRAINING PROGRAM

## 2024-04-07 PROCEDURE — 80053 COMPREHEN METABOLIC PANEL: CPT | Performed by: INTERNAL MEDICINE

## 2024-04-07 RX ORDER — PREDNISONE 20 MG/1
40 TABLET ORAL
Status: DISCONTINUED | OUTPATIENT
Start: 2024-04-07 | End: 2024-04-08

## 2024-04-07 RX ADMIN — PREDNISONE 40 MG: 20 TABLET ORAL at 14:36

## 2024-04-07 RX ADMIN — HEPARIN SODIUM 5000 UNITS: 5000 INJECTION INTRAVENOUS; SUBCUTANEOUS at 06:28

## 2024-04-07 RX ADMIN — Medication 10 ML: at 09:31

## 2024-04-07 RX ADMIN — INSULIN LISPRO 4 UNITS: 100 INJECTION, SOLUTION INTRAVENOUS; SUBCUTANEOUS at 09:30

## 2024-04-07 RX ADMIN — INSULIN LISPRO 4 UNITS: 100 INJECTION, SOLUTION INTRAVENOUS; SUBCUTANEOUS at 21:34

## 2024-04-07 RX ADMIN — METOPROLOL SUCCINATE 100 MG: 50 TABLET, EXTENDED RELEASE ORAL at 09:26

## 2024-04-07 RX ADMIN — PIOGLITAZONE 30 MG: 15 TABLET ORAL at 09:28

## 2024-04-07 RX ADMIN — PIPERACILLIN AND TAZOBACTAM 3.38 G: 3; .375 INJECTION, POWDER, LYOPHILIZED, FOR SOLUTION INTRAVENOUS at 09:27

## 2024-04-07 RX ADMIN — INSULIN LISPRO 4 UNITS: 100 INJECTION, SOLUTION INTRAVENOUS; SUBCUTANEOUS at 12:35

## 2024-04-07 RX ADMIN — PIPERACILLIN AND TAZOBACTAM 3.38 G: 3; .375 INJECTION, POWDER, LYOPHILIZED, FOR SOLUTION INTRAVENOUS at 00:10

## 2024-04-07 RX ADMIN — COLCHICINE 0.6 MG: 0.6 TABLET ORAL at 09:26

## 2024-04-07 RX ADMIN — LISINOPRIL 40 MG: 40 TABLET ORAL at 09:27

## 2024-04-07 RX ADMIN — HEPARIN SODIUM 5000 UNITS: 5000 INJECTION INTRAVENOUS; SUBCUTANEOUS at 14:49

## 2024-04-07 RX ADMIN — INSULIN DETEMIR 10 UNITS: 100 INJECTION, SOLUTION SUBCUTANEOUS at 14:50

## 2024-04-07 RX ADMIN — PIPERACILLIN AND TAZOBACTAM 3.38 G: 3; .375 INJECTION, POWDER, LYOPHILIZED, FOR SOLUTION INTRAVENOUS at 14:49

## 2024-04-07 RX ADMIN — Medication 10 ML: at 21:35

## 2024-04-07 RX ADMIN — DAPTOMYCIN 800 MG: 500 INJECTION, POWDER, LYOPHILIZED, FOR SOLUTION INTRAVENOUS at 14:50

## 2024-04-07 RX ADMIN — GLIPIZIDE 10 MG: 10 TABLET ORAL at 09:27

## 2024-04-07 RX ADMIN — INSULIN LISPRO 2 UNITS: 100 INJECTION, SOLUTION INTRAVENOUS; SUBCUTANEOUS at 18:17

## 2024-04-07 RX ADMIN — HEPARIN SODIUM 5000 UNITS: 5000 INJECTION INTRAVENOUS; SUBCUTANEOUS at 21:34

## 2024-04-07 NOTE — PROGRESS NOTES
[facility]  PROGRESS NOTE    Patient Name: Alvaro Sinha  : 1965  MRN: [id number]    Date of Admission: 2024  Primary Care Physician: Martha Mckee MD    Subjective   Subjective     CC:  Follow-up for osteomyelitis    HPI:  Patient seen and examined this morning.  He feels that his gout flare in his hand is much better after starting steroids yesterday.  Hoping to be able to get his surgery tomorrow    Objective   Objective     Vital Signs:   Temp:  [97 °F (36.1 °C)-97.3 °F (36.3 °C)] 97.3 °F (36.3 °C)  Heart Rate:  [55-71] 69  Resp:  [15-16] 16  BP: (140-162)/(73-95) 148/81     Physical Exam:  General: Comfortable, not in distress, conversant and cooperative  Head: Atraumatic and normocephalic  Eyes: No Icterus. No pallor  Ears:  Ears appear intact with no abnormalities noted  Throat: No oral lesions, no thrush  Neck: Supple, trachea midline  Lungs: Clear to auscultation bilaterally, equal air entry, no wheezing or crackles  Heart:  Normal S1 and S2, no murmur, no gallop, No JVD, no lower extremity swelling  Abdomen:  Soft, no tenderness, no organomegaly, normal bowel sounds, no organomegaly  Extremities: Right foot purulent infection on the plantar aspect of the foot at the base of the 1st-3rd toe with surrounding erythema  Skin: No bleeding, bruising or rash, normal skin turgor and elasticity  Neurologic: Cranial nerves appear intact with no evidence of facial asymmetry, normal motor and sensory functions in all 4 extremities  Psych: Alert and oriented x 3, normal mood    Results Reviewed:  LAB RESULTS:      Lab 24  0452 24  0456 24  0547 24  0626 24  0534 24  1832   WBC 4.13 5.40 5.88 5.10 4.83 6.89   HEMOGLOBIN 10.9* 10.5* 10.5* 10.3* 10.0* 10.8*   HEMATOCRIT 33.9* 31.8* 31.7* 31.6* 30.4* 32.7*   PLATELETS 201 210 183 178 148 162   NEUTROS ABS 3.36 3.80 4.16 3.62  --  5.32   IMMATURE GRANS (ABS)  0.05 0.06* 0.07* 0.03  --  0.04   LYMPHS ABS 0.48* 0.87 0.89 0.72  --  0.81   MONOS ABS 0.21 0.39 0.51 0.44  --  0.58   EOS ABS 0.01 0.25 0.22 0.26  --  0.13   MCV 91.9 92.4 93.2 91.9 92.7 92.9   SED RATE  --   --  98* 100*  --  94*   CRP 7.45* 9.73* 11.91* 9.71*  --  16.12*   LACTATE  --   --   --   --   --  1.3         Lab 04/07/24 0452 04/06/24 0456 04/06/24  0001 04/05/24  0547 04/04/24 0626 04/03/24  0534 04/02/24  1832   SODIUM 137 133*  --  136 140 139 134*   POTASSIUM 5.2 4.2  --  4.5 4.6 4.3 4.2   CHLORIDE 103 100  --  103 105 106 100   CO2 23.0 24.0  --  24.0 26.0 22.0 20.0*   ANION GAP 11.0 9.0  --  9.0 9.0 11.0 14.0   BUN 28* 19  --  19 16 24* 29*   CREATININE 1.29* 1.20  --  1.47* 1.23 1.18 1.43*   EGFR 64.3 70.1  --  54.9* 68.1 71.5 56.8*   GLUCOSE 256* 114*  --  121* 105* 108* 152*   CALCIUM 9.1 8.8  --  8.7 8.7 8.5* 9.0   MAGNESIUM 2.1  --  2.2 1.5* 1.8 1.7  --    PHOSPHORUS 3.3  --   --  3.4 3.2  --   --    HEMOGLOBIN A1C  --   --   --   --   --   --  8.50*         Lab 04/07/24 0452 04/06/24 0456 04/05/24  0547 04/04/24  0626 04/02/24  1832   TOTAL PROTEIN 6.6 6.1 6.3 6.2 7.3   ALBUMIN 3.5 3.4* 3.5 3.5 3.7   GLOBULIN 3.1 2.7 2.8 2.7 3.6   ALT (SGPT) 28 23 22 23 21   AST (SGOT) 24 21 21 22 20   BILIRUBIN 0.4 0.5 0.7 0.6 0.6   ALK PHOS 162* 127* 109 98 90                     Brief Urine Lab Results       None            Microbiology Results Abnormal       Procedure Component Value - Date/Time    Blood Culture - Blood, Arm, Right [[id number]]  (Normal) Collected: 04/02/24 1900    Lab Status: Preliminary result Specimen: Blood from Arm, Right Updated: 04/06/24 1946     Blood Culture No growth at 4 days    Blood Culture - Blood, Arm, Right [[id number]]  (Normal) Collected: 04/02/24 1833    Lab Status: Preliminary result Specimen: Blood from Arm, Right Updated: 04/06/24 1900     Blood Culture No growth at 4 days    Narrative:      Less than seven (7) mL's of blood was collected.  Insufficient quantity  may yield false negative results.    MRSA Screen, PCR (Inpatient) - Swab, Nares [[id number]]  (Normal) Collected: 04/02/24 1903    Lab Status: Final result Specimen: Swab from Nares Updated: 04/02/24 2047     MRSA PCR Negative    Narrative:      The negative predictive value of this diagnostic test is high and should only be used to consider de-escalating anti-MRSA therapy. A positive result may indicate colonization with MRSA and must be correlated clinically.  MRSA Negative            No radiology results from the last 24 hrs        Current medications:  Scheduled Meds:[Held by provider] clopidogrel, 75 mg, Oral, Daily  colchicine, 0.6 mg, Oral, Daily  DAPTOmycin, 8 mg/kg (Adjusted), Intravenous, Q24H  glipizide, 10 mg, Oral, QAM AC  heparin (porcine), 5,000 Units, Subcutaneous, Q8H  insulin lispro, 2-7 Units, Subcutaneous, 4x Daily AC & at Bedtime  lisinopril, 40 mg, Oral, Daily  metoprolol succinate XL, 100 mg, Oral, Daily  pioglitazone, 30 mg, Oral, Daily  piperacillin-tazobactam, 3.375 g, Intravenous, Q8H  sodium chloride, 10 mL, Intravenous, Q12H      Continuous Infusions:   PRN Meds:.  acetaminophen    senna-docusate sodium **AND** polyethylene glycol **AND** bisacodyl **AND** bisacodyl    Calcium Replacement - Follow Nurse / BPA Driven Protocol    dextrose    dextrose    glucagon (human recombinant)    HYDROcodone-acetaminophen    HYDROmorphone    Magnesium Standard Dose Replacement - Follow Nurse / BPA Driven Protocol    Phosphorus Replacement - Follow Nurse / BPA Driven Protocol    Potassium Replacement - Follow Nurse / BPA Driven Protocol    sodium chloride    sodium chloride    Assessment & Plan   Assessment & Plan     Active Hospital Problems    Diagnosis  POA    **Osteomyelitis [M86.9]  Yes    Stage 3a chronic kidney disease [N18.31]  Yes    History of myalgia due to HMG CoA reductase inhibitor [Z87.39]  Not Applicable    Microalbuminuric diabetic nephropathy [E11.21]  Yes    Hyperlipidemia [E78.5]   Yes    Type 2 diabetes mellitus with hyperglycemia [E11.65]  Yes    Lymphoma [C85.90]  Yes    Diabetic foot ulcer [E11.621, L97.509]  Yes    Hypertensive disorder [I10]  Yes      Resolved Hospital Problems   No resolved problems to display.        Brief Hospital Course to date:  Alvaro Sinha is a 58 y.o. male with past medical history of lymphoma, type 2 diabetes, hypertension, hyperlipidemia, CKD, gout, chronic ulceration of the right foot who is presenting with worsening wound of right foot. MRI showed concerns for early osteomyelitis of the second and third middle and distal phalanges of the right rei    Nonhealing diabetic ulcer of the right foot with overlying cellulitis   Concern for foot osteomyelitis  Patient with chronic foot ulcer, currently with worsening cellulitis and possible osteomyelitis per MRI  Currently on Zosyn and daptomycin per ID  Holding Plavix in case any surgical intervention  Arterial duplex and CTA runoff with no evidence of significant peripheral arterial disease  Orthopedic team evaluated the patient.  Tentative plan for surgery by Dr. Hays tomorrow    Acute gout flare  Did not improve with colchicine.  But started improving after IV steroids.  Will transition to p.o. prednisone  Continue to hold allopurinol  CRP is trending down    Type 2 diabetes  A1c 8.5%  Has been off Trulicity due to insurance coverage  Will continue oral medications for now   SSI     Stage III CKD due to diabetes  Hypertension  Creatinine currently near baseline, continue to monitor  Continue home lisinopril     Lymphoma  History of lymphoma in chart, has been following with [facility] for this  Ongoing workup, is not currently on any immunotherapy or immunosuppressive medications     Statin intolerance  Rosuvastatin listed in medical history however no recent statin.  History of myalgia         Expected Discharge Location and Transportation: TBD expected Discharge   Expected Discharge Date:  4/10/2024; Expected Discharge Time:      DVT prophylaxis:  Medical DVT prophylaxis orders are present.         AM-PAC 6 Clicks Score (PT): 21 (04/07/24 0802)    CODE STATUS:   Code Status and Medical Interventions:   Ordered at: 04/03/24 0013     Code Status (Patient has no pulse and is not breathing):    CPR (Attempt to Resuscitate)     Medical Interventions (Patient has pulse or is breathing):    Full Support     Copied text in this note has been reviewed and is accurate as of 04/07/24.     Francia Pitts MD  04/07/24

## 2024-04-07 NOTE — PLAN OF CARE
Problem: Adult Inpatient Plan of Care  Goal: Plan of Care Review  Outcome: Ongoing, Progressing  Flowsheets (Taken 4/7/2024 1848)  Progress: improving  Goal: Patient-Specific Goal (Individualized)  Outcome: Ongoing, Progressing  Goal: Absence of Hospital-Acquired Illness or Injury  Outcome: Ongoing, Progressing  Intervention: Identify and Manage Fall Risk  Recent Flowsheet Documentation  Taken 4/7/2024 1800 by Arlette Curran RN  Safety Promotion/Fall Prevention:   safety round/check completed   toileting scheduled   room organization consistent   nonskid shoes/slippers when out of bed   fall prevention program maintained   activity supervised   assistive device/personal items within reach   elopement precautions  Taken 4/7/2024 1400 by Arlette Curran RN  Safety Promotion/Fall Prevention:   safety round/check completed   toileting scheduled   room organization consistent   nonskid shoes/slippers when out of bed   fall prevention program maintained   activity supervised   assistive device/personal items within reach   clutter free environment maintained  Taken 4/7/2024 1200 by Arlette Curran RN  Safety Promotion/Fall Prevention:   safety round/check completed   toileting scheduled   room organization consistent   nonskid shoes/slippers when out of bed   fall prevention program maintained   activity supervised   assistive device/personal items within reach   clutter free environment maintained  Taken 4/7/2024 1000 by Arlette Curran RN  Safety Promotion/Fall Prevention:   safety round/check completed   toileting scheduled   room organization consistent   nonskid shoes/slippers when out of bed   fall prevention program maintained   activity supervised   assistive device/personal items within reach   clutter free environment maintained  Taken 4/7/2024 0802 by Arlette Curran RN  Safety Promotion/Fall Prevention:   safety round/check completed   toileting scheduled   room organization consistent   fall prevention program maintained    activity supervised   assistive device/personal items within reach   clutter free environment maintained  Intervention: Prevent Skin Injury  Recent Flowsheet Documentation  Taken 4/7/2024 1800 by Arlette Curran RN  Body Position: position changed independently  Taken 4/7/2024 1600 by Arlette Curran RN  Body Position: position changed independently  Taken 4/7/2024 1400 by Arlette Curran RN  Body Position: position changed independently  Taken 4/7/2024 1200 by Arlette Curran RN  Body Position: position changed independently  Taken 4/7/2024 1000 by Arlette Curran RN  Body Position: position changed independently  Taken 4/7/2024 0802 by Arlette Curran RN  Body Position: position changed independently  Skin Protection:   adhesive use limited   protective footwear used   pulse oximeter probe site changed   skin sealant/moisture barrier applied   skin-to-device areas padded   transparent dressing maintained   tubing/devices free from skin contact  Intervention: Prevent and Manage VTE (Venous Thromboembolism) Risk  Recent Flowsheet Documentation  Taken 4/7/2024 0802 by Arlette Curran RN  Activity Management: activity encouraged  VTE Prevention/Management: (plavix held) other (see comments)  Intervention: Prevent Infection  Recent Flowsheet Documentation  Taken 4/7/2024 1600 by Arlette Curran RN  Infection Prevention:   environmental surveillance performed   rest/sleep promoted   single patient room provided  Taken 4/7/2024 1200 by Arlette Curran RN  Infection Prevention:   environmental surveillance performed   hand hygiene promoted   personal protective equipment utilized   rest/sleep promoted   single patient room provided  Taken 4/7/2024 1000 by Arlette Curran RN  Infection Prevention:   environmental surveillance performed   hand hygiene promoted   personal protective equipment utilized   rest/sleep promoted   single patient room provided  Taken 4/7/2024 0802 by Arlette Curran RN  Infection Prevention:   environmental surveillance performed   hand  hygiene promoted   personal protective equipment utilized   rest/sleep promoted   single patient room provided  Goal: Optimal Comfort and Wellbeing  Outcome: Ongoing, Progressing  Intervention: Provide Person-Centered Care  Recent Flowsheet Documentation  Taken 4/7/2024 0802 by Arlette Curran RN  Trust Relationship/Rapport:   care explained   choices provided   emotional support provided   empathic listening provided   questions answered   reassurance provided   thoughts/feelings acknowledged  Goal: Readiness for Transition of Care  Outcome: Ongoing, Progressing     Problem: Diabetes Comorbidity  Goal: Blood Glucose Level Within Targeted Range  Outcome: Ongoing, Progressing  Intervention: Monitor and Manage Glycemia  Recent Flowsheet Documentation  Taken 4/7/2024 1800 by Arlette Curran RN  Glycemic Management: blood glucose monitored  Taken 4/7/2024 1600 by Arlette Curran RN  Glycemic Management: blood glucose monitored  Taken 4/7/2024 1200 by Arlette Curran RN  Glycemic Management: blood glucose monitored  Taken 4/7/2024 0802 by Arlette Curran RN  Glycemic Management: blood glucose monitored     Problem: Hypertension Comorbidity  Goal: Blood Pressure in Desired Range  Outcome: Ongoing, Progressing  Intervention: Maintain Blood Pressure Management  Recent Flowsheet Documentation  Taken 4/7/2024 1800 by Arlette Curran RN  Syncope Management: position changed slowly  Taken 4/7/2024 1600 by Arlette Curran RN  Syncope Management: position changed slowly  Taken 4/7/2024 1400 by Arlette Curran RN  Syncope Management:   position changed slowly   legs elevated  Taken 4/7/2024 1200 by Arlette Curran RN  Syncope Management:   position changed slowly   legs elevated  Taken 4/7/2024 1000 by Arlette Curran RN  Syncope Management:   position changed slowly   leg muscle contraction encouraged  Taken 4/7/2024 0802 by Arlette Curran RN  Syncope Management:   legs elevated   position changed slowly  Medication Review/Management: medications reviewed     Problem:  Fall Injury Risk  Goal: Absence of Fall and Fall-Related Injury  Outcome: Ongoing, Progressing  Intervention: Identify and Manage Contributors  Recent Flowsheet Documentation  Taken 4/7/2024 1400 by Arlette Curran RN  Self-Care Promotion:   independence encouraged   meal set-up provided  Taken 4/7/2024 0802 by Arlette Curran RN  Medication Review/Management: medications reviewed  Self-Care Promotion:   independence encouraged   meal set-up provided  Intervention: Promote Injury-Free Environment  Recent Flowsheet Documentation  Taken 4/7/2024 1800 by Arlette Curran RN  Safety Promotion/Fall Prevention:   safety round/check completed   toileting scheduled   room organization consistent   nonskid shoes/slippers when out of bed   fall prevention program maintained   activity supervised   assistive device/personal items within reach   elopement precautions  Taken 4/7/2024 1400 by Arlette Curran RN  Safety Promotion/Fall Prevention:   safety round/check completed   toileting scheduled   room organization consistent   nonskid shoes/slippers when out of bed   fall prevention program maintained   activity supervised   assistive device/personal items within reach   clutter free environment maintained  Taken 4/7/2024 1200 by Arlette Curran RN  Safety Promotion/Fall Prevention:   safety round/check completed   toileting scheduled   room organization consistent   nonskid shoes/slippers when out of bed   fall prevention program maintained   activity supervised   assistive device/personal items within reach   clutter free environment maintained  Taken 4/7/2024 1000 by Arlette Curran RN  Safety Promotion/Fall Prevention:   safety round/check completed   toileting scheduled   room organization consistent   nonskid shoes/slippers when out of bed   fall prevention program maintained   activity supervised   assistive device/personal items within reach   clutter free environment maintained  Taken 4/7/2024 0802 by Arlette Curran RN  Safety Promotion/Fall  Prevention:   safety round/check completed   toileting scheduled   room organization consistent   fall prevention program maintained   activity supervised   assistive device/personal items within reach   clutter free environment maintained     Problem: Noninvasive Ventilation Acute  Goal: Effective Unassisted Ventilation and Oxygenation  Outcome: Ongoing, Progressing  Intervention: Monitor and Manage Noninvasive Ventilation  Recent Flowsheet Documentation  Taken 4/7/2024 1600 by Arlette Curran RN  Airway/Ventilation Management: pulmonary hygiene promoted  Taken 4/7/2024 1400 by Arlette Curran RN  Airway/Ventilation Management: pulmonary hygiene promoted  Taken 4/7/2024 1200 by Arlette Curran RN  Airway/Ventilation Management: pulmonary hygiene promoted  Taken 4/7/2024 1000 by Arlette Curran RN  Airway/Ventilation Management: pulmonary hygiene promoted  Taken 4/7/2024 0802 by Arlette Curran RN  Airway/Ventilation Management: pulmonary hygiene promoted   Goal Outcome Evaluation:           Progress: improving

## 2024-04-08 ENCOUNTER — ANESTHESIA EVENT (OUTPATIENT)
Dept: PERIOP | Facility: HOSPITAL | Age: 59
End: 2024-04-08
Payer: COMMERCIAL

## 2024-04-08 ENCOUNTER — ANESTHESIA (OUTPATIENT)
Dept: PERIOP | Facility: HOSPITAL | Age: 59
End: 2024-04-08
Payer: COMMERCIAL

## 2024-04-08 ENCOUNTER — ANESTHESIA EVENT CONVERTED (OUTPATIENT)
Dept: ANESTHESIOLOGY | Facility: HOSPITAL | Age: 59
DRG: 617 | End: 2024-04-08
Payer: COMMERCIAL

## 2024-04-08 LAB
ALBUMIN SERPL-MCNC: 3.6 G/DL (ref 3.5–5.2)
ALBUMIN/GLOB SERPL: 1 G/DL
ALP SERPL-CCNC: 131 U/L (ref 39–117)
ALT SERPL W P-5'-P-CCNC: 32 U/L (ref 1–41)
ANION GAP SERPL CALCULATED.3IONS-SCNC: 11 MMOL/L (ref 5–15)
AST SERPL-CCNC: 30 U/L (ref 1–40)
BASOPHILS # BLD AUTO: 0.01 10*3/MM3 (ref 0–0.2)
BASOPHILS NFR BLD AUTO: 0.2 % (ref 0–1.5)
BILIRUB SERPL-MCNC: 0.3 MG/DL (ref 0–1.2)
BUN SERPL-MCNC: 29 MG/DL (ref 6–20)
BUN/CREAT SERPL: 23 (ref 7–25)
CALCIUM SPEC-SCNC: 8.9 MG/DL (ref 8.6–10.5)
CHLORIDE SERPL-SCNC: 100 MMOL/L (ref 98–107)
CO2 SERPL-SCNC: 22 MMOL/L (ref 22–29)
CREAT SERPL-MCNC: 1.26 MG/DL (ref 0.76–1.27)
CRP SERPL-MCNC: 3.4 MG/DL (ref 0–0.5)
DEPRECATED RDW RBC AUTO: 41.6 FL (ref 37–54)
EGFRCR SERPLBLD CKD-EPI 2021: 66.1 ML/MIN/1.73
EOSINOPHIL # BLD AUTO: 0 10*3/MM3 (ref 0–0.4)
EOSINOPHIL NFR BLD AUTO: 0 % (ref 0.3–6.2)
ERYTHROCYTE [DISTWIDTH] IN BLOOD BY AUTOMATED COUNT: 12.6 % (ref 12.3–15.4)
GLOBULIN UR ELPH-MCNC: 3.6 GM/DL
GLUCOSE BLDC GLUCOMTR-MCNC: 163 MG/DL (ref 70–130)
GLUCOSE BLDC GLUCOMTR-MCNC: 195 MG/DL (ref 70–130)
GLUCOSE BLDC GLUCOMTR-MCNC: 216 MG/DL (ref 70–130)
GLUCOSE BLDC GLUCOMTR-MCNC: 269 MG/DL (ref 70–130)
GLUCOSE SERPL-MCNC: 226 MG/DL (ref 65–99)
HCT VFR BLD AUTO: 31.9 % (ref 37.5–51)
HGB BLD-MCNC: 10.5 G/DL (ref 13–17.7)
IMM GRANULOCYTES # BLD AUTO: 0.07 10*3/MM3 (ref 0–0.05)
IMM GRANULOCYTES NFR BLD AUTO: 1.5 % (ref 0–0.5)
LYMPHOCYTES # BLD AUTO: 0.61 10*3/MM3 (ref 0.7–3.1)
LYMPHOCYTES NFR BLD AUTO: 12.7 % (ref 19.6–45.3)
MCH RBC QN AUTO: 30 PG (ref 26.6–33)
MCHC RBC AUTO-ENTMCNC: 32.9 G/DL (ref 31.5–35.7)
MCV RBC AUTO: 91.1 FL (ref 79–97)
MONOCYTES # BLD AUTO: 0.23 10*3/MM3 (ref 0.1–0.9)
MONOCYTES NFR BLD AUTO: 4.8 % (ref 5–12)
NEUTROPHILS NFR BLD AUTO: 3.9 10*3/MM3 (ref 1.7–7)
NEUTROPHILS NFR BLD AUTO: 80.8 % (ref 42.7–76)
NRBC BLD AUTO-RTO: 0 /100 WBC (ref 0–0.2)
PLATELET # BLD AUTO: 203 10*3/MM3 (ref 140–450)
PMV BLD AUTO: 10.4 FL (ref 6–12)
POTASSIUM SERPL-SCNC: 4.5 MMOL/L (ref 3.5–5.2)
PROT SERPL-MCNC: 7.2 G/DL (ref 6–8.5)
RBC # BLD AUTO: 3.5 10*6/MM3 (ref 4.14–5.8)
SODIUM SERPL-SCNC: 133 MMOL/L (ref 136–145)
WBC NRBC COR # BLD AUTO: 4.82 10*3/MM3 (ref 3.4–10.8)

## 2024-04-08 PROCEDURE — 87070 CULTURE OTHR SPECIMN AEROBIC: CPT | Performed by: ORTHOPAEDIC SURGERY

## 2024-04-08 PROCEDURE — 25010000002 BUPIVACAINE (PF) 0.25 % SOLUTION

## 2024-04-08 PROCEDURE — 88311 DECALCIFY TISSUE: CPT | Performed by: ORTHOPAEDIC SURGERY

## 2024-04-08 PROCEDURE — 80053 COMPREHEN METABOLIC PANEL: CPT | Performed by: INTERNAL MEDICINE

## 2024-04-08 PROCEDURE — 87075 CULTR BACTERIA EXCEPT BLOOD: CPT | Performed by: ORTHOPAEDIC SURGERY

## 2024-04-08 PROCEDURE — 82948 REAGENT STRIP/BLOOD GLUCOSE: CPT

## 2024-04-08 PROCEDURE — 25010000002 HEPARIN (PORCINE) PER 1000 UNITS: Performed by: ORTHOPAEDIC SURGERY

## 2024-04-08 PROCEDURE — 0Y6M0Z5 DETACHMENT AT RIGHT FOOT, COMPLETE 2ND RAY, OPEN APPROACH: ICD-10-PCS | Performed by: ORTHOPAEDIC SURGERY

## 2024-04-08 PROCEDURE — 25010000002 DEXAMETHASONE SODIUM PHOSPHATE 10 MG/ML SOLUTION

## 2024-04-08 PROCEDURE — 25010000002 PROPOFOL 10 MG/ML EMULSION: Performed by: NURSE ANESTHETIST, CERTIFIED REGISTERED

## 2024-04-08 PROCEDURE — 85025 COMPLETE CBC W/AUTO DIFF WBC: CPT | Performed by: INTERNAL MEDICINE

## 2024-04-08 PROCEDURE — 63710000001 PREDNISONE PER 1 MG: Performed by: INTERNAL MEDICINE

## 2024-04-08 PROCEDURE — 25010000002 DAPTOMYCIN PER 1 MG: Performed by: INTERNAL MEDICINE

## 2024-04-08 PROCEDURE — 94660 CPAP INITIATION&MGMT: CPT

## 2024-04-08 PROCEDURE — 25010000002 ONDANSETRON PER 1 MG: Performed by: ANESTHESIOLOGY

## 2024-04-08 PROCEDURE — 88305 TISSUE EXAM BY PATHOLOGIST: CPT | Performed by: ORTHOPAEDIC SURGERY

## 2024-04-08 PROCEDURE — 25010000002 VANCOMYCIN 1 G RECONSTITUTED SOLUTION: Performed by: ORTHOPAEDIC SURGERY

## 2024-04-08 PROCEDURE — 63710000001 INSULIN DETEMIR PER 5 UNITS: Performed by: INTERNAL MEDICINE

## 2024-04-08 PROCEDURE — 25010000002 HEPARIN (PORCINE) PER 1000 UNITS: Performed by: STUDENT IN AN ORGANIZED HEALTH CARE EDUCATION/TRAINING PROGRAM

## 2024-04-08 PROCEDURE — 99232 SBSQ HOSP IP/OBS MODERATE 35: CPT | Performed by: STUDENT IN AN ORGANIZED HEALTH CARE EDUCATION/TRAINING PROGRAM

## 2024-04-08 PROCEDURE — 87205 SMEAR GRAM STAIN: CPT | Performed by: ORTHOPAEDIC SURGERY

## 2024-04-08 PROCEDURE — 25010000002 PIPERACILLIN SOD-TAZOBACTAM PER 1 G: Performed by: STUDENT IN AN ORGANIZED HEALTH CARE EDUCATION/TRAINING PROGRAM

## 2024-04-08 PROCEDURE — 94799 UNLISTED PULMONARY SVC/PX: CPT

## 2024-04-08 PROCEDURE — 25010000002 FENTANYL CITRATE (PF) 50 MCG/ML SOLUTION

## 2024-04-08 PROCEDURE — 25810000003 LACTATED RINGERS PER 1000 ML: Performed by: ANESTHESIOLOGY

## 2024-04-08 PROCEDURE — 93005 ELECTROCARDIOGRAM TRACING: CPT | Performed by: ANESTHESIOLOGY

## 2024-04-08 PROCEDURE — 86140 C-REACTIVE PROTEIN: CPT | Performed by: INTERNAL MEDICINE

## 2024-04-08 PROCEDURE — 25810000003 LACTATED RINGERS PER 1000 ML: Performed by: NURSE ANESTHETIST, CERTIFIED REGISTERED

## 2024-04-08 PROCEDURE — 63710000001 INSULIN REGULAR HUMAN PER 5 UNITS: Performed by: STUDENT IN AN ORGANIZED HEALTH CARE EDUCATION/TRAINING PROGRAM

## 2024-04-08 PROCEDURE — 0Y6M0Z6 DETACHMENT AT RIGHT FOOT, COMPLETE 3RD RAY, OPEN APPROACH: ICD-10-PCS | Performed by: ORTHOPAEDIC SURGERY

## 2024-04-08 PROCEDURE — 25010000002 CEFAZOLIN IN DEXTROSE 2-4 GM/100ML-% SOLUTION: Performed by: NURSE ANESTHETIST, CERTIFIED REGISTERED

## 2024-04-08 PROCEDURE — 63710000001 INSULIN REGULAR HUMAN PER 5 UNITS: Performed by: ORTHOPAEDIC SURGERY

## 2024-04-08 PROCEDURE — 87015 SPECIMEN INFECT AGNT CONCNTJ: CPT | Performed by: ORTHOPAEDIC SURGERY

## 2024-04-08 RX ORDER — DROPERIDOL 2.5 MG/ML
0.62 INJECTION, SOLUTION INTRAMUSCULAR; INTRAVENOUS ONCE AS NEEDED
Status: DISCONTINUED | OUTPATIENT
Start: 2024-04-08 | End: 2024-04-08

## 2024-04-08 RX ORDER — SODIUM CHLORIDE, SODIUM LACTATE, POTASSIUM CHLORIDE, CALCIUM CHLORIDE 600; 310; 30; 20 MG/100ML; MG/100ML; MG/100ML; MG/100ML
INJECTION, SOLUTION INTRAVENOUS CONTINUOUS PRN
Status: DISCONTINUED | OUTPATIENT
Start: 2024-04-08 | End: 2024-04-08 | Stop reason: SURG

## 2024-04-08 RX ORDER — BUPIVACAINE HYDROCHLORIDE 2.5 MG/ML
INJECTION, SOLUTION EPIDURAL; INFILTRATION; INTRACAUDAL
Status: COMPLETED | OUTPATIENT
Start: 2024-04-08 | End: 2024-04-08

## 2024-04-08 RX ORDER — SODIUM CHLORIDE, SODIUM LACTATE, POTASSIUM CHLORIDE, CALCIUM CHLORIDE 600; 310; 30; 20 MG/100ML; MG/100ML; MG/100ML; MG/100ML
9 INJECTION, SOLUTION INTRAVENOUS CONTINUOUS
Status: DISCONTINUED | OUTPATIENT
Start: 2024-04-08 | End: 2024-04-12

## 2024-04-08 RX ORDER — CEFAZOLIN SODIUM 2 G/100ML
INJECTION, SOLUTION INTRAVENOUS AS NEEDED
Status: DISCONTINUED | OUTPATIENT
Start: 2024-04-08 | End: 2024-04-08 | Stop reason: SURG

## 2024-04-08 RX ORDER — ONDANSETRON 2 MG/ML
4 INJECTION INTRAMUSCULAR; INTRAVENOUS ONCE AS NEEDED
Status: DISCONTINUED | OUTPATIENT
Start: 2024-04-08 | End: 2024-04-08 | Stop reason: HOSPADM

## 2024-04-08 RX ORDER — EPHEDRINE SULFATE 50 MG/ML
INJECTION INTRAVENOUS AS NEEDED
Status: DISCONTINUED | OUTPATIENT
Start: 2024-04-08 | End: 2024-04-08 | Stop reason: SURG

## 2024-04-08 RX ORDER — ONDANSETRON 2 MG/ML
INJECTION INTRAMUSCULAR; INTRAVENOUS AS NEEDED
Status: DISCONTINUED | OUTPATIENT
Start: 2024-04-08 | End: 2024-04-08 | Stop reason: SURG

## 2024-04-08 RX ORDER — MIDAZOLAM HYDROCHLORIDE 1 MG/ML
1 INJECTION INTRAMUSCULAR; INTRAVENOUS
Status: DISCONTINUED | OUTPATIENT
Start: 2024-04-08 | End: 2024-04-08 | Stop reason: HOSPADM

## 2024-04-08 RX ORDER — OXYCODONE HYDROCHLORIDE 5 MG/1
5 TABLET ORAL EVERY 4 HOURS PRN
Status: DISCONTINUED | OUTPATIENT
Start: 2024-04-08 | End: 2024-04-14

## 2024-04-08 RX ORDER — CEFAZOLIN SODIUM IN 0.9 % NACL 3 G/100 ML
3000 INTRAVENOUS SOLUTION, PIGGYBACK (ML) INTRAVENOUS ONCE
Status: DISCONTINUED | OUTPATIENT
Start: 2024-04-08 | End: 2024-04-08 | Stop reason: HOSPADM

## 2024-04-08 RX ORDER — SODIUM CHLORIDE 9 MG/ML
40 INJECTION, SOLUTION INTRAVENOUS AS NEEDED
Status: DISCONTINUED | OUTPATIENT
Start: 2024-04-08 | End: 2024-04-08 | Stop reason: HOSPADM

## 2024-04-08 RX ORDER — MORPHINE SULFATE 1 MG/ML
2 INJECTION, SOLUTION EPIDURAL; INTRATHECAL; INTRAVENOUS EVERY 4 HOURS PRN
Status: ACTIVE | OUTPATIENT
Start: 2024-04-08 | End: 2024-04-13

## 2024-04-08 RX ORDER — FENTANYL CITRATE 50 UG/ML
INJECTION, SOLUTION INTRAMUSCULAR; INTRAVENOUS
Status: COMPLETED | OUTPATIENT
Start: 2024-04-08 | End: 2024-04-08

## 2024-04-08 RX ORDER — HYDROMORPHONE HYDROCHLORIDE 1 MG/ML
0.5 INJECTION, SOLUTION INTRAMUSCULAR; INTRAVENOUS; SUBCUTANEOUS
Status: DISCONTINUED | OUTPATIENT
Start: 2024-04-08 | End: 2024-04-08

## 2024-04-08 RX ORDER — FAMOTIDINE 10 MG/ML
20 INJECTION, SOLUTION INTRAVENOUS ONCE
Status: COMPLETED | OUTPATIENT
Start: 2024-04-08 | End: 2024-04-08

## 2024-04-08 RX ORDER — LIDOCAINE HYDROCHLORIDE 10 MG/ML
0.5 INJECTION, SOLUTION EPIDURAL; INFILTRATION; INTRACAUDAL; PERINEURAL ONCE AS NEEDED
Status: DISCONTINUED | OUTPATIENT
Start: 2024-04-08 | End: 2024-04-08 | Stop reason: HOSPADM

## 2024-04-08 RX ORDER — FAMOTIDINE 20 MG/1
20 TABLET, FILM COATED ORAL ONCE
Status: CANCELLED | OUTPATIENT
Start: 2024-04-08 | End: 2024-04-08

## 2024-04-08 RX ORDER — LIDOCAINE HYDROCHLORIDE 10 MG/ML
INJECTION, SOLUTION EPIDURAL; INFILTRATION; INTRACAUDAL; PERINEURAL AS NEEDED
Status: DISCONTINUED | OUTPATIENT
Start: 2024-04-08 | End: 2024-04-08 | Stop reason: SURG

## 2024-04-08 RX ORDER — SODIUM CHLORIDE 0.9 % (FLUSH) 0.9 %
10 SYRINGE (ML) INJECTION AS NEEDED
Status: DISCONTINUED | OUTPATIENT
Start: 2024-04-08 | End: 2024-04-08 | Stop reason: HOSPADM

## 2024-04-08 RX ORDER — PREDNISONE 20 MG/1
20 TABLET ORAL
Status: DISCONTINUED | OUTPATIENT
Start: 2024-04-09 | End: 2024-04-11

## 2024-04-08 RX ORDER — MAGNESIUM HYDROXIDE 1200 MG/15ML
LIQUID ORAL AS NEEDED
Status: DISCONTINUED | OUTPATIENT
Start: 2024-04-08 | End: 2024-04-08 | Stop reason: HOSPADM

## 2024-04-08 RX ORDER — SODIUM CHLORIDE 0.9 % (FLUSH) 0.9 %
10 SYRINGE (ML) INJECTION EVERY 12 HOURS SCHEDULED
Status: DISCONTINUED | OUTPATIENT
Start: 2024-04-08 | End: 2024-04-08 | Stop reason: HOSPADM

## 2024-04-08 RX ORDER — OXYCODONE HYDROCHLORIDE 5 MG/1
5 TABLET ORAL EVERY 4 HOURS PRN
Qty: 42 TABLET | Refills: 0 | Status: SHIPPED | OUTPATIENT
Start: 2024-04-08

## 2024-04-08 RX ORDER — FENTANYL CITRATE 50 UG/ML
50 INJECTION, SOLUTION INTRAMUSCULAR; INTRAVENOUS
Status: DISCONTINUED | OUTPATIENT
Start: 2024-04-08 | End: 2024-04-08

## 2024-04-08 RX ORDER — VANCOMYCIN HYDROCHLORIDE 1 G/20ML
INJECTION, POWDER, LYOPHILIZED, FOR SOLUTION INTRAVENOUS AS NEEDED
Status: DISCONTINUED | OUTPATIENT
Start: 2024-04-08 | End: 2024-04-08 | Stop reason: HOSPADM

## 2024-04-08 RX ORDER — DEXAMETHASONE SODIUM PHOSPHATE 10 MG/ML
INJECTION, SOLUTION INTRAMUSCULAR; INTRAVENOUS
Status: COMPLETED | OUTPATIENT
Start: 2024-04-08 | End: 2024-04-08

## 2024-04-08 RX ORDER — NALOXONE HYDROCHLORIDE 4 MG/.1ML
SPRAY NASAL
Qty: 2 EACH | Refills: 0 | Status: SHIPPED | OUTPATIENT
Start: 2024-04-08

## 2024-04-08 RX ORDER — FENTANYL CITRATE 50 UG/ML
50 INJECTION, SOLUTION INTRAMUSCULAR; INTRAVENOUS
Status: DISCONTINUED | OUTPATIENT
Start: 2024-04-08 | End: 2024-04-08 | Stop reason: HOSPADM

## 2024-04-08 RX ORDER — PROPOFOL 10 MG/ML
VIAL (ML) INTRAVENOUS AS NEEDED
Status: DISCONTINUED | OUTPATIENT
Start: 2024-04-08 | End: 2024-04-08 | Stop reason: SURG

## 2024-04-08 RX ORDER — HYDROMORPHONE HYDROCHLORIDE 1 MG/ML
0.5 INJECTION, SOLUTION INTRAMUSCULAR; INTRAVENOUS; SUBCUTANEOUS
Status: DISCONTINUED | OUTPATIENT
Start: 2024-04-08 | End: 2024-04-08 | Stop reason: HOSPADM

## 2024-04-08 RX ADMIN — INSULIN HUMAN 3 UNITS: 100 INJECTION, SOLUTION PARENTERAL at 09:49

## 2024-04-08 RX ADMIN — DAPTOMYCIN 800 MG: 500 INJECTION, POWDER, LYOPHILIZED, FOR SOLUTION INTRAVENOUS at 12:37

## 2024-04-08 RX ADMIN — EPHEDRINE SULFATE 10 MG: 50 INJECTION INTRAVENOUS at 16:47

## 2024-04-08 RX ADMIN — LIDOCAINE HYDROCHLORIDE 50 MG: 10 INJECTION, SOLUTION EPIDURAL; INFILTRATION; INTRACAUDAL; PERINEURAL at 16:18

## 2024-04-08 RX ADMIN — SODIUM CHLORIDE, POTASSIUM CHLORIDE, SODIUM LACTATE AND CALCIUM CHLORIDE: 600; 310; 30; 20 INJECTION, SOLUTION INTRAVENOUS at 16:10

## 2024-04-08 RX ADMIN — INSULIN HUMAN 8 UNITS: 100 INJECTION, SOLUTION PARENTERAL at 23:56

## 2024-04-08 RX ADMIN — INSULIN DETEMIR 10 UNITS: 100 INJECTION, SOLUTION SUBCUTANEOUS at 09:51

## 2024-04-08 RX ADMIN — COLCHICINE 0.6 MG: 0.6 TABLET ORAL at 09:46

## 2024-04-08 RX ADMIN — DEXAMETHASONE SODIUM PHOSPHATE 2 MG: 10 INJECTION, SOLUTION INTRAMUSCULAR; INTRAVENOUS at 15:35

## 2024-04-08 RX ADMIN — METOPROLOL SUCCINATE 100 MG: 50 TABLET, EXTENDED RELEASE ORAL at 09:47

## 2024-04-08 RX ADMIN — PIOGLITAZONE 30 MG: 15 TABLET ORAL at 09:49

## 2024-04-08 RX ADMIN — ONDANSETRON 4 MG: 2 INJECTION INTRAMUSCULAR; INTRAVENOUS at 16:59

## 2024-04-08 RX ADMIN — LISINOPRIL 40 MG: 40 TABLET ORAL at 09:52

## 2024-04-08 RX ADMIN — HEPARIN SODIUM 5000 UNITS: 5000 INJECTION INTRAVENOUS; SUBCUTANEOUS at 05:31

## 2024-04-08 RX ADMIN — Medication 10 ML: at 20:13

## 2024-04-08 RX ADMIN — Medication 10 ML: at 09:52

## 2024-04-08 RX ADMIN — PIPERACILLIN AND TAZOBACTAM 3.38 G: 3; .375 INJECTION, POWDER, LYOPHILIZED, FOR SOLUTION INTRAVENOUS at 00:26

## 2024-04-08 RX ADMIN — PROPOFOL 300 MG: 10 INJECTION, EMULSION INTRAVENOUS at 16:18

## 2024-04-08 RX ADMIN — BUPIVACAINE HYDROCHLORIDE 30 ML: 2.5 INJECTION, SOLUTION EPIDURAL; INFILTRATION; INTRACAUDAL; PERINEURAL at 15:35

## 2024-04-08 RX ADMIN — INSULIN HUMAN 5 UNITS: 100 INJECTION, SOLUTION PARENTERAL at 18:47

## 2024-04-08 RX ADMIN — PIPERACILLIN AND TAZOBACTAM 3.38 G: 3; .375 INJECTION, POWDER, LYOPHILIZED, FOR SOLUTION INTRAVENOUS at 05:31

## 2024-04-08 RX ADMIN — SODIUM CHLORIDE, POTASSIUM CHLORIDE, SODIUM LACTATE AND CALCIUM CHLORIDE 9 ML/HR: 600; 310; 30; 20 INJECTION, SOLUTION INTRAVENOUS at 15:15

## 2024-04-08 RX ADMIN — FENTANYL CITRATE 100 MCG: 50 INJECTION, SOLUTION INTRAMUSCULAR; INTRAVENOUS at 15:35

## 2024-04-08 RX ADMIN — GLIPIZIDE 10 MG: 10 TABLET ORAL at 09:46

## 2024-04-08 RX ADMIN — PIPERACILLIN AND TAZOBACTAM 3.38 G: 3; .375 INJECTION, POWDER, LYOPHILIZED, FOR SOLUTION INTRAVENOUS at 15:54

## 2024-04-08 RX ADMIN — HEPARIN SODIUM 5000 UNITS: 5000 INJECTION INTRAVENOUS; SUBCUTANEOUS at 20:13

## 2024-04-08 RX ADMIN — FAMOTIDINE 20 MG: 10 INJECTION, SOLUTION INTRAVENOUS at 15:52

## 2024-04-08 RX ADMIN — CEFAZOLIN SODIUM 3 G: 2 INJECTION, SOLUTION INTRAVENOUS at 16:29

## 2024-04-08 RX ADMIN — PREDNISONE 40 MG: 20 TABLET ORAL at 09:46

## 2024-04-08 NOTE — ANESTHESIA PROCEDURE NOTES
Right Pop SS      Patient reassessed immediately prior to procedure    Patient location during procedure: pre-op  Start time: 4/8/2024 3:35 PM  Reason for block: at surgeon's request and post-op pain management  Performed by  CRNA/CAA: Jeremi Jj CRNA  Assisted by: Evans Lugo CRNA  Preanesthetic Checklist  Completed: patient identified, IV checked, site marked, risks and benefits discussed, surgical consent, monitors and equipment checked, pre-op evaluation and timeout performed  Prep:  Pt Position: left lateral decubitus  Sterile barriers:cap, gloves, mask and washed/disinfected hands  Prep: ChloraPrep  Patient monitoring: blood pressure monitoring, continuous pulse oximetry and EKG  Procedure    Sedation: yes  Performed under: local infiltration  Guidance:ultrasound guided    ULTRASOUND INTERPRETATION.  Using ultrasound guidance a 20 G gauge needle was placed in close proximity to the sciatic nerve, at which point, under ultrasound guidance anesthetic was injected in the area of the nerve and spread of the anesthesia was seen on ultrasound in close proximity thereto.  There were no abnormalities seen on ultrasound; a digital image was taken; and the patient tolerated the procedure with no complications. Images:still images obtained, printed/placed on chart    Laterality:right  Block Type:popliteal  Injection Technique:single-shot  Needle Type:echogenic and Tuohy  Needle Gauge:18 G  Resistance on Injection: none  Catheter Size:20 G    Medications Used: dexamethasone sodium phosphate injection - Injection   2 mg - 4/8/2024 3:35:00 PM  bupivacaine PF (MARCAINE) 0.25 % injection - Injection   30 mL - 4/8/2024 3:35:00 PM  fentaNYL citrate (PF) (SUBLIMAZE) injection - Intravenous   100 mcg - 4/8/2024 3:35:00 PM      Post Assessment  Injection Assessment: negative aspiration for heme, no paresthesia on injection and incremental injection  Patient Tolerance:comfortable throughout  "block  Complications:no  Additional Notes  SINGLE shot    A high-frequency linear transducer, with sterile cover, was placed in the popliteal fossa to identify the popliteal artery and vein, Tibial nerve (TN) and Common Peroneal nerve (CP). The transducer was then moved in a cephalad fashion to observe the TN and CP nerve bifurcation to form the Sciatic Nerve. The insertion site was prepped and draped in sterile fashion. Skin and cutaneous tissue was infiltrated with 2-5 ml of 1% Lidocaine. Using ultrasound-guidance, a 20-gauge B-Aguilar 4\" Ultraplex 360 non-stimulating echogenic needle was then inserted and advanced in plane from lateral to medial. Preservative-free normal saline was utilized for hydro-dissection of tissue, advancement of Touhy, and to confirm final needle placement posterior to the nerves. Local anesthetic injection spread, in incremental 3-5 ml injections, to surround both nerve structures. Aspiration every 5 ml to prevent intravascular injection. Injection was completed with negative aspiration of blood and negative intravascular injection. Injection pressures were normal with minimal resistance              "

## 2024-04-08 NOTE — PLAN OF CARE
Goal Outcome Evaluation:         Aox4, RA while awake, CPAP while asleep. NPO since 0000, no adverse issues.

## 2024-04-08 NOTE — ANESTHESIA PROCEDURE NOTES
Airway  Urgency: elective    Date/Time: 4/8/2024 4:19 PM    General Information and Staff    Patient location during procedure: OR  CRNA/CAA: Ewa Barragan CRNA    Indications and Patient Condition  Indications for airway management: airway protection    Preoxygenated: yes  Mask difficulty assessment: 0 - not attempted    Final Airway Details  Final airway type: supraglottic airway      Successful airway: I-gel  Size 5     Number of attempts at approach: 1  Assessment: lips, teeth, and gum same as pre-op and atraumatic intubation

## 2024-04-08 NOTE — PROGRESS NOTES
[facility] Medicine Services  PROGRESS NOTE    Patient Name: Alvaro Sinha  : 1965  MRN: [id number]    Date of Admission: 2024  Primary Care Physician: Martha Mckee MD    Subjective   Subjective     CC:  F/u osteomyelitis     HPI:  Patient denies any uncontrolled pain in his extremities.  He denies fevers, chills, sweats.  He expresses concern over his blood sugar secondary to steroid use and would like to wean off steroids.      Objective   Objective     Vital Signs:   Temp:  [95.8 °F (35.4 °C)-97.8 °F (36.6 °C)] 97.8 °F (36.6 °C)  Heart Rate:  [45-67] 67  Resp:  [18] 18  BP: (136-150)/(76-94) 150/78     Physical Exam:  General appearance: alert, awake, oriented, no acute distress   Cardiovascular: RRR, no murmurs or rubs, radial pulse full 2/4 BL   Respiratory: CTAB, no crackles or wheezes   Abdomen: soft, non-tender, no organomegaly, bowel sounds normoactive  MSK: Right foot dressed with mild saturation, mild surrounding erythema.  Nontender to palpation.  Cap refill less than 3 seconds  Neuro/CNS: alert and oriented x3, normal speech    Results Reviewed:  LAB RESULTS:      Lab 24  0616 24  0452 24  0456 24  0547 24  0626 24  0534 24  1832   WBC 4.82 4.13 5.40 5.88 5.10   < > 6.89   HEMOGLOBIN 10.5* 10.9* 10.5* 10.5* 10.3*   < > 10.8*   HEMATOCRIT 31.9* 33.9* 31.8* 31.7* 31.6*   < > 32.7*   PLATELETS 203 201 210 183 178   < > 162   NEUTROS ABS 3.90 3.36 3.80 4.16 3.62  --  5.32   IMMATURE GRANS (ABS) 0.07* 0.05 0.06* 0.07* 0.03  --  0.04   LYMPHS ABS 0.61* 0.48* 0.87 0.89 0.72  --  0.81   MONOS ABS 0.23 0.21 0.39 0.51 0.44  --  0.58   EOS ABS 0.00 0.01 0.25 0.22 0.26  --  0.13   MCV 91.1 91.9 92.4 93.2 91.9   < > 92.9   SED RATE  --   --   --  98* 100*  --  94*   CRP 3.40* 7.45* 9.73* 11.91* 9.71*  --  16.12*   LACTATE  --   --   --   --   --   --  1.3    < > = values in this interval not displayed.         Lab  04/08/24  0616 04/07/24  0452 04/06/24  0456 04/06/24  0001 04/05/24  0547 04/04/24  0626 04/03/24  0534 04/02/24  1832   SODIUM 133* 137 133*  --  136 140 139 134*   POTASSIUM 4.5 5.2 4.2  --  4.5 4.6 4.3 4.2   CHLORIDE 100 103 100  --  103 105 106 100   CO2 22.0 23.0 24.0  --  24.0 26.0 22.0 20.0*   ANION GAP 11.0 11.0 9.0  --  9.0 9.0 11.0 14.0   BUN 29* 28* 19  --  19 16 24* 29*   CREATININE 1.26 1.29* 1.20  --  1.47* 1.23 1.18 1.43*   EGFR 66.1 64.3 70.1  --  54.9* 68.1 71.5 56.8*   GLUCOSE 226* 256* 114*  --  121* 105* 108* 152*   CALCIUM 8.9 9.1 8.8  --  8.7 8.7 8.5* 9.0   MAGNESIUM  --  2.1  --  2.2 1.5* 1.8 1.7  --    PHOSPHORUS  --  3.3  --   --  3.4 3.2  --   --    HEMOGLOBIN A1C  --   --   --   --   --   --   --  8.50*         Lab 04/08/24  0616 04/07/24 0452 04/06/24  0456 04/05/24  0547 04/04/24  0626   TOTAL PROTEIN 7.2 6.6 6.1 6.3 6.2   ALBUMIN 3.6 3.5 3.4* 3.5 3.5   GLOBULIN 3.6 3.1 2.7 2.8 2.7   ALT (SGPT) 32 28 23 22 23   AST (SGOT) 30 24 21 21 22   BILIRUBIN 0.3 0.4 0.5 0.7 0.6   ALK PHOS 131* 162* 127* 109 98                     Brief Urine Lab Results       None            Microbiology Results Abnormal       Procedure Component Value - Date/Time    Blood Culture - Blood, Arm, Right [[id number]]  (Normal) Collected: 04/02/24 1900    Lab Status: Final result Specimen: Blood from Arm, Right Updated: 04/07/24 1946     Blood Culture No growth at 5 days    Blood Culture - Blood, Arm, Right [[id number]]  (Normal) Collected: 04/02/24 1833    Lab Status: Final result Specimen: Blood from Arm, Right Updated: 04/07/24 1900     Blood Culture No growth at 5 days    Narrative:      Less than seven (7) mL's of blood was collected.  Insufficient quantity may yield false negative results.    MRSA Screen, PCR (Inpatient) - Swab, Nares [[id number]]  (Normal) Collected: 04/02/24 1903    Lab Status: Final result Specimen: Swab from Nares Updated: 04/02/24 2047     MRSA PCR Negative    Narrative:      The negative  predictive value of this diagnostic test is high and should only be used to consider de-escalating anti-MRSA therapy. A positive result may indicate colonization with MRSA and must be correlated clinically.  MRSA Negative            No radiology results from the last 24 hrs        Current medications:  Scheduled Meds:[Held by provider] clopidogrel, 75 mg, Oral, Daily  colchicine, 0.6 mg, Oral, Daily  DAPTOmycin, 8 mg/kg (Adjusted), Intravenous, Q24H  glipizide, 10 mg, Oral, QAM AC  heparin (porcine), 5,000 Units, Subcutaneous, Q8H  insulin detemir, 10 Units, Subcutaneous, Daily  insulin regular, 3-14 Units, Subcutaneous, Q6H  lisinopril, 40 mg, Oral, Daily  metoprolol succinate XL, 100 mg, Oral, Daily  pioglitazone, 30 mg, Oral, Daily  piperacillin-tazobactam, 3.375 g, Intravenous, Q8H  [START ON 4/9/2024] predniSONE, 20 mg, Oral, Daily With Breakfast  sodium chloride, 10 mL, Intravenous, Q12H      Continuous Infusions:   PRN Meds:.  acetaminophen    senna-docusate sodium **AND** polyethylene glycol **AND** bisacodyl **AND** bisacodyl    Calcium Replacement - Follow Nurse / BPA Driven Protocol    dextrose    dextrose    glucagon (human recombinant)    HYDROcodone-acetaminophen    HYDROmorphone    Magnesium Standard Dose Replacement - Follow Nurse / BPA Driven Protocol    Phosphorus Replacement - Follow Nurse / BPA Driven Protocol    Potassium Replacement - Follow Nurse / BPA Driven Protocol    sodium chloride    sodium chloride    Assessment & Plan   Assessment & Plan     Active Hospital Problems    Diagnosis  POA    **Osteomyelitis [M86.9]  Yes    Stage 3a chronic kidney disease [N18.31]  Yes    History of myalgia due to HMG CoA reductase inhibitor [Z87.39]  Not Applicable    Microalbuminuric diabetic nephropathy [E11.21]  Yes    Hyperlipidemia [E78.5]  Yes    Type 2 diabetes mellitus with hyperglycemia [E11.65]  Yes    Lymphoma [C85.90]  Yes    Diabetic foot ulcer [E11.621, L97.509]  Yes    Hypertensive disorder  [I10]  Yes      Resolved Hospital Problems   No resolved problems to display.     This patient's assessments and plans were partially entered by my partner and updated as appropriate by me on 4/8/2024     Brief Hospital Course to date:  Alvaro Sinha is a 58 y.o. male with past medical history of lymphoma, type 2 diabetes, hypertension, hyperlipidemia, CKD, gout, chronic ulceration of the right foot, presented with worsening wound of right foot. MRI of R foot showed concerns for early osteomyelitis of the second and third middle and distal phalanges.  Orthopedic surgery and infectious disease consulted.     Nonhealing diabetic ulcer of the right foot with overlying cellulitis   Concern for foot osteomyelitis  Patient with chronic foot ulcer, currently with worsening cellulitis and possible osteomyelitis per MRI  Currently on Zosyn and daptomycin per ID  Holding Plavix in anticipation of surgery  Arterial duplex and CTA runoff with no evidence of significant peripheral arterial disease  Orthopedic surgery planning for intervention 4/8 vs 4/9 depending upon scheduling     Acute gout flare  Did not improve with colchicine monotherapy but significant improvement after starting steroids  Will wean off prednisone  Patient states he does not do well with allopurinol  Continue colchicine    Type 2 diabetes  A1c 8.5%  Has been off Trulicity due to insurance coverage  Continue glipizide, increase low-dose sliding scale insulin to moderate dose, continue Levemir 10 units daily      Stage III CKD due to diabetes  Hypertension  Creatinine currently near baseline, continue to monitor  Continue home lisinopril     Lymphoma  History of lymphoma in chart, has been following with [facility] for this  Ongoing workup, is not currently on any immunotherapy or immunosuppressive medications     Statin intolerance  Rosuvastatin listed in medical history however no recent statin.  History of myalgia          Expected Discharge  Location and Transportation: TBD  Expected Discharge   Expected Discharge Date: 4/10/2024; Expected Discharge Time:      DVT prophylaxis:  Medical DVT prophylaxis orders are present.         AM-PAC 6 Clicks Score (PT): 22 (04/07/24 2000)    CODE STATUS:   Code Status and Medical Interventions:   Ordered at: 04/03/24 0013     Code Status (Patient has no pulse and is not breathing):    CPR (Attempt to Resuscitate)     Medical Interventions (Patient has pulse or is breathing):    Full Support       Florencio Parrish,   04/08/24

## 2024-04-08 NOTE — PROGRESS NOTES
[facility]    INFECTIOUS DISEASE CONSULT/INITIAL HOSPITAL VISIT    Alvaro Sinha  1965  [id number]    Date of consult: 4/3/2024    Admit date: 4/2/2024    Requesting Provider: No ref. provider found  Evaluating physician: Aung Jameson MD  Reason for Consultation: Right foot diabetic wound ulcers with underlying osteomyelitis second and third toe  Chief Complaint: Above      Subjective   History of present illness:  Patient is a  58 y.o.  Yr old male with a history of diabetes mellitus type 2, essential hypertension, hyperlipidemia, CKD stage IIIa, gout, with chronic right foot ulceration being followed at the [facility] which worsened 3/31/2024.  He has been using a boot to offload his foot, but was also working at [employer], although he has been off work recently.  MRI of the foot 4/2/2024 was consistent with possible early osteomyelitis second and third middle and distal phalanges.  He has a plantar draining fistulous track below his second and third metatarsal and an anterior web skin breakdown between his second and third toe on his foot.  He was awaiting possible transfer to the [facility] for further orthopedic evaluation.  He has no other localizing signs or symptoms of infection.  I was consulted on 4/3/2024 for further evaluation and treatment.    4/4/2024 history reviewed.  No high fevers or chills.  Tolerating antibiotics for right foot osteomyelitis.    4/5/2024 history reviewed.  Tolerating his antibiotics for right foot osteomyelitis second and third metatarsal.  No high fever.  Awaits surgical disposition.    4/8/2024 history reviewed.  No high fever.  Tolerating antibiotics for right foot second and third toe osteomyelitis.  Awaits possible second and third metatarsal resection by surgery.    Past Medical History:   Diagnosis Date    Diabetes mellitus     Hypertension        Past Surgical History:   Procedure Laterality Date     BONE RESECTION, RIB         Pediatric History   Patient Parents    Not on file     Other Topics Concern    Not on file   Social History Narrative    Not on file   No cigarettes, occasional alcohol, no drug use,  to [facility] with 1 child.  Works at [employer].    family history is not on file.    Allergies   Allergen Reactions    Statins Myalgia       Immunization History   Administered Date(s) Administered    COVID-19 (MODERNA) 1st,2nd,3rd Dose Monovalent 09/23/2021, 11/02/2021       Medication:  @Scheduled Meds:[Held by provider] clopidogrel, 75 mg, Oral, Daily  colchicine, 0.6 mg, Oral, Daily  DAPTOmycin, 8 mg/kg (Adjusted), Intravenous, Q24H  glipizide, 10 mg, Oral, QAM AC  heparin (porcine), 5,000 Units, Subcutaneous, Q8H  insulin detemir, 10 Units, Subcutaneous, Daily  insulin regular, 3-14 Units, Subcutaneous, Q6H  lisinopril, 40 mg, Oral, Daily  metoprolol succinate XL, 100 mg, Oral, Daily  pioglitazone, 30 mg, Oral, Daily  piperacillin-tazobactam, 3.375 g, Intravenous, Q8H  [START ON 4/9/2024] predniSONE, 20 mg, Oral, Daily With Breakfast  sodium chloride, 10 mL, Intravenous, Q12H      Continuous Infusions:   PRN Meds:.  acetaminophen    senna-docusate sodium **AND** polyethylene glycol **AND** bisacodyl **AND** bisacodyl    Calcium Replacement - Follow Nurse / BPA Driven Protocol    dextrose    dextrose    glucagon (human recombinant)    HYDROcodone-acetaminophen    HYDROmorphone    Magnesium Standard Dose Replacement - Follow Nurse / BPA Driven Protocol    Phosphorus Replacement - Follow Nurse / BPA Driven Protocol    Potassium Replacement - Follow Nurse / BPA Driven Protocol    sodium chloride    sodium chloride     Please refer to the medical record for a full medication list    Review of Systems:    Constitutional-- No Fever, chills or sweats.  Appetite good, and no malaise. No fatigue.  HEENT-- No new vision, hearing or throat complaints.  No epistaxis or oral sores.  Denies odynophagia or  "dysphagia.  No odynophagia or dysphagia. No headache, photophobia or neck stiffness.  CV-- No chest pain, palpitation or syncope  Resp-- No SOB/cough/Hemoptysis  GI- No nausea, vomiting, or diarrhea.  No hematochezia, melena, or hematemesis. Denies jaundice or chronic liver disease.  -- No dysuria, hematuria, or flank pain.  Denies hesitancy, urgency.  Lymph- no swollen lymph nodes in neck/axilla or groin.   Heme- No active bruising or bleeding; no Hx of DVT or PE.  MS-- no swelling or pain in the bones or joints of arms/legs.  No new back pain.  Neuro-- No acute focal weakness or numbness in the arms or legs.  No seizures.  Skin--No rashes or lesions, except right foot as per HPI, no nodules    Physical Exam:   Vital Signs   Temp:  [95.8 °F (35.4 °C)-97.8 °F (36.6 °C)] 97.8 °F (36.6 °C)  Heart Rate:  [45-67] 67  Resp:  [18] 18  BP: (136-150)/(76-94) 150/78    Blood pressure 150/78, pulse 67, temperature 97.8 °F (36.6 °C), temperature source Oral, resp. rate 18, height 185.4 cm (72.99\"), weight 136 kg (300 lb), SpO2 97%.  GENERAL: Awake and alert, in minor distress. Appears  stated age.  Resting in bed.  HEENT:  Normocephalic, atraumatic.  Oropharynx without thrush. Dentition in good repair. No cervical adenopathy. No neck masses.  Ears externally normal, Nose externally normal.  EYES:  No conjunctival injection. No icterus. EOM full.  LYMPHATICS: No lymphadenopathy of the neck or axillary or inguinal regions.   HEART: No murmur, gallop, or pericardial friction rub. Reg rate rhythm.  LUNGS: Clear to auscultation and percussion. No respiratory distress, no use of accessory muscles.  ABDOMEN: Soft, nontender, nondistended. No appreciable HSM.  Bowel sounds normal.  Obese.  No mass.  SKIN: Warm and dry without cutaneous eruptions.  No nodules.  Right second and third webspace with increased erythema and purulent discharge, and plantar fistulous tract between the right second and third metatarsal probes to near " "bone.  PSYCHIATRIC: Mental status lucid. No confusion.  EXT:  No cellulitic change.  NEURO: Oriented to name, nonfocal.              4/3/24 right foot wounds    Results Review:   I reviewed the patient's new clinical results.  I reviewed the patient's new imaging results and agree with the interpretation.  I reviewed the patient's other test results and agree with the interpretation    Results from last 7 days   Lab Units 04/08/24  0616 04/07/24  0452 04/06/24  0456   WBC 10*3/mm3 4.82 4.13 5.40   HEMOGLOBIN g/dL 10.5* 10.9* 10.5*   HEMATOCRIT % 31.9* 33.9* 31.8*   PLATELETS 10*3/mm3 203 201 210     Results from last 7 days   Lab Units 04/08/24  0616   SODIUM mmol/L 133*   POTASSIUM mmol/L 4.5   CHLORIDE mmol/L 100   CO2 mmol/L 22.0   BUN mg/dL 29*   CREATININE mg/dL 1.26   GLUCOSE mg/dL 226*   CALCIUM mg/dL 8.9     Results from last 7 days   Lab Units 04/08/24  0616   ALK PHOS U/L 131*   BILIRUBIN mg/dL 0.3   ALT (SGPT) U/L 32   AST (SGOT) U/L 30     Results from last 7 days   Lab Units 04/05/24  0547   SED RATE mm/hr 98*     Results from last 7 days   Lab Units 04/08/24  0616   CRP mg/dL 3.40*         Results from last 7 days   Lab Units 04/02/24  1832   LACTATE mmol/L 1.3     Estimated Creatinine Clearance: 92.2 mL/min (by C-G formula based on SCr of 1.26 mg/dL).  CPK          4/6/2024    04:56   Common Labs   Creatine Kinase 36       Procalitonin Results:       Brief Urine Lab Results       None           No results found for: \"SITE\", \"ALLENTEST\", \"PHART\", \"KIZ6GXI\", \"PO2ART\", \"AWT1XXJ\", \"BASEEXCESS\", \"Z5ZBGXKY\", \"HGBBG\", \"HCTABG\", \"OXYHEMOGLOBI\", \"METHHGBN\", \"CARBOXYHGB\", \"CO2CT\", \"BAROMETRIC\", \"MODALITY\", \"FIO2\"     Microbiology:    Right foot wound culture 4/2/2024 with gram-positive cocci, gram-negative rods on Gram stain, MRSA screen negative, blood cultures x 2 from 4/2 negative    Results for orders placed or performed during the hospital encounter of 04/02/24   Blood Culture - Blood, Arm, Right    " Specimen: Arm, Right; Blood   Result Value Ref Range    Blood Culture No growth at 5 days          Culture results from last 30 days   Lab 04/02/24  1900   WOUNDCX Heavy growth (4+) Streptococcus agalactiae (Group B)*  Heavy growth (4+) Streptococcus mitis / oralis*  Light growth (2+) Eikenella corrodens*  Moderate growth (3+) Normal Skin Terri      Brief Urine Lab Results       None        Blood cultures x 2 from 4/2 are negative.  Wound culture right foot growing group B streptococcus to date, MRSA screen negative.    Radiology:  Imaging Results (Last 72 Hours)       ** No results found for the last 72 hours. **            IMPRESSION:     Second and third distal and middle phalanges MRI changes with bone marrow edema in the setting of diabetes and chronic wounds of the right foot with increased pretest probability for underlying osteomyelitis, versus reactive edema.  I would favor osteomyelitis given the way his right foot looks with a fistulous tract that probes deep to bone.  No erosions in the cortical structure noted.  Usual organisms will be mixed terri.  MRSA screen negative, wound culture Gram stain with gram-negative rods and gram-positive cocci, blood cultures x 2 from 4/2 are negative to date.  Group B streptococcus growing as of 4/4/2024, and Streptococcus mitis.  Right foot cellulitis and wound infection associated with diabetes, despite use of a surgical boot.  Diabetes mellitus type 2 with increased risk for infection.  Chronic kidney disease stage IIIa, creatinine 1.18 on 4/3/2024.  1.23 on 4/4/2024.  1.47 on 4/5.  1.26 on 4/8.  Anemia, chronic disease.  Worse.  Hyponatremia 133, worse.    PLAN:    Diagnostically, continue to follow patient's physical exam, CBC, CMP, CRP.  Radiographic studies as needed.  Therapeutically, continue daptomycin and piperacillin/tazobactam awaiting further culture data.  This would provide coverage for MRSA, Pseudomonas, versus other.  Likely antibiotics will be  adjusted depending upon culture data.  Await surgical cultures.  Supportive care.  Wound care.  Nonweightbearing on right foot.  Evaluation for surgical debridement, and at risk for metatarsal resections.  Waits on surgical disposition.    I discussed the patient's findings and my recommendations with patient, family, and nursing staff    Thank you for asking me to see Alvaro Sinha.  Our group would be pleased to follow this patient over the course of their hospitalization and assist with outpatient antimicrobial therapy, as indicated.  Further recommendations depend on the results of the cultures and clinical course.  Increased risk for adverse drug reactions, complications of IV access, readmission, loss of limb.  Side effects discussed.      Aung Jameson MD  4/8/2024

## 2024-04-08 NOTE — ANESTHESIA PREPROCEDURE EVALUATION
Anesthesia Evaluation     Patient summary reviewed and Nursing notes reviewed                Airway   Mallampati: II  TM distance: >3 FB  Neck ROM: full  No difficulty expected  Dental - normal exam   (+) partials    Pulmonary - negative pulmonary ROS and normal exam   Cardiovascular - normal exam    (+) hypertension, PVD, hyperlipidemia      Neuro/Psych- negative ROS  GI/Hepatic/Renal/Endo    (+) morbid obesity, renal disease- CRI and stones, diabetes mellitus    Musculoskeletal (-) negative ROS    Abdominal  - normal exam    Bowel sounds: normal.   Substance History - negative use     OB/GYN negative ob/gyn ROS         Other      history of cancer                Anesthesia Plan    ASA 3     general     (POPLITEAL SS FOR POSTOP PAIN)  intravenous induction     Anesthetic plan, risks, benefits, and alternatives have been provided, discussed and informed consent has been obtained with: patient.    Plan discussed with CRNA.    CODE STATUS:    Code Status (Patient has no pulse and is not breathing): CPR (Attempt to Resuscitate)  Medical Interventions (Patient has pulse or is breathing): Full Support

## 2024-04-08 NOTE — ANESTHESIA POSTPROCEDURE EVALUATION
Patient: Alvaro Sinha    Procedure Summary       Date: 04/08/24 [facility] / [facility]  [facility]    Anesthesia Start: 1610 Anesthesia Stop: 1731    Procedure: AMPUTATION TRANS METATARSAL (Right: Foot) Diagnosis:       Osteomyelitis of right foot, unspecified type      (Osteomyelitis of right foot, unspecified type [M86.9])    Surgeons: Jeremi Hays Jr., MD Provider: Silver Redmond MD    Anesthesia Type: general ASA Status: 3            Anesthesia Type: general    Vitals  Vitals Value Taken Time   /77 04/08/24 1727   Temp 97.8    Pulse 82 04/08/24 1731   Resp 14    SpO2 91 % 04/08/24 1731   Vitals shown include unfiled device data.        Post Anesthesia Care and Evaluation    Patient location during evaluation: PACU  Patient participation: complete - patient participated  Level of consciousness: awake and alert  Pain management: adequate    Airway patency: patent  Anesthetic complications: No anesthetic complications  PONV Status: none  Cardiovascular status: hemodynamically stable and acceptable  Respiratory status: nonlabored ventilation, acceptable and nasal cannula  Hydration status: acceptable

## 2024-04-08 NOTE — OP NOTE
[facility]     OPERATIVE REPORT      PATIENT NAME:  Alvaro Sinha                            YOB: 1965       PREOP DIAGNOSIS:   Right forefoot infection    POSTOP DIAGNOSIS:   Same.    PROCEDURE:   Right     80716:  foot 2nd ray amputation  40994:  foot 2nd ray amputation  95592:  Wound vacuum assisted closure: 4 cm x 3 cm x 3 cm    SURGEON:     Jeremi Hays MD    OPERATIVE TEAM:   Circulator: Adwoa Pablo RN  Scrub Person: Sudha Roper    ANESTHETIST:  Anesthesiologist: Silver Redmond MD  CRNA: Evans Lugo CRNA; Ewa Barragan CRNA  Student Nurse Anesthetist: Joe Madrigal SRNA    ANESTHESIA:   Choice    ESTIMATED BLOOD LOSS:   < 30 ml    TOURNIQUET TIME:   < 15 minutes    FINDINGS:     Remaining tissue appeared healthy.    IMPLANTS:     None.    SPECIMENS:     Specimens       ID Source Type Tests Collected By Collected At Frozen?    1 Toe, Right Wound ANAEROBIC CULTURE  WOUND CULTURE   Jeremi Hays Jr., MD 4/8/24 1639     Description: Right 2nd and 3rd toe aerobic and anaerobic cultures    A Toe, Right Tissue TISSUE PATHOLOGY EXAM   Jeremi Hays Jr., MD 4/8/24 1645     Description: 2nd and 3rd ray amputation for permanent            COMPLICATIONS:    None.    DISPOSITION:    Stable to recovery.     INDICATIONS:   58 y.o. male with Right plantar ulcer beneath the second metatarsal, purulent drainage from the second toe.  He was admitted early last week after I was contacted by the emergency department.  At that point I advised [place] was out of [place] and recommended transfer.  He underwent MRI which demonstrated plantar ulcer, diffuse edema in the soft tissue, questionable early osteomyelitic changes of the second and third metatarsal head.  He has been admitted since last Tuesday on broad-spectrum antibiotics.  I had a discussion with him regarding further management.  We had a lengthy discussion regarding further conservative wound care, debridement,  toe amputation, ray amputation versus transmetatarsal amputation.  Ultimately, after lengthy discussion, he wished to proceed with right second and third ray amputation, wound vacuum-assisted closure.     Risks of surgery were discussed which included but were not limited to pain, bleeding, infection, damage to adjacent structures, need for further surgery, wound healing complications, loss of limb and death.  The patient expressed verbal consent and written consent was obtained for the above procedure.     NARRATIVE:    Patient was identified in preoperative holding.  Operative site was marked in indelible ink.  History, physical, consent were reviewed and updated.  Patient was surrendered to the anesthesia team and transported to the operative suite where anesthesia was induced.  Hip bump was placed on the ipsilateral side.  Operative extremity was prepped and draped in the usual sterile fashion.  The operative team donned sterile gowns and gloves and a timeout was called.  All in attendance agreed regarding the patient's identity, proposed operative procedure, and operative site.    An Esmarch bandage was placed in the supramalleolar region and removed mid case.    I made a longitudinal incision centered over the second and third ray, incorporated the second and third toe base using a racquet style incision, carried this distally to incorporate the plantar ulcer.  Utilizing Bovie electrocautery I dissected down to the second and third metatarsals, carried the dissection distally, circumferentially dissected around the second and third toes which I removed and sent for pathology.  I noted turbid fluid, swab specimens of which I sent for culture.  I exposed the second and third metatarsal shafts and utilizing a sagittal saw, transected them and sent this for pathology.  I took down the Esmarch, copiously irrigated with Irrisept and saline.  I achieved hemostasis, closed the proximal wound with nylon suture.  The  resulting wound measured 3 cm x 4 cm x 3 cm.  I applied vancomycin powder to the wound, placed a wound vacuum-assisted closure device was noted to have good seal.    Sterile dressing applied.    Anesthesia was concluded and the patient was transported to the PACU in stable condition.  Counts were correct x2.  There were no apparent complications.  I was present and scrubbed for the entire case.    Jeremi Hays Jr, MD  4/8/2024

## 2024-04-08 NOTE — CASE MANAGEMENT/SOCIAL WORK
Discharge Planning Assessment  [facility]     Patient Name: Alvaro Sinha  MRN: [id number]  Today's Date: 4/8/2024    Admit Date: 4/2/2024    Plan: Home   Discharge Needs Assessment    No documentation.                  Discharge Plan       Row Name 04/08/24 1402       Plan    Plan Home    Patient/Family in Agreement with Plan yes    Plan Comments I spoke with patient and wife at the BS. Instructed the wife to call the surgeon's office about patient's short term disability paperwork. I gave patient and wife a typed up letter stating patient was here since 4-2 with planned surgery to give to patient's employer. CM will follow after surgery.    Final Discharge Disposition Code 01 - home or self-care                  Continued Care and Services - Admitted Since 4/2/2024    No active coordination exists for this encounter.       Expected Discharge Date and Time       Expected Discharge Date Expected Discharge Time    Apr 10, 2024            Demographic Summary    No documentation.                  Functional Status    No documentation.                  Psychosocial    No documentation.                  Abuse/Neglect    No documentation.                  Legal    No documentation.                  Substance Abuse    No documentation.                  Patient Forms    No documentation.                     Enma Byrd RN

## 2024-04-08 NOTE — PROGRESS NOTES
Alvaro Sinha       LOS: 5 days   Patient Care Team:  Martha Mckee MD as PCP - General (Family Medicine)    Chief Complaint: Right foot infection/osteomyelitis    Subjective     Interval History:     Resting comfortably in bed this morning.  Pain tolerable, no acute events overnight.  Spouse is present at the bedside.    Review of Systems:      Gen- No fevers, chills  CV- No chest pain, palpitations  Resp- No cough, dyspnea  GI- No N/V/D, abd pain    Objective     Vital Signs  Vital Signs (last 24 hours)         04/07 0700 04/08 0659 04/08 0700 04/08 0735   Most Recent      Temp (°F) 97.3 -  97.8       97.8 (36.6) 04/08 0000    Heart Rate 45 -  70       48 04/08 0600    Resp 16 -  18       18 04/08 0000    /80 -  148/81       138/94 04/08 0000    SpO2 (%) 93 -  99       97 04/08 0600    Oxygen Concentration (%)   28       28 04/08 0418              Physical Exam:     No acute distress.  Nonlabored respirations.  Regular rate and rhythm.  Abdomen nondistended.  Right lower extremity: Dressing present is clean, dry, intact.     Results Review:     I reviewed the patient's new clinical results.    Medication Review:   Hospital Medications (active)         Dose Frequency Start End    acetaminophen (TYLENOL) tablet 650 mg 650 mg Every 4 Hours PRN 4/2/2024 --    Admin Instructions: If given for fever, use fever parameter: fever greater than 100.4 °F  Based on patient request - if ordered for moderate or severe pain, provider allows for administration of a medication prescribed for a lower pain scale.    Do not exceed 4 grams of acetaminophen in a 24 hr period. Max dose of 2gm for AST/ALT greater than 120 units/L.    If given for pain, use the following pain scale:   Mild Pain = Pain Score of 1-3, CPOT 1-2  Moderate Pain = Pain Score of 4-6, CPOT 3-4  Severe Pain = Pain Score of 7-10, CPOT 5-8    Route: Oral    bisacodyl (DULCOLAX) EC tablet 5 mg 5 mg Daily PRN 4/2/2024 --    Admin  "Instructions: Use if no bowel movement after 12 hours.  Swallow whole. Do not crush, split, or chew tablet.    Route: Oral    Linked Group 1: Placed in \"And\" Linked Group        bisacodyl (DULCOLAX) suppository 10 mg 10 mg Daily PRN 4/2/2024 --    Admin Instructions: Use if no bowel movement after 12 hours.  Hold for diarrhea    Route: Rectal    Linked Group 1: Placed in \"And\" Linked Group        Calcium Replacement - Follow Nurse / BPA Driven Protocol  As Needed 4/2/2024 --    Admin Instructions: Open Order & Select \"BHS Electrolyte Replacement Protocol Algorithm\" to View Details    Route: Does not apply    clopidogrel (PLAVIX) tablet 75 mg ([Held by provider] since 4/3/2024 12:01 AM) 75 mg Daily 4/3/2024 --    Route: Oral    colchicine tablet 0.6 mg 0.6 mg Daily 4/4/2024 --    Admin Instructions: Group 2 (Pink) Hazardous Drug - Reproductive Risk Only - See Handling Guide    Route: Oral    DAPTOmycin (CUBICIN) 800 mg in sodium chloride 0.9 % 50 mL IVPB 8 mg/kg × 102 kg (Adjusted) Every 24 Hours 4/3/2024 5/3/2024    Admin Instructions: Caution: Look alike/sound alike drug alert.  Refrigerate. Do not shake.    Route: Intravenous    dextrose (D50W) (25 g/50 mL) IV injection 25 g 25 g Every 15 Minutes PRN 4/3/2024 --    Admin Instructions: Blood sugar less than 70; patient has IV access - Unresponsive, NPO or Unable To Safely Swallow    Route: Intravenous    dextrose (GLUTOSE) oral gel 15 g 15 g Every 15 Minutes PRN 4/3/2024 --    Admin Instructions: BS<70, Patient Alert, Is not NPO, Can safely swallow.    Route: Oral    glipizide (GLUCOTROL) tablet 10 mg 10 mg Every Morning Before Breakfast 4/3/2024 --    Admin Instructions: Caution: Look alike/sound alike drug alert    Route: Oral    glucagon (GLUCAGEN) injection 1 mg 1 mg Every 15 Minutes PRN 4/3/2024 --    Admin Instructions: Blood Glucose Less Than 70 - Patient Without IV Access - Unresponsive, NPO or Unable To Safely Swallow  Reconstitute powder for injection by " adding 1 mL of -supplied sterile diluent or sterile water for injection to a vial containing 1 mg of the drug, to provide solutions containing 1 mg/mL. Shake vial gently to dissolve.    Route: Intramuscular    heparin (porcine) 5000 UNIT/ML injection 5,000 Units 5,000 Units Every 8 Hours Scheduled 4/3/2024 --    Route: Subcutaneous    HYDROcodone-acetaminophen (NORCO) 5-325 MG per tablet 1 tablet 1 tablet Every 4 Hours PRN 4/2/2024 4/11/2024    Admin Instructions: Based on patient request - if ordered for moderate or severe pain, provider allows for administration of a medication prescribed for a lower pain scale.  [CULLEN]    Do not exceed 4 grams of acetaminophen in a 24 hr period. Max dose of 2gm for AST/ALT greater than 120 units/L        If given for pain, use the following pain scale:   Mild Pain = Pain Score of 1-3, CPOT 1-2  Moderate Pain = Pain Score of 4-6, CPOT 3-4  Severe Pain = Pain Score of 7-10, CPOT 5-8    Route: Oral    HYDROmorphone (DILAUDID) injection 0.5 mg 0.5 mg Every 3 Hours PRN 4/4/2024 4/12/2024    Admin Instructions: Based on patient request - if ordered for moderate or severe pain, provider allows for administration of a medication prescribed for a lower pain scale.  If given for pain, use the following pain scale:  Mild Pain = Pain Score of 1-3, CPOT 1-2  Moderate Pain = Pain Score of 4-6, CPOT 3-4  Severe Pain = Pain Score of 7-10, CPOT 5-8    Route: Intravenous    insulin detemir (LEVEMIR) injection 10 Units 10 Units Daily 4/7/2024 --    Admin Instructions: Do not hold basal insulin without an order. Consider requesting a dose edit, if needed.      Route: Subcutaneous    Insulin Lispro (humaLOG) injection 2-7 Units 2-7 Units 4 Times Daily Before Meals & Nightly 4/3/2024 --    Admin Instructions: Correction Insulin - Low Dose - Total Insulin Dose Less Than 40 units/day (Lean, Elderly or Renal Patients)    Blood Glucose 150-199 mg/dL - 2 units  Blood Glucose 200-249 mg/dL - 3  "units  Blood Glucose 250-299 mg/dL - 4 units  Blood Glucose 300-349 mg/dL - 5 units  Blood Glucose 350-400 mg/dL - 6 units  Blood Glucose Greater Than 400 mg/dL - 7 units & Call Provider   Caution: Look alike/sound alike drug alert    Route: Subcutaneous    lisinopril (PRINIVIL,ZESTRIL) tablet 40 mg 40 mg Daily 4/3/2024 --    Admin Instructions: Hold for SBP less than 100, DBP less than 60.    Route: Oral    Magnesium Standard Dose Replacement - Follow Nurse / BPA Driven Protocol  As Needed 4/2/2024 --    Admin Instructions: Open Order & Select \"BHS Electrolyte Replacement Protocol Algorithm\" to View Details    Route: Does not apply    metoprolol succinate XL (TOPROL-XL) 24 hr tablet 100 mg 100 mg Daily 4/3/2024 --    Admin Instructions: Hold for SBP less than 100, DBP less than 60, or heart rate less than 50    Do not crush or chew the capsules or tablets. The drug may not work as designed if the capsule or tablet is crushed or chewed. Swallow whole.  Do not crush or chew.    Route: Oral    Phosphorus Replacement - Follow Nurse / BPA Driven Protocol  As Needed 4/2/2024 --    Admin Instructions: Open Order & Select \"BHS Electrolyte Replacement Protocol Algorithm\" to View Details    Route: Does not apply    pioglitazone (ACTOS) tablet 30 mg 30 mg Daily 4/3/2024 --    Route: Oral    piperacillin-tazobactam (ZOSYN) 3.375 g IVPB in 100 mL NS MBP (CD) 3.375 g Every 8 Hours 4/3/2024 4/10/2024    Route: Intravenous    polyethylene glycol (MIRALAX) packet 17 g 17 g Daily PRN 4/2/2024 --    Admin Instructions: Use if no bowel movement after 12 hours. Mix in 6-8 ounces of water.  Use 4-8 ounces of water, tea, or juice for each 17 gram dose.    Route: Oral    Linked Group 1: Placed in \"And\" Linked Group        Potassium Replacement - Follow Nurse / BPA Driven Protocol  As Needed 4/2/2024 --    Admin Instructions: Open Order & Select \"BHS Electrolyte Replacement Protocol Algorithm\" to View Details    Route: Does not apply    " "predniSONE (DELTASONE) tablet 40 mg 40 mg Daily With Breakfast 4/7/2024 --    Admin Instructions: Take with food.    Route: Oral    sennosides-docusate (PERICOLACE) 8.6-50 MG per tablet 2 tablet 2 tablet 2 Times Daily PRN 4/2/2024 --    Admin Instructions: Start bowel management regimen if patient has not had a bowel movement after 12 hours.    Route: Oral    Linked Group 1: Placed in \"And\" Linked Group        sodium chloride 0.9 % flush 10 mL 10 mL Every 12 Hours Scheduled 4/3/2024 --    Route: Intravenous    sodium chloride 0.9 % flush 10 mL 10 mL As Needed 4/2/2024 --    Route: Intravenous    sodium chloride 0.9 % infusion 40 mL 40 mL As Needed 4/2/2024 --    Admin Instructions: Following administration of an IV intermittent medication, flush line with 40mL NS at 100mL/hr.    Route: Intravenous              Assessment & Plan     D8-year-old male with multiple medical comorbidities, diabetes mellitus with right foot wound/infection, presumed osteomyelitis      Osteomyelitis    Lymphoma    Type 2 diabetes mellitus with hyperglycemia    Diabetic foot ulcer    History of myalgia due to HMG CoA reductase inhibitor    Hypertensive disorder    Hyperlipidemia    Microalbuminuric diabetic nephropathy    Stage 3a chronic kidney disease    Nonweightbearing right lower extremity  Continue local wound care as per recommendations from PT/wound care; daily Betadine painting, 4 x 4, Kerlix dry dressing  Agree with antibiotics per infectious disease    I again had a discussion with the patient today at bedside regarding further management options including nonsurgical as well as surgical.  We have again discussed various levels of amputation including second and third toe amputation, partial ray amputations, transmetatarsal amputation.  After considering his options over the weekend, he is interested in pursuing limb salvage.  Anticipate he will benefit from second and third partial ray amputations, possible transmetatarsal " amputation.    Tentative plan for surgery at the earliest convenience pending or availability.  Further surgical recommendations per Dr. Hays.      JOHN Villavicencio  04/08/24  07:35 EDT

## 2024-04-09 LAB
ANION GAP SERPL CALCULATED.3IONS-SCNC: 10 MMOL/L (ref 5–15)
BUN SERPL-MCNC: 27 MG/DL (ref 6–20)
BUN/CREAT SERPL: 20 (ref 7–25)
CALCIUM SPEC-SCNC: 9 MG/DL (ref 8.6–10.5)
CHLORIDE SERPL-SCNC: 105 MMOL/L (ref 98–107)
CO2 SERPL-SCNC: 23 MMOL/L (ref 22–29)
CREAT SERPL-MCNC: 1.35 MG/DL (ref 0.76–1.27)
DEPRECATED RDW RBC AUTO: 43.4 FL (ref 37–54)
EGFRCR SERPLBLD CKD-EPI 2021: 60.9 ML/MIN/1.73
ERYTHROCYTE [DISTWIDTH] IN BLOOD BY AUTOMATED COUNT: 12.7 % (ref 12.3–15.4)
GLUCOSE BLDC GLUCOMTR-MCNC: 137 MG/DL (ref 70–130)
GLUCOSE BLDC GLUCOMTR-MCNC: 197 MG/DL (ref 70–130)
GLUCOSE BLDC GLUCOMTR-MCNC: 210 MG/DL (ref 70–130)
GLUCOSE BLDC GLUCOMTR-MCNC: 258 MG/DL (ref 70–130)
GLUCOSE SERPL-MCNC: 195 MG/DL (ref 65–99)
HCT VFR BLD AUTO: 33.2 % (ref 37.5–51)
HGB BLD-MCNC: 11 G/DL (ref 13–17.7)
MCH RBC QN AUTO: 30.8 PG (ref 26.6–33)
MCHC RBC AUTO-ENTMCNC: 33.1 G/DL (ref 31.5–35.7)
MCV RBC AUTO: 93 FL (ref 79–97)
PLATELET # BLD AUTO: 204 10*3/MM3 (ref 140–450)
PMV BLD AUTO: 10.2 FL (ref 6–12)
POTASSIUM SERPL-SCNC: 4.6 MMOL/L (ref 3.5–5.2)
RBC # BLD AUTO: 3.57 10*6/MM3 (ref 4.14–5.8)
SODIUM SERPL-SCNC: 138 MMOL/L (ref 136–145)
WBC NRBC COR # BLD AUTO: 4.97 10*3/MM3 (ref 3.4–10.8)

## 2024-04-09 PROCEDURE — 85027 COMPLETE CBC AUTOMATED: CPT | Performed by: ORTHOPAEDIC SURGERY

## 2024-04-09 PROCEDURE — 63710000001 INSULIN REGULAR HUMAN PER 5 UNITS: Performed by: ORTHOPAEDIC SURGERY

## 2024-04-09 PROCEDURE — 82948 REAGENT STRIP/BLOOD GLUCOSE: CPT

## 2024-04-09 PROCEDURE — 63710000001 PREDNISONE PER 1 MG: Performed by: ORTHOPAEDIC SURGERY

## 2024-04-09 PROCEDURE — 97605 NEG PRS WND THER DME<=50SQCM: CPT

## 2024-04-09 PROCEDURE — 93010 ELECTROCARDIOGRAM REPORT: CPT | Performed by: INTERNAL MEDICINE

## 2024-04-09 PROCEDURE — 25010000002 CEFTRIAXONE PER 250 MG: Performed by: INTERNAL MEDICINE

## 2024-04-09 PROCEDURE — 63710000001 INSULIN DETEMIR PER 5 UNITS: Performed by: ORTHOPAEDIC SURGERY

## 2024-04-09 PROCEDURE — 94660 CPAP INITIATION&MGMT: CPT

## 2024-04-09 PROCEDURE — 99232 SBSQ HOSP IP/OBS MODERATE 35: CPT | Performed by: NURSE PRACTITIONER

## 2024-04-09 PROCEDURE — 80048 BASIC METABOLIC PNL TOTAL CA: CPT | Performed by: ORTHOPAEDIC SURGERY

## 2024-04-09 PROCEDURE — 25010000002 DAPTOMYCIN PER 1 MG: Performed by: ORTHOPAEDIC SURGERY

## 2024-04-09 PROCEDURE — 63710000001 INSULIN LISPRO (HUMAN) PER 5 UNITS: Performed by: NURSE PRACTITIONER

## 2024-04-09 PROCEDURE — 94799 UNLISTED PULMONARY SVC/PX: CPT

## 2024-04-09 PROCEDURE — 97164 PT RE-EVAL EST PLAN CARE: CPT

## 2024-04-09 PROCEDURE — 25010000002 HEPARIN (PORCINE) PER 1000 UNITS: Performed by: ORTHOPAEDIC SURGERY

## 2024-04-09 RX ORDER — INSULIN LISPRO 100 [IU]/ML
2-9 INJECTION, SOLUTION INTRAVENOUS; SUBCUTANEOUS
Status: DISCONTINUED | OUTPATIENT
Start: 2024-04-09 | End: 2024-04-15 | Stop reason: HOSPADM

## 2024-04-09 RX ADMIN — CEFTRIAXONE 2000 MG: 2 INJECTION, POWDER, FOR SOLUTION INTRAMUSCULAR; INTRAVENOUS at 21:05

## 2024-04-09 RX ADMIN — DAPTOMYCIN 800 MG: 500 INJECTION, POWDER, LYOPHILIZED, FOR SOLUTION INTRAVENOUS at 12:37

## 2024-04-09 RX ADMIN — PIOGLITAZONE 30 MG: 15 TABLET ORAL at 12:38

## 2024-04-09 RX ADMIN — INSULIN DETEMIR 10 UNITS: 100 INJECTION, SOLUTION SUBCUTANEOUS at 09:20

## 2024-04-09 RX ADMIN — HEPARIN SODIUM 5000 UNITS: 5000 INJECTION INTRAVENOUS; SUBCUTANEOUS at 15:40

## 2024-04-09 RX ADMIN — Medication 10 ML: at 21:08

## 2024-04-09 RX ADMIN — METOPROLOL SUCCINATE 100 MG: 50 TABLET, EXTENDED RELEASE ORAL at 09:20

## 2024-04-09 RX ADMIN — PREDNISONE 20 MG: 20 TABLET ORAL at 09:23

## 2024-04-09 RX ADMIN — Medication 10 ML: at 09:23

## 2024-04-09 RX ADMIN — COLCHICINE 0.6 MG: 0.6 TABLET ORAL at 09:20

## 2024-04-09 RX ADMIN — INSULIN LISPRO 2 UNITS: 100 INJECTION, SOLUTION INTRAVENOUS; SUBCUTANEOUS at 17:00

## 2024-04-09 RX ADMIN — LISINOPRIL 40 MG: 40 TABLET ORAL at 09:20

## 2024-04-09 RX ADMIN — GLIPIZIDE 10 MG: 10 TABLET ORAL at 09:20

## 2024-04-09 RX ADMIN — INSULIN LISPRO 6 UNITS: 100 INJECTION, SOLUTION INTRAVENOUS; SUBCUTANEOUS at 21:04

## 2024-04-09 RX ADMIN — INSULIN HUMAN 5 UNITS: 100 INJECTION, SOLUTION PARENTERAL at 05:23

## 2024-04-09 RX ADMIN — HEPARIN SODIUM 5000 UNITS: 5000 INJECTION INTRAVENOUS; SUBCUTANEOUS at 21:05

## 2024-04-09 RX ADMIN — HEPARIN SODIUM 5000 UNITS: 5000 INJECTION INTRAVENOUS; SUBCUTANEOUS at 05:24

## 2024-04-09 NOTE — PLAN OF CARE
Goal Outcome Evaluation:  Plan of Care Reviewed With: patient           Outcome Evaluation: Pt presents with wound vac to foot s/p surgical debridement. Pt with possible surgery later this week for potential closure per pt, but PT will f/u daily for vac checks and change in 2-3 days depending on Md's plan for further surgery.

## 2024-04-09 NOTE — CASE MANAGEMENT/SOCIAL WORK
Discharge Planning Assessment  [facility]     Patient Name: Alvaro Sinha  MRN: [id number]  Today's Date: 4/9/2024    Admit Date: 4/2/2024    Plan: Home and ongoing   Discharge Needs Assessment    No documentation.                  Discharge Plan       Row Name 04/09/24 1349       Plan    Plan Home and ongoing    Patient/Family in Agreement with Plan yes    Plan Comments Patient has surgery on 4-8 on the right foot. I talked with patient and wife at the  and he still plans home when MR. RT foot has a wound vac and NWB at this time. Discussed going home with IV AB and going to [facility] everyday to get infused and go to OP PT for wound vac change and wound care. CM continues to follow                  Continued Care and Services - Admitted Since 4/2/2024    No active coordination exists for this encounter.       Expected Discharge Date and Time       Expected Discharge Date Expected Discharge Time    Apr 12, 2024            Demographic Summary    No documentation.                  Functional Status    No documentation.                  Psychosocial    No documentation.                  Abuse/Neglect    No documentation.                  Legal    No documentation.                  Substance Abuse    No documentation.                  Patient Forms    No documentation.                     Enma Byrd RN

## 2024-04-09 NOTE — PROGRESS NOTES
[facility] Medicine Services  PROGRESS NOTE    Patient Name: Alvaro Sinha  : 1965  MRN: [id number]    Date of Admission: 2024  Primary Care Physician: Martha Mckee MD    Subjective   Subjective     CC:  F/u osteomyelitis     HPI:  No new issues overnight  No complaints of pain  Possible additional procedure Thursday or Friday      Objective   Objective     Vital Signs:   Temp:  [97.2 °F (36.2 °C)-98.5 °F (36.9 °C)] 98 °F (36.7 °C)  Heart Rate:  [51-81] 67  Resp:  [16-18] 17  BP: (131-185)/() 142/94  Flow (L/min):  [2-4] 2     Physical Exam:  Constitutional: No acute distress, awake, alert, wife at bedside  HENT: NCAT, mucous membranes moist  Respiratory: Clear to auscultation bilaterally, respiratory effort normal   Cardiovascular: RRR, no murmurs, rubs, or gallops  Gastrointestinal: Positive bowel sounds, soft, nontender, nondistended  Musculoskeletal: No bilateral ankle edema  Psychiatric: Appropriate affect, cooperative  Neurologic: Oriented x 3, HERNANDEZ, speech clear  Skin: No rashes noted.  Right foot wrapped, wound vac in place      Results Reviewed:  LAB RESULTS:      Lab 24  0527 24  0616 24  0452 24  0456 24  0547 24  0626 24  0534 24  1832   WBC 4.97 4.82 4.13 5.40 5.88 5.10   < > 6.89   HEMOGLOBIN 11.0* 10.5* 10.9* 10.5* 10.5* 10.3*   < > 10.8*   HEMATOCRIT 33.2* 31.9* 33.9* 31.8* 31.7* 31.6*   < > 32.7*   PLATELETS 204 203 201 210 183 178   < > 162   NEUTROS ABS  --  3.90 3.36 3.80 4.16 3.62  --  5.32   IMMATURE GRANS (ABS)  --  0.07* 0.05 0.06* 0.07* 0.03  --  0.04   LYMPHS ABS  --  0.61* 0.48* 0.87 0.89 0.72  --  0.81   MONOS ABS  --  0.23 0.21 0.39 0.51 0.44  --  0.58   EOS ABS  --  0.00 0.01 0.25 0.22 0.26  --  0.13   MCV 93.0 91.1 91.9 92.4 93.2 91.9   < > 92.9   SED RATE  --   --   --   --  98* 100*  --  94*   CRP  --  3.40* 7.45* 9.73* 11.91* 9.71*  --  16.12*   LACTATE  --   --   --    --   --   --   --  1.3    < > = values in this interval not displayed.         Lab 04/09/24  0527 04/08/24  0616 04/07/24  0452 04/06/24  0456 04/06/24  0001 04/05/24  0547 04/04/24  0626 04/03/24  0534 04/02/24  1832   SODIUM 138 133* 137 133*  --  136 140 139 134*   POTASSIUM 4.6 4.5 5.2 4.2  --  4.5 4.6 4.3 4.2   CHLORIDE 105 100 103 100  --  103 105 106 100   CO2 23.0 22.0 23.0 24.0  --  24.0 26.0 22.0 20.0*   ANION GAP 10.0 11.0 11.0 9.0  --  9.0 9.0 11.0 14.0   BUN 27* 29* 28* 19  --  19 16 24* 29*   CREATININE 1.35* 1.26 1.29* 1.20  --  1.47* 1.23 1.18 1.43*   EGFR 60.9 66.1 64.3 70.1  --  54.9* 68.1 71.5 56.8*   GLUCOSE 195* 226* 256* 114*  --  121* 105* 108* 152*   CALCIUM 9.0 8.9 9.1 8.8  --  8.7 8.7 8.5* 9.0   MAGNESIUM  --   --  2.1  --  2.2 1.5* 1.8 1.7  --    PHOSPHORUS  --   --  3.3  --   --  3.4 3.2  --   --    HEMOGLOBIN A1C  --   --   --   --   --   --   --   --  8.50*         Lab 04/08/24  0616 04/07/24  0452 04/06/24  0456 04/05/24  0547 04/04/24  0626   TOTAL PROTEIN 7.2 6.6 6.1 6.3 6.2   ALBUMIN 3.6 3.5 3.4* 3.5 3.5   GLOBULIN 3.6 3.1 2.7 2.8 2.7   ALT (SGPT) 32 28 23 22 23   AST (SGOT) 30 24 21 21 22   BILIRUBIN 0.3 0.4 0.5 0.7 0.6   ALK PHOS 131* 162* 127* 109 98                     Brief Urine Lab Results       None            Microbiology Results Abnormal       Procedure Component Value - Date/Time    Wound Culture - Wound, Toe, Right [[id number]] Collected: 04/08/24 1639    Lab Status: Preliminary result Specimen: Wound from Toe, Right Updated: 04/09/24 0904     Wound Culture No growth     Gram Stain No WBCs or organisms seen    Blood Culture - Blood, Arm, Right [[id number]]  (Normal) Collected: 04/02/24 1900    Lab Status: Final result Specimen: Blood from Arm, Right Updated: 04/07/24 1946     Blood Culture No growth at 5 days    Blood Culture - Blood, Arm, Right [[id number]]  (Normal) Collected: 04/02/24 1833    Lab Status: Final result Specimen: Blood from Arm, Right Updated: 04/07/24 1900      Blood Culture No growth at 5 days    Narrative:      Less than seven (7) mL's of blood was collected.  Insufficient quantity may yield false negative results.    MRSA Screen, PCR (Inpatient) - Swab, Nares [[id number]]  (Normal) Collected: 04/02/24 1903    Lab Status: Final result Specimen: Swab from Nares Updated: 04/02/24 2047     MRSA PCR Negative    Narrative:      The negative predictive value of this diagnostic test is high and should only be used to consider de-escalating anti-MRSA therapy. A positive result may indicate colonization with MRSA and must be correlated clinically.  MRSA Negative            Right Pop SS    Result Date: 4/8/2024  Jeremi Jj CRNA     4/8/2024  3:44 PM Right Pop SS Patient reassessed immediately prior to procedure Patient location during procedure: pre-op Start time: 4/8/2024 3:35 PM Reason for block: at surgeon's request and post-op pain management Performed by CRNA/CAA: Jeremi Jj CRNA Assisted by: Evans Lugo CRNA Preanesthetic Checklist Completed: patient identified, IV checked, site marked, risks and benefits discussed, surgical consent, monitors and equipment checked, pre-op evaluation and timeout performed Prep: Pt Position: left lateral decubitus Sterile barriers:cap, gloves, mask and washed/disinfected hands Prep: ChloraPrep Patient monitoring: blood pressure monitoring, continuous pulse oximetry and EKG Procedure Sedation: yes Performed under: local infiltration Guidance:ultrasound guided ULTRASOUND INTERPRETATION.  Using ultrasound guidance a 20 G gauge needle was placed in close proximity to the sciatic nerve, at which point, under ultrasound guidance anesthetic was injected in the area of the nerve and spread of the anesthesia was seen on ultrasound in close proximity thereto.  There were no abnormalities seen on ultrasound; a digital image was taken; and the patient tolerated the procedure with no complications. Images:still images obtained,  "printed/placed on chart Laterality:right Block Type:popliteal Injection Technique:single-shot Needle Type:echogenic and Tuohy Needle Gauge:18 G Resistance on Injection: none Catheter Size:20 G Medications Used: dexamethasone sodium phosphate injection - Injection  2 mg - 4/8/2024 3:35:00 PM bupivacaine PF (MARCAINE) 0.25 % injection - Injection  30 mL - 4/8/2024 3:35:00 PM fentaNYL citrate (PF) (SUBLIMAZE) injection - Intravenous  100 mcg - 4/8/2024 3:35:00 PM Post Assessment Injection Assessment: negative aspiration for heme, no paresthesia on injection and incremental injection Patient Tolerance:comfortable throughout block Complications:no Additional Notes SINGLE shot  A high-frequency linear transducer, with sterile cover, was placed in the popliteal fossa to identify the popliteal artery and vein, Tibial nerve (TN) and Common Peroneal nerve (CP). The transducer was then moved in a cephalad fashion to observe the TN and CP nerve bifurcation to form the Sciatic Nerve. The insertion site was prepped and draped in sterile fashion. Skin and cutaneous tissue was infiltrated with 2-5 ml of 1% Lidocaine. Using ultrasound-guidance, a 20-gauge B-Aguilar 4\" Ultraplex 360 non-stimulating echogenic needle was then inserted and advanced in plane from lateral to medial. Preservative-free normal saline was utilized for hydro-dissection of tissue, advancement of Touhy, and to confirm final needle placement posterior to the nerves. Local anesthetic injection spread, in incremental 3-5 ml injections, to surround both nerve structures. Aspiration every 5 ml to prevent intravascular injection. Injection was completed with negative aspiration of blood and negative intravascular injection. Injection pressures were normal with minimal resistance          Current medications:  Scheduled Meds:colchicine, 0.6 mg, Oral, Daily  DAPTOmycin, 8 mg/kg (Adjusted), Intravenous, Q24H  glipizide, 10 mg, Oral, QAM AC  heparin (porcine), 5,000 Units, " Subcutaneous, Q8H  insulin detemir, 10 Units, Subcutaneous, Daily  insulin regular, 3-14 Units, Subcutaneous, Q6H  lisinopril, 40 mg, Oral, Daily  metoprolol succinate XL, 100 mg, Oral, Daily  pioglitazone, 30 mg, Oral, Daily  predniSONE, 20 mg, Oral, Daily With Breakfast  sodium chloride, 10 mL, Intravenous, Q12H      Continuous Infusions:lactated ringers, 9 mL/hr, Last Rate: 9 mL/hr (04/08/24 1515)      PRN Meds:.  acetaminophen    senna-docusate sodium **AND** polyethylene glycol **AND** bisacodyl **AND** bisacodyl    Calcium Replacement - Follow Nurse / BPA Driven Protocol    dextrose    dextrose    glucagon (human recombinant)    HYDROcodone-acetaminophen    HYDROmorphone    Magnesium Standard Dose Replacement - Follow Nurse / BPA Driven Protocol    Morphine    oxyCODONE    Phosphorus Replacement - Follow Nurse / BPA Driven Protocol    Potassium Replacement - Follow Nurse / BPA Driven Protocol    sodium chloride    sodium chloride    Assessment & Plan   Assessment & Plan     Active Hospital Problems    Diagnosis  POA    **Osteomyelitis [M86.9]  Yes    Stage 3a chronic kidney disease [N18.31]  Yes    History of myalgia due to HMG CoA reductase inhibitor [Z87.39]  Not Applicable    Microalbuminuric diabetic nephropathy [E11.21]  Yes    Hyperlipidemia [E78.5]  Yes    Type 2 diabetes mellitus with hyperglycemia [E11.65]  Yes    Lymphoma [C85.90]  Yes    Diabetic foot ulcer [E11.621, L97.509]  Yes    Hypertensive disorder [I10]  Yes      Resolved Hospital Problems   No resolved problems to display.     This patient's assessments and plans were partially entered by my partner and updated as appropriate by me on 4/8/2024     Brief Hospital Course to date:  Alvaro Sinha is a 58 y.o. male with past medical history of lymphoma, type 2 diabetes, hypertension, hyperlipidemia, CKD, gout, chronic ulceration of the right foot, presented with worsening wound of right foot. MRI of R foot showed concerns for early  osteomyelitis of the second and third middle and distal phalanges.  Orthopedic surgery and infectious disease consulted.     Nonhealing diabetic ulcer of the right foot with overlying cellulitis   Concern for foot osteomyelitis  Patient with chronic foot ulcer, currently with worsening cellulitis and possible osteomyelitis per MRI  Currently on Zosyn and daptomycin per ID  Restart plavix when ok with surgery  Arterial duplex and CTA runoff with no evidence of significant peripheral arterial disease  S/p right second and third ray amputation 4/8/24, wound vac in place     Acute gout flare  Did not improve with colchicine monotherapy but significant improvement after starting steroids  Will wean off prednisone  Patient states he does not do well with allopurinol  Continue colchicine    Type 2 diabetes  A1c 8.5%  Has been off Trulicity due to insurance coverage  Continue glipizide, continue actos, continue   continue Levemir 10 units daily  + SSI (changed to humalog from regular insulin 4/9/24)   Latest Reference Range & Units 04/08/24 23:53 04/09/24 05:19 04/09/24 05:27 04/09/24 11:42   Glucose 70 - 130 mg/dL 269 (H) 210 (H) 195 (H) 137 (H)       Stage III CKD due to diabetes  Hypertension  Creatinine currently near baseline, continue to monitor  Continue home lisinopril     Lymphoma  History of lymphoma in chart, has been following with [facility] for this  Ongoing workup, is not currently on any immunotherapy or immunosuppressive medications     Statin intolerance  Rosuvastatin listed in medical history however no recent statin.  History of myalgia          Expected Discharge Location and Transportation: TBD  Expected Discharge   Expected Discharge Date: 4/12/2024; Expected Discharge Time:      DVT prophylaxis:  Medical DVT prophylaxis orders are present.         AM-PAC 6 Clicks Score (PT): 18 (04/08/24 2000)    CODE STATUS:   Code Status and Medical Interventions:   Ordered at: 04/03/24 0013     Code Status  (Patient has no pulse and is not breathing):    CPR (Attempt to Resuscitate)     Medical Interventions (Patient has pulse or is breathing):    Full Support       Karena Quiros, APRN  04/09/24

## 2024-04-09 NOTE — PROGRESS NOTES
Alvaro Sinha       LOS: 6 days   Patient Care Team:  Martha Mckee MD as PCP - General (Family Medicine)    Chief Complaint: Right foot infection/osteomyelitis    Subjective     Interval History:     Resting comfortably in bed this morning.  Pain tolerable, no acute events overnight.      Review of Systems:      Gen- No fevers, chills  CV- No chest pain, palpitations  Resp- No cough, dyspnea  GI- No N/V/D, abd pain    Objective     Vital Signs  Vital Signs (last 24 hours)         04/07 0700  04/08 0659 04/08 0700 04/08 0735   Most Recent      Temp (°F) 97.3 -  97.8       97.8 (36.6) 04/08 0000    Heart Rate 45 -  70       48 04/08 0600    Resp 16 -  18       18 04/08 0000    /80 -  148/81       138/94 04/08 0000    SpO2 (%) 93 -  99       97 04/08 0600    Oxygen Concentration (%)   28       28 04/08 0418              Physical Exam:     No acute distress.  Nonlabored respirations.  Regular rate and rhythm.  Abdomen nondistended.  Right lower extremity: Operative dressing present is clean, dry, intact.  Minimal serosanguinous output.     Results Review:     I reviewed the patient's new clinical results.    Medication Review:   Hospital Medications (active)         Dose Frequency Start End    acetaminophen (TYLENOL) tablet 650 mg 650 mg Every 4 Hours PRN 4/2/2024 --    Admin Instructions: If given for fever, use fever parameter: fever greater than 100.4 °F  Based on patient request - if ordered for moderate or severe pain, provider allows for administration of a medication prescribed for a lower pain scale.    Do not exceed 4 grams of acetaminophen in a 24 hr period. Max dose of 2gm for AST/ALT greater than 120 units/L.    If given for pain, use the following pain scale:   Mild Pain = Pain Score of 1-3, CPOT 1-2  Moderate Pain = Pain Score of 4-6, CPOT 3-4  Severe Pain = Pain Score of 7-10, CPOT 5-8    Route: Oral    bisacodyl (DULCOLAX) EC tablet 5 mg 5 mg Daily PRN 4/2/2024 --    Admin  "Instructions: Use if no bowel movement after 12 hours.  Swallow whole. Do not crush, split, or chew tablet.    Route: Oral    Linked Group 1: Placed in \"And\" Linked Group        bisacodyl (DULCOLAX) suppository 10 mg 10 mg Daily PRN 4/2/2024 --    Admin Instructions: Use if no bowel movement after 12 hours.  Hold for diarrhea    Route: Rectal    Linked Group 1: Placed in \"And\" Linked Group        Calcium Replacement - Follow Nurse / BPA Driven Protocol  As Needed 4/2/2024 --    Admin Instructions: Open Order & Select \"BHS Electrolyte Replacement Protocol Algorithm\" to View Details    Route: Does not apply    clopidogrel (PLAVIX) tablet 75 mg ([Held by provider] since 4/3/2024 12:01 AM) 75 mg Daily 4/3/2024 --    Route: Oral    colchicine tablet 0.6 mg 0.6 mg Daily 4/4/2024 --    Admin Instructions: Group 2 (Pink) Hazardous Drug - Reproductive Risk Only - See Handling Guide    Route: Oral    DAPTOmycin (CUBICIN) 800 mg in sodium chloride 0.9 % 50 mL IVPB 8 mg/kg × 102 kg (Adjusted) Every 24 Hours 4/3/2024 5/3/2024    Admin Instructions: Caution: Look alike/sound alike drug alert.  Refrigerate. Do not shake.    Route: Intravenous    dextrose (D50W) (25 g/50 mL) IV injection 25 g 25 g Every 15 Minutes PRN 4/3/2024 --    Admin Instructions: Blood sugar less than 70; patient has IV access - Unresponsive, NPO or Unable To Safely Swallow    Route: Intravenous    dextrose (GLUTOSE) oral gel 15 g 15 g Every 15 Minutes PRN 4/3/2024 --    Admin Instructions: BS<70, Patient Alert, Is not NPO, Can safely swallow.    Route: Oral    glipizide (GLUCOTROL) tablet 10 mg 10 mg Every Morning Before Breakfast 4/3/2024 --    Admin Instructions: Caution: Look alike/sound alike drug alert    Route: Oral    glucagon (GLUCAGEN) injection 1 mg 1 mg Every 15 Minutes PRN 4/3/2024 --    Admin Instructions: Blood Glucose Less Than 70 - Patient Without IV Access - Unresponsive, NPO or Unable To Safely Swallow  Reconstitute powder for injection by " adding 1 mL of -supplied sterile diluent or sterile water for injection to a vial containing 1 mg of the drug, to provide solutions containing 1 mg/mL. Shake vial gently to dissolve.    Route: Intramuscular    heparin (porcine) 5000 UNIT/ML injection 5,000 Units 5,000 Units Every 8 Hours Scheduled 4/3/2024 --    Route: Subcutaneous    HYDROcodone-acetaminophen (NORCO) 5-325 MG per tablet 1 tablet 1 tablet Every 4 Hours PRN 4/2/2024 4/11/2024    Admin Instructions: Based on patient request - if ordered for moderate or severe pain, provider allows for administration of a medication prescribed for a lower pain scale.  [CULLEN]    Do not exceed 4 grams of acetaminophen in a 24 hr period. Max dose of 2gm for AST/ALT greater than 120 units/L        If given for pain, use the following pain scale:   Mild Pain = Pain Score of 1-3, CPOT 1-2  Moderate Pain = Pain Score of 4-6, CPOT 3-4  Severe Pain = Pain Score of 7-10, CPOT 5-8    Route: Oral    HYDROmorphone (DILAUDID) injection 0.5 mg 0.5 mg Every 3 Hours PRN 4/4/2024 4/12/2024    Admin Instructions: Based on patient request - if ordered for moderate or severe pain, provider allows for administration of a medication prescribed for a lower pain scale.  If given for pain, use the following pain scale:  Mild Pain = Pain Score of 1-3, CPOT 1-2  Moderate Pain = Pain Score of 4-6, CPOT 3-4  Severe Pain = Pain Score of 7-10, CPOT 5-8    Route: Intravenous    insulin detemir (LEVEMIR) injection 10 Units 10 Units Daily 4/7/2024 --    Admin Instructions: Do not hold basal insulin without an order. Consider requesting a dose edit, if needed.      Route: Subcutaneous    Insulin Lispro (humaLOG) injection 2-7 Units 2-7 Units 4 Times Daily Before Meals & Nightly 4/3/2024 --    Admin Instructions: Correction Insulin - Low Dose - Total Insulin Dose Less Than 40 units/day (Lean, Elderly or Renal Patients)    Blood Glucose 150-199 mg/dL - 2 units  Blood Glucose 200-249 mg/dL - 3  "units  Blood Glucose 250-299 mg/dL - 4 units  Blood Glucose 300-349 mg/dL - 5 units  Blood Glucose 350-400 mg/dL - 6 units  Blood Glucose Greater Than 400 mg/dL - 7 units & Call Provider   Caution: Look alike/sound alike drug alert    Route: Subcutaneous    lisinopril (PRINIVIL,ZESTRIL) tablet 40 mg 40 mg Daily 4/3/2024 --    Admin Instructions: Hold for SBP less than 100, DBP less than 60.    Route: Oral    Magnesium Standard Dose Replacement - Follow Nurse / BPA Driven Protocol  As Needed 4/2/2024 --    Admin Instructions: Open Order & Select \"BHS Electrolyte Replacement Protocol Algorithm\" to View Details    Route: Does not apply    metoprolol succinate XL (TOPROL-XL) 24 hr tablet 100 mg 100 mg Daily 4/3/2024 --    Admin Instructions: Hold for SBP less than 100, DBP less than 60, or heart rate less than 50    Do not crush or chew the capsules or tablets. The drug may not work as designed if the capsule or tablet is crushed or chewed. Swallow whole.  Do not crush or chew.    Route: Oral    Phosphorus Replacement - Follow Nurse / BPA Driven Protocol  As Needed 4/2/2024 --    Admin Instructions: Open Order & Select \"BHS Electrolyte Replacement Protocol Algorithm\" to View Details    Route: Does not apply    pioglitazone (ACTOS) tablet 30 mg 30 mg Daily 4/3/2024 --    Route: Oral    piperacillin-tazobactam (ZOSYN) 3.375 g IVPB in 100 mL NS MBP (CD) 3.375 g Every 8 Hours 4/3/2024 4/10/2024    Route: Intravenous    polyethylene glycol (MIRALAX) packet 17 g 17 g Daily PRN 4/2/2024 --    Admin Instructions: Use if no bowel movement after 12 hours. Mix in 6-8 ounces of water.  Use 4-8 ounces of water, tea, or juice for each 17 gram dose.    Route: Oral    Linked Group 1: Placed in \"And\" Linked Group        Potassium Replacement - Follow Nurse / BPA Driven Protocol  As Needed 4/2/2024 --    Admin Instructions: Open Order & Select \"BHS Electrolyte Replacement Protocol Algorithm\" to View Details    Route: Does not apply    " "predniSONE (DELTASONE) tablet 40 mg 40 mg Daily With Breakfast 4/7/2024 --    Admin Instructions: Take with food.    Route: Oral    sennosides-docusate (PERICOLACE) 8.6-50 MG per tablet 2 tablet 2 tablet 2 Times Daily PRN 4/2/2024 --    Admin Instructions: Start bowel management regimen if patient has not had a bowel movement after 12 hours.    Route: Oral    Linked Group 1: Placed in \"And\" Linked Group        sodium chloride 0.9 % flush 10 mL 10 mL Every 12 Hours Scheduled 4/3/2024 --    Route: Intravenous    sodium chloride 0.9 % flush 10 mL 10 mL As Needed 4/2/2024 --    Route: Intravenous    sodium chloride 0.9 % infusion 40 mL 40 mL As Needed 4/2/2024 --    Admin Instructions: Following administration of an IV intermittent medication, flush line with 40mL NS at 100mL/hr.    Route: Intravenous              Assessment & Plan     58-year-old male with multiple medical comorbidities, diabetes mellitus with right foot wound/infection status post right second and third ray amputation with wound vacuum-assisted closure April 8, 2024.      Osteomyelitis    Lymphoma    Type 2 diabetes mellitus with hyperglycemia    Diabetic foot ulcer    History of myalgia due to HMG CoA reductase inhibitor    Hypertensive disorder    Hyperlipidemia    Microalbuminuric diabetic nephropathy    Stage 3a chronic kidney disease    Nonweightbearing right lower extremity  Every 72 hour wound vacuum-assisted closure changes beginning April 11.  He will need outpatient wound vacuum-assisted closure therapy, likely follow-up with PT wound care.  Follow intraoperative cultures.      Jeremi Hays Jr, MD  04/09/24  06:08 EDT          "

## 2024-04-09 NOTE — PLAN OF CARE
Goal Outcome Evaluation:               Pain well controlled overnight, NSR, CPAP while asleep. No adverse issues. Minimal drainage to wound vac.

## 2024-04-09 NOTE — THERAPY RE-EVALUATION
Acute Care - Wound/Debridement Initial Evaluation  [facility]     Patient Name: Alvaro Sinha  : 1965  MRN: [id number]  Today's Date: 2024                Admit Date: 2024    Visit Dx:    ICD-10-CM ICD-9-CM   1. Osteomyelitis of right foot, unspecified type  M86.9 730.27   2. Cellulitis of right foot  L03.115 682.7   3. Diabetic ulcer of right foot associated with other specified diabetes mellitus, unspecified part of foot, unspecified ulcer stage  E13.621 250.80    L97.519 707.15   4. History of diabetes mellitus  Z86.39 V12.29       Patient Active Problem List   Diagnosis    Nephrolithiasis    Lymphadenopathy    Lymphoma    Osteomyelitis    Type 2 diabetes mellitus with hyperglycemia    Elevated serum immunoglobulin free light chains    Elevated erythrocyte sedimentation rate    Diabetic foot ulcer    History of myalgia due to HMG CoA reductase inhibitor    Gout    Hypertensive disorder    Hyperlipidemia    Hypercalcemia    Microalbuminuric diabetic nephropathy    Peripheral angiopathy due to diabetes mellitus    Stage 3a chronic kidney disease        Past Medical History:   Diagnosis Date    Diabetes mellitus     Hypertension         Past Surgical History:   Procedure Laterality Date    BONE RESECTION, RIB      TRANS METATARSAL AMPUTATION Right 2024    Procedure: AMPUTATION TRANS METATARSAL RIGHT;  Surgeon: Jeremi Hays Jr., MD;  Location: [facility];  Service: Orthopedics;  Laterality: Right;           Wound 24 1630 Right second toe Amputation stump (Active)   Dressing Appearance dry;intact 24 1000   Closure Unable to assess 24   Base dressing in place, unable to visualize 24 1000   Wound Length (cm) 3 cm 24 1000   Wound Width (cm) 4 cm 24 1000   Wound Depth (cm) 3 cm 24 1000   Wound Surface Area (cm^2) 12 cm^2 24 1000   Wound Volume (cm^3) 36 cm^3 24 1000   Drainage Amount none 24   Care, Wound negative pressure  wound therapy 04/08/24 2000   Dressing Care other (see comments) 04/08/24 1724   Periwound Care dry periwound area maintained 04/09/24 0800   Wound Output (mL) 10 04/09/24 1000       NPWT (Negative Pressure Wound Therapy) 04/08/24 1705 Right foot (Active)   Therapy Setting continuous therapy 04/09/24 1000   Dressing unable to visualize 04/09/24 0800   Pressure Setting 125 mmHg 04/09/24 1000         WOUND DEBRIDEMENT                     PT Assessment (Last 12 Hours)       PT Evaluation and Treatment       Row Name 04/09/24 1000          Physical Therapy Time and Intention    Subjective Information no complaints  -MF     Document Type evaluation;therapy note (daily note);wound care  -MF     Mode of Treatment individual therapy;physical therapy  -       Row Name 04/09/24 1000          General Information    Patient Profile Reviewed yes  -MF     Patient Observations alert;cooperative;agree to therapy  -     Pertinent History of Current Functional Problem wound vac to foot s/p surgical debridement.  -MF     Risks Reviewed patient:;increased discomfort  -     Benefits Reviewed patient:;improve skin integrity  -     Barriers to Rehab previous functional deficit;medically complex  -       Row Name 04/09/24 1000          Pain Scale: FACES Pre/Post-Treatment    Pain: FACES Scale, Pretreatment 0-->no hurt  -MF     Posttreatment Pain Rating 0-->no hurt  -MF     Pain Location - foot  -MF       Row Name 04/09/24 1000          Cognition    Affect/Mental Status (Cognition) WNL  -MF       Row Name 04/09/24 1000          Wound 04/08/24 1630 Right second toe Amputation stump    Wound - Properties Group Placement Date: 04/08/24  -NH Placement Time: 1630  -NH Present on Original Admission: N  -NH Side: Right  -NH Location: second toe  -NH Primary Wound Type: Amputation s  -NH Additional Comments: 2nd and 3rd toe amputation  -NH    Dressing Appearance dry;intact  -MF     Base dressing in place, unable to visualize  -      Wound Length (cm) 3 cm  -MF     Wound Width (cm) 4 cm  -MF     Wound Depth (cm) 3 cm  -MF     Wound Surface Area (cm^2) 12 cm^2  -MF     Wound Volume (cm^3) 36 cm^3  -MF     Wound Output (mL) 10  -MF     Retired Wound - Properties Group Placement Date: 04/08/24  -NH Placement Time: 1630  -NH Present on Original Admission: N  -NH Side: Right  -NH Location: second toe  -NH Primary Wound Type: Amputation s  -NH Additional Comments: 2nd and 3rd toe amputation  -NH    Retired Wound - Properties Group Date first assessed: 04/08/24  -NH Time first assessed: 1630  -NH Present on Original Admission: N  -NH Side: Right  -NH Location: second toe  -NH Primary Wound Type: Amputation s  -NH Additional Comments: 2nd and 3rd toe amputation  -NH      Row Name 04/09/24 1000          NPWT (Negative Pressure Wound Therapy) 04/08/24 1705 Right foot    NPWT (Negative Pressure Wound Therapy) - Properties Group Placement Date: 04/08/24  -NH Placement Time: 1705  -NH Location: Right foot  -NH Additional Comments: wound vac  -NH    Therapy Setting continuous therapy  -     Pressure Setting 125 mmHg  -     Retired NPWT (Negative Pressure Wound Therapy) - Properties Group Placement Date: 04/08/24  -NH Placement Time: 1705  -NH Location: Right foot  -NH Additional Comments: wound vac  -NH    Retired NPWT (Negative Pressure Wound Therapy) - Properties Group Placement Date: 04/08/24  -NH Placement Time: 1705  -NH Location: Right foot  -NH Additional Comments: wound vac  -NH      Row Name 04/09/24 1000          Coping    Observed Emotional State calm;cooperative  -     Verbalized Emotional State acceptance  -     Trust Relationship/Rapport care explained  -       Row Name 04/09/24 1000          Plan of Care Review    Plan of Care Reviewed With patient  -     Outcome Evaluation Pt presents with wound vac to foot s/p surgical debridement. Pt with possible surgery later this week for potential closure per pt, but PT will f/u daily for vac  checks and change in 2-3 days depending on Md's plan for further surgery.  -       Row Name 04/09/24 1000          Positioning and Restraints    Pre-Treatment Position in bed  -     Post Treatment Position bed  -     In Bed supine;call light within reach  -       Row Name 04/09/24 1000          Therapy Assessment/Plan (PT)    Patient/Family Therapy Goals Statement (PT) wound healing to go home.  -     PT Diagnosis (PT) R foot wound s/p surgical debridement  -     Rehab Potential (PT) fair, will monitor progress closely  -     Criteria for Skilled Interventions Met (PT) yes;meets criteria;skilled treatment is necessary  -       Row Name 04/09/24 1000          Physical Therapy Goals    Wound Care Goal Selection (PT) wound care, PT goal 1  -       Row Name 04/09/24 1000          Wound Care Goal 1 (PT)    Wound Care Goal 1 (PT) PT to obtain home wound vac to allow for d/c home with OP f/u  -     Time Frame (Wound Care Goal 1, PT) 10 days  -               User Key  (r) = Recorded By, (t) = Taken By, (c) = Cosigned By      Initials Name Provider Type    Jesus Bernard, PT Physical Therapist    Adwoa Finch, RN Registered Nurse                  Physical Therapy Education       Title: PT OT SLP Therapies (Done)       Topic: Physical Therapy (Done)       Point: Mobility training (Done)       Learning Progress Summary             Patient Acceptance, E, VU by KM at 4/7/2024 0023    Acceptance, E,TB, VU by TB at 4/5/2024 1410    Acceptance, E,TB, VU by TB at 4/3/2024 1520                         Point: Home exercise program (Done)       Learning Progress Summary             Patient Acceptance, E, VU by KM at 4/7/2024 0023    Acceptance, E,TB, VU by TB at 4/5/2024 1410    Acceptance, E,TB, VU by TB at 4/3/2024 1520                         Point: Body mechanics (Done)       Learning Progress Summary             Patient Acceptance, E, VU by KM at 4/7/2024 0023    Acceptance, E,TB, VU by TB at  4/5/2024 1410    Acceptance, E,TB, VU by TB at 4/3/2024 1520                         Point: Precautions (Done)       Learning Progress Summary             Patient Acceptance, E, VU by  at 4/7/2024 0023    Acceptance, E,TB, VU by TB at 4/5/2024 1410    Acceptance, E,TB, VU by TB at 4/3/2024 1520                                         User Key       Initials Effective Dates Name Provider Type Discipline     06/16/21 -  Sarita Bui, RN Registered Nurse Nurse    TB 01/16/24 -  Lexi Grady, PT Student PT Student PT                    Recommendation and Plan  Anticipated Equipment Needs at Discharge (PT): front wheeled walker, crutches  Anticipated Discharge Disposition (PT): home with assist, home with home health  Planned Therapy Interventions (PT): wound care, patient/family education  Therapy Frequency (PT): daily  Plan of Care Reviewed With: patient           Outcome Evaluation: Pt presents with wound vac to foot s/p surgical debridement. Pt with possible surgery later this week for potential closure per pt, but PT will f/u daily for vac checks and change in 2-3 days depending on Md's plan for further surgery.  Plan of Care Reviewed With: patient            Time Calculation   PT Charges       Row Name 04/09/24 1000             Time Calculation    Start Time 1000  -MF      PT Goal Re-Cert Due Date 04/13/24  -MF         Untimed Charges    PT Eval/Re-eval Minutes 25  -MF      Wound Care 92377 Neg Press (DME) wound TO 50 sqcm  -MF      [id number] Pressure wound to 50 sqcm 25  -MF         Total Minutes    Untimed Charges Total Minutes 50  -MF       Total Minutes 50  -MF                User Key  (r) = Recorded By, (t) = Taken By, (c) = Cosigned By      Initials Name Provider Type    Jesus Bernard, PT Physical Therapist                            PT G-Codes  Outcome Measure Options: AM-PAC 6 Clicks Basic Mobility (PT)  AM-PAC 6 Clicks Score (PT): 18       Jesus Mares, PT  4/9/2024

## 2024-04-09 NOTE — PROGRESS NOTES
[facility]    INFECTIOUS DISEASE CONSULT/INITIAL HOSPITAL VISIT    Alvaro Sinha  1965  [id number]    Date of consult: 4/3/2024    Admit date: 4/2/2024    Requesting Provider: No ref. provider found  Evaluating physician: Aung Jameson MD  Reason for Consultation: Right foot diabetic wound ulcers with underlying osteomyelitis second and third toe  Chief Complaint: Above      Subjective   History of present illness:  Patient is a  58 y.o.  Yr old male with a history of diabetes mellitus type 2, essential hypertension, hyperlipidemia, CKD stage IIIa, gout, with chronic right foot ulceration being followed at the [facility] which worsened 3/31/2024.  He has been using a boot to offload his foot, but was also working at [employer], although he has been off work recently.  MRI of the foot 4/2/2024 was consistent with possible early osteomyelitis second and third middle and distal phalanges.  He has a plantar draining fistulous track below his second and third metatarsal and an anterior web skin breakdown between his second and third toe on his foot.  He was awaiting possible transfer to the [facility] for further orthopedic evaluation.  He has no other localizing signs or symptoms of infection.  I was consulted on 4/3/2024 for further evaluation and treatment.    4/4/2024 history reviewed.  No high fevers or chills.  Tolerating antibiotics for right foot osteomyelitis.    4/5/2024 history reviewed.  Tolerating his antibiotics for right foot osteomyelitis second and third metatarsal.  No high fever.  Awaits surgical disposition.    4/8/2024 history reviewed.  No high fever.  Tolerating antibiotics for right foot second and third toe osteomyelitis.  Awaits possible second and third metatarsal resection by surgery.    4/9/24 history reviewed.  Status post ray resection second and third right toe by Dr. Hays on 4/8/2024.  No high fever.  Tolerating  antibiotics.  Changing to ceftriaxone and daptomycin for Streptococcus, Eikenella (should be sensitive to ceftriaxone).    Past Medical History:   Diagnosis Date    Diabetes mellitus     Hypertension        Past Surgical History:   Procedure Laterality Date    BONE RESECTION, RIB         Pediatric History   Patient Parents    Not on file     Other Topics Concern    Not on file   Social History Narrative    Not on file   No cigarettes, occasional alcohol, no drug use,  to [facility] with 1 child.  Works at [employer].    family history is not on file.    Allergies   Allergen Reactions    Statins Myalgia       Immunization History   Administered Date(s) Administered    COVID-19 (MODERNA) 1st,2nd,3rd Dose Monovalent 09/23/2021, 11/02/2021       Medication:  @Scheduled Meds:colchicine, 0.6 mg, Oral, Daily  DAPTOmycin, 8 mg/kg (Adjusted), Intravenous, Q24H  glipizide, 10 mg, Oral, QAM AC  heparin (porcine), 5,000 Units, Subcutaneous, Q8H  insulin detemir, 10 Units, Subcutaneous, Daily  insulin regular, 3-14 Units, Subcutaneous, Q6H  lisinopril, 40 mg, Oral, Daily  metoprolol succinate XL, 100 mg, Oral, Daily  pioglitazone, 30 mg, Oral, Daily  predniSONE, 20 mg, Oral, Daily With Breakfast  sodium chloride, 10 mL, Intravenous, Q12H      Continuous Infusions:lactated ringers, 9 mL/hr, Last Rate: 9 mL/hr (04/08/24 1515)      PRN Meds:.  acetaminophen    senna-docusate sodium **AND** polyethylene glycol **AND** bisacodyl **AND** bisacodyl    Calcium Replacement - Follow Nurse / BPA Driven Protocol    dextrose    dextrose    glucagon (human recombinant)    HYDROcodone-acetaminophen    HYDROmorphone    Magnesium Standard Dose Replacement - Follow Nurse / BPA Driven Protocol    Morphine    oxyCODONE    Phosphorus Replacement - Follow Nurse / BPA Driven Protocol    Potassium Replacement - Follow Nurse / BPA Driven Protocol    sodium chloride    sodium chloride     Please refer to the medical record for a full medication  "list    Review of Systems:    Constitutional-- No Fever, chills or sweats.  Appetite good, and no malaise. No fatigue.  HEENT-- No new vision, hearing or throat complaints.  No epistaxis or oral sores.  Denies odynophagia or dysphagia.  No odynophagia or dysphagia. No headache, photophobia or neck stiffness.  CV-- No chest pain, palpitation or syncope  Resp-- No SOB/cough/Hemoptysis  GI- No nausea, vomiting, or diarrhea.  No hematochezia, melena, or hematemesis. Denies jaundice or chronic liver disease.  -- No dysuria, hematuria, or flank pain.  Denies hesitancy, urgency.  Lymph- no swollen lymph nodes in neck/axilla or groin.   Heme- No active bruising or bleeding; no Hx of DVT or PE.  MS-- no swelling or pain in the bones or joints of arms/legs.  No new back pain.  Neuro-- No acute focal weakness or numbness in the arms or legs.  No seizures.  Skin--No rashes or lesions, except right foot as per HPI,    Physical Exam:   Vital Signs   Temp:  [97.2 °F (36.2 °C)-98.5 °F (36.9 °C)] 98.3 °F (36.8 °C)  Heart Rate:  [51-81] 68  Resp:  [16-18] 16  BP: (131-185)/() 131/79    Blood pressure 131/79, pulse 68, temperature 98.3 °F (36.8 °C), temperature source Oral, resp. rate 16, height 185.4 cm (72.99\"), weight 136 kg (300 lb), SpO2 96%.  GENERAL: Awake and alert, in minimal distress. Appears  stated age.  Resting in bed.  Visiting with his wife.  HEENT:  Normocephalic, atraumatic.  Oropharynx without thrush. Dentition in good repair. No cervical adenopathy. No neck masses.  Ears externally normal, Nose externally normal.  EYES:  No conjunctival injection. No icterus. EOM full.  LYMPHATICS: No lymphadenopathy of the neck or axillary or inguinal regions.   HEART: No murmur, gallop, or pericardial friction rub. Reg rate rhythm.  LUNGS: Clear to auscultation and percussion. No respiratory distress, no use of accessory muscles.  ABDOMEN: Soft, nontender, nondistended. No appreciable HSM.  Bowel sounds normal.  Obese.  No " "mass.  SKIN: Warm and dry without cutaneous eruptions.  No nodules.  Right foot surgical dressing in place.  PSYCHIATRIC: Mental status lucid. No confusion.  EXT:  No cellulitic change.  NEURO: Oriented to name, nonfocal.              4/3/24 right foot wounds prior to ray resections 4/8    Results Review:   I reviewed the patient's new clinical results.  I reviewed the patient's new imaging results and agree with the interpretation.  I reviewed the patient's other test results and agree with the interpretation    Results from last 7 days   Lab Units 04/09/24  0527 04/08/24  0616 04/07/24  0452   WBC 10*3/mm3 4.97 4.82 4.13   HEMOGLOBIN g/dL 11.0* 10.5* 10.9*   HEMATOCRIT % 33.2* 31.9* 33.9*   PLATELETS 10*3/mm3 204 203 201     Results from last 7 days   Lab Units 04/09/24  0527   SODIUM mmol/L 138   POTASSIUM mmol/L 4.6   CHLORIDE mmol/L 105   CO2 mmol/L 23.0   BUN mg/dL 27*   CREATININE mg/dL 1.35*   GLUCOSE mg/dL 195*   CALCIUM mg/dL 9.0     Results from last 7 days   Lab Units 04/08/24  0616   ALK PHOS U/L 131*   BILIRUBIN mg/dL 0.3   ALT (SGPT) U/L 32   AST (SGOT) U/L 30     Results from last 7 days   Lab Units 04/05/24  0547   SED RATE mm/hr 98*     Results from last 7 days   Lab Units 04/08/24  0616   CRP mg/dL 3.40*         Results from last 7 days   Lab Units 04/02/24  1832   LACTATE mmol/L 1.3     Estimated Creatinine Clearance: 86 mL/min (A) (by C-G formula based on SCr of 1.35 mg/dL (H)).  CPK          4/6/2024    04:56   Common Labs   Creatine Kinase 36       Procalitonin Results:       Brief Urine Lab Results       None           No results found for: \"SITE\", \"ALLENTEST\", \"PHART\", \"SLG5GSH\", \"PO2ART\", \"NFX3DJI\", \"BASEEXCESS\", \"F2OAGRXI\", \"HGBBG\", \"HCTABG\", \"OXYHEMOGLOBI\", \"METHHGBN\", \"CARBOXYHGB\", \"CO2CT\", \"BAROMETRIC\", \"MODALITY\", \"FIO2\"     Microbiology:    Right foot wound culture 4/2/2024 with gram-positive cocci, gram-negative rods on Gram stain, MRSA screen negative, blood cultures x 2 from 4/2 " negative.  Streptococcus, group B strep, Eikenella.    Results for orders placed or performed during the hospital encounter of 04/02/24   Blood Culture - Blood, Arm, Right    Specimen: Arm, Right; Blood   Result Value Ref Range    Blood Culture No growth at 5 days          Culture results from last 30 days   Lab 04/02/24  1900   WOUNDCX Heavy growth (4+) Streptococcus agalactiae (Group B)*  Heavy growth (4+) Streptococcus mitis / oralis*  Light growth (2+) Eikenella corrodens*  Moderate growth (3+) Normal Skin Terri      Brief Urine Lab Results       None        Blood cultures x 2 from 4/2 are negative.  Wound culture right foot growing group B streptococcus to date, MRSA screen negative.    Radiology:  Imaging Results (Last 72 Hours)       ** No results found for the last 72 hours. **            IMPRESSION:     Second and third distal and middle phalanges MRI changes with bone marrow edema in the setting of diabetes and chronic wounds of the right foot with increased pretest probability for underlying osteomyelitis, versus reactive edema.  I would favor osteomyelitis given the way his right foot looks with a fistulous tract that probes deep to bone.  No erosions in the cortical structure noted.  Usual organisms will be mixed terri.  MRSA screen negative, wound culture Gram stain with gram-negative rods and gram-positive cocci, blood cultures x 2 from 4/2 are negative to date.  Group B streptococcus growing as of 4/4/2024, and Streptococcus mitis.  Status post right second and third ray resection 4/8/2024.  Dr. Hays.  Right foot cellulitis and wound infection associated with diabetes, despite use of a surgical boot.  Diabetes mellitus type 2 with increased risk for infection.  Chronic kidney disease stage IIIa, creatinine 1.18 on 4/3/2024.  1.23 on 4/4/2024.  1.47 on 4/5.  1.26 on 4/8.  1.35 on 4/9.  Anemia, chronic disease.  Ongoing.    PLAN:    Diagnostically, continue to follow patient's physical exam,  CBC, CMP, CRP.    Therapeutically, changed to daptomycin and ceftriaxone in anticipation of outpatient therapy.  This would provide coverage for Streptococcus and Eikenella.  Await further surgical cultures.  Supportive care.  Wound care.  Nonweightbearing on right foot.  Status post third and second toe ray resection 4/8/2024..      I discussed the patient's findings and my recommendations with patient, family, and nursing staff    Case management orders: Please arrange for home antibiotics with [facility] with daptomycin 700 mg IV daily, ceftriaxone 2 g IV daily to continue until 5/22/2024.  Check CBC, CMP, CRP, CPK weekly while on IV antibiotics.  Fax orders to [phone], call [phone] with final arrangements.  Arrange for follow-up with me in 1 week postdischarge.    Thank you for asking me to see Alvaro Sinha.  Our group would be pleased to follow this patient over the course of their hospitalization and assist with outpatient antimicrobial therapy, as indicated.  Further recommendations depend on the results of the cultures and clinical course.  Increased risk for adverse drug reactions, complications of IV access, readmission, loss of limb.  Side effects discussed.      Aung Jameson MD  4/9/2024

## 2024-04-09 NOTE — PLAN OF CARE
Goal Outcome Evaluation:         Wound vac R foot for 2nd and 3rd toe amp, very minimal output. Nonweightbearing R foot, pt used BSC with 1 assistance today. Pt will be here for several days per MDR this AM. A/O. RA, CPAP at night.

## 2024-04-10 LAB
ALBUMIN SERPL-MCNC: 3.8 G/DL (ref 3.5–5.2)
ALBUMIN/GLOB SERPL: 1.3 G/DL
ALP SERPL-CCNC: 126 U/L (ref 39–117)
ALT SERPL W P-5'-P-CCNC: 53 U/L (ref 1–41)
ANION GAP SERPL CALCULATED.3IONS-SCNC: 10 MMOL/L (ref 5–15)
AST SERPL-CCNC: 48 U/L (ref 1–40)
BASOPHILS # BLD AUTO: 0.03 10*3/MM3 (ref 0–0.2)
BASOPHILS NFR BLD AUTO: 0.6 % (ref 0–1.5)
BILIRUB SERPL-MCNC: 0.2 MG/DL (ref 0–1.2)
BUN SERPL-MCNC: 26 MG/DL (ref 6–20)
BUN/CREAT SERPL: 22.2 (ref 7–25)
CALCIUM SPEC-SCNC: 8.9 MG/DL (ref 8.6–10.5)
CHLORIDE SERPL-SCNC: 105 MMOL/L (ref 98–107)
CK SERPL-CCNC: 21 U/L (ref 20–200)
CO2 SERPL-SCNC: 24 MMOL/L (ref 22–29)
CREAT SERPL-MCNC: 1.17 MG/DL (ref 0.76–1.27)
CYTO UR: NORMAL
DEPRECATED RDW RBC AUTO: 43.8 FL (ref 37–54)
EGFRCR SERPLBLD CKD-EPI 2021: 72.3 ML/MIN/1.73
EOSINOPHIL # BLD AUTO: 0.1 10*3/MM3 (ref 0–0.4)
EOSINOPHIL NFR BLD AUTO: 1.9 % (ref 0.3–6.2)
ERYTHROCYTE [DISTWIDTH] IN BLOOD BY AUTOMATED COUNT: 12.7 % (ref 12.3–15.4)
GLOBULIN UR ELPH-MCNC: 2.9 GM/DL
GLUCOSE BLDC GLUCOMTR-MCNC: 136 MG/DL (ref 70–130)
GLUCOSE BLDC GLUCOMTR-MCNC: 193 MG/DL (ref 70–130)
GLUCOSE BLDC GLUCOMTR-MCNC: 249 MG/DL (ref 70–130)
GLUCOSE BLDC GLUCOMTR-MCNC: 260 MG/DL (ref 70–130)
GLUCOSE SERPL-MCNC: 140 MG/DL (ref 65–99)
HCT VFR BLD AUTO: 35.5 % (ref 37.5–51)
HGB BLD-MCNC: 11.4 G/DL (ref 13–17.7)
IMM GRANULOCYTES # BLD AUTO: 0.19 10*3/MM3 (ref 0–0.05)
IMM GRANULOCYTES NFR BLD AUTO: 3.7 % (ref 0–0.5)
LAB AP CASE REPORT: NORMAL
LAB AP CLINICAL INFORMATION: NORMAL
LYMPHOCYTES # BLD AUTO: 1.31 10*3/MM3 (ref 0.7–3.1)
LYMPHOCYTES NFR BLD AUTO: 25.5 % (ref 19.6–45.3)
MCH RBC QN AUTO: 30.2 PG (ref 26.6–33)
MCHC RBC AUTO-ENTMCNC: 32.1 G/DL (ref 31.5–35.7)
MCV RBC AUTO: 93.9 FL (ref 79–97)
MONOCYTES # BLD AUTO: 0.33 10*3/MM3 (ref 0.1–0.9)
MONOCYTES NFR BLD AUTO: 6.4 % (ref 5–12)
NEUTROPHILS NFR BLD AUTO: 3.17 10*3/MM3 (ref 1.7–7)
NEUTROPHILS NFR BLD AUTO: 61.9 % (ref 42.7–76)
NRBC BLD AUTO-RTO: 0 /100 WBC (ref 0–0.2)
PATH REPORT.FINAL DX SPEC: NORMAL
PATH REPORT.GROSS SPEC: NORMAL
PLATELET # BLD AUTO: 221 10*3/MM3 (ref 140–450)
PMV BLD AUTO: 10.2 FL (ref 6–12)
POTASSIUM SERPL-SCNC: 4.5 MMOL/L (ref 3.5–5.2)
PROT SERPL-MCNC: 6.7 G/DL (ref 6–8.5)
RBC # BLD AUTO: 3.78 10*6/MM3 (ref 4.14–5.8)
SODIUM SERPL-SCNC: 139 MMOL/L (ref 136–145)
WBC NRBC COR # BLD AUTO: 5.13 10*3/MM3 (ref 3.4–10.8)

## 2024-04-10 PROCEDURE — 82948 REAGENT STRIP/BLOOD GLUCOSE: CPT

## 2024-04-10 PROCEDURE — 82550 ASSAY OF CK (CPK): CPT | Performed by: INTERNAL MEDICINE

## 2024-04-10 PROCEDURE — 63710000001 PREDNISONE PER 1 MG: Performed by: ORTHOPAEDIC SURGERY

## 2024-04-10 PROCEDURE — 63710000001 INSULIN DETEMIR PER 5 UNITS: Performed by: ORTHOPAEDIC SURGERY

## 2024-04-10 PROCEDURE — 94660 CPAP INITIATION&MGMT: CPT

## 2024-04-10 PROCEDURE — 97605 NEG PRS WND THER DME<=50SQCM: CPT

## 2024-04-10 PROCEDURE — 25010000002 CEFTRIAXONE PER 250 MG: Performed by: INTERNAL MEDICINE

## 2024-04-10 PROCEDURE — 25010000002 DAPTOMYCIN PER 1 MG: Performed by: ORTHOPAEDIC SURGERY

## 2024-04-10 PROCEDURE — 63710000001 INSULIN LISPRO (HUMAN) PER 5 UNITS: Performed by: NURSE PRACTITIONER

## 2024-04-10 PROCEDURE — 80053 COMPREHEN METABOLIC PANEL: CPT | Performed by: INTERNAL MEDICINE

## 2024-04-10 PROCEDURE — 85025 COMPLETE CBC W/AUTO DIFF WBC: CPT | Performed by: INTERNAL MEDICINE

## 2024-04-10 PROCEDURE — 94799 UNLISTED PULMONARY SVC/PX: CPT

## 2024-04-10 PROCEDURE — 99232 SBSQ HOSP IP/OBS MODERATE 35: CPT | Performed by: NURSE PRACTITIONER

## 2024-04-10 PROCEDURE — 25010000002 HEPARIN (PORCINE) PER 1000 UNITS: Performed by: ORTHOPAEDIC SURGERY

## 2024-04-10 RX ADMIN — INSULIN LISPRO 6 UNITS: 100 INJECTION, SOLUTION INTRAVENOUS; SUBCUTANEOUS at 20:40

## 2024-04-10 RX ADMIN — INSULIN LISPRO 2 UNITS: 100 INJECTION, SOLUTION INTRAVENOUS; SUBCUTANEOUS at 12:34

## 2024-04-10 RX ADMIN — GLIPIZIDE 10 MG: 10 TABLET ORAL at 09:07

## 2024-04-10 RX ADMIN — INSULIN DETEMIR 10 UNITS: 100 INJECTION, SOLUTION SUBCUTANEOUS at 09:05

## 2024-04-10 RX ADMIN — INSULIN LISPRO 4 UNITS: 100 INJECTION, SOLUTION INTRAVENOUS; SUBCUTANEOUS at 17:11

## 2024-04-10 RX ADMIN — CEFTRIAXONE 2000 MG: 2 INJECTION, POWDER, FOR SOLUTION INTRAMUSCULAR; INTRAVENOUS at 20:38

## 2024-04-10 RX ADMIN — METOPROLOL SUCCINATE 100 MG: 50 TABLET, EXTENDED RELEASE ORAL at 09:08

## 2024-04-10 RX ADMIN — HEPARIN SODIUM 5000 UNITS: 5000 INJECTION INTRAVENOUS; SUBCUTANEOUS at 05:09

## 2024-04-10 RX ADMIN — LISINOPRIL 40 MG: 40 TABLET ORAL at 09:11

## 2024-04-10 RX ADMIN — HEPARIN SODIUM 5000 UNITS: 5000 INJECTION INTRAVENOUS; SUBCUTANEOUS at 17:11

## 2024-04-10 RX ADMIN — Medication 10 ML: at 20:44

## 2024-04-10 RX ADMIN — PREDNISONE 20 MG: 20 TABLET ORAL at 09:11

## 2024-04-10 RX ADMIN — DAPTOMYCIN 800 MG: 500 INJECTION, POWDER, LYOPHILIZED, FOR SOLUTION INTRAVENOUS at 12:30

## 2024-04-10 RX ADMIN — Medication 10 ML: at 09:13

## 2024-04-10 RX ADMIN — COLCHICINE 0.6 MG: 0.6 TABLET ORAL at 09:11

## 2024-04-10 RX ADMIN — HEPARIN SODIUM 5000 UNITS: 5000 INJECTION INTRAVENOUS; SUBCUTANEOUS at 20:40

## 2024-04-10 RX ADMIN — PIOGLITAZONE 30 MG: 15 TABLET ORAL at 09:12

## 2024-04-10 NOTE — PROGRESS NOTES
[facility]    INFECTIOUS DISEASE CONSULT/INITIAL HOSPITAL VISIT    Alvaro Sinha  1965  [id number]    Date of consult: 4/3/2024    Admit date: 4/2/2024    Requesting Provider: No ref. provider found  Evaluating physician: Aung Jameson MD  Reason for Consultation: Right foot diabetic wound ulcers with underlying osteomyelitis second and third toe  Chief Complaint: Above      Subjective   History of present illness:  Patient is a  58 y.o.  Yr old male with a history of diabetes mellitus type 2, essential hypertension, hyperlipidemia, CKD stage IIIa, gout, with chronic right foot ulceration being followed at the [facility] which worsened 3/31/2024.  He has been using a boot to offload his foot, but was also working at [employer], although he has been off work recently.  MRI of the foot 4/2/2024 was consistent with possible early osteomyelitis second and third middle and distal phalanges.  He has a plantar draining fistulous track below his second and third metatarsal and an anterior web skin breakdown between his second and third toe on his foot.  He was awaiting possible transfer to the [facility] for further orthopedic evaluation.  He has no other localizing signs or symptoms of infection.  I was consulted on 4/3/2024 for further evaluation and treatment.    4/4/2024 history reviewed.  No high fevers or chills.  Tolerating antibiotics for right foot osteomyelitis.    4/5/2024 history reviewed.  Tolerating his antibiotics for right foot osteomyelitis second and third metatarsal.  No high fever.  Awaits surgical disposition.    4/8/2024 history reviewed.  No high fever.  Tolerating antibiotics for right foot second and third toe osteomyelitis.  Awaits possible second and third metatarsal resection by surgery.    4/9/24 history reviewed.  Status post ray resection second and third right toe by Dr. Hays on 4/8/2024.  No high fever.  Tolerating  antibiotics.  Changing to ceftriaxone and daptomycin for Streptococcus, Eikenella (should be sensitive to ceftriaxone).    4/10/24 history reviewed.  Tolerating antibiotics for right foot osteomyelitis with daptomycin and ceftriaxone until 5/22.  No respiratory complaints or pain.  No fever.    Past Medical History:   Diagnosis Date    Diabetes mellitus     Hypertension        Past Surgical History:   Procedure Laterality Date    BONE RESECTION, RIB      TRANS METATARSAL AMPUTATION Right 4/8/2024    Procedure: AMPUTATION TRANS METATARSAL RIGHT;  Surgeon: Jeremi Hays Jr., MD;  Location: [facility];  Service: Orthopedics;  Laterality: Right;       Pediatric History   Patient Parents    Not on file     Other Topics Concern    Not on file   Social History Narrative    Not on file   No cigarettes, occasional alcohol, no drug use,  to [facility] with 1 child.  Works at [employer].    family history is not on file.    Allergies   Allergen Reactions    Statins Myalgia       Immunization History   Administered Date(s) Administered    COVID-19 (MODERNA) 1st,2nd,3rd Dose Monovalent 09/23/2021, 11/02/2021       Medication:  @Scheduled Meds:cefTRIAXone, 2,000 mg, Intravenous, Q24H  colchicine, 0.6 mg, Oral, Daily  DAPTOmycin, 8 mg/kg (Adjusted), Intravenous, Q24H  glipizide, 10 mg, Oral, QAM AC  heparin (porcine), 5,000 Units, Subcutaneous, Q8H  insulin detemir, 10 Units, Subcutaneous, Daily  insulin lispro, 2-9 Units, Subcutaneous, 4x Daily AC & at Bedtime  lisinopril, 40 mg, Oral, Daily  metoprolol succinate XL, 100 mg, Oral, Daily  pioglitazone, 30 mg, Oral, Daily  predniSONE, 20 mg, Oral, Daily With Breakfast  sodium chloride, 10 mL, Intravenous, Q12H      Continuous Infusions:lactated ringers, 9 mL/hr, Last Rate: 9 mL/hr (04/08/24 4755)  Pharmacy Consult,       PRN Meds:.  acetaminophen    senna-docusate sodium **AND** polyethylene glycol **AND** bisacodyl **AND** bisacodyl    Calcium Replacement - Follow Nurse /  "BPA Driven Protocol    dextrose    dextrose    glucagon (human recombinant)    HYDROcodone-acetaminophen    HYDROmorphone    Magnesium Standard Dose Replacement - Follow Nurse / BPA Driven Protocol    Morphine    oxyCODONE    Pharmacy Consult    Phosphorus Replacement - Follow Nurse / BPA Driven Protocol    Potassium Replacement - Follow Nurse / BPA Driven Protocol    sodium chloride    sodium chloride     Please refer to the medical record for a full medication list    Review of Systems:    Constitutional-- No Fever, chills or sweats.  Appetite good, and no malaise. No fatigue.  HEENT-- No new vision, hearing or throat complaints.  No epistaxis or oral sores.  Denies odynophagia or dysphagia.  No odynophagia or dysphagia. No headache, photophobia or neck stiffness.  CV-- No chest pain, palpitation or syncope  Resp-- No SOB/cough/Hemoptysis, no wheeze  GI- No nausea, vomiting, or diarrhea.  No hematochezia, melena, or hematemesis. Denies jaundice or chronic liver disease.  -- No dysuria, hematuria, or flank pain.  Denies hesitancy, urgency.  Lymph- no swollen lymph nodes in neck/axilla or groin.   Heme- No active bruising or bleeding; no Hx of DVT or PE.  MS-- no swelling or pain in the bones or joints of arms/legs.  No new back pain.  Neuro-- No acute focal weakness or numbness in the arms or legs.  No seizures.  Skin--No rashes or lesions, except right foot as per HPI, no nodules    Physical Exam:   Vital Signs   Temp:  [97.2 °F (36.2 °C)-97.8 °F (36.6 °C)] 97.8 °F (36.6 °C)  Heart Rate:  [47-77] 77  Resp:  [16] 16  BP: (116-144)/(71-95) 126/89    Blood pressure 126/89, pulse 77, temperature 97.8 °F (36.6 °C), temperature source Oral, resp. rate 16, height 185.4 cm (72.99\"), weight 136 kg (300 lb), SpO2 93%.  GENERAL: Awake and alert, in minor distress. Appears  stated age.  Resting in bed.  Visiting with his wife.  HEENT:  Normocephalic, atraumatic.  Oropharynx without thrush. Dentition in good repair. No " cervical adenopathy. No neck masses.  Ears externally normal, Nose externally normal.  EYES:  No conjunctival injection. No icterus. EOM full.  LYMPHATICS: No lymphadenopathy of the neck or axillary or inguinal regions.   HEART: No murmur, gallop, or pericardial friction rub. Reg rate rhythm.  No JVD.  LUNGS: Clear to auscultation and percussion. No respiratory distress, no use of accessory muscles.  ABDOMEN: Soft, nontender, nondistended. No appreciable HSM.  Bowel sounds normal.  Obese.  No mass.  SKIN: Warm and dry without cutaneous eruptions.  No nodules.  Right foot surgical dressing in place.  PSYCHIATRIC: Mental status lucid. No confusion.  EXT:  No cellulitic change.  NEURO: Oriented to name, nonfocal.              4/3/24 right foot wounds prior to ray resections 4/8    Results Review:   I reviewed the patient's new clinical results.  I reviewed the patient's new imaging results and agree with the interpretation.  I reviewed the patient's other test results and agree with the interpretation    Results from last 7 days   Lab Units 04/10/24  0629 04/09/24  0527 04/08/24  0616   WBC 10*3/mm3 5.13 4.97 4.82   HEMOGLOBIN g/dL 11.4* 11.0* 10.5*   HEMATOCRIT % 35.5* 33.2* 31.9*   PLATELETS 10*3/mm3 221 204 203     Results from last 7 days   Lab Units 04/10/24  0629   SODIUM mmol/L 139   POTASSIUM mmol/L 4.5   CHLORIDE mmol/L 105   CO2 mmol/L 24.0   BUN mg/dL 26*   CREATININE mg/dL 1.17   GLUCOSE mg/dL 140*   CALCIUM mg/dL 8.9     Results from last 7 days   Lab Units 04/10/24  0629   ALK PHOS U/L 126*   BILIRUBIN mg/dL 0.2   ALT (SGPT) U/L 53*   AST (SGOT) U/L 48*     Results from last 7 days   Lab Units 04/05/24  0547   SED RATE mm/hr 98*     Results from last 7 days   Lab Units 04/08/24  0616   CRP mg/dL 3.40*               Estimated Creatinine Clearance: 99.3 mL/min (by C-G formula based on SCr of 1.17 mg/dL).  CPK          4/10/2024    06:29   Common Labs   Creatine Kinase 21       Procalitonin Results:      "  Brief Urine Lab Results       None           No results found for: \"SITE\", \"ALLENTEST\", \"PHART\", \"YPL7WFP\", \"PO2ART\", \"ZOJ5YSI\", \"BASEEXCESS\", \"O4MJLFBO\", \"HGBBG\", \"HCTABG\", \"OXYHEMOGLOBI\", \"METHHGBN\", \"CARBOXYHGB\", \"CO2CT\", \"BAROMETRIC\", \"MODALITY\", \"FIO2\"     Microbiology:    Right foot wound culture 4/2/2024 with gram-positive cocci, gram-negative rods on Gram stain, MRSA screen negative, blood cultures x 2 from 4/2 negative.  Streptococcus, group B strep, Eikenella.    Results for orders placed or performed during the hospital encounter of 04/02/24   Blood Culture - Blood, Arm, Right    Specimen: Arm, Right; Blood   Result Value Ref Range    Blood Culture No growth at 5 days          Culture results from last 30 days   Lab 04/08/24  1639 04/02/24  1900   WOUNDCX No growth at 2 days Heavy growth (4+) Streptococcus agalactiae (Group B)*  Heavy growth (4+) Streptococcus mitis / oralis*  Light growth (2+) Eikenella corrodens*  Moderate growth (3+) Normal Skin Terri      Brief Urine Lab Results       None        Blood cultures x 2 from 4/2 are negative.  Wound culture right foot growing group B streptococcus to date, MRSA screen negative.    Radiology:  Imaging Results (Last 72 Hours)       ** No results found for the last 72 hours. **            IMPRESSION:     Second and third distal and middle phalanges MRI changes with bone marrow edema in the setting of diabetes and chronic wounds of the right foot with increased pretest probability for underlying osteomyelitis, versus reactive edema.  I would favor osteomyelitis given the way his right foot looks with a fistulous tract that probes deep to bone.  No erosions in the cortical structure noted.  Usual organisms will be mixed terri.  MRSA screen negative, wound culture Gram stain with gram-negative rods and gram-positive cocci, blood cultures x 2 from 4/2 are negative to date.  Group B streptococcus growing as of 4/4/2024, and Streptococcus mitis.  Status post " right second and third ray resection 4/8/2024.  Dr. Hays.  Right foot cellulitis and wound infection associated with diabetes, despite use of a surgical boot.  Diabetes mellitus type 2 with increased risk for infection.  Chronic kidney disease stage IIIa, creatinine 1.18 on 4/3/2024.  1.23 on 4/4/2024.  1.47 on 4/5.  1.26 on 4/8.  1.35 on 4/9.  1.17 on 4/10.  Anemia, chronic disease.  Ongoing.  Elevated alkaline phosphatase new 126, ALT 53, AST 48.  Likely related to above issues and medications.    PLAN:    Diagnostically, continue to follow patient's physical exam, CBC, CMP, CRP.    Therapeutically, changed to daptomycin and ceftriaxone on 4/9 in anticipation of outpatient therapy.  This would provide coverage for Streptococcus and Eikenella.  Await further surgical cultures.  Supportive care.  Wound care.  Nonweightbearing on right foot.  Status post third and second toe ray resection 4/8/2024..  PICC versus [place].    I discussed the patient's findings and my recommendations with patient, family, and nursing staff    Case management orders: Please arrange for home antibiotics with [place] with daptomycin 700 mg IV daily, ceftriaxone 2 g IV daily to continue until 5/22/2024.  Check CBC, CMP, CRP, CPK weekly while on IV antibiotics.  Fax orders to [phone], call [phone] with final arrangements.  Arrange for follow-up with me in 1 week postdischarge.    Thank you for asking me to see Alvaro Sinha.  Our group would be pleased to follow this patient over the course of their hospitalization and assist with outpatient antimicrobial therapy, as indicated.  Further recommendations depend on the results of the cultures and clinical course.  Increased risk for adverse drug reactions, complications of IV access, readmission, loss of limb.  Side effects discussed.      Aung Jameson MD  4/10/2024

## 2024-04-10 NOTE — DISCHARGE PLACEMENT REQUEST
"Case Management  [phone]    Alvaro Harris \"NIK\" (58 y.o. Male)       Date of Birth   1965    Social Security Number       Address   [address] [place] [address]    Home Phone   [phone]    MRN   [id number]       Yazidi   Zoroastrian    Marital Status                               Admission Date   24    Admission Type   Emergency    Admitting Provider   Florencio Parrish DO    Attending Provider   Florencio Parrish DO    Department, Room/Bed   [address], S454/1       Discharge Date       Discharge Disposition       Discharge Destination                                 Attending Provider: Florencio Parrish DO    Allergies: Statins    Isolation: None   Infection: None   Code Status: CPR    Ht: 185.4 cm (72.99\")   Wt: 136 kg (300 lb)    Admission Cmt: None   Principal Problem: Osteomyelitis [M86.9]                   Active Insurance as of 2024       Primary Coverage       Payor Plan Insurance Group Employer/Plan Group    [employer] BLUE [employer]OSS [employer] BLUE CROSS BLUE SHIELD PPO [id number]       Payor Plan Address Payor Plan Phone Number Payor Plan Fax Number Effective Dates    [address] [phone]  2022 - None Entered    [place] [address]         Subscriber Name Subscriber Birth Date Member ID       ALVARO HARRIS 1965 [id number]                     Emergency Contacts        (Rel.) Home Phone Work Phone Mobile Phone    keshav harris (Spouse) -- -- [phone]                 History & Physical        Niko Santiago MD at 24 2304              [facility] Medicine Services  HISTORY AND PHYSICAL    Patient Name: Alvaro Harris  : 1965  MRN: [id number]  Primary Care Physician: Martha Mckee MD  Date of admission: 2024      Subjective  Subjective     Chief Complaint:  Wound    HPI:  Alvaro Harris is a 58 y.o. male with past medical history of " lymphoma, type 2 diabetes, hypertension, hyperlipidemia, CKD, gout who is presenting with wound of right foot.  [facility], has been mostly unchanged until 2 days ago when he noticed increased in erythema of the right foot extending to the leg.  He reports no fever but associated with full body chills.  He states that he has been using a boot to offload the foot.  Sugars at home running anywhere from 1  but can occasionally get into the 300s.  He is on 3 oral medicines right now for diabetes, has not taken his Trulicity in a while due to insurance coverage.  He does not smoke.  Currently feels well, foot is a little sore.  Reports no claudication type symptoms.  Otherwise no chest pain, shortness of breath, abdominal pain.  MRI showed concerns for early osteomyelitis of the second and third middle and distal phalanges of the right foot.  Due to lack of orthopedic coverage here, transfer was attempted at multiple locations and he was excepted at [facility] for surgical evaluation, however he was placed on a wait list and admitted to [facility] for IV antibiotics until then.      Personal History     Past Medical History:   Diagnosis Date    Diabetes mellitus     Hypertension          Oncology Problem List:  Lymphoma (05/13/2022; Status: Active)    Oncology/Hematology History     Past Surgical History:   Procedure Laterality Date    BONE RESECTION, RIB         Family History: family history is not on file.     Social History:  reports that he has never smoked. He has never used smokeless tobacco. He reports current alcohol use. He reports that he does not currently use drugs.  Social History     Social History Narrative    Not on file       Medications:  Available home medication information reviewed.  Dulaglutide, HYDROcodone-acetaminophen, SITagliptin, allopurinol, cephalexin, clopidogrel, coenzyme Q10,  colchicine, dapagliflozin, ergocalciferol, glimepiride, lisinopril, metFORMIN, metoprolol succinate XL, ondansetron ODT, pioglitazone, rosuvastatin, sildenafil, and silver sulfadiazine    Allergies   Allergen Reactions    Statins Myalgia       Objective  Objective     Vital Signs:   Temp:  [98.9 °F (37.2 °C)-99.2 °F (37.3 °C)] 99.2 °F (37.3 °C)  Heart Rate:  [67-97] 71  Resp:  [18] 18  BP: (130-162)/(68-91) 137/79       Physical Exam   Awake alert and oriented x 3  Resting comfortably in bed  Mucous membranes moist  Heart regular rate and rhythm  Lungs are clear to auscultation bilaterally  There is no abdominal tenderness or distention  There is right lower extremity edema and erythema, warm and tender to touch.  No areas of fluctuance or crepitus.  There is an open wound between the right second and third toes as well as an ulceration on the right plantar aspect of the foot.  The foot itself is warm with good capillary refill however due to edema dorsalis pedis and tibialis pulses are faint.  There is also Charcot deformity of the feet bilaterally.    Result Review:  I have personally reviewed the results from the time of this admission to 4/3/2024 00:14 EDT and agree with these findings:  [x]  Laboratory list / accordion  [x]  Microbiology  [x]  Radiology  []  EKG/Telemetry   []  Cardiology/Vascular   []  Pathology  [x]  Old records  []  Other:  Most notable findings include: See assessment and plan      LAB RESULTS:      Lab 04/02/24  1832   WBC 6.89   HEMOGLOBIN 10.8*   HEMATOCRIT 32.7*   PLATELETS 162   NEUTROS ABS 5.32   IMMATURE GRANS (ABS) 0.04   LYMPHS ABS 0.81   MONOS ABS 0.58   EOS ABS 0.13   MCV 92.9   SED RATE 94*   CRP 16.12*   LACTATE 1.3         Lab 04/02/24  1832   SODIUM 134*   POTASSIUM 4.2   CHLORIDE 100   CO2 20.0*   ANION GAP 14.0   BUN 29*   CREATININE 1.43*   EGFR 56.8*   GLUCOSE 152*   CALCIUM 9.0         Lab 04/02/24  1832   TOTAL PROTEIN 7.3   ALBUMIN 3.7   GLOBULIN 3.6   ALT (SGPT) 21    AST (SGOT) 20   BILIRUBIN 0.6   ALK PHOS 90                         Microbiology Results (last 10 days)       Procedure Component Value - Date/Time    MRSA Screen, PCR (Inpatient) - Swab, Nares [[id number]]  (Normal) Collected: 04/02/24 1903    Lab Status: Final result Specimen: Swab from Nares Updated: 04/02/24 2047     MRSA PCR Negative    Narrative:      The negative predictive value of this diagnostic test is high and should only be used to consider de-escalating anti-MRSA therapy. A positive result may indicate colonization with MRSA and must be correlated clinically.  MRSA Negative    Wound Culture - Wound, Foot, Right [[id number]] Collected: 04/02/24 1900    Lab Status: Preliminary result Specimen: Wound from Foot, Right Updated: 04/02/24 2117     Gram Stain Moderate (3+) WBCs per low power field      Few (2+) Epithelial cells per low power field      Many (4+) Gram negative bacilli      Many (4+) Gram positive cocci            MRI Foot Right Without Contrast    Result Date: 4/2/2024  MRI FOOT RIGHT WO CONTRAST Date of Exam: 4/2/2024 8:30 PM EDT Indication: eval for osteo R foot.  Comparison: Same day foot radiograph Technique:  Routine multiplanar/multisequence sequence images of the right foot were obtained without contrast administration. Findings: Bones and joints: Degenerative subchondral edema and cystic changes throughout the midfoot. Degenerative subchondral edema and cystic change of the first metatarsophalangeal joint as well as the first interphalangeal joint. Small effusion with subchondral edema at the second metatarsophalangeal joint. Small bone marrow edema within the second and third distal and middle phalanges. No loss of normal T1 cortical or marrow signal. No evidence of acute fracture. No suspicious marrow placing lesions. Soft tissues: Diffuse subcutaneous edema. There is more pronounced edema seen in the second and third toes. No soft tissue mass or fluid collection seen. Fatty and  edematous changes of the intrinsic foot muscles suggestive of neuropathic changes. Soft tissue wound seen along the plantar aspect of the second metatarsophalangeal joint. Visualized flexor and extensor tendons appear grossly intact. Plantar plates appear grossly intact. Lisfranc ligament appears grossly intact. No evidence of intermetatarsal bursitis or neuroma.     Impression: Impression: Swelling of the second and third toes as well as a small soft tissue wound along the plantar aspect of the second metatarsophalangeal joint. There is minimal marrow edema seen within the second and third middle and distal phalanges as well as a small joint effusion and subchondral edema seen at the second metatarsophalangeal joint. No definite loss of T1 cortical signal to suggest erosive changes. These findings most like represent reactive edema/effusion. However early osteomyelitis cannot be fully excluded. Recommend continued clinical and imaging follow-up as warranted. Electronically Signed: Shilo Torres MD  4/2/2024 9:15 PM EDT  Workstation ID: [id number]    XR Foot 3+ View Right    Result Date: 4/2/2024  XR FOOT 3+ VW RIGHT Date of Exam: 4/2/2024 6:03 PM EDT Indication: eval for osteo Comparison: None available. Findings: Spurring at the insertion of the Achilles tendon. Calcaneal spur. Osteoarthritic changes. The tarsal and metatarsal bones appear intact. Severe osteoarthritic changes of the first metatarsophalangeal joint. Questionable fracture at the base of the second proximal phalanx. No lytic or blastic disease.     Impression: No evidence of osteomyelitis. Questionable fracture at the base of the second proximal phalanx. Electronically Signed: Harry Kim MD  4/2/2024 6:19 PM EDT  Workstation ID: [id number]         Assessment & Plan  Assessment & Plan       Osteomyelitis    Lymphoma    Type 2 diabetes mellitus with hyperglycemia    Diabetic foot ulcer    History of myalgia due to HMG CoA reductase inhibitor     Hypertensive disorder    Hyperlipidemia    Microalbuminuric diabetic nephropathy    Stage 3a chronic kidney disease    Nonhealing diabetic ulcer of the right foot with overlying cellulitis and concern for osteomyelitis  -Inflammatory markers are elevated otherwise patient is a normal white blood count normal blood pressure, normal heart rate and is afebrile.  He is not septic appearing.  MRI showed concerns for early osteomyelitis of the second and third middle and distal phalanges of the right foot. MRSA nares is negative, wound cultures polymicrobial so far.  -He is currently accepted at [facility] and on their wait list due to no orthopedic coverage this week at this location.  -In the meantime we will consult infectious disease, dietary, wound  -Will start on Zosyn and follow cultures  -ABIs ordered for the morning  -Holding home Plavix in anticipation of possible surgical intervention    Type 2 diabetes  -Updated A1c pending.  Sugars fluctuating at home.  Has been off Trulicity due to insurance coverage.  Will continue oral medications for now and use corrective factor insulin to keep in goal range    Stage III CKD due to diabetes  Hypertension  -Creatinine currently near baseline, continue to monitor  -Continue home lisinopril    Lymphoma  -History of lymphoma in chart, [facility]  Ongoing workup, is not currently on any immunotherapy or immunosuppressive medications    Statin intolerance  -Rosuvastatin listed in medical history however no recent statin.  History of myalgia.  Would likely benefit from some sort of therapy, consider initiating simvastatin with vitamin D supplementation.    Gout  -Continue allopurinol, check uric acid      DVT prophylaxis: [facility]  Medical DVT prophylaxis orders are present.          CODE STATUS:    Code Status and Medical Interventions:   Ordered at: 04/03/24 0013     Code Status (Patient has no pulse and is not breathing):     CPR (Attempt to Resuscitate)     Medical Interventions (Patient has pulse or is breathing):    Full Support       Expected Discharge   Expected discharge date/ time has not been documented.     Niko Santiago MD  24      Electronically signed by Niko Santiago MD at 24 0016        [address]  [address] [facility] [address]  Phone:  [phone]  Fax:  [phone]        Patient: ROOM: [facility]   Alvaro Sinha MRN:  [id number]   [address] [place] [id number] :  1965  SSN:    Phone: [phone] Sex:  M   PCP: Martha Mckee                 Emergency Contact Information       Name Relation Home Work Mobile     keshav sinha Spouse     [phone]          INSURANCE PAYOR PLAN GROUP # SUBSCRIBER ID   Primary:    DENNIS AGUILAR [id number]   Admitting Diagnosis: Osteomyelitis [M86.9]  Order Date:  2024        Inpatient Case Management  Consult       (Order ID: [id number])     Diagnosis:         Priority:  Routine Expected Date:   Expiration Date:        Interval:   Count:    Comments: Please arrange for home antibiotics with [facility] with daptomycin 700 mg IV daily, ceftriaxone 2 g IV daily to continue until 2024.  Check CBC, CMP, CRP, CPK weekly while on IV antibiotics.  Fax orders to [phone], call [phone] with final arrangements.  Arrange for follow-up with me in 1 week postdischarge.  Reason for Consult: abx        Authorizing Provider:Aung Jameson MD  Authorizing Provider's NPI: [id number]  Order Entered By: Aung Jameson MD 2024  5:48 PM     Electronically signed by: Aung Jameson MD 2024  5:48 PM

## 2024-04-10 NOTE — PLAN OF CARE
Goal Outcome Evaluation:  Plan of Care Reviewed With: patient           Outcome Evaluation: wound vac intact with no issues noted.

## 2024-04-10 NOTE — PLAN OF CARE
Goal Outcome Evaluation:         Aox4, RA while awake, CPAP while asleep. No adverse issues overnight. Minimal output in wound vac.                                       Doxycycline Pregnancy And Lactation Text: This medication is Pregnancy Category D and not consider safe during pregnancy. It is also excreted in breast milk but is considered safe for shorter treatment courses. Nsaids Pregnancy And Lactation Text: These medications are considered safe up to 30 weeks gestation. It is excreted in breast milk. Clofazimine Counseling:  I discussed with the patient the risks of clofazimine including but not limited to skin and eye pigmentation, liver damage, nausea/vomiting, gastrointestinal bleeding and allergy. Valtrex Pregnancy And Lactation Text: this medication is Pregnancy Category B and is considered safe during pregnancy. This medication is not directly found in breast milk but it's metabolite acyclovir is present. Protopic Counseling: Patient may experience a mild burning sensation during topical application. Protopic is not approved in children less than 2 years of age. There have been case reports of hematologic and skin malignancies in patients using topical calcineurin inhibitors although causality is questionable. Griseofulvin Pregnancy And Lactation Text: This medication is Pregnancy Category X and is known to cause serious birth defects. It is unknown if this medication is excreted in breast milk but breast feeding should be avoided. Humira Counseling:  I discussed with the patient the risks of adalimumab including but not limited to myelosuppression, immunosuppression, autoimmune hepatitis, demyelinating diseases, lymphoma, and serious infections.  The patient understands that monitoring is required including a PPD at baseline and must alert us or the primary physician if symptoms of infection or other concerning signs are noted. Hydroquinone Pregnancy And Lactation Text: This medication has not been assigned a Pregnancy Risk Category but animal studies failed to show danger with the topical medication. It is unknown if the medication is excreted in breast milk. Minocycline Counseling: Patient advised regarding possible photosensitivity and discoloration of the teeth, skin, lips, tongue and gums.  Patient instructed to avoid sunlight, if possible.  When exposed to sunlight, patients should wear protective clothing, sunglasses, and sunscreen.  The patient was instructed to call the office immediately if the following severe adverse effects occur:  hearing changes, easy bruising/bleeding, severe headache, or vision changes.  The patient verbalized understanding of the proper use and possible adverse effects of minocycline.  All of the patient's questions and concerns were addressed. Topical Clindamycin Counseling: Patient counseled that this medication may cause skin irritation or allergic reactions.  In the event of skin irritation, the patient was advised to reduce the amount of the drug applied or use it less frequently.   The patient verbalized understanding of the proper use and possible adverse effects of clindamycin.  All of the patient's questions and concerns were addressed. Imiquimod Counseling:  I discussed with the patient the risks of imiquimod including but not limited to erythema, scaling, itching, weeping, crusting, and pain.  Patient understands that the inflammatory response to imiquimod is variable from person to person and was educated regarded proper titration schedule.  If flu-like symptoms develop, patient knows to discontinue the medication and contact us. Rituxan Counseling:  I discussed with the patient the risks of Rituxan infusions. Side effects can include infusion reactions, severe drug rashes including mucocutaneous reactions, reactivation of latent hepatitis and other infections and rarely progressive multifocal leukoencephalopathy.  All of the patient's questions and concerns were addressed. Xolair Pregnancy And Lactation Text: This medication is Pregnancy Category B and is considered safe during pregnancy. This medication is excreted in breast milk. Azathioprine Pregnancy And Lactation Text: This medication is Pregnancy Category D and isn't considered safe during pregnancy. It is unknown if this medication is excreted in breast milk. Minoxidil Counseling: Minoxidil is a topical medication which can increase blood flow where it is applied. It is uncertain how this medication increases hair growth. Side effects are uncommon and include stinging and allergic reactions. Gabapentin Counseling: I discussed with the patient the risks of gabapentin including but not limited to dizziness, somnolence, fatigue and ataxia. Metronidazole Counseling:  I discussed with the patient the risks of metronidazole including but not limited to seizures, nausea/vomiting, a metallic taste in the mouth, nausea/vomiting and severe allergy. Arava Pregnancy And Lactation Text: This medication is Pregnancy Category X and is absolutely contraindicated during pregnancy. It is unknown if it is excreted in breast milk. Xelgaryz Pregnancy And Lactation Text: This medication is Pregnancy Category D and is not considered safe during pregnancy.  The risk during breast feeding is also uncertain. Otezla Counseling: The side effects of Otezla were discussed with the patient, including but not limited to worsening or new depression, weight loss, diarrhea, nausea, upper respiratory tract infection, and headache. Patient instructed to call the office should any adverse effect occur.  The patient verbalized understanding of the proper use and possible adverse effects of Otezla.  All the patient's questions and concerns were addressed. Methotrexate Pregnancy And Lactation Text: This medication is Pregnancy Category X and is known to cause fetal harm. This medication is excreted in breast milk. Azathioprine Counseling:  I discussed with the patient the risks of azathioprine including but not limited to myelosuppression, immunosuppression, hepatotoxicity, lymphoma, and infections.  The patient understands that monitoring is required including baseline LFTs, Creatinine, possible TPMP genotyping and weekly CBCs for the first month and then every 2 weeks thereafter.  The patient verbalized understanding of the proper use and possible adverse effects of azathioprine.  All of the patient's questions and concerns were addressed. Cimzia Pregnancy And Lactation Text: This medication crosses the placenta but can be considered safe in certain situations. Cimzia may be excreted in breast milk. Taltz Counseling: I discussed with the patient the risks of ixekizumab including but not limited to immunosuppression, serious infections, worsening of inflammatory bowel disease and drug reactions.  The patient understands that monitoring is required including a PPD at baseline and must alert us or the primary physician if symptoms of infection or other concerning signs are noted. Benzoyl Peroxide Counseling: Patient counseled that medicine may cause skin irritation and bleach clothing.  In the event of skin irritation, the patient was advised to reduce the amount of the drug applied or use it less frequently.   The patient verbalized understanding of the proper use and possible adverse effects of benzoyl peroxide.  All of the patient's questions and concerns were addressed. Use Enhanced Medication Counseling?: No Infliximab Counseling:  I discussed with the patient the risks of infliximab including but not limited to myelosuppression, immunosuppression, autoimmune hepatitis, demyelinating diseases, lymphoma, and serious infections.  The patient understands that monitoring is required including a PPD at baseline and must alert us or the primary physician if symptoms of infection or other concerning signs are noted. Solaraze Pregnancy And Lactation Text: This medication is Pregnancy Category B and is considered safe. There is some data to suggest avoiding during the third trimester. It is unknown if this medication is excreted in breast milk. Thalidomide Counseling: I discussed with the patient the risks of thalidomide including but not limited to birth defects, anxiety, weakness, chest pain, dizziness, cough and severe allergy. Methotrexate Counseling:  Patient counseled regarding adverse effects of methotrexate including but not limited to nausea, vomiting, abnormalities in liver function tests. Patients may develop mouth sores, rash, diarrhea, and abnormalities in blood counts. The patient understands that monitoring is required including LFT's and blood counts.  There is a rare possibility of scarring of the liver and lung problems that can occur when taking methotrexate. Persistent nausea, loss of appetite, pale stools, dark urine, cough, and shortness of breath should be reported immediately. Patient advised to discontinue methotrexate treatment at least three months before attempting to become pregnant.  I discussed the need for folate supplements while taking methotrexate.  These supplements can decrease side effects during methotrexate treatment. The patient verbalized understanding of the proper use and possible adverse effects of methotrexate.  All of the patient's questions and concerns were addressed. Erythromycin Pregnancy And Lactation Text: This medication is Pregnancy Category B and is considered safe during pregnancy. It is also excreted in breast milk. Stelara Pregnancy And Lactation Text: This medication is Pregnancy Category B and is considered safe during pregnancy. It is unknown if this medication is excreted in breast milk. Bactrim Counseling:  I discussed with the patient the risks of sulfa antibiotics including but not limited to GI upset, allergic reaction, drug rash, diarrhea, dizziness, photosensitivity, and yeast infections.  Rarely, more serious reactions can occur including but not limited to aplastic anemia, agranulocytosis, methemoglobinemia, blood dyscrasias, liver or kidney failure, lung infiltrates or desquamative/blistering drug rashes. Itraconazole Pregnancy And Lactation Text: This medication is Pregnancy Category C and it isn't know if it is safe during pregnancy. It is also excreted in breast milk. Bexarotene Counseling:  I discussed with the patient the risks of bexarotene including but not limited to hair loss, dry lips/skin/eyes, liver abnormalities, hyperlipidemia, pancreatitis, depression/suicidal ideation, photosensitivity, drug rash/allergic reactions, hypothyroidism, anemia, leukopenia, infection, cataracts, and teratogenicity.  Patient understands that they will need regular blood tests to check lipid profile, liver function tests, white blood cell count, thyroid function tests and pregnancy test if applicable. Benzoyl Peroxide Pregnancy And Lactation Text: This medication is Pregnancy Category C. It is unknown if benzoyl peroxide is excreted in breast milk. Detail Level: Detailed Albendazole Pregnancy And Lactation Text: This medication is Pregnancy Category C and it isn't known if it is safe during pregnancy. It is also excreted in breast milk. Xeljanz Counseling: I discussed with the patient the risks of Xeljanz therapy including increased risk of infection, liver issues, headache, diarrhea, or cold symptoms. Live vaccines should be avoided. They were instructed to call if they have any problems. Clofazimine Pregnancy And Lactation Text: This medication is Pregnancy Category C and isn't considered safe during pregnancy. It is excreted in breast milk. Arava Counseling:  Patient counseled regarding adverse effects of Arava including but not limited to nausea, vomiting, abnormalities in liver function tests. Patients may develop mouth sores, rash, diarrhea, and abnormalities in blood counts. The patient understands that monitoring is required including LFTs and blood counts.  There is a rare possibility of scarring of the liver and lung problems that can occur when taking methotrexate. Persistent nausea, loss of appetite, pale stools, dark urine, cough, and shortness of breath should be reported immediately. Patient advised to discontinue Arava treatment and consult with a physician prior to attempting conception. The patient will have to undergo a treatment to eliminate Arava from the body prior to conception. Zyclara Counseling:  I discussed with the patient the risks of imiquimod including but not limited to erythema, scaling, itching, weeping, crusting, and pain.  Patient understands that the inflammatory response to imiquimod is variable from person to person and was educated regarded proper titration schedule.  If flu-like symptoms develop, patient knows to discontinue the medication and contact us. Erivedge Counseling- I discussed with the patient the risks of Erivedge including but not limited to nausea, vomiting, diarrhea, constipation, weight loss, changes in the sense of taste, decreased appetite, muscle spasms, and hair loss.  The patient verbalized understanding of the proper use and possible adverse effects of Erivedge.  All of the patient's questions and concerns were addressed. Siliq Pregnancy And Lactation Text: The risk during pregnancy and breastfeeding is uncertain with this medication. Rifampin Pregnancy And Lactation Text: This medication is Pregnancy Category C and it isn't know if it is safe during pregnancy. It is also excreted in breast milk and should not be used if you are breast feeding. Acitretin Counseling:  I discussed with the patient the risks of acitretin including but not limited to hair loss, dry lips/skin/eyes, liver damage, hyperlipidemia, depression/suicidal ideation, photosensitivity.  Serious rare side effects can include but are not limited to pancreatitis, pseudotumor cerebri, bony changes, clot formation/stroke/heart attack.  Patient understands that alcohol is contraindicated since it can result in liver toxicity and significantly prolong the elimination of the drug by many years. Cephalexin Pregnancy And Lactation Text: This medication is Pregnancy Category B and considered safe during pregnancy.  It is also excreted in breast milk but can be used safely for shorter doses. Rifampin Counseling: I discussed with the patient the risks of rifampin including but not limited to liver damage, kidney damage, red-orange body fluids, nausea/vomiting and severe allergy. Elidel Pregnancy And Lactation Text: This medication is Pregnancy Category C. It is unknown if this medication is excreted in breast milk. Topical Sulfur Applications Counseling: Topical Sulfur Counseling: Patient counseled that this medication may cause skin irritation or allergic reactions.  In the event of skin irritation, the patient was advised to reduce the amount of the drug applied or use it less frequently.   The patient verbalized understanding of the proper use and possible adverse effects of topical sulfur application.  All of the patient's questions and concerns were addressed. Glycopyrrolate Counseling:  I discussed with the patient the risks of glycopyrrolate including but not limited to skin rash, drowsiness, dry mouth, difficulty urinating, and blurred vision. Fluconazole Counseling:  Patient counseled regarding adverse effects of fluconazole including but not limited to headache, diarrhea, nausea, upset stomach, liver function test abnormalities, taste disturbance, and stomach pain.  There is a rare possibility of liver failure that can occur when taking fluconazole.  The patient understands that monitoring of LFTs and kidney function test may be required, especially at baseline. The patient verbalized understanding of the proper use and possible adverse effects of fluconazole.  All of the patient's questions and concerns were addressed. Hydroquinone Counseling:  Patient advised that medication may result in skin irritation, lightening (hypopigmentation), dryness, and burning.  In the event of skin irritation, the patient was advised to reduce the amount of the drug applied or use it less frequently.  Rarely, spots that are treated with hydroquinone can become darker (pseudoochronosis).  Should this occur, patient instructed to stop medication and call the office. The patient verbalized understanding of the proper use and possible adverse effects of hydroquinone.  All of the patient's questions and concerns were addressed. Nsaids Counseling: NSAID Counseling: I discussed with the patient that NSAIDs should be taken with food. Prolonged use of NSAIDs can result in the development of stomach ulcers.  Patient advised to stop taking NSAIDs if abdominal pain occurs.  The patient verbalized understanding of the proper use and possible adverse effects of NSAIDs.  All of the patient's questions and concerns were addressed. Bexarotene Pregnancy And Lactation Text: This medication is Pregnancy Category X and should not be given to women who are pregnant or may become pregnant. This medication should not be used if you are breast feeding. Hydroxyzine Pregnancy And Lactation Text: This medication is not safe during pregnancy and should not be taken. It is also excreted in breast milk and breast feeding isn't recommended. Eucrisa Counseling: Patient may experience a mild burning sensation during topical application. Eucrisa is not approved in children less than 2 years of age. Dupixent Pregnancy And Lactation Text: This medication likely crosses the placenta but the risk for the fetus is uncertain. This medication is excreted in breast milk. Cyclosporine Pregnancy And Lactation Text: This medication is Pregnancy Category C and it isn't know if it is safe during pregnancy. This medication is excreted in breast milk. Ivermectin Counseling:  Patient instructed to take medication on an empty stomach with a full glass of water.  Patient informed of potential adverse effects including but not limited to nausea, diarrhea, dizziness, itching, and swelling of the extremities or lymph nodes.  The patient verbalized understanding of the proper use and possible adverse effects of ivermectin.  All of the patient's questions and concerns were addressed. Odomzo Counseling- I discussed with the patient the risks of Odomzo including but not limited to nausea, vomiting, diarrhea, constipation, weight loss, changes in the sense of taste, decreased appetite, muscle spasms, and hair loss.  The patient verbalized understanding of the proper use and possible adverse effects of Odomzo.  All of the patient's questions and concerns were addressed. Rituxan Pregnancy And Lactation Text: This medication is Pregnancy Category C and it isn't know if it is safe during pregnancy. It is unknown if this medication is excreted in breast milk but similar antibodies are known to be excreted. Clindamycin Pregnancy And Lactation Text: This medication can be used in pregnancy if certain situations. Clindamycin is also present in breast milk. Tazorac Counseling:  Patient advised that medication is irritating and drying.  Patient may need to apply sparingly and wash off after an hour before eventually leaving it on overnight.  The patient verbalized understanding of the proper use and possible adverse effects of tazorac.  All of the patient's questions and concerns were addressed. Tetracycline Pregnancy And Lactation Text: This medication is Pregnancy Category D and not consider safe during pregnancy. It is also excreted in breast milk. Protopic Pregnancy And Lactation Text: This medication is Pregnancy Category C. It is unknown if this medication is excreted in breast milk when applied topically. Tetracycline Counseling: Patient counseled regarding possible photosensitivity and increased risk for sunburn.  Patient instructed to avoid sunlight, if possible.  When exposed to sunlight, patients should wear protective clothing, sunglasses, and sunscreen.  The patient was instructed to call the office immediately if the following severe adverse effects occur:  hearing changes, easy bruising/bleeding, severe headache, or vision changes.  The patient verbalized understanding of the proper use and possible adverse effects of tetracycline.  All of the patient's questions and concerns were addressed. Patient understands to avoid pregnancy while on therapy due to potential birth defects. High Dose Vitamin A Counseling: Side effects reviewed, pt to contact office should one occur. Azithromycin Pregnancy And Lactation Text: This medication is considered safe during pregnancy and is also secreted in breast milk. Doxycycline Counseling:  Patient counseled regarding possible photosensitivity and increased risk for sunburn.  Patient instructed to avoid sunlight, if possible.  When exposed to sunlight, patients should wear protective clothing, sunglasses, and sunscreen.  The patient was instructed to call the office immediately if the following severe adverse effects occur:  hearing changes, easy bruising/bleeding, severe headache, or vision changes.  The patient verbalized understanding of the proper use and possible adverse effects of doxycycline.  All of the patient's questions and concerns were addressed. Acitretin Pregnancy And Lactation Text: This medication is Pregnancy Category X and should not be given to women who are pregnant or may become pregnant in the future. This medication is excreted in breast milk. Glycopyrrolate Pregnancy And Lactation Text: This medication is Pregnancy Category B and is considered safe during pregnancy. It is unknown if it is excreted breast milk. Hydroxychloroquine Counseling:  I discussed with the patient that a baseline ophthalmologic exam is needed at the start of therapy and every year thereafter while on therapy. A CBC may also be warranted for monitoring.  The side effects of this medication were discussed with the patient, including but not limited to agranulocytosis, aplastic anemia, seizures, rashes, retinopathy, and liver toxicity. Patient instructed to call the office should any adverse effect occur.  The patient verbalized understanding of the proper use and possible adverse effects of Plaquenil.  All the patient's questions and concerns were addressed. Birth Control Pills Pregnancy And Lactation Text: This medication should be avoided if pregnant and for the first 30 days post-partum. Siliq Counseling:  I discussed with the patient the risks of Siliq including but not limited to new or worsening depression, suicidal thoughts and behavior, immunosuppression, malignancy, posterior leukoencephalopathy syndrome, and serious infections.  The patient understands that monitoring is required including a PPD at baseline and must alert us or the primary physician if symptoms of infection or other concerning signs are noted. There is also a special program designed to monitor depression which is required with Siliq. Hydroxychloroquine Pregnancy And Lactation Text: This medication has been shown to cause fetal harm but it isn't assigned a Pregnancy Risk Category. There are small amounts excreted in breast milk. Doxepin Pregnancy And Lactation Text: This medication is Pregnancy Category C and it isn't known if it is safe during pregnancy. It is also excreted in breast milk and breast feeding isn't recommended. Tazorac Pregnancy And Lactation Text: This medication is not safe during pregnancy. It is unknown if this medication is excreted in breast milk. Bactrim Pregnancy And Lactation Text: This medication is Pregnancy Category D and is known to cause fetal risk.  It is also excreted in breast milk. Ketoconazole Pregnancy And Lactation Text: This medication is Pregnancy Category C and it isn't know if it is safe during pregnancy. It is also excreted in breast milk and breast feeding isn't recommended. Terbinafine Counseling: Patient counseling regarding adverse effects of terbinafine including but not limited to headache, diarrhea, rash, upset stomach, liver function test abnormalities, itching, taste/smell disturbance, nausea, abdominal pain, and flatulence.  There is a rare possibility of liver failure that can occur when taking terbinafine.  The patient understands that a baseline LFT and kidney function test may be required. The patient verbalized understanding of the proper use and possible adverse effects of terbinafine.  All of the patient's questions and concerns were addressed. Spironolactone Counseling: Patient advised regarding risks of diarrhea, abdominal pain, hyperkalemia, birth defects (for female patients), liver toxicity and renal toxicity. The patient may need blood work to monitor liver and kidney function and potassium levels while on therapy. The patient verbalized understanding of the proper use and possible adverse effects of spironolactone.  All of the patient's questions and concerns were addressed. Tremfya Counseling: I discussed with the patient the risks of guselkumab including but not limited to immunosuppression, serious infections, worsening of inflammatory bowel disease and drug reactions.  The patient understands that monitoring is required including a PPD at baseline and must alert us or the primary physician if symptoms of infection or other concerning signs are noted. Albendazole Counseling:  I discussed with the patient the risks of albendazole including but not limited to cytopenia, kidney damage, nausea/vomiting and severe allergy.  The patient understands that this medication is being used in an off-label manner. Oxybutynin Counseling:  I discussed with the patient the risks of oxybutynin including but not limited to skin rash, drowsiness, dry mouth, difficulty urinating, and blurred vision. Carac Counseling:  I discussed with the patient the risks of Carac including but not limited to erythema, scaling, itching, weeping, crusting, and pain. Cephalexin Counseling: I counseled the patient regarding use of cephalexin as an antibiotic for prophylactic and/or therapeutic purposes. Cephalexin (commonly prescribed under brand name Keflex) is a cephalosporin antibiotic which is active against numerous classes of bacteria, including most skin bacteria. Side effects may include nausea, diarrhea, gastrointestinal upset, rash, hives, yeast infections, and in rare cases, hepatitis, kidney disease, seizures, fever, confusion, neurologic symptoms, and others. Patients with severe allergies to penicillin medications are cautioned that there is about a 10% incidence of cross-reactivity with cephalosporins. When possible, patients with penicillin allergies should use alternatives to cephalosporins for antibiotic therapy. Azithromycin Counseling:  I discussed with the patient the risks of azithromycin including but not limited to GI upset, allergic reaction, drug rash, diarrhea, and yeast infections. Cyclosporine Counseling:  I discussed with the patient the risks of cyclosporine including but not limited to hypertension, gingival hyperplasia,myelosuppression, immunosuppression, liver damage, kidney damage, neurotoxicity, lymphoma, and serious infections. The patient understands that monitoring is required including baseline blood pressure, CBC, CMP, lipid panel and uric acid, and then 1-2 times monthly CMP and blood pressure. Colchicine Counseling:  Patient counseled regarding adverse effects including but not limited to stomach upset (nausea, vomiting, stomach pain, or diarrhea).  Patient instructed to limit alcohol consumption while taking this medication.  Colchicine may reduce blood counts especially with prolonged use.  The patient understands that monitoring of kidney function and blood counts may be required, especially at baseline. The patient verbalized understanding of the proper use and possible adverse effects of colchicine.  All of the patient's questions and concerns were addressed. Cyclophosphamide Pregnancy And Lactation Text: This medication is Pregnancy Category D and it isn't considered safe during pregnancy. This medication is excreted in breast milk. Hydroxyzine Counseling: Patient advised that the medication is sedating and not to drive a car after taking this medication.  Patient informed of potential adverse effects including but not limited to dry mouth, urinary retention, and blurry vision.  The patient verbalized understanding of the proper use and possible adverse effects of hydroxyzine.  All of the patient's questions and concerns were addressed. Picato Counseling:  I discussed with the patient the risks of Picato including but not limited to erythema, scaling, itching, weeping, crusting, and pain. Isotretinoin Counseling: Patient should get monthly blood tests, not donate blood, not drive at night if vision affected, not share medication, and not undergo elective surgery for 6 months after tx completed. Side effects reviewed, pt to contact office should one occur. Drysol Pregnancy And Lactation Text: This medication is considered safe during pregnancy and breast feeding. Birth Control Pills Counseling: Birth Control Pill Counseling: I discussed with the patient the potential side effects of OCPs including but not limited to increased risk of stroke, heart attack, thrombophlebitis, deep venous thrombosis, hepatic adenomas, breast changes, GI upset, headaches, and depression.  The patient verbalized understanding of the proper use and possible adverse effects of OCPs. All of the patient's questions and concerns were addressed. Clindamycin Counseling: I counseled the patient regarding use of clindamycin as an antibiotic for prophylactic and/or therapeutic purposes. Clindamycin is active against numerous classes of bacteria, including skin bacteria. Side effects may include nausea, diarrhea, gastrointestinal upset, rash, hives, yeast infections, and in rare cases, colitis. High Dose Vitamin A Pregnancy And Lactation Text: High dose vitamin A therapy is contraindicated during pregnancy and breast feeding. Valtrex Counseling: I discussed with the patient the risks of valacyclovir including but not limited to kidney damage, nausea, vomiting and severe allergy.  The patient understands that if the infection seems to be worsening or is not improving, they are to call. Otezla Pregnancy And Lactation Text: This medication is Pregnancy Category C and it isn't known if it is safe during pregnancy. It is unknown if it is excreted in breast milk. Quinolones Counseling:  I discussed with the patient the risks of fluoroquinolones including but not limited to GI upset, allergic reaction, drug rash, diarrhea, dizziness, photosensitivity, yeast infections, liver function test abnormalities, tendonitis/tendon rupture. Spironolactone Pregnancy And Lactation Text: This medication can cause feminization of the male fetus and should be avoided during pregnancy. The active metabolite is also found in breast milk. Cimetidine Counseling:  I discussed with the patient the risks of Cimetidine including but not limited to gynecomastia, headache, diarrhea, nausea, drowsiness, arrhythmias, pancreatitis, skin rashes, psychosis, bone marrow suppression and kidney toxicity. Ketoconazole Counseling:   Patient counseled regarding improving absorption with orange juice.  Adverse effects include but are not limited to breast enlargement, headache, diarrhea, nausea, upset stomach, liver function test abnormalities, taste disturbance, and stomach pain.  There is a rare possibility of liver failure that can occur when taking ketoconazole. The patient understands that monitoring of LFTs may be required, especially at baseline. The patient verbalized understanding of the proper use and possible adverse effects of ketoconazole.  All of the patient's questions and concerns were addressed. Xolair Counseling:  Patient informed of potential adverse effects including but not limited to fever, muscle aches, rash and allergic reactions.  The patient verbalized understanding of the proper use and possible adverse effects of Xolair.  All of the patient's questions and concerns were addressed. Dapsone Pregnancy And Lactation Text: This medication is Pregnancy Category C and is not considered safe during pregnancy or breast feeding. Solaraze Counseling:  I discussed with the patient the risks of Solaraze including but not limited to erythema, scaling, itching, weeping, crusting, and pain. Topical Sulfur Applications Pregnancy And Lactation Text: This medication is Pregnancy Category C and has an unknown safety profile during pregnancy. It is unknown if this topical medication is excreted in breast milk. Cimetidine Pregnancy And Lactation Text: This medication is Pregnancy Category B and is considered safe during pregnancy. It is also excreted in breast milk and breast feeding isn't recommended. Topical Clindamycin Pregnancy And Lactation Text: This medication is Pregnancy Category B and is considered safe during pregnancy. It is unknown if it is excreted in breast milk. Dupixent Counseling: I discussed with the patient the risks of dupilumab including but not limited to eye infection and irritation, cold sores, injection site reactions, worsening of asthma, allergic reactions and increased risk of parasitic infection.  Live vaccines should be avoided while taking dupilumab. Dupilumab will also interact with certain medications such as warfarin and cyclosporine. The patient understands that monitoring is required and they must alert us or the primary physician if symptoms of infection or other concerning signs are noted. Cosentyx Counseling:  I discussed with the patient the risks of Cosentyx including but not limited to worsening of Crohn's disease, immunosuppression, allergic reactions and infections.  The patient understands that monitoring is required including a PPD at baseline and must alert us or the primary physician if symptoms of infection or other concerning signs are noted. Drysol Counseling:  I discussed with the patient the risks of drysol/aluminum chloride including but not limited to skin rash, itching, irritation, burning. Elidel Counseling: Patient may experience a mild burning sensation during topical application. Elidel is not approved in children less than 2 years of age. There have been case reports of hematologic and skin malignancies in patients using topical calcineurin inhibitors although causality is questionable. 5-Fu Pregnancy And Lactation Text: This medication is Pregnancy Category X and contraindicated in pregnancy and in women who may become pregnant. It is unknown if this medication is excreted in breast milk. Cellcept Counseling:  I discussed with the patient the risks of mycophenolate mofetil including but not limited to infection/immunosuppression, GI upset, hypokalemia, hypercholesterolemia, bone marrow suppression, lymphoproliferative disorders, malignancy, GI ulceration/bleed/perforation, colitis, interstitial lung disease, kidney failure, progressive multifocal leukoencephalopathy, and birth defects.  The patient understands that monitoring is required including a baseline creatinine and regular CBC testing. In addition, patient must alert us immediately if symptoms of infection or other concerning signs are noted. Cimzia Counseling:  I discussed with the patient the risks of Cimzia including but not limited to immunosuppression, allergic reactions and infections.  The patient understands that monitoring is required including a PPD at baseline and must alert us or the primary physician if symptoms of infection or other concerning signs are noted. Metronidazole Pregnancy And Lactation Text: This medication is Pregnancy Category B and considered safe during pregnancy.  It is also excreted in breast milk. Stelara Counseling:  I discussed with the patient the risks of ustekinumab including but not limited to immunosuppression, malignancy, posterior leukoencephalopathy syndrome, and serious infections.  The patient understands that monitoring is required including a PPD at baseline and must alert us or the primary physician if symptoms of infection or other concerning signs are noted. Griseofulvin Counseling:  I discussed with the patient the risks of griseofulvin including but not limited to photosensitivity, cytopenia, liver damage, nausea/vomiting and severe allergy.  The patient understands that this medication is best absorbed when taken with a fatty meal (e.g., ice cream or french fries). Erythromycin Counseling:  I discussed with the patient the risks of erythromycin including but not limited to GI upset, allergic reaction, drug rash, diarrhea, increase in liver enzymes, and yeast infections. Simponi Counseling:  I discussed with the patient the risks of golimumab including but not limited to myelosuppression, immunosuppression, autoimmune hepatitis, demyelinating diseases, lymphoma, and serious infections.  The patient understands that monitoring is required including a PPD at baseline and must alert us or the primary physician if symptoms of infection or other concerning signs are noted. SSKI Counseling:  I discussed with the patient the risks of SSKI including but not limited to thyroid abnormalities, metallic taste, GI upset, fever, headache, acne, arthralgias, paraesthesias, lymphadenopathy, easy bleeding, arrhythmias, and allergic reaction. Sski Pregnancy And Lactation Text: This medication is Pregnancy Category D and isn't considered safe during pregnancy. It is excreted in breast milk. Enbrel Counseling:  I discussed with the patient the risks of etanercept including but not limited to myelosuppression, immunosuppression, autoimmune hepatitis, demyelinating diseases, lymphoma, and infections.  The patient understands that monitoring is required including a PPD at baseline and must alert us or the primary physician if symptoms of infection or other concerning signs are noted. Isotretinoin Pregnancy And Lactation Text: This medication is Pregnancy Category X and is considered extremely dangerous during pregnancy. It is unknown if it is excreted in breast milk. Itraconazole Counseling:  I discussed with the patient the risks of itraconazole including but not limited to liver damage, nausea/vomiting, neuropathy, and severe allergy.  The patient understands that this medication is best absorbed when taken with acidic beverages such as non-diet cola or ginger ale.  The patient understands that monitoring is required including baseline LFTs and repeat LFTs at intervals.  The patient understands that they are to contact us or the primary physician if concerning signs are noted. Topical Retinoid counseling:  Patient advised to apply a pea-sized amount only at bedtime and wait 30 minutes after washing their face before applying.  If too drying, patient may add a non-comedogenic moisturizer. The patient verbalized understanding of the proper use and possible adverse effects of retinoids.  All of the patient's questions and concerns were addressed. Doxepin Counseling:  Patient advised that the medication is sedating and not to drive a car after taking this medication. Patient informed of potential adverse effects including but not limited to dry mouth, urinary retention, and blurry vision.  The patient verbalized understanding of the proper use and possible adverse effects of doxepin.  All of the patient's questions and concerns were addressed. Prednisone Counseling:  I discussed with the patient the risks of prolonged use of prednisone including but not limited to weight gain, insomnia, osteoporosis, mood changes, diabetes, susceptibility to infection, glaucoma and high blood pressure.  In cases where prednisone use is prolonged, patients should be monitored with blood pressure checks, serum glucose levels and an eye exam.  Additionally, the patient may need to be placed on GI prophylaxis, PCP prophylaxis, and calcium and vitamin D supplementation and/or a bisphosphonate.  The patient verbalized understanding of the proper use and the possible adverse effects of prednisone.  All of the patient's questions and concerns were addressed. Dapsone Counseling: I discussed with the patient the risks of dapsone including but not limited to hemolytic anemia, agranulocytosis, rashes, methemoglobinemia, kidney failure, peripheral neuropathy, headaches, GI upset, and liver toxicity.  Patients who start dapsone require monitoring including baseline LFTs and weekly CBCs for the first month, then every month thereafter.  The patient verbalized understanding of the proper use and possible adverse effects of dapsone.  All of the patient's questions and concerns were addressed. 5-Fu Counseling: 5-Fluorouracil Counseling:  I discussed with the patient the risks of 5-fluorouracil including but not limited to erythema, scaling, itching, weeping, crusting, and pain. Cyclophosphamide Counseling:  I discussed with the patient the risks of cyclophosphamide including but not limited to hair loss, hormonal abnormalities, decreased fertility, abdominal pain, diarrhea, nausea and vomiting, bone marrow suppression and infection. The patient understands that monitoring is required while taking this medication.

## 2024-04-10 NOTE — PLAN OF CARE
Goal Outcome Evaluation:      Wound vac R foot. Possible additional procedures later this week. IV antibx.

## 2024-04-10 NOTE — THERAPY WOUND CARE TREATMENT
Acute Care - Wound/Debridement Treatment Note  [facility]     Patient Name: Alvaro Sinha  : 1965  MRN: [id number]  Today's Date: 4/10/2024                Admit Date: 2024    Visit Dx:    ICD-10-CM ICD-9-CM   1. Osteomyelitis of right foot, unspecified type  M86.9 730.27   2. Cellulitis of right foot  L03.115 682.7   3. Diabetic ulcer of right foot associated with other specified diabetes mellitus, unspecified part of foot, unspecified ulcer stage  E13.621 250.80    L97.519 707.15   4. History of diabetes mellitus  Z86.39 V12.29       Patient Active Problem List   Diagnosis    Nephrolithiasis    Lymphadenopathy    Lymphoma    Osteomyelitis    Type 2 diabetes mellitus with hyperglycemia    Elevated serum immunoglobulin free light chains    Elevated erythrocyte sedimentation rate    Diabetic foot ulcer    History of myalgia due to HMG CoA reductase inhibitor    Gout    Hypertensive disorder    Hyperlipidemia    Hypercalcemia    Microalbuminuric diabetic nephropathy    Peripheral angiopathy due to diabetes mellitus    Stage 3a chronic kidney disease        Past Medical History:   Diagnosis Date    Diabetes mellitus     Hypertension         Past Surgical History:   Procedure Laterality Date    BONE RESECTION, RIB      TRANS METATARSAL AMPUTATION Right 2024    Procedure: AMPUTATION TRANS METATARSAL RIGHT;  Surgeon: Jeremi Hays Jr., MD;  Location: On license of [facility];  Service: Orthopedics;  Laterality: Right;           Wound 24 1630 Right second toe Amputation stump (Active)   Dressing Appearance dry;intact 04/10/24 0800   Closure Unable to assess 24   Base dressing in place, unable to visualize 04/10/24 0800   Wound Length (cm) 3 cm 24 1000   Wound Width (cm) 4 cm 24 1000   Wound Depth (cm) 3 cm 24 1000   Wound Surface Area (cm^2) 12 cm^2 24 1000   Wound Volume (cm^3) 36 cm^3 24 1000   Drainage Characteristics/Odor serosanguineous 24    Drainage Amount scant 04/09/24 2000   Care, Wound negative pressure wound therapy 04/09/24 2000   Periwound Care dry periwound area maintained 04/09/24 1600   Wound Output (mL) 10 04/09/24 1000       NPWT (Negative Pressure Wound Therapy) 04/08/24 1705 Right foot (Active)   Therapy Setting continuous therapy 04/10/24 0800   Dressing unable to visualize 04/09/24 2000   Pressure Setting 125 mmHg 04/10/24 0800         WOUND DEBRIDEMENT                     PT Assessment (Last 12 Hours)       PT Evaluation and Treatment       Row Name 04/10/24 0800          Physical Therapy Time and Intention    Subjective Information no complaints  -MF     Document Type therapy note (daily note);wound care  -MF     Mode of Treatment individual therapy;physical therapy  -       Row Name 04/10/24 0800          Pain    Pretreatment Pain Rating 0/10 - no pain  -MF     Posttreatment Pain Rating 0/10 - no pain  -MF       Row Name 04/10/24 0800          Wound 04/08/24 1630 Right second toe Amputation stump    Wound - Properties Group Placement Date: 04/08/24  -NH Placement Time: 1630  -NH Present on Original Admission: N  -NH Side: Right  -NH Location: second toe  -NH Primary Wound Type: Amputation s  -NH Additional Comments: 2nd and 3rd toe amputation  -NH    Dressing Appearance dry;intact  -MF     Base dressing in place, unable to visualize  -     Retired Wound - Properties Group Placement Date: 04/08/24  -NH Placement Time: 1630  -NH Present on Original Admission: N  -NH Side: Right  -NH Location: second toe  -NH Primary Wound Type: Amputation s  -NH Additional Comments: 2nd and 3rd toe amputation  -NH    Retired Wound - Properties Group Date first assessed: 04/08/24  -NH Time first assessed: 1630  -NH Present on Original Admission: N  -NH Side: Right  -NH Location: second toe  -NH Primary Wound Type: Amputation s  -NH Additional Comments: 2nd and 3rd toe amputation  -NH      Row Name 04/10/24 0800          NPWT (Negative Pressure  Wound Therapy) 04/08/24 1705 Right foot    NPWT (Negative Pressure Wound Therapy) - Properties Group Placement Date: 04/08/24  -NH Placement Time: 1705  -NH Location: Right foot  -NH Additional Comments: wound vac  -NH    Therapy Setting continuous therapy  -     Pressure Setting 125 mmHg  -MF     Retired NPWT (Negative Pressure Wound Therapy) - Properties Group Placement Date: 04/08/24  -NH Placement Time: 1705  -NH Location: Right foot  -NH Additional Comments: wound vac  -NH    Retired NPWT (Negative Pressure Wound Therapy) - Properties Group Placement Date: 04/08/24  -NH Placement Time: 1705  -NH Location: Right foot  -NH Additional Comments: wound vac  -NH      Row Name 04/10/24 0800          Coping    Observed Emotional State calm;cooperative  -     Verbalized Emotional State acceptance  -     Trust Relationship/Rapport care explained  -MF       Row Name 04/10/24 0800          Plan of Care Review    Plan of Care Reviewed With patient  -MF     Outcome Evaluation wound vac intact with no issues noted.  -MF       Row Name 04/10/24 0800          Positioning and Restraints    Pre-Treatment Position in bed  -MF     Post Treatment Position bed  -MF     In Bed supine;call light within reach  -               User Key  (r) = Recorded By, (t) = Taken By, (c) = Cosigned By      Initials Name Provider Type    Jesus Bernard, PT Physical Therapist    NH Adwoa Pablo, RN Registered Nurse                  Physical Therapy Education       Title: PT OT SLP Therapies (Done)       Topic: Physical Therapy (Done)       Point: Mobility training (Done)       Learning Progress Summary             Patient Acceptance, E, VU by KM at 4/7/2024 0023    Acceptance, E,TB, VU by TB at 4/5/2024 1410    Acceptance, E,TB, VU by TB at 4/3/2024 1520                         Point: Home exercise program (Done)       Learning Progress Summary             Patient Acceptance, E, VU by KM at 4/7/2024 0023    Acceptance, E,TB, VU by TB  at 4/5/2024 1410    Acceptance, E,TB, VU by TB at 4/3/2024 1520                         Point: Body mechanics (Done)       Learning Progress Summary             Patient Acceptance, E, VU by KM at 4/7/2024 0023    Acceptance, E,TB, VU by TB at 4/5/2024 1410    Acceptance, E,TB, VU by TB at 4/3/2024 1520                         Point: Precautions (Done)       Learning Progress Summary             Patient Acceptance, E, VU by KM at 4/7/2024 0023    Acceptance, E,TB, VU by TB at 4/5/2024 1410    Acceptance, E,TB, VU by TB at 4/3/2024 1520                                         User Key       Initials Effective Dates Name Provider Type Discipline     06/16/21 -  Sarita Bui RN Registered Nurse Nurse     01/16/24 -  Lexi Grady, PT Student PT Student PT                    Recommendation and Plan  Anticipated Equipment Needs at Discharge (PT): front wheeled walker, crutches  Anticipated Discharge Disposition (PT): home with assist, home with home health  Planned Therapy Interventions (PT): wound care, patient/family education  Therapy Frequency (PT): daily  Plan of Care Reviewed With: patient           Outcome Evaluation: wound vac intact with no issues noted.  Plan of Care Reviewed With: patient            Time Calculation   PT Charges       Row Name 04/10/24 0800             Time Calculation    Start Time 0800  -MF      PT Goal Re-Cert Due Date 04/13/24  -MF         Untimed Charges    40335-Vel Pressure wound to 50 sqcm 10  -MF         Total Minutes    Untimed Charges Total Minutes 10  -MF       Total Minutes 10  -MF                User Key  (r) = Recorded By, (t) = Taken By, (c) = Cosigned By      Initials Name Provider Type     Jesus Mares, PT Physical Therapist                      Therapy Charges for Today       Code Description Service Date Service Provider Modifiers Qty    43925057292 HC PT NEG PRESS WOUND TO 50SQCM DME2 4/9/2024 Jesus Mares, PT GP 1    76415726818 HC PT RE-EVAL  ESTABLISHED PLAN 2 4/9/2024 Jesus Mares, PT GP 1              PT G-Codes  Outcome Measure Options: AM-PAC 6 Clicks Basic Mobility (PT)  AM-PAC 6 Clicks Score (PT): 15       Jesus Mares, PT  4/10/2024

## 2024-04-10 NOTE — PROGRESS NOTES
[facility] Medicine Services  PROGRESS NOTE    Patient Name: Alvaro Sinha  : 1965  MRN: [id number]    Date of Admission: 2024  Primary Care Physician: Martha Mckee MD    Subjective   Subjective     CC:  F/u osteomyelitis     HPI:  Pt is seen resting in bed in no acute distress.  Awake and alert.  Denies any right foot pain currently.  Wound VAC in place.  Tolerating diet.  States he may be having additional procedure tomorrow for Friday.  Awaiting disposition.  No new issues.      Objective   Objective     Vital Signs:   Temp:  [97.2 °F (36.2 °C)-97.8 °F (36.6 °C)] 97.3 °F (36.3 °C)  Heart Rate:  [47-77] 70  Resp:  [16-18] 18  BP: (116-150)/(71-95) 150/95     Physical Exam:  Constitutional: No acute distress, awake, alert.  Resting up in bed.  No visitors at bedside.  HENT: NCAT, mucous membranes moist  Respiratory: Clear to auscultation bilaterally, respiratory effort normal on room air with sats 99%.  Cardiovascular: RRR, no murmurs, rubs, or gallops  Gastrointestinal: Positive bowel sounds, soft, nontender, nondistended.  Obese.  Musculoskeletal: No left ankle edema.  (Right foot and ankle with dressing and Ace in place). HERNANDEZ spontaneously.  Psychiatric: Appropriate affect, cooperative  Neurologic: Oriented x 3, nonfocal.  Speech clear and appropriate.  Follows commands.  Skin: No rashes noted.  Right foot with dressing and Ace in place dry and intact, wound vac in place      Results Reviewed:  LAB RESULTS:      Lab 04/10/24  0629 24  0527 24  0616 24  0452 24  0456 24  0547 24  0626   WBC 5.13 4.97 4.82 4.13 5.40 5.88 5.10   HEMOGLOBIN 11.4* 11.0* 10.5* 10.9* 10.5* 10.5* 10.3*   HEMATOCRIT 35.5* 33.2* 31.9* 33.9* 31.8* 31.7* 31.6*   PLATELETS 221 204 203 201 210 183 178   NEUTROS ABS 3.17  --  3.90 3.36 3.80 4.16 3.62   IMMATURE GRANS (ABS) 0.19*  --  0.07* 0.05 0.06* 0.07* 0.03   LYMPHS ABS 1.31  --  0.61* 0.48*  0.87 0.89 0.72   MONOS ABS 0.33  --  0.23 0.21 0.39 0.51 0.44   EOS ABS 0.10  --  0.00 0.01 0.25 0.22 0.26   MCV 93.9 93.0 91.1 91.9 92.4 93.2 91.9   SED RATE  --   --   --   --   --  98* 100*   CRP  --   --  3.40* 7.45* 9.73* 11.91* 9.71*         Lab 04/10/24  0629 04/09/24  0527 04/08/24  0616 04/07/24  0452 04/06/24  0456 04/06/24  0001 04/05/24  0547 04/04/24 0626   SODIUM 139 138 133* 137 133*  --  136 140   POTASSIUM 4.5 4.6 4.5 5.2 4.2  --  4.5 4.6   CHLORIDE 105 105 100 103 100  --  103 105   CO2 24.0 23.0 22.0 23.0 24.0  --  24.0 26.0   ANION GAP 10.0 10.0 11.0 11.0 9.0  --  9.0 9.0   BUN 26* 27* 29* 28* 19  --  19 16   CREATININE 1.17 1.35* 1.26 1.29* 1.20  --  1.47* 1.23   EGFR 72.3 60.9 66.1 64.3 70.1  --  54.9* 68.1   GLUCOSE 140* 195* 226* 256* 114*  --  121* 105*   CALCIUM 8.9 9.0 8.9 9.1 8.8  --  8.7 8.7   MAGNESIUM  --   --   --  2.1  --  2.2 1.5* 1.8   PHOSPHORUS  --   --   --  3.3  --   --  3.4 3.2         Lab 04/10/24  0629 04/08/24  0616 04/07/24  0452 04/06/24  0456 04/05/24  0547   TOTAL PROTEIN 6.7 7.2 6.6 6.1 6.3   ALBUMIN 3.8 3.6 3.5 3.4* 3.5   GLOBULIN 2.9 3.6 3.1 2.7 2.8   ALT (SGPT) 53* 32 28 23 22   AST (SGOT) 48* 30 24 21 21   BILIRUBIN 0.2 0.3 0.4 0.5 0.7   ALK PHOS 126* 131* 162* 127* 109                     Brief Urine Lab Results       None            Microbiology Results Abnormal       Procedure Component Value - Date/Time    Wound Culture - Wound, Toe, Right [[id number]] Collected: 04/08/24 1639    Lab Status: Preliminary result Specimen: Wound from Toe, Right Updated: 04/10/24 0723     Wound Culture No growth at 2 days     Gram Stain No WBCs or organisms seen    Blood Culture - Blood, Arm, Right [[id number]]  (Normal) Collected: 04/02/24 1900    Lab Status: Final result Specimen: Blood from Arm, Right Updated: 04/07/24 1946     Blood Culture No growth at 5 days    Blood Culture - Blood, Arm, Right [[id number]]  (Normal) Collected: 04/02/24 1833    Lab Status: Final result  Specimen: Blood from Arm, Right Updated: 04/07/24 1900     Blood Culture No growth at 5 days    Narrative:      Less than seven (7) mL's of blood was collected.  Insufficient quantity may yield false negative results.    MRSA Screen, PCR (Inpatient) - Swab, Nares [[id number]]  (Normal) Collected: 04/02/24 1903    Lab Status: Final result Specimen: Swab from Nares Updated: 04/02/24 2047     MRSA PCR Negative    Narrative:      The negative predictive value of this diagnostic test is high and should only be used to consider de-escalating anti-MRSA therapy. A positive result may indicate colonization with MRSA and must be correlated clinically.  MRSA Negative            Right Pop SS    Result Date: 4/8/2024  Jeremi Jj CRNA     4/8/2024  3:44 PM Right Pop SS Patient reassessed immediately prior to procedure Patient location during procedure: pre-op Start time: 4/8/2024 3:35 PM Reason for block: at surgeon's request and post-op pain management Performed by CRNA/CAA: Jeremi Jj CRNA Assisted by: Evans Lugo CRNA Preanesthetic Checklist Completed: patient identified, IV checked, site marked, risks and benefits discussed, surgical consent, monitors and equipment checked, pre-op evaluation and timeout performed Prep: Pt Position: left lateral decubitus Sterile barriers:cap, gloves, mask and washed/disinfected hands Prep: ChloraPrep Patient monitoring: blood pressure monitoring, continuous pulse oximetry and EKG Procedure Sedation: yes Performed under: local infiltration Guidance:ultrasound guided ULTRASOUND INTERPRETATION.  Using ultrasound guidance a 20 G gauge needle was placed in close proximity to the sciatic nerve, at which point, under ultrasound guidance anesthetic was injected in the area of the nerve and spread of the anesthesia was seen on ultrasound in close proximity thereto.  There were no abnormalities seen on ultrasound; a digital image was taken; and the patient tolerated the procedure  "with no complications. Images:still images obtained, printed/placed on chart Laterality:right Block Type:popliteal Injection Technique:single-shot Needle Type:echogenic and Tuohy Needle Gauge:18 G Resistance on Injection: none Catheter Size:20 G Medications Used: dexamethasone sodium phosphate injection - Injection  2 mg - 4/8/2024 3:35:00 PM bupivacaine PF (MARCAINE) 0.25 % injection - Injection  30 mL - 4/8/2024 3:35:00 PM fentaNYL citrate (PF) (SUBLIMAZE) injection - Intravenous  100 mcg - 4/8/2024 3:35:00 PM Post Assessment Injection Assessment: negative aspiration for heme, no paresthesia on injection and incremental injection Patient Tolerance:comfortable throughout block Complications:no Additional Notes SINGLE shot  A high-frequency linear transducer, with sterile cover, was placed in the popliteal fossa to identify the popliteal artery and vein, Tibial nerve (TN) and Common Peroneal nerve (CP). The transducer was then moved in a cephalad fashion to observe the TN and CP nerve bifurcation to form the Sciatic Nerve. The insertion site was prepped and draped in sterile fashion. Skin and cutaneous tissue was infiltrated with 2-5 ml of 1% Lidocaine. Using ultrasound-guidance, a 20-gauge B-Aguilar 4\" Ultraplex 360 non-stimulating echogenic needle was then inserted and advanced in plane from lateral to medial. Preservative-free normal saline was utilized for hydro-dissection of tissue, advancement of Touhy, and to confirm final needle placement posterior to the nerves. Local anesthetic injection spread, in incremental 3-5 ml injections, to surround both nerve structures. Aspiration every 5 ml to prevent intravascular injection. Injection was completed with negative aspiration of blood and negative intravascular injection. Injection pressures were normal with minimal resistance          Current medications:  Scheduled Meds:cefTRIAXone, 2,000 mg, Intravenous, Q24H  colchicine, 0.6 mg, Oral, Daily  DAPTOmycin, 8 mg/kg " (Adjusted), Intravenous, Q24H  glipizide, 10 mg, Oral, QAM AC  heparin (porcine), 5,000 Units, Subcutaneous, Q8H  insulin detemir, 10 Units, Subcutaneous, Daily  insulin lispro, 2-9 Units, Subcutaneous, 4x Daily AC & at Bedtime  lisinopril, 40 mg, Oral, Daily  metoprolol succinate XL, 100 mg, Oral, Daily  pioglitazone, 30 mg, Oral, Daily  predniSONE, 20 mg, Oral, Daily With Breakfast  sodium chloride, 10 mL, Intravenous, Q12H      Continuous Infusions:lactated ringers, 9 mL/hr, Last Rate: 9 mL/hr (04/08/24 2885)  Pharmacy Consult,       PRN Meds:.  acetaminophen    senna-docusate sodium **AND** polyethylene glycol **AND** bisacodyl **AND** bisacodyl    Calcium Replacement - Follow Nurse / BPA Driven Protocol    dextrose    dextrose    glucagon (human recombinant)    HYDROcodone-acetaminophen    HYDROmorphone    Magnesium Standard Dose Replacement - Follow Nurse / BPA Driven Protocol    Morphine    oxyCODONE    Pharmacy Consult    Phosphorus Replacement - Follow Nurse / BPA Driven Protocol    Potassium Replacement - Follow Nurse / BPA Driven Protocol    sodium chloride    sodium chloride    Assessment & Plan   Assessment & Plan     Active Hospital Problems    Diagnosis  POA    **Osteomyelitis [M86.9]  Yes    Stage 3a chronic kidney disease [N18.31]  Yes    History of myalgia due to HMG CoA reductase inhibitor [Z87.39]  Not Applicable    Microalbuminuric diabetic nephropathy [E11.21]  Yes    Hyperlipidemia [E78.5]  Yes    Type 2 diabetes mellitus with hyperglycemia [E11.65]  Yes    Lymphoma [C85.90]  Yes    Diabetic foot ulcer [E11.621, L97.509]  Yes    Hypertensive disorder [I10]  Yes      Resolved Hospital Problems   No resolved problems to display.     This patient's assessments and plans were partially entered by my partner and updated as appropriate by me on 4/8/2024     Brief Hospital Course to date:  Alvaro Sinha is a 58 y.o. male with past medical history of lymphoma, type 2 diabetes, hypertension,  hyperlipidemia, CKD, gout, chronic ulceration of the right foot, presented with worsening wound of right foot. MRI of R foot showed concerns for early osteomyelitis of the second and third middle and distal phalanges.  Orthopedic surgery and infectious disease consulted.    This patient's problems and plans were partially entered by my partner and updated as appropriate by me 04/10/24.    Assessment/Plan:  Pt is new to me today      Nonhealing diabetic ulcer of the right foot with overlying cellulitis   Concern for foot osteomyelitis  Patient with chronic foot ulcer, currently with worsening cellulitis and possible osteomyelitis per MRI  Currently on Zosyn and daptomycin per ID  Restart plavix when ok with surgery  Arterial duplex and CTA runoff with no evidence of significant peripheral arterial disease  Dr. Hummel with Ortho following.  S/p right second and third ray amputation 4/8/24, wound vac in place  Possible additional procedures needed later this week.     Acute gout flare  Did not improve with colchicine monotherapy but significant improvement after starting steroids on 4/6)  Will wean off prednisone as tolerated (decreased to 20 mg daily yesterday 4/9)  Patient states he does not do well with allopurinol  Continue colchicine    Type 2 diabetes  A1c 8.5%  Has been off Trulicity due to insurance coverage  Continue glipizide, continue actos  continue Levemir 10 units daily  + SSI (changed to humalog from regular insulin 4/9/24)  Glucose ranging 137-258 last 24 hours.  May need further adjustment.  As changes just made yesterday we will monitor overnight and make further changes tomorrow if needed    Stage III CKD due to diabetes  Hypertension  Creatinine currently near baseline, continue to monitor  Continue home lisinopril     Lymphoma  History of lymphoma in chart, has been following with [facility] for this  Ongoing workup, is not currently on any immunotherapy or immunosuppressive medications  Will  need appointment arranged with [facility] oncology prior to discharge only so that patient does not get lost to follow-up     Statin intolerance  Rosuvastatin listed in medical history however no recent statin.  History of myalgia      Expected Discharge Location and Transportation: TBD  Expected Discharge   Expected Discharge Date: 4/12/2024; Expected Discharge Time:      DVT prophylaxis:  Medical DVT prophylaxis orders are present.         AM-PAC 6 Clicks Score (PT): 15 (04/09/24 2000)    CODE STATUS:   Code Status and Medical Interventions:   Ordered at: 04/03/24 0013     Code Status (Patient has no pulse and is not breathing):    CPR (Attempt to Resuscitate)     Medical Interventions (Patient has pulse or is breathing):    Full Support       Theresa Peterson, APRN  04/10/24

## 2024-04-10 NOTE — PROGRESS NOTES
Alvaro Sinha       LOS: 7 days   Patient Care Team:  Martha Mckee MD as PCP - General (Family Medicine)    Chief Complaint: Right foot infection/osteomyelitis    Subjective     Interval History:     Resting comfortably in bed this morning.  Pain tolerable, no acute events overnight.      Review of Systems:      Gen- No fevers, chills  CV- No chest pain, palpitations  Resp- No cough, dyspnea  GI- No N/V/D, abd pain    Objective     Vital Signs  Vital Signs (last 24 hours)         04/07 0700  04/08 0659 04/08 0700 04/08 0735   Most Recent      Temp (°F) 97.3 -  97.8       97.8 (36.6) 04/08 0000    Heart Rate 45 -  70       48 04/08 0600    Resp 16 -  18       18 04/08 0000    /80 -  148/81       138/94 04/08 0000    SpO2 (%) 93 -  99       97 04/08 0600    Oxygen Concentration (%)   28       28 04/08 0418              Physical Exam:     No acute distress.  Nonlabored respirations.  Regular rate and rhythm.  Abdomen nondistended.  Right lower extremity: Operative dressing present is clean, dry, intact.  Wound VAC in place with adequate seal noted.  Minimal serosanguinous output.     Results Review:     I reviewed the patient's new clinical results.    Medication Review:   Hospital Medications (active)         Dose Frequency Start End    acetaminophen (TYLENOL) tablet 650 mg 650 mg Every 4 Hours PRN 4/2/2024 --    Admin Instructions: If given for fever, use fever parameter: fever greater than 100.4 °F  Based on patient request - if ordered for moderate or severe pain, provider allows for administration of a medication prescribed for a lower pain scale.    Do not exceed 4 grams of acetaminophen in a 24 hr period. Max dose of 2gm for AST/ALT greater than 120 units/L.    If given for pain, use the following pain scale:   Mild Pain = Pain Score of 1-3, CPOT 1-2  Moderate Pain = Pain Score of 4-6, CPOT 3-4  Severe Pain = Pain Score of 7-10, CPOT 5-8    Route: Oral    bisacodyl (DULCOLAX) EC  "tablet 5 mg 5 mg Daily PRN 4/2/2024 --    Admin Instructions: Use if no bowel movement after 12 hours.  Swallow whole. Do not crush, split, or chew tablet.    Route: Oral    Linked Group 1: Placed in \"And\" Linked Group        bisacodyl (DULCOLAX) suppository 10 mg 10 mg Daily PRN 4/2/2024 --    Admin Instructions: Use if no bowel movement after 12 hours.  Hold for diarrhea    Route: Rectal    Linked Group 1: Placed in \"And\" Linked Group        Calcium Replacement - Follow Nurse / BPA Driven Protocol  As Needed 4/2/2024 --    Admin Instructions: Open Order & Select \"BHS Electrolyte Replacement Protocol Algorithm\" to View Details    Route: Does not apply    clopidogrel (PLAVIX) tablet 75 mg ([Held by provider] since 4/3/2024 12:01 AM) 75 mg Daily 4/3/2024 --    Route: Oral    colchicine tablet 0.6 mg 0.6 mg Daily 4/4/2024 --    Admin Instructions: Group 2 (Pink) Hazardous Drug - Reproductive Risk Only - See Handling Guide    Route: Oral    DAPTOmycin (CUBICIN) 800 mg in sodium chloride 0.9 % 50 mL IVPB 8 mg/kg × 102 kg (Adjusted) Every 24 Hours 4/3/2024 5/3/2024    Admin Instructions: Caution: Look alike/sound alike drug alert.  Refrigerate. Do not shake.    Route: Intravenous    dextrose (D50W) (25 g/50 mL) IV injection 25 g 25 g Every 15 Minutes PRN 4/3/2024 --    Admin Instructions: Blood sugar less than 70; patient has IV access - Unresponsive, NPO or Unable To Safely Swallow    Route: Intravenous    dextrose (GLUTOSE) oral gel 15 g 15 g Every 15 Minutes PRN 4/3/2024 --    Admin Instructions: BS<70, Patient Alert, Is not NPO, Can safely swallow.    Route: Oral    glipizide (GLUCOTROL) tablet 10 mg 10 mg Every Morning Before Breakfast 4/3/2024 --    Admin Instructions: Caution: Look alike/sound alike drug alert    Route: Oral    glucagon (GLUCAGEN) injection 1 mg 1 mg Every 15 Minutes PRN 4/3/2024 --    Admin Instructions: Blood Glucose Less Than 70 - Patient Without IV Access - Unresponsive, NPO or Unable To " Safely Swallow  Reconstitute powder for injection by adding 1 mL of -supplied sterile diluent or sterile water for injection to a vial containing 1 mg of the drug, to provide solutions containing 1 mg/mL. Shake vial gently to dissolve.    Route: Intramuscular    heparin (porcine) 5000 UNIT/ML injection 5,000 Units 5,000 Units Every 8 Hours Scheduled 4/3/2024 --    Route: Subcutaneous    HYDROcodone-acetaminophen (NORCO) 5-325 MG per tablet 1 tablet 1 tablet Every 4 Hours PRN 4/2/2024 4/11/2024    Admin Instructions: Based on patient request - if ordered for moderate or severe pain, provider allows for administration of a medication prescribed for a lower pain scale.  [CULLEN]    Do not exceed 4 grams of acetaminophen in a 24 hr period. Max dose of 2gm for AST/ALT greater than 120 units/L        If given for pain, use the following pain scale:   Mild Pain = Pain Score of 1-3, CPOT 1-2  Moderate Pain = Pain Score of 4-6, CPOT 3-4  Severe Pain = Pain Score of 7-10, CPOT 5-8    Route: Oral    HYDROmorphone (DILAUDID) injection 0.5 mg 0.5 mg Every 3 Hours PRN 4/4/2024 4/12/2024    Admin Instructions: Based on patient request - if ordered for moderate or severe pain, provider allows for administration of a medication prescribed for a lower pain scale.  If given for pain, use the following pain scale:  Mild Pain = Pain Score of 1-3, CPOT 1-2  Moderate Pain = Pain Score of 4-6, CPOT 3-4  Severe Pain = Pain Score of 7-10, CPOT 5-8    Route: Intravenous    insulin detemir (LEVEMIR) injection 10 Units 10 Units Daily 4/7/2024 --    Admin Instructions: Do not hold basal insulin without an order. Consider requesting a dose edit, if needed.      Route: Subcutaneous    Insulin Lispro (humaLOG) injection 2-7 Units 2-7 Units 4 Times Daily Before Meals & Nightly 4/3/2024 --    Admin Instructions: Correction Insulin - Low Dose - Total Insulin Dose Less Than 40 units/day (Lean, Elderly or Renal Patients)    Blood Glucose  "150-199 mg/dL - 2 units  Blood Glucose 200-249 mg/dL - 3 units  Blood Glucose 250-299 mg/dL - 4 units  Blood Glucose 300-349 mg/dL - 5 units  Blood Glucose 350-400 mg/dL - 6 units  Blood Glucose Greater Than 400 mg/dL - 7 units & Call Provider   Caution: Look alike/sound alike drug alert    Route: Subcutaneous    lisinopril (PRINIVIL,ZESTRIL) tablet 40 mg 40 mg Daily 4/3/2024 --    Admin Instructions: Hold for SBP less than 100, DBP less than 60.    Route: Oral    Magnesium Standard Dose Replacement - Follow Nurse / BPA Driven Protocol  As Needed 4/2/2024 --    Admin Instructions: Open Order & Select \"BHS Electrolyte Replacement Protocol Algorithm\" to View Details    Route: Does not apply    metoprolol succinate XL (TOPROL-XL) 24 hr tablet 100 mg 100 mg Daily 4/3/2024 --    Admin Instructions: Hold for SBP less than 100, DBP less than 60, or heart rate less than 50    Do not crush or chew the capsules or tablets. The drug may not work as designed if the capsule or tablet is crushed or chewed. Swallow whole.  Do not crush or chew.    Route: Oral    Phosphorus Replacement - Follow Nurse / BPA Driven Protocol  As Needed 4/2/2024 --    Admin Instructions: Open Order & Select \"BHS Electrolyte Replacement Protocol Algorithm\" to View Details    Route: Does not apply    pioglitazone (ACTOS) tablet 30 mg 30 mg Daily 4/3/2024 --    Route: Oral    piperacillin-tazobactam (ZOSYN) 3.375 g IVPB in 100 mL NS MBP (CD) 3.375 g Every 8 Hours 4/3/2024 4/10/2024    Route: Intravenous    polyethylene glycol (MIRALAX) packet 17 g 17 g Daily PRN 4/2/2024 --    Admin Instructions: Use if no bowel movement after 12 hours. Mix in 6-8 ounces of water.  Use 4-8 ounces of water, tea, or juice for each 17 gram dose.    Route: Oral    Linked Group 1: Placed in \"And\" Linked Group        Potassium Replacement - Follow Nurse / BPA Driven Protocol  As Needed 4/2/2024 --    Admin Instructions: Open Order & Select \"BHS Electrolyte Replacement Protocol " "Algorithm\" to View Details    Route: Does not apply    predniSONE (DELTASONE) tablet 40 mg 40 mg Daily With Breakfast 4/7/2024 --    Admin Instructions: Take with food.    Route: Oral    sennosides-docusate (PERICOLACE) 8.6-50 MG per tablet 2 tablet 2 tablet 2 Times Daily PRN 4/2/2024 --    Admin Instructions: Start bowel management regimen if patient has not had a bowel movement after 12 hours.    Route: Oral    Linked Group 1: Placed in \"And\" Linked Group        sodium chloride 0.9 % flush 10 mL 10 mL Every 12 Hours Scheduled 4/3/2024 --    Route: Intravenous    sodium chloride 0.9 % flush 10 mL 10 mL As Needed 4/2/2024 --    Route: Intravenous    sodium chloride 0.9 % infusion 40 mL 40 mL As Needed 4/2/2024 --    Admin Instructions: Following administration of an IV intermittent medication, flush line with 40mL NS at 100mL/hr.    Route: Intravenous              Assessment & Plan     58-year-old male with multiple medical comorbidities, diabetes mellitus with right foot wound/infection status post right second and third ray amputation with wound vacuum-assisted closure April 8, 2024.      Osteomyelitis    Lymphoma    Type 2 diabetes mellitus with hyperglycemia    Diabetic foot ulcer    History of myalgia due to HMG CoA reductase inhibitor    Hypertensive disorder    Hyperlipidemia    Microalbuminuric diabetic nephropathy    Stage 3a chronic kidney disease    Nonweightbearing right lower extremity; okay for minimal protected heel weightbearing right lower extremity in postoperative shoe for transfers only    Every 72 hour wound vacuum-assisted closure changes beginning April 11.  He will need outpatient wound vacuum-assisted closure therapy, likely follow-up with PT wound care.    Follow intraoperative cultures.    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  He is excused from work until further recommendations.    Will follow    JOHN Villavicencio  04/10/24  07:29 " EDT

## 2024-04-11 LAB
ANION GAP SERPL CALCULATED.3IONS-SCNC: 10 MMOL/L (ref 5–15)
BACTERIA SPEC AEROBE CULT: NORMAL
BUN SERPL-MCNC: 30 MG/DL (ref 6–20)
BUN/CREAT SERPL: 23.4 (ref 7–25)
CALCIUM SPEC-SCNC: 9.1 MG/DL (ref 8.6–10.5)
CHLORIDE SERPL-SCNC: 104 MMOL/L (ref 98–107)
CO2 SERPL-SCNC: 26 MMOL/L (ref 22–29)
CREAT SERPL-MCNC: 1.28 MG/DL (ref 0.76–1.27)
EGFRCR SERPLBLD CKD-EPI 2021: 64.9 ML/MIN/1.73
GLUCOSE BLDC GLUCOMTR-MCNC: 150 MG/DL (ref 70–130)
GLUCOSE BLDC GLUCOMTR-MCNC: 168 MG/DL (ref 70–130)
GLUCOSE BLDC GLUCOMTR-MCNC: 261 MG/DL (ref 70–130)
GLUCOSE BLDC GLUCOMTR-MCNC: 266 MG/DL (ref 70–130)
GLUCOSE SERPL-MCNC: 160 MG/DL (ref 65–99)
GRAM STN SPEC: NORMAL
POTASSIUM SERPL-SCNC: 4.5 MMOL/L (ref 3.5–5.2)
QT INTERVAL: 458 MS
QTC INTERVAL: 472 MS
SODIUM SERPL-SCNC: 140 MMOL/L (ref 136–145)

## 2024-04-11 PROCEDURE — 82948 REAGENT STRIP/BLOOD GLUCOSE: CPT

## 2024-04-11 PROCEDURE — 25010000002 HEPARIN (PORCINE) PER 1000 UNITS: Performed by: ORTHOPAEDIC SURGERY

## 2024-04-11 PROCEDURE — 25010000002 CEFTRIAXONE PER 250 MG: Performed by: INTERNAL MEDICINE

## 2024-04-11 PROCEDURE — 63710000001 INSULIN DETEMIR PER 5 UNITS: Performed by: ORTHOPAEDIC SURGERY

## 2024-04-11 PROCEDURE — 94799 UNLISTED PULMONARY SVC/PX: CPT

## 2024-04-11 PROCEDURE — 80048 BASIC METABOLIC PNL TOTAL CA: CPT | Performed by: INTERNAL MEDICINE

## 2024-04-11 PROCEDURE — 63710000001 INSULIN LISPRO (HUMAN) PER 5 UNITS: Performed by: NURSE PRACTITIONER

## 2024-04-11 PROCEDURE — 99232 SBSQ HOSP IP/OBS MODERATE 35: CPT | Performed by: NURSE PRACTITIONER

## 2024-04-11 PROCEDURE — 25010000002 DAPTOMYCIN PER 1 MG: Performed by: ORTHOPAEDIC SURGERY

## 2024-04-11 PROCEDURE — 63710000001 PREDNISONE PER 1 MG: Performed by: ORTHOPAEDIC SURGERY

## 2024-04-11 PROCEDURE — 97605 NEG PRS WND THER DME<=50SQCM: CPT

## 2024-04-11 PROCEDURE — 94660 CPAP INITIATION&MGMT: CPT

## 2024-04-11 RX ORDER — CEFAZOLIN SODIUM IN 0.9 % NACL 3 G/100 ML
3000 INTRAVENOUS SOLUTION, PIGGYBACK (ML) INTRAVENOUS ONCE
Status: CANCELLED | OUTPATIENT
Start: 2024-04-11 | End: 2024-04-11

## 2024-04-11 RX ORDER — PREDNISONE 10 MG/1
10 TABLET ORAL
Status: DISCONTINUED | OUTPATIENT
Start: 2024-04-12 | End: 2024-04-15

## 2024-04-11 RX ADMIN — Medication 10 ML: at 09:10

## 2024-04-11 RX ADMIN — INSULIN LISPRO 6 UNITS: 100 INJECTION, SOLUTION INTRAVENOUS; SUBCUTANEOUS at 21:17

## 2024-04-11 RX ADMIN — DAPTOMYCIN 800 MG: 500 INJECTION, POWDER, LYOPHILIZED, FOR SOLUTION INTRAVENOUS at 10:53

## 2024-04-11 RX ADMIN — INSULIN LISPRO 2 UNITS: 100 INJECTION, SOLUTION INTRAVENOUS; SUBCUTANEOUS at 09:11

## 2024-04-11 RX ADMIN — GLIPIZIDE 10 MG: 10 TABLET ORAL at 09:09

## 2024-04-11 RX ADMIN — METOPROLOL SUCCINATE 100 MG: 50 TABLET, EXTENDED RELEASE ORAL at 09:09

## 2024-04-11 RX ADMIN — HEPARIN SODIUM 5000 UNITS: 5000 INJECTION INTRAVENOUS; SUBCUTANEOUS at 21:17

## 2024-04-11 RX ADMIN — PIOGLITAZONE 30 MG: 15 TABLET ORAL at 09:09

## 2024-04-11 RX ADMIN — COLCHICINE 0.6 MG: 0.6 TABLET ORAL at 09:09

## 2024-04-11 RX ADMIN — LISINOPRIL 40 MG: 40 TABLET ORAL at 09:09

## 2024-04-11 RX ADMIN — HEPARIN SODIUM 5000 UNITS: 5000 INJECTION INTRAVENOUS; SUBCUTANEOUS at 13:52

## 2024-04-11 RX ADMIN — PREDNISONE 20 MG: 20 TABLET ORAL at 09:09

## 2024-04-11 RX ADMIN — INSULIN LISPRO 6 UNITS: 100 INJECTION, SOLUTION INTRAVENOUS; SUBCUTANEOUS at 17:03

## 2024-04-11 RX ADMIN — INSULIN DETEMIR 10 UNITS: 100 INJECTION, SOLUTION SUBCUTANEOUS at 09:09

## 2024-04-11 RX ADMIN — CEFTRIAXONE 2000 MG: 2 INJECTION, POWDER, FOR SOLUTION INTRAMUSCULAR; INTRAVENOUS at 18:07

## 2024-04-11 RX ADMIN — HEPARIN SODIUM 5000 UNITS: 5000 INJECTION INTRAVENOUS; SUBCUTANEOUS at 05:34

## 2024-04-11 RX ADMIN — INSULIN LISPRO 2 UNITS: 100 INJECTION, SOLUTION INTRAVENOUS; SUBCUTANEOUS at 11:49

## 2024-04-11 RX ADMIN — Medication 10 ML: at 21:17

## 2024-04-11 NOTE — PROGRESS NOTES
Alvaro Sinha       LOS: 8 days   Patient Care Team:  Martha Mckee MD as PCP - General (Family Medicine)    Chief Complaint: Right foot infection/osteomyelitis    Subjective     Interval History:     Resting comfortably in bed this morning.  Pain tolerable, no acute events overnight.      Review of Systems:      Gen- No fevers, chills  CV- No chest pain, palpitations  Resp- No cough, dyspnea  GI- No N/V/D, abd pain    Objective     Vital Signs  Vital Signs (last 24 hours)         04/07 0700  04/08 0659 04/08 0700 04/08 0735   Most Recent      Temp (°F) 97.3 -  97.8       97.8 (36.6) 04/08 0000    Heart Rate 45 -  70       48 04/08 0600    Resp 16 -  18       18 04/08 0000    /80 -  148/81       138/94 04/08 0000    SpO2 (%) 93 -  99       97 04/08 0600    Oxygen Concentration (%)   28       28 04/08 0418              Physical Exam:     No acute distress.  Nonlabored respirations.  Regular rate and rhythm.  Abdomen nondistended.  Right lower extremity: Operative dressing present is clean, dry, intact.  Wound VAC in place with adequate seal noted.  Minimal serosanguinous output.     Results Review:     I reviewed the patient's new clinical results.    Medication Review:   Hospital Medications (active)         Dose Frequency Start End    acetaminophen (TYLENOL) tablet 650 mg 650 mg Every 4 Hours PRN 4/2/2024 --    Admin Instructions: If given for fever, use fever parameter: fever greater than 100.4 °F  Based on patient request - if ordered for moderate or severe pain, provider allows for administration of a medication prescribed for a lower pain scale.    Do not exceed 4 grams of acetaminophen in a 24 hr period. Max dose of 2gm for AST/ALT greater than 120 units/L.    If given for pain, use the following pain scale:   Mild Pain = Pain Score of 1-3, CPOT 1-2  Moderate Pain = Pain Score of 4-6, CPOT 3-4  Severe Pain = Pain Score of 7-10, CPOT 5-8    Route: Oral    bisacodyl (DULCOLAX) EC  "tablet 5 mg 5 mg Daily PRN 4/2/2024 --    Admin Instructions: Use if no bowel movement after 12 hours.  Swallow whole. Do not crush, split, or chew tablet.    Route: Oral    Linked Group 1: Placed in \"And\" Linked Group        bisacodyl (DULCOLAX) suppository 10 mg 10 mg Daily PRN 4/2/2024 --    Admin Instructions: Use if no bowel movement after 12 hours.  Hold for diarrhea    Route: Rectal    Linked Group 1: Placed in \"And\" Linked Group        Calcium Replacement - Follow Nurse / BPA Driven Protocol  As Needed 4/2/2024 --    Admin Instructions: Open Order & Select \"BHS Electrolyte Replacement Protocol Algorithm\" to View Details    Route: Does not apply    clopidogrel (PLAVIX) tablet 75 mg ([Held by provider] since 4/3/2024 12:01 AM) 75 mg Daily 4/3/2024 --    Route: Oral    colchicine tablet 0.6 mg 0.6 mg Daily 4/4/2024 --    Admin Instructions: Group 2 (Pink) Hazardous Drug - Reproductive Risk Only - See Handling Guide    Route: Oral    DAPTOmycin (CUBICIN) 800 mg in sodium chloride 0.9 % 50 mL IVPB 8 mg/kg × 102 kg (Adjusted) Every 24 Hours 4/3/2024 5/3/2024    Admin Instructions: Caution: Look alike/sound alike drug alert.  Refrigerate. Do not shake.    Route: Intravenous    dextrose (D50W) (25 g/50 mL) IV injection 25 g 25 g Every 15 Minutes PRN 4/3/2024 --    Admin Instructions: Blood sugar less than 70; patient has IV access - Unresponsive, NPO or Unable To Safely Swallow    Route: Intravenous    dextrose (GLUTOSE) oral gel 15 g 15 g Every 15 Minutes PRN 4/3/2024 --    Admin Instructions: BS<70, Patient Alert, Is not NPO, Can safely swallow.    Route: Oral    glipizide (GLUCOTROL) tablet 10 mg 10 mg Every Morning Before Breakfast 4/3/2024 --    Admin Instructions: Caution: Look alike/sound alike drug alert    Route: Oral    glucagon (GLUCAGEN) injection 1 mg 1 mg Every 15 Minutes PRN 4/3/2024 --    Admin Instructions: Blood Glucose Less Than 70 - Patient Without IV Access - Unresponsive, NPO or Unable To " Safely Swallow  Reconstitute powder for injection by adding 1 mL of -supplied sterile diluent or sterile water for injection to a vial containing 1 mg of the drug, to provide solutions containing 1 mg/mL. Shake vial gently to dissolve.    Route: Intramuscular    heparin (porcine) 5000 UNIT/ML injection 5,000 Units 5,000 Units Every 8 Hours Scheduled 4/3/2024 --    Route: Subcutaneous    HYDROcodone-acetaminophen (NORCO) 5-325 MG per tablet 1 tablet 1 tablet Every 4 Hours PRN 4/2/2024 4/11/2024    Admin Instructions: Based on patient request - if ordered for moderate or severe pain, provider allows for administration of a medication prescribed for a lower pain scale.  [CULLEN]    Do not exceed 4 grams of acetaminophen in a 24 hr period. Max dose of 2gm for AST/ALT greater than 120 units/L        If given for pain, use the following pain scale:   Mild Pain = Pain Score of 1-3, CPOT 1-2  Moderate Pain = Pain Score of 4-6, CPOT 3-4  Severe Pain = Pain Score of 7-10, CPOT 5-8    Route: Oral    HYDROmorphone (DILAUDID) injection 0.5 mg 0.5 mg Every 3 Hours PRN 4/4/2024 4/12/2024    Admin Instructions: Based on patient request - if ordered for moderate or severe pain, provider allows for administration of a medication prescribed for a lower pain scale.  If given for pain, use the following pain scale:  Mild Pain = Pain Score of 1-3, CPOT 1-2  Moderate Pain = Pain Score of 4-6, CPOT 3-4  Severe Pain = Pain Score of 7-10, CPOT 5-8    Route: Intravenous    insulin detemir (LEVEMIR) injection 10 Units 10 Units Daily 4/7/2024 --    Admin Instructions: Do not hold basal insulin without an order. Consider requesting a dose edit, if needed.      Route: Subcutaneous    Insulin Lispro (humaLOG) injection 2-7 Units 2-7 Units 4 Times Daily Before Meals & Nightly 4/3/2024 --    Admin Instructions: Correction Insulin - Low Dose - Total Insulin Dose Less Than 40 units/day (Lean, Elderly or Renal Patients)    Blood Glucose  "150-199 mg/dL - 2 units  Blood Glucose 200-249 mg/dL - 3 units  Blood Glucose 250-299 mg/dL - 4 units  Blood Glucose 300-349 mg/dL - 5 units  Blood Glucose 350-400 mg/dL - 6 units  Blood Glucose Greater Than 400 mg/dL - 7 units & Call Provider   Caution: Look alike/sound alike drug alert    Route: Subcutaneous    lisinopril (PRINIVIL,ZESTRIL) tablet 40 mg 40 mg Daily 4/3/2024 --    Admin Instructions: Hold for SBP less than 100, DBP less than 60.    Route: Oral    Magnesium Standard Dose Replacement - Follow Nurse / BPA Driven Protocol  As Needed 4/2/2024 --    Admin Instructions: Open Order & Select \"BHS Electrolyte Replacement Protocol Algorithm\" to View Details    Route: Does not apply    metoprolol succinate XL (TOPROL-XL) 24 hr tablet 100 mg 100 mg Daily 4/3/2024 --    Admin Instructions: Hold for SBP less than 100, DBP less than 60, or heart rate less than 50    Do not crush or chew the capsules or tablets. The drug may not work as designed if the capsule or tablet is crushed or chewed. Swallow whole.  Do not crush or chew.    Route: Oral    Phosphorus Replacement - Follow Nurse / BPA Driven Protocol  As Needed 4/2/2024 --    Admin Instructions: Open Order & Select \"BHS Electrolyte Replacement Protocol Algorithm\" to View Details    Route: Does not apply    pioglitazone (ACTOS) tablet 30 mg 30 mg Daily 4/3/2024 --    Route: Oral    piperacillin-tazobactam (ZOSYN) 3.375 g IVPB in 100 mL NS MBP (CD) 3.375 g Every 8 Hours 4/3/2024 4/10/2024    Route: Intravenous    polyethylene glycol (MIRALAX) packet 17 g 17 g Daily PRN 4/2/2024 --    Admin Instructions: Use if no bowel movement after 12 hours. Mix in 6-8 ounces of water.  Use 4-8 ounces of water, tea, or juice for each 17 gram dose.    Route: Oral    Linked Group 1: Placed in \"And\" Linked Group        Potassium Replacement - Follow Nurse / BPA Driven Protocol  As Needed 4/2/2024 --    Admin Instructions: Open Order & Select \"BHS Electrolyte Replacement Protocol " "Algorithm\" to View Details    Route: Does not apply    predniSONE (DELTASONE) tablet 40 mg 40 mg Daily With Breakfast 4/7/2024 --    Admin Instructions: Take with food.    Route: Oral    sennosides-docusate (PERICOLACE) 8.6-50 MG per tablet 2 tablet 2 tablet 2 Times Daily PRN 4/2/2024 --    Admin Instructions: Start bowel management regimen if patient has not had a bowel movement after 12 hours.    Route: Oral    Linked Group 1: Placed in \"And\" Linked Group        sodium chloride 0.9 % flush 10 mL 10 mL Every 12 Hours Scheduled 4/3/2024 --    Route: Intravenous    sodium chloride 0.9 % flush 10 mL 10 mL As Needed 4/2/2024 --    Route: Intravenous    sodium chloride 0.9 % infusion 40 mL 40 mL As Needed 4/2/2024 --    Admin Instructions: Following administration of an IV intermittent medication, flush line with 40mL NS at 100mL/hr.    Route: Intravenous              Assessment & Plan     58-year-old male with multiple medical comorbidities, diabetes mellitus with right foot wound/infection status post right second and third ray amputation with wound vacuum-assisted closure April 8, 2024.      Osteomyelitis    Lymphoma    Type 2 diabetes mellitus with hyperglycemia    Diabetic foot ulcer    History of myalgia due to HMG CoA reductase inhibitor    Hypertensive disorder    Hyperlipidemia    Microalbuminuric diabetic nephropathy    Stage 3a chronic kidney disease    Nonweightbearing right lower extremity; okay for minimal protected heel weightbearing right lower extremity in postoperative shoe for transfers only    Plan to leave wound VAC in place for now, anticipated wound VAC change versus closure in the OR tomorrow 4/12  Follow intraoperative cultures.    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  He is excused from work until further recommendations.    I discussed with the patient regarding further management options today.  We discussed continued wound vacuum-assisted " closure versus repeat right foot irrigation, debridement, secondary closure versus wound VAC change.  The proposed procedure, risk/benefits, alternatives, expected perioperative course were discussed with the patient in detail.  Risks discussed include but are not limited to infection, bleeding, injury to adjacent structures, wound healing complications, need for further surgery, DVT/PE, loss of limb, life.  The patient seems to understand and accepts these risks and wishes to proceed with the proposed procedure.    Following a detailed discussion, plan for right foot irrigation, debridement, secondary closure versus wound vacuum-assisted closure change tomorrow 4/12/2024 in the OR.  N.p.o. status at midnight.        JOHN Villavicencio  04/11/24  07:48 EDT

## 2024-04-11 NOTE — PROGRESS NOTES
[facility] Medicine Services  PROGRESS NOTE    Patient Name: Alvaro Sinha  : 1965  MRN: [id number]    Date of Admission: 2024  Primary Care Physician: Martha Mckee MD    Subjective   Subjective     CC:  F/u osteomyelitis     HPI:  Patient was seen resting up in bed in no acute distress.  Awake and alert.  Denies nausea or vomiting.  Wound VAC intact.  Rates right foot pain 1/10 scale.  Dates he is going to surgery tomorrow morning for another procedure.  Encouraged p.o. fluid intake today due to mild elevated creatinine, he agrees.  Will be n.p.o. after midnight for surgery tomorrow    Objective   Objective     Vital Signs:   Temp:  [96.7 °F (35.9 °C)-98.1 °F (36.7 °C)] 96.7 °F (35.9 °C)  Heart Rate:  [56-76] 64  Resp:  [15-18] 18  BP: (131-155)/(84-99) 155/99  Flow (L/min):  [0] 0     Physical Exam:  Constitutional: No acute distress, awake, alert.  Resting up in bed.  No visitors at bedside.  HENT: NCAT, mucous membranes moist  Respiratory: Clear to auscultation bilaterally, respiratory effort normal on room air with sats 95%.  Cardiovascular: RRR, no murmurs, rubs, or gallops  Gastrointestinal: Positive bowel sounds, soft, nontender, nondistended.  Obese.  Musculoskeletal: No left ankle edema.  (Right foot and ankle with dressing and Ace in place). HERNANDEZ spontaneously.  Psychiatric: Appropriate affect, cooperative  Neurologic: Oriented x 3, nonfocal.  Speech clear and appropriate.  Follows commands.  Skin: No rashes noted.  Right foot with dressing and Ace in place dry and intact, wound vac in place      Results Reviewed:  LAB RESULTS:      Lab 04/10/24  0629 24  0527 24  0616 24  0452 24  0456 24  0547   WBC 5.13 4.97 4.82 4.13 5.40 5.88   HEMOGLOBIN 11.4* 11.0* 10.5* 10.9* 10.5* 10.5*   HEMATOCRIT 35.5* 33.2* 31.9* 33.9* 31.8* 31.7*   PLATELETS 221 204 203 201 210 183   NEUTROS ABS 3.17  --  3.90 3.36 3.80 4.16   IMMATURE  GRANS (ABS) 0.19*  --  0.07* 0.05 0.06* 0.07*   LYMPHS ABS 1.31  --  0.61* 0.48* 0.87 0.89   MONOS ABS 0.33  --  0.23 0.21 0.39 0.51   EOS ABS 0.10  --  0.00 0.01 0.25 0.22   MCV 93.9 93.0 91.1 91.9 92.4 93.2   SED RATE  --   --   --   --   --  98*   CRP  --   --  3.40* 7.45* 9.73* 11.91*         Lab 04/11/24  0513 04/10/24  0629 04/09/24  0527 04/08/24  0616 04/07/24  0452 04/06/24  0456 04/06/24  0001 04/05/24  0547   SODIUM 140 139 138 133* 137   < >  --  136   POTASSIUM 4.5 4.5 4.6 4.5 5.2   < >  --  4.5   CHLORIDE 104 105 105 100 103   < >  --  103   CO2 26.0 24.0 23.0 22.0 23.0   < >  --  24.0   ANION GAP 10.0 10.0 10.0 11.0 11.0   < >  --  9.0   BUN 30* 26* 27* 29* 28*   < >  --  19   CREATININE 1.28* 1.17 1.35* 1.26 1.29*   < >  --  1.47*   EGFR 64.9 72.3 60.9 66.1 64.3   < >  --  54.9*   GLUCOSE 160* 140* 195* 226* 256*   < >  --  121*   CALCIUM 9.1 8.9 9.0 8.9 9.1   < >  --  8.7   MAGNESIUM  --   --   --   --  2.1  --  2.2 1.5*   PHOSPHORUS  --   --   --   --  3.3  --   --  3.4    < > = values in this interval not displayed.         Lab 04/10/24  0629 04/08/24  0616 04/07/24  0452 04/06/24  0456 04/05/24  0547   TOTAL PROTEIN 6.7 7.2 6.6 6.1 6.3   ALBUMIN 3.8 3.6 3.5 3.4* 3.5   GLOBULIN 2.9 3.6 3.1 2.7 2.8   ALT (SGPT) 53* 32 28 23 22   AST (SGOT) 48* 30 24 21 21   BILIRUBIN 0.2 0.3 0.4 0.5 0.7   ALK PHOS 126* 131* 162* 127* 109                     Brief Urine Lab Results       None            Microbiology Results Abnormal       Procedure Component Value - Date/Time    Anaerobic Culture - Wound, Toe, Right [[id number]]  (Normal) Collected: 04/08/24 1639    Lab Status: Preliminary result Specimen: Wound from Toe, Right Updated: 04/11/24 0729     Anaerobic Culture No anaerobes isolated at 3 days    Wound Culture - Wound, Toe, Right [[id number]] Collected: 04/08/24 1639    Lab Status: Final result Specimen: Wound from Toe, Right Updated: 04/11/24 0626     Wound Culture No growth at 3 days     Gram Stain No WBCs  or organisms seen    Blood Culture - Blood, Arm, Right [[id number]]  (Normal) Collected: 04/02/24 1900    Lab Status: Final result Specimen: Blood from Arm, Right Updated: 04/07/24 1946     Blood Culture No growth at 5 days    Blood Culture - Blood, Arm, Right [[id number]]  (Normal) Collected: 04/02/24 1833    Lab Status: Final result Specimen: Blood from Arm, Right Updated: 04/07/24 1900     Blood Culture No growth at 5 days    Narrative:      Less than seven (7) mL's of blood was collected.  Insufficient quantity may yield false negative results.    MRSA Screen, PCR (Inpatient) - Swab, Nares [[id number]]  (Normal) Collected: 04/02/24 1903    Lab Status: Final result Specimen: Swab from Nares Updated: 04/02/24 2047     MRSA PCR Negative    Narrative:      The negative predictive value of this diagnostic test is high and should only be used to consider de-escalating anti-MRSA therapy. A positive result may indicate colonization with MRSA and must be correlated clinically.  MRSA Negative            No radiology results from the last 24 hrs        Current medications:  Scheduled Meds:cefTRIAXone, 2,000 mg, Intravenous, Q24H  colchicine, 0.6 mg, Oral, Daily  DAPTOmycin, 8 mg/kg (Adjusted), Intravenous, Q24H  glipizide, 10 mg, Oral, QAM AC  heparin (porcine), 5,000 Units, Subcutaneous, Q8H  insulin detemir, 10 Units, Subcutaneous, Daily  insulin lispro, 2-9 Units, Subcutaneous, 4x Daily AC & at Bedtime  lisinopril, 40 mg, Oral, Daily  metoprolol succinate XL, 100 mg, Oral, Daily  pioglitazone, 30 mg, Oral, Daily  [START ON 4/12/2024] predniSONE, 10 mg, Oral, Daily With Breakfast  sodium chloride, 10 mL, Intravenous, Q12H      Continuous Infusions:lactated ringers, 9 mL/hr, Last Rate: 9 mL/hr (04/08/24 1515)      PRN Meds:.  acetaminophen    senna-docusate sodium **AND** polyethylene glycol **AND** bisacodyl **AND** bisacodyl    Calcium Replacement - Follow Nurse / BPA Driven Protocol    dextrose    dextrose    glucagon  (human recombinant)    HYDROmorphone    Magnesium Standard Dose Replacement - Follow Nurse / BPA Driven Protocol    Morphine    oxyCODONE    Phosphorus Replacement - Follow Nurse / BPA Driven Protocol    Potassium Replacement - Follow Nurse / BPA Driven Protocol    sodium chloride    sodium chloride    Assessment & Plan   Assessment & Plan     Active Hospital Problems    Diagnosis  POA    **Osteomyelitis [M86.9]  Yes    Stage 3a chronic kidney disease [N18.31]  Yes    History of myalgia due to HMG CoA reductase inhibitor [Z87.39]  Not Applicable    Microalbuminuric diabetic nephropathy [E11.21]  Yes    Hyperlipidemia [E78.5]  Yes    Type 2 diabetes mellitus with hyperglycemia [E11.65]  Yes    Lymphoma [C85.90]  Yes    Diabetic foot ulcer [E11.621, L97.509]  Yes    Hypertensive disorder [I10]  Yes      Resolved Hospital Problems   No resolved problems to display.     This patient's assessments and plans were partially entered by my partner and updated as appropriate by me on 4/8/2024     Brief Hospital Course to date:  Alvaro Sinha is a 58 y.o. male with past medical history of lymphoma, type 2 diabetes, hypertension, hyperlipidemia, CKD, gout, chronic ulceration of the right foot, presented with worsening wound of right foot. MRI of R foot showed concerns for early osteomyelitis of the second and third middle and distal phalanges.  Orthopedic surgery and infectious disease consulted.    This patient's problems and plans were partially entered by my partner and updated as appropriate by me 04/11/24.    Assessment/Plan:      Nonhealing diabetic ulcer of the right foot with overlying cellulitis   Concern for foot osteomyelitis  Patient with chronic foot ulcer, currently with worsening cellulitis and possible osteomyelitis per MRI  Currently on Zosyn and daptomycin per ID  Restart plavix when ok with surgery (continuing to hold for now, patient going for additional surgical procedure tomorrow 4/12)  Arterial  duplex and CTA runoff with no evidence of significant peripheral arterial disease  Dr. Hummel with Ortho following.  S/p right second and third ray amputation 4/8/24, wound vac in place  Npo. after midnight for planned surgical procedure tomorrow.     Acute gout flare  Did not improve with colchicine monotherapy but significant improvement after starting steroids on 4/6)  Will wean off prednisone as tolerated (decreased to 20 mg daily 4/9).  Decrease to 10 mg daily today.  Patient states he does not do well with allopurinol  Continue colchicine    Type 2 diabetes  A1c 8.5%  Has been off Trulicity due to insurance coverage  Continue glipizide, continue actos  continue Levemir 10 units daily  + SSI (changed to humalog from regular insulin 4/9/24)  Glucose normal in the morning fasting but running in the upper 100s-low to mid 200s postprandial.  He is on steroid taper (decreased from 20 mg prednisone daily to 10 mg daily beginning tomorrow).  Also will be n.p.o. after midnight tonight for surgery again tomorrow.  For these reasons we will continue current regimen and monitor.  Adjust further tomorrow after surgery as needed.    Stage III CKD due to diabetes  Hypertension  Creatinine currently near baseline, continue to monitor  Continue home lisinopril     Lymphoma  History of lymphoma in chart, has been following with [facility] for this  Ongoing workup, is not currently on any immunotherapy or immunosuppressive medications  Will need appointment arranged with [facility] oncology prior to discharge only so that patient does not get lost to follow-up     Statin intolerance  Rosuvastatin listed in medical history however no recent statin.  History of myalgia      Expected Discharge Location and Transportation: TBD  Expected Discharge   Expected Discharge Date: 4/13/2024; Expected Discharge Time:      DVT prophylaxis:  Medical DVT prophylaxis orders are present.         AM-PAC 6 Clicks Score (PT): 16 (04/11/24  0800)    CODE STATUS:   Code Status and Medical Interventions:   Ordered at: 04/03/24 0013     Code Status (Patient has no pulse and is not breathing):    CPR (Attempt to Resuscitate)     Medical Interventions (Patient has pulse or is breathing):    Full Support       Theresa Peterson, APRN  04/11/24

## 2024-04-11 NOTE — PROGRESS NOTES
Clinical Nutrition   Reason for Visit: Follow-up protocol    Patient Name: Alvaro Sinha  YOB: 1965  MRN: [id number]  Date of Encounter: 04/11/24 11:26 EDT  Admission date: 4/2/2024    Comments:    Visited with patient. Endorsed good appetite and PO intake. Denied any GI symptoms, however, stated his BM have been loose (2-3 BM/d). He believes this to be r/t taking abx. Tolerating ONS. No nutrition POC changes at this time. RDN following as able.    Nutrition Assessment   Admission Diagnosis:  Osteomyelitis [M86.9]    Problem List:    Osteomyelitis    Lymphoma    Type 2 diabetes mellitus with hyperglycemia    Diabetic foot ulcer    History of myalgia due to HMG CoA reductase inhibitor    Hypertensive disorder    Hyperlipidemia    Microalbuminuric diabetic nephropathy    Stage 3a chronic kidney disease    PMH:   He  has a past medical history of Diabetes mellitus and Hypertension.    PSH:  He  has a past surgical history that includes Bone Resection, Rib and Foot amputation through metatarsal (Right, 4/8/2024).    Applicable Nutrition Concerns:   Skin:R foot ulcers  GI: wnl    Reported/Observed/Food/Nutrition Related History:     4/11  Visited with patient. Endorsed good appetite and PO intake. Denied any GI symptoms, however, stated his BM have been loose (2-3 BM/d). He believes this to be r/t taking abx. Tolerating ONS - prefers kulwinder. No nutrition POC changes at this time.    4/4  Pt sitting up in bed, eating lunch, reports good appetite, states that he previous Ha1c 3 months ago was 6.7, and before that it was 6.4, he has had problems controlling blood sugars past several months, 2nd foot infection and gout flare ups which has required steroids, he normally checks blood sugars ~ 2x/day, takes actos, metformin, and glipizide, has not been able  to get his Trulicity shots lately, does not follow diabetic diet    Anthropometrics     Flowsheet Rows      Flowsheet Row First Filed  "Value   Admission Height 185.4 cm (73\") Documented at 04/02/2024 1710   Admission Weight 136 kg (300 lb) Documented at 04/02/2024 1710          Height: Height: 185.4 cm (72.99\")  Last Filed Weight: Weight: 136 kg (300 lb) (04/08/24 1539)  Method: Weight Method: Stated  BMI: BMI (Calculated): 39.6  BMI classification: Obese Class II: 35-39.9kg/m2    Nutrition Focused Physical Exam     Date:     Unable to perform exam due to: Defer pending indication    Current Nutrition Prescription     PO: Diet: Regular/House, Diabetic; Consistent Carbohydrate; Fluid Consistency: Thin (IDDSI 0)  NPO Diet NPO Type: Strict NPO  Oral Nutrition Supplement: boost glucose @ L/D  Intake:  4/10: B 0, L 50, 5/8 B 0, L 0, D 50% PO intake.    Nutrition Diagnosis   Date: 4-4-24 Updated:   Problem Increased nutrient needs   Etiology Poor Skin Integrity   Signs/Symptoms R foot ulcers     Date:  4-4-24 Updated:  Problem Food and nutrition knowledge deficit   Etiology Uncontrolled DM   Signs/Symptoms Ha1c = 8.5     Goal:   General: Nutrition to support treatment  PO: Establish PO  EN/PN: N/A    Nutrition Intervention      Follow treatment progress, Care plan reviewed, Interview for preferences, Supplement provided    Monitoring/Evaluation:   Per protocol, PO intake, Supplement intake, POC/GOC    Yolanda Meneses RD  Time Spent: 25min  "

## 2024-04-11 NOTE — CASE MANAGEMENT/SOCIAL WORK
Continued Stay Note  [facility]     Patient Name: Alvaro Sinha  MRN: [id number]  Today's Date: 4/11/2024    Admit Date: 4/2/2024    Plan: Ongoing   Discharge Plan       Row Name 04/11/24 1153       Plan    Plan Ongoing    Patient/Family in Agreement with Plan other (see comments)    Plan Comments Pt was discussed in Medical Rounds today. Pt's RN states that pt is having a I&D tomorrow for possible incision closure versus wound vac. Pt's RN states that pt has been ambulating to the restroom on his heel. D/C plan is ongoing.  will continue to follow.    Final Discharge Disposition Code 30 - still a patient                   Discharge Codes    No documentation.                 Expected Discharge Date and Time       Expected Discharge Date Expected Discharge Time    Apr 12, 2024               Felicita Pak RN

## 2024-04-11 NOTE — PROGRESS NOTES
[facility]    INFECTIOUS DISEASE CONSULT/INITIAL HOSPITAL VISIT    Alvaro Sinha  1965  [id number]    Date of consult: 4/3/2024    Admit date: 4/2/2024    Requesting Provider: No ref. provider found  Evaluating physician: Aung Jameson MD  Reason for Consultation: Right foot diabetic wound ulcers with underlying osteomyelitis second and third toe  Chief Complaint: Above      Subjective   History of present illness:  Patient is a  58 y.o.  Yr old male with a history of diabetes mellitus type 2, essential hypertension, hyperlipidemia, CKD stage IIIa, gout, with chronic right foot ulceration being followed at the [facility] which worsened 3/31/2024.  He has been using a boot to offload his foot, but was also working at [employer], although he has been off work recently.  MRI of the foot 4/2/2024 was consistent with possible early osteomyelitis second and third middle and distal phalanges.  He has a plantar draining fistulous track below his second and third metatarsal and an anterior web skin breakdown between his second and third toe on his foot.  He was awaiting possible transfer to the [facility] for further orthopedic evaluation.  He has no other localizing signs or symptoms of infection.  I was consulted on 4/3/2024 for further evaluation and treatment.    4/4/2024 history reviewed.  No high fevers or chills.  Tolerating antibiotics for right foot osteomyelitis.    4/5/2024 history reviewed.  Tolerating his antibiotics for right foot osteomyelitis second and third metatarsal.  No high fever.  Awaits surgical disposition.    4/8/2024 history reviewed.  No high fever.  Tolerating antibiotics for right foot second and third toe osteomyelitis.  Awaits possible second and third metatarsal resection by surgery.    4/9/24 history reviewed.  Status post ray resection second and third right toe by Dr. Hays on 4/8/2024.  No high fever.  Tolerating  antibiotics.  Changing to ceftriaxone and daptomycin for Streptococcus, Eikenella (should be sensitive to ceftriaxone).    4/10/24 history reviewed.  Tolerating antibiotics for right foot osteomyelitis with daptomycin and ceftriaxone until 5/22.  No respiratory complaints or pain.  No fever.    4/11/2024 history reviewed.  Continues on antibiotics for right foot osteomyelitis with ceftriaxone and daptomycin until 5/22.  Awaits revision surgery.    Past Medical History:   Diagnosis Date    Diabetes mellitus     Hypertension        Past Surgical History:   Procedure Laterality Date    BONE RESECTION, RIB      TRANS METATARSAL AMPUTATION Right 4/8/2024    Procedure: AMPUTATION TRANS METATARSAL RIGHT;  Surgeon: Jeremi Hays Jr., MD;  Location: [facility];  Service: Orthopedics;  Laterality: Right;       Pediatric History   Patient Parents    Not on file     Other Topics Concern    Not on file   Social History Narrative    Not on file   No cigarettes, occasional alcohol, no drug use,  to [facility] with 1 child.  Works at [employer].    family history is not on file.    Allergies   Allergen Reactions    Statins Myalgia       Immunization History   Administered Date(s) Administered    COVID-19 (MODERNA) 1st,2nd,3rd Dose Monovalent 09/23/2021, 11/02/2021       Medication:  @Scheduled Meds:cefTRIAXone, 2,000 mg, Intravenous, Q24H  colchicine, 0.6 mg, Oral, Daily  DAPTOmycin, 8 mg/kg (Adjusted), Intravenous, Q24H  glipizide, 10 mg, Oral, QAM AC  heparin (porcine), 5,000 Units, Subcutaneous, Q8H  insulin detemir, 10 Units, Subcutaneous, Daily  insulin lispro, 2-9 Units, Subcutaneous, 4x Daily AC & at Bedtime  lisinopril, 40 mg, Oral, Daily  metoprolol succinate XL, 100 mg, Oral, Daily  pioglitazone, 30 mg, Oral, Daily  predniSONE, 20 mg, Oral, Daily With Breakfast  sodium chloride, 10 mL, Intravenous, Q12H      Continuous Infusions:lactated ringers, 9 mL/hr, Last Rate: 9 mL/hr (04/08/24 1515)      PRN Meds:.   "acetaminophen    senna-docusate sodium **AND** polyethylene glycol **AND** bisacodyl **AND** bisacodyl    Calcium Replacement - Follow Nurse / BPA Driven Protocol    dextrose    dextrose    glucagon (human recombinant)    HYDROcodone-acetaminophen    HYDROmorphone    Magnesium Standard Dose Replacement - Follow Nurse / BPA Driven Protocol    Morphine    oxyCODONE    Phosphorus Replacement - Follow Nurse / BPA Driven Protocol    Potassium Replacement - Follow Nurse / BPA Driven Protocol    sodium chloride    sodium chloride     Please refer to the medical record for a full medication list    Review of Systems:    Constitutional-- No Fever, chills or sweats.  Appetite good, and no malaise. No fatigue.  HEENT-- No new vision, hearing or throat complaints.  No epistaxis or oral sores.  Denies odynophagia or dysphagia.  No odynophagia or dysphagia. No headache, photophobia or neck stiffness.  CV-- No chest pain, palpitation or syncope  Resp-- No SOB/cough/Hemoptysis, no wheeze  GI- No nausea, vomiting, or diarrhea.  No hematochezia, melena, or hematemesis. Denies jaundice or chronic liver disease.  -- No dysuria, hematuria, or flank pain.  Denies hesitancy, urgency.  Lymph- no swollen lymph nodes in neck/axilla or groin.   Heme- No active bruising or bleeding; no Hx of DVT or PE.  MS-- no swelling or pain in the bones or joints of arms/legs.  No new back pain.  Neuro-- No acute focal weakness or numbness in the arms or legs.  No seizures.  Skin--No rashes or lesions, except right foot as per HPI    Physical Exam:   Vital Signs   Temp:  [96.7 °F (35.9 °C)-98.1 °F (36.7 °C)] 96.7 °F (35.9 °C)  Heart Rate:  [56-76] 74  Resp:  [15-18] 18  BP: (131-150)/(84-95) 136/94    Blood pressure 136/94, pulse 74, temperature 96.7 °F (35.9 °C), temperature source Oral, resp. rate 18, height 185.4 cm (72.99\"), weight 136 kg (300 lb), SpO2 93%.  GENERAL: Awake and alert, in minimal distress. Appears  stated age.  Resting in bed.  " Watching TV.  HEENT:  Normocephalic, atraumatic.  Oropharynx without thrush. Dentition in good repair. No cervical adenopathy. No neck masses.  Ears externally normal, Nose externally normal.  EYES:  No conjunctival injection. No icterus. EOM full.  LYMPHATICS: No lymphadenopathy of the neck or axillary or inguinal regions.   HEART: No murmur, gallop, or pericardial friction rub. Reg rate rhythm.    LUNGS: Clear to auscultation and percussion. No respiratory distress, no use of accessory muscles.  ABDOMEN: Soft, nontender, nondistended. No appreciable HSM.  Bowel sounds normal.  Obese.  No mass.  SKIN: Warm and dry without cutaneous eruptions.  No nodules.  Right foot surgical dressing in place.  PSYCHIATRIC: Mental status lucid. No confusion.  EXT:  No cellulitic change.  NEURO: Oriented to name, nonfocal.              4/3/24 right foot wounds prior to ray resections 4/8    Results Review:   I reviewed the patient's new clinical results.  I reviewed the patient's new imaging results and agree with the interpretation.  I reviewed the patient's other test results and agree with the interpretation    Results from last 7 days   Lab Units 04/10/24  0629 04/09/24  0527 04/08/24  0616   WBC 10*3/mm3 5.13 4.97 4.82   HEMOGLOBIN g/dL 11.4* 11.0* 10.5*   HEMATOCRIT % 35.5* 33.2* 31.9*   PLATELETS 10*3/mm3 221 204 203     Results from last 7 days   Lab Units 04/11/24  0513   SODIUM mmol/L 140   POTASSIUM mmol/L 4.5   CHLORIDE mmol/L 104   CO2 mmol/L 26.0   BUN mg/dL 30*   CREATININE mg/dL 1.28*   GLUCOSE mg/dL 160*   CALCIUM mg/dL 9.1     Results from last 7 days   Lab Units 04/10/24  0629   ALK PHOS U/L 126*   BILIRUBIN mg/dL 0.2   ALT (SGPT) U/L 53*   AST (SGOT) U/L 48*     Results from last 7 days   Lab Units 04/05/24  0547   SED RATE mm/hr 98*     Results from last 7 days   Lab Units 04/08/24  0616   CRP mg/dL 3.40*               Estimated Creatinine Clearance: 90.8 mL/min (A) (by C-G formula based on SCr of 1.28 mg/dL  "(H)).  CPK          4/10/2024    06:29   Common Labs   Creatine Kinase 21       Procalitonin Results:       Brief Urine Lab Results       None           No results found for: \"SITE\", \"ALLENTEST\", \"PHART\", \"YPM5NOC\", \"PO2ART\", \"LHS1NKK\", \"BASEEXCESS\", \"Z7LEJYGF\", \"HGBBG\", \"HCTABG\", \"OXYHEMOGLOBI\", \"METHHGBN\", \"CARBOXYHGB\", \"CO2CT\", \"BAROMETRIC\", \"MODALITY\", \"FIO2\"     Microbiology:    Right foot wound culture 4/2/2024 with gram-positive cocci, gram-negative rods on Gram stain, MRSA screen negative, blood cultures x 2 from 4/2 negative.  Streptococcus, group B strep, Eikenella.    Results for orders placed or performed during the hospital encounter of 04/02/24   Blood Culture - Blood, Arm, Right    Specimen: Arm, Right; Blood   Result Value Ref Range    Blood Culture No growth at 5 days          Culture results from last 30 days   Lab 04/08/24  1639 04/02/24  1900   WOUNDCX No growth at 3 days Heavy growth (4+) Streptococcus agalactiae (Group B)*  Heavy growth (4+) Streptococcus mitis / oralis*  Light growth (2+) Eikenella corrodens*  Moderate growth (3+) Normal Skin Terri   ANACX No anaerobes isolated at 3 days  --       Brief Urine Lab Results       None        Blood cultures x 2 from 4/2 are negative.  Wound culture right foot growing group B streptococcus to date, MRSA screen negative.    Radiology:  Imaging Results (Last 72 Hours)       ** No results found for the last 72 hours. **            IMPRESSION:     Second and third distal and middle phalanges MRI changes with bone marrow edema in the setting of diabetes and chronic wounds of the right foot with increased pretest probability for underlying osteomyelitis, versus reactive edema.  I would favor osteomyelitis given the way his right foot looks with a fistulous tract that probes deep to bone.  No erosions in the cortical structure noted.  Usual organisms will be mixed terri.  MRSA screen negative, wound culture Gram stain with gram-negative rods and " gram-positive cocci, blood cultures x 2 from 4/2 are negative to date.  Group B streptococcus growing as of 4/4/2024, and Streptococcus mitis.  Status post right second and third ray resection 4/8/2024.  Dr. Hays.  Right foot cellulitis and wound infection associated with diabetes, despite use of a surgical boot.  Diabetes mellitus type 2 with increased risk for infection.  Chronic kidney disease stage IIIa, creatinine 1.18 on 4/3/2024.  1.23 on 4/4/2024.  1.47 on 4/5.  1.26 on 4/8.  1.35 on 4/9.  1.17 on 4/10.  1.28 on 4/11, worse.  Anemia, chronic disease.  Ongoing.  Elevated alkaline phosphatase new 126, ALT 53, AST 48.  Likely related to above issues and medications.    PLAN:    Diagnostically, continue to follow patient's physical exam, CBC, CMP, CRP.  Cultures which were obtained from the foot.  Therapeutically, changed to daptomycin and ceftriaxone on 4/9 in anticipation of outpatient therapy.  This would provide coverage for Streptococcus and Eikenella.    Supportive care.  Wound care.  Nonweightbearing on right foot.  Status post third and second toe ray resection 4/8/2024..  PICC versus [place].    I discussed the patient's findings and my recommendations with patient, family, and nursing staff    Case management orders: Please arrange for home antibiotics with [facility] with daptomycin 700 mg IV daily, ceftriaxone 2 g IV daily to continue until 5/22/2024.  Check CBC, CMP, CRP, CPK weekly while on IV antibiotics.  Fax orders to [phone], call [phone] with final arrangements.  Arrange for follow-up with me in 1 week postdischarge.    Thank you for asking me to see Alvaro Sinha.  Our group would be pleased to follow this patient over the course of their hospitalization and assist with outpatient antimicrobial therapy, as indicated.  Further recommendations depend on the results of the cultures and clinical course.  Increased risk for adverse drug reactions,  complications of IV access, readmission, loss of limb.  Side effects discussed.      Aung Jameson MD  4/11/2024

## 2024-04-12 ENCOUNTER — ANESTHESIA (OUTPATIENT)
Dept: PERIOP | Facility: HOSPITAL | Age: 59
DRG: 617 | End: 2024-04-12
Payer: COMMERCIAL

## 2024-04-12 ENCOUNTER — ANESTHESIA EVENT CONVERTED (OUTPATIENT)
Dept: ANESTHESIOLOGY | Facility: HOSPITAL | Age: 59
DRG: 617 | End: 2024-04-12
Payer: COMMERCIAL

## 2024-04-12 ENCOUNTER — ANESTHESIA EVENT (OUTPATIENT)
Dept: PERIOP | Facility: HOSPITAL | Age: 59
DRG: 617 | End: 2024-04-12
Payer: COMMERCIAL

## 2024-04-12 LAB
ANION GAP SERPL CALCULATED.3IONS-SCNC: 9 MMOL/L (ref 5–15)
BUN SERPL-MCNC: 28 MG/DL (ref 6–20)
BUN/CREAT SERPL: 23.9 (ref 7–25)
CALCIUM SPEC-SCNC: 9.3 MG/DL (ref 8.6–10.5)
CHLORIDE SERPL-SCNC: 103 MMOL/L (ref 98–107)
CO2 SERPL-SCNC: 25 MMOL/L (ref 22–29)
CREAT SERPL-MCNC: 1.17 MG/DL (ref 0.76–1.27)
DEPRECATED RDW RBC AUTO: 44.5 FL (ref 37–54)
EGFRCR SERPLBLD CKD-EPI 2021: 72.3 ML/MIN/1.73
ERYTHROCYTE [DISTWIDTH] IN BLOOD BY AUTOMATED COUNT: 12.9 % (ref 12.3–15.4)
GLUCOSE BLDC GLUCOMTR-MCNC: 154 MG/DL (ref 70–130)
GLUCOSE BLDC GLUCOMTR-MCNC: 189 MG/DL (ref 70–130)
GLUCOSE BLDC GLUCOMTR-MCNC: 191 MG/DL (ref 70–130)
GLUCOSE BLDC GLUCOMTR-MCNC: 264 MG/DL (ref 70–130)
GLUCOSE BLDC GLUCOMTR-MCNC: 318 MG/DL (ref 70–130)
GLUCOSE BLDC GLUCOMTR-MCNC: 346 MG/DL (ref 70–130)
GLUCOSE SERPL-MCNC: 183 MG/DL (ref 65–99)
HCT VFR BLD AUTO: 35.8 % (ref 37.5–51)
HGB BLD-MCNC: 11.6 G/DL (ref 13–17.7)
MCH RBC QN AUTO: 30.4 PG (ref 26.6–33)
MCHC RBC AUTO-ENTMCNC: 32.4 G/DL (ref 31.5–35.7)
MCV RBC AUTO: 94 FL (ref 79–97)
PLATELET # BLD AUTO: 194 10*3/MM3 (ref 140–450)
PMV BLD AUTO: 10.7 FL (ref 6–12)
POTASSIUM SERPL-SCNC: 4.6 MMOL/L (ref 3.5–5.2)
RBC # BLD AUTO: 3.81 10*6/MM3 (ref 4.14–5.8)
SODIUM SERPL-SCNC: 137 MMOL/L (ref 136–145)
WBC NRBC COR # BLD AUTO: 6.25 10*3/MM3 (ref 3.4–10.8)

## 2024-04-12 PROCEDURE — 87116 MYCOBACTERIA CULTURE: CPT | Performed by: ORTHOPAEDIC SURGERY

## 2024-04-12 PROCEDURE — 87205 SMEAR GRAM STAIN: CPT | Performed by: INTERNAL MEDICINE

## 2024-04-12 PROCEDURE — 87206 SMEAR FLUORESCENT/ACID STAI: CPT | Performed by: ORTHOPAEDIC SURGERY

## 2024-04-12 PROCEDURE — 25810000003 LACTATED RINGERS PER 1000 ML: Performed by: ANESTHESIOLOGY

## 2024-04-12 PROCEDURE — 85027 COMPLETE CBC AUTOMATED: CPT | Performed by: NURSE PRACTITIONER

## 2024-04-12 PROCEDURE — 25010000002 PROPOFOL 10 MG/ML EMULSION: Performed by: ANESTHESIOLOGY

## 2024-04-12 PROCEDURE — 25010000002 DEXAMETHASONE SODIUM PHOSPHATE 10 MG/ML SOLUTION: Performed by: ANESTHESIOLOGY

## 2024-04-12 PROCEDURE — 80048 BASIC METABOLIC PNL TOTAL CA: CPT | Performed by: NURSE PRACTITIONER

## 2024-04-12 PROCEDURE — 87075 CULTR BACTERIA EXCEPT BLOOD: CPT | Performed by: ORTHOPAEDIC SURGERY

## 2024-04-12 PROCEDURE — 63710000001 INSULIN DETEMIR PER 5 UNITS: Performed by: INTERNAL MEDICINE

## 2024-04-12 PROCEDURE — 25010000002 BUPIVACAINE (PF) 0.25 % SOLUTION: Performed by: ANESTHESIOLOGY

## 2024-04-12 PROCEDURE — 0KBV0ZZ EXCISION OF RIGHT FOOT MUSCLE, OPEN APPROACH: ICD-10-PCS | Performed by: ORTHOPAEDIC SURGERY

## 2024-04-12 PROCEDURE — 87070 CULTURE OTHR SPECIMN AEROBIC: CPT | Performed by: INTERNAL MEDICINE

## 2024-04-12 PROCEDURE — 25010000002 HEPARIN (PORCINE) PER 1000 UNITS: Performed by: INTERNAL MEDICINE

## 2024-04-12 PROCEDURE — 94660 CPAP INITIATION&MGMT: CPT

## 2024-04-12 PROCEDURE — 25010000002 VANCOMYCIN 1 G RECONSTITUTED SOLUTION: Performed by: ORTHOPAEDIC SURGERY

## 2024-04-12 PROCEDURE — 25010000002 HEPARIN (PORCINE) PER 1000 UNITS: Performed by: ORTHOPAEDIC SURGERY

## 2024-04-12 PROCEDURE — 25010000002 CEFTRIAXONE PER 250 MG: Performed by: INTERNAL MEDICINE

## 2024-04-12 PROCEDURE — 25010000002 DEXAMETHASONE PER 1 MG: Performed by: ANESTHESIOLOGY

## 2024-04-12 PROCEDURE — 63710000001 INSULIN LISPRO (HUMAN) PER 5 UNITS: Performed by: INTERNAL MEDICINE

## 2024-04-12 PROCEDURE — 99232 SBSQ HOSP IP/OBS MODERATE 35: CPT | Performed by: NURSE PRACTITIONER

## 2024-04-12 PROCEDURE — 94799 UNLISTED PULMONARY SVC/PX: CPT

## 2024-04-12 PROCEDURE — 82948 REAGENT STRIP/BLOOD GLUCOSE: CPT

## 2024-04-12 PROCEDURE — 87102 FUNGUS ISOLATION CULTURE: CPT | Performed by: ORTHOPAEDIC SURGERY

## 2024-04-12 PROCEDURE — 63710000001 PREDNISONE PER 5 MG: Performed by: INTERNAL MEDICINE

## 2024-04-12 PROCEDURE — 25010000002 ONDANSETRON PER 1 MG: Performed by: ANESTHESIOLOGY

## 2024-04-12 RX ORDER — SODIUM CHLORIDE 0.9 % (FLUSH) 0.9 %
10 SYRINGE (ML) INJECTION EVERY 12 HOURS SCHEDULED
Status: DISCONTINUED | OUTPATIENT
Start: 2024-04-12 | End: 2024-04-12 | Stop reason: HOSPADM

## 2024-04-12 RX ORDER — FENTANYL CITRATE 50 UG/ML
50 INJECTION, SOLUTION INTRAMUSCULAR; INTRAVENOUS
Status: DISCONTINUED | OUTPATIENT
Start: 2024-04-12 | End: 2024-04-14 | Stop reason: HOSPADM

## 2024-04-12 RX ORDER — IPRATROPIUM BROMIDE AND ALBUTEROL SULFATE 2.5; .5 MG/3ML; MG/3ML
3 SOLUTION RESPIRATORY (INHALATION) ONCE AS NEEDED
Status: DISCONTINUED | OUTPATIENT
Start: 2024-04-12 | End: 2024-04-14 | Stop reason: HOSPADM

## 2024-04-12 RX ORDER — MEPERIDINE HYDROCHLORIDE 25 MG/ML
12.5 INJECTION INTRAMUSCULAR; INTRAVENOUS; SUBCUTANEOUS
Status: ACTIVE | OUTPATIENT
Start: 2024-04-12 | End: 2024-04-13

## 2024-04-12 RX ORDER — ONDANSETRON 2 MG/ML
INJECTION INTRAMUSCULAR; INTRAVENOUS AS NEEDED
Status: DISCONTINUED | OUTPATIENT
Start: 2024-04-12 | End: 2024-04-12 | Stop reason: SURG

## 2024-04-12 RX ORDER — DEXAMETHASONE SODIUM PHOSPHATE 10 MG/ML
INJECTION, SOLUTION INTRAMUSCULAR; INTRAVENOUS
Status: COMPLETED | OUTPATIENT
Start: 2024-04-12 | End: 2024-04-12

## 2024-04-12 RX ORDER — SODIUM CHLORIDE 0.9 % (FLUSH) 0.9 %
3 SYRINGE (ML) INJECTION EVERY 12 HOURS SCHEDULED
Status: DISCONTINUED | OUTPATIENT
Start: 2024-04-12 | End: 2024-04-14 | Stop reason: HOSPADM

## 2024-04-12 RX ORDER — DROPERIDOL 2.5 MG/ML
0.62 INJECTION, SOLUTION INTRAMUSCULAR; INTRAVENOUS ONCE AS NEEDED
Status: DISCONTINUED | OUTPATIENT
Start: 2024-04-12 | End: 2024-04-14 | Stop reason: HOSPADM

## 2024-04-12 RX ORDER — ONDANSETRON 2 MG/ML
4 INJECTION INTRAMUSCULAR; INTRAVENOUS ONCE AS NEEDED
Status: DISCONTINUED | OUTPATIENT
Start: 2024-04-12 | End: 2024-04-14 | Stop reason: HOSPADM

## 2024-04-12 RX ORDER — BUPIVACAINE HYDROCHLORIDE 2.5 MG/ML
INJECTION, SOLUTION EPIDURAL; INFILTRATION; INTRACAUDAL
Status: COMPLETED | OUTPATIENT
Start: 2024-04-12 | End: 2024-04-12

## 2024-04-12 RX ORDER — PROMETHAZINE HYDROCHLORIDE 25 MG/1
25 TABLET ORAL ONCE AS NEEDED
Status: DISCONTINUED | OUTPATIENT
Start: 2024-04-12 | End: 2024-04-14 | Stop reason: HOSPADM

## 2024-04-12 RX ORDER — LIDOCAINE HYDROCHLORIDE 10 MG/ML
0.5 INJECTION, SOLUTION EPIDURAL; INFILTRATION; INTRACAUDAL; PERINEURAL ONCE AS NEEDED
Status: DISCONTINUED | OUTPATIENT
Start: 2024-04-12 | End: 2024-04-12 | Stop reason: HOSPADM

## 2024-04-12 RX ORDER — LIDOCAINE HYDROCHLORIDE 10 MG/ML
INJECTION, SOLUTION EPIDURAL; INFILTRATION; INTRACAUDAL; PERINEURAL AS NEEDED
Status: DISCONTINUED | OUTPATIENT
Start: 2024-04-12 | End: 2024-04-12 | Stop reason: SURG

## 2024-04-12 RX ORDER — PROPOFOL 10 MG/ML
VIAL (ML) INTRAVENOUS AS NEEDED
Status: DISCONTINUED | OUTPATIENT
Start: 2024-04-12 | End: 2024-04-12 | Stop reason: SURG

## 2024-04-12 RX ORDER — SODIUM CHLORIDE 0.9 % (FLUSH) 0.9 %
10 SYRINGE (ML) INJECTION AS NEEDED
Status: DISCONTINUED | OUTPATIENT
Start: 2024-04-12 | End: 2024-04-12 | Stop reason: HOSPADM

## 2024-04-12 RX ORDER — SODIUM CHLORIDE, SODIUM LACTATE, POTASSIUM CHLORIDE, CALCIUM CHLORIDE 600; 310; 30; 20 MG/100ML; MG/100ML; MG/100ML; MG/100ML
9 INJECTION, SOLUTION INTRAVENOUS CONTINUOUS
Status: DISCONTINUED | OUTPATIENT
Start: 2024-04-12 | End: 2024-04-15

## 2024-04-12 RX ORDER — MAGNESIUM HYDROXIDE 1200 MG/15ML
LIQUID ORAL AS NEEDED
Status: DISCONTINUED | OUTPATIENT
Start: 2024-04-12 | End: 2024-04-12 | Stop reason: HOSPADM

## 2024-04-12 RX ORDER — VANCOMYCIN HYDROCHLORIDE 1 G/20ML
INJECTION, POWDER, LYOPHILIZED, FOR SOLUTION INTRAVENOUS AS NEEDED
Status: DISCONTINUED | OUTPATIENT
Start: 2024-04-12 | End: 2024-04-12 | Stop reason: HOSPADM

## 2024-04-12 RX ORDER — LABETALOL HYDROCHLORIDE 5 MG/ML
5 INJECTION, SOLUTION INTRAVENOUS
Status: DISCONTINUED | OUTPATIENT
Start: 2024-04-12 | End: 2024-04-14 | Stop reason: HOSPADM

## 2024-04-12 RX ORDER — MIDAZOLAM HYDROCHLORIDE 1 MG/ML
1 INJECTION INTRAMUSCULAR; INTRAVENOUS
Status: DISCONTINUED | OUTPATIENT
Start: 2024-04-12 | End: 2024-04-12 | Stop reason: HOSPADM

## 2024-04-12 RX ORDER — HYDROCODONE BITARTRATE AND ACETAMINOPHEN 5; 325 MG/1; MG/1
1 TABLET ORAL ONCE AS NEEDED
Status: DISCONTINUED | OUTPATIENT
Start: 2024-04-12 | End: 2024-04-14 | Stop reason: HOSPADM

## 2024-04-12 RX ORDER — DROPERIDOL 2.5 MG/ML
0.62 INJECTION, SOLUTION INTRAMUSCULAR; INTRAVENOUS
Status: DISCONTINUED | OUTPATIENT
Start: 2024-04-12 | End: 2024-04-14 | Stop reason: HOSPADM

## 2024-04-12 RX ORDER — SODIUM CHLORIDE 0.9 % (FLUSH) 0.9 %
3-10 SYRINGE (ML) INJECTION AS NEEDED
Status: DISCONTINUED | OUTPATIENT
Start: 2024-04-12 | End: 2024-04-14 | Stop reason: HOSPADM

## 2024-04-12 RX ORDER — HYDRALAZINE HYDROCHLORIDE 20 MG/ML
5 INJECTION INTRAMUSCULAR; INTRAVENOUS
Status: DISCONTINUED | OUTPATIENT
Start: 2024-04-12 | End: 2024-04-14 | Stop reason: HOSPADM

## 2024-04-12 RX ORDER — SODIUM CHLORIDE 9 MG/ML
40 INJECTION, SOLUTION INTRAVENOUS AS NEEDED
Status: DISCONTINUED | OUTPATIENT
Start: 2024-04-12 | End: 2024-04-14 | Stop reason: HOSPADM

## 2024-04-12 RX ORDER — PROMETHAZINE HYDROCHLORIDE 25 MG/1
25 SUPPOSITORY RECTAL ONCE AS NEEDED
Status: DISCONTINUED | OUTPATIENT
Start: 2024-04-12 | End: 2024-04-14 | Stop reason: HOSPADM

## 2024-04-12 RX ORDER — NALOXONE HCL 0.4 MG/ML
0.4 VIAL (ML) INJECTION AS NEEDED
Status: DISCONTINUED | OUTPATIENT
Start: 2024-04-12 | End: 2024-04-14 | Stop reason: HOSPADM

## 2024-04-12 RX ORDER — HYDROMORPHONE HYDROCHLORIDE 1 MG/ML
0.5 INJECTION, SOLUTION INTRAMUSCULAR; INTRAVENOUS; SUBCUTANEOUS
Status: DISCONTINUED | OUTPATIENT
Start: 2024-04-12 | End: 2024-04-14 | Stop reason: HOSPADM

## 2024-04-12 RX ORDER — DEXAMETHASONE SODIUM PHOSPHATE 4 MG/ML
INJECTION, SOLUTION INTRA-ARTICULAR; INTRALESIONAL; INTRAMUSCULAR; INTRAVENOUS; SOFT TISSUE AS NEEDED
Status: DISCONTINUED | OUTPATIENT
Start: 2024-04-12 | End: 2024-04-12 | Stop reason: SURG

## 2024-04-12 RX ORDER — SODIUM CHLORIDE 9 MG/ML
40 INJECTION, SOLUTION INTRAVENOUS AS NEEDED
Status: DISCONTINUED | OUTPATIENT
Start: 2024-04-12 | End: 2024-04-12 | Stop reason: HOSPADM

## 2024-04-12 RX ORDER — FAMOTIDINE 20 MG/1
20 TABLET, FILM COATED ORAL ONCE
Status: COMPLETED | OUTPATIENT
Start: 2024-04-12 | End: 2024-04-12

## 2024-04-12 RX ORDER — FAMOTIDINE 10 MG/ML
20 INJECTION, SOLUTION INTRAVENOUS ONCE
Status: CANCELLED | OUTPATIENT
Start: 2024-04-12 | End: 2024-04-12

## 2024-04-12 RX ADMIN — ONDANSETRON 4 MG: 2 INJECTION INTRAMUSCULAR; INTRAVENOUS at 10:50

## 2024-04-12 RX ADMIN — METOPROLOL SUCCINATE 100 MG: 50 TABLET, EXTENDED RELEASE ORAL at 09:11

## 2024-04-12 RX ADMIN — INSULIN LISPRO 8 UNITS: 100 INJECTION, SOLUTION INTRAVENOUS; SUBCUTANEOUS at 21:27

## 2024-04-12 RX ADMIN — PROPOFOL 300 MG: 10 INJECTION, EMULSION INTRAVENOUS at 10:43

## 2024-04-12 RX ADMIN — LIDOCAINE HYDROCHLORIDE 50 MG: 10 INJECTION, SOLUTION EPIDURAL; INFILTRATION; INTRACAUDAL; PERINEURAL at 10:43

## 2024-04-12 RX ADMIN — DEXAMETHASONE SODIUM PHOSPHATE 2 MG: 10 INJECTION, SOLUTION INTRAMUSCULAR; INTRAVENOUS at 09:29

## 2024-04-12 RX ADMIN — INSULIN DETEMIR 10 UNITS: 100 INJECTION, SOLUTION SUBCUTANEOUS at 12:37

## 2024-04-12 RX ADMIN — GLIPIZIDE 10 MG: 10 TABLET ORAL at 12:42

## 2024-04-12 RX ADMIN — COLCHICINE 0.6 MG: 0.6 TABLET ORAL at 12:37

## 2024-04-12 RX ADMIN — HEPARIN SODIUM 5000 UNITS: 5000 INJECTION INTRAVENOUS; SUBCUTANEOUS at 05:09

## 2024-04-12 RX ADMIN — SODIUM CHLORIDE, POTASSIUM CHLORIDE, SODIUM LACTATE AND CALCIUM CHLORIDE 9 ML/HR: 600; 310; 30; 20 INJECTION, SOLUTION INTRAVENOUS at 09:09

## 2024-04-12 RX ADMIN — PIOGLITAZONE 30 MG: 15 TABLET ORAL at 12:36

## 2024-04-12 RX ADMIN — BUPIVACAINE HYDROCHLORIDE 30 ML: 2.5 INJECTION, SOLUTION EPIDURAL; INFILTRATION; INTRACAUDAL; PERINEURAL at 09:29

## 2024-04-12 RX ADMIN — CEFTRIAXONE 2000 MG: 2 INJECTION, POWDER, FOR SOLUTION INTRAMUSCULAR; INTRAVENOUS at 18:44

## 2024-04-12 RX ADMIN — DEXAMETHASONE SODIUM PHOSPHATE 4 MG: 4 INJECTION, SOLUTION INTRA-ARTICULAR; INTRALESIONAL; INTRAMUSCULAR; INTRAVENOUS; SOFT TISSUE at 10:50

## 2024-04-12 RX ADMIN — HEPARIN SODIUM 5000 UNITS: 5000 INJECTION INTRAVENOUS; SUBCUTANEOUS at 13:47

## 2024-04-12 RX ADMIN — INSULIN LISPRO 2 UNITS: 100 INJECTION, SOLUTION INTRAVENOUS; SUBCUTANEOUS at 11:30

## 2024-04-12 RX ADMIN — DAPTOMYCIN 800 MG: 500 INJECTION, POWDER, LYOPHILIZED, FOR SOLUTION INTRAVENOUS at 10:49

## 2024-04-12 RX ADMIN — HEPARIN SODIUM 5000 UNITS: 5000 INJECTION INTRAVENOUS; SUBCUTANEOUS at 21:19

## 2024-04-12 RX ADMIN — FAMOTIDINE 20 MG: 20 TABLET ORAL at 09:09

## 2024-04-12 RX ADMIN — Medication 10 ML: at 09:08

## 2024-04-12 RX ADMIN — INSULIN LISPRO 7 UNITS: 100 INJECTION, SOLUTION INTRAVENOUS; SUBCUTANEOUS at 18:35

## 2024-04-12 RX ADMIN — LISINOPRIL 40 MG: 40 TABLET ORAL at 12:37

## 2024-04-12 RX ADMIN — PREDNISONE 10 MG: 10 TABLET ORAL at 12:42

## 2024-04-12 NOTE — PROGRESS NOTES
[facility] Medicine Services  PROGRESS NOTE    Patient Name: Alvaro Sinha  : 1965  MRN: [id number]    Date of Admission: 2024  Primary Care Physician: Martha Mckee MD    Subjective   Subjective     CC:  Follow up osteomyelitis     HPI:  Patient seen resting in bed postoperatively following right foot I&D in the OR today with Dr. Hays.  Pain is currently well-controlled.  He is feeling well and denies any new complaints at this time.    Objective   Objective     Vital Signs:   Temp:  [97.2 °F (36.2 °C)-98.2 °F (36.8 °C)] 97.4 °F (36.3 °C)  Heart Rate:  [53-86] 65  Resp:  [14-18] 16  BP: ()/(55-88) 131/79  Flow (L/min):  [0-4] 4     Physical Exam:  Constitutional: No acute distress, awake, alert, spouse at bedside  HENT: NCAT, mucous membranes moist  Respiratory: Clear to auscultation bilaterally, respiratory effort normal   Cardiovascular: RRR, no murmurs, palpable pedal pulses bilaterally, cap refill brisk   Gastrointestinal: Positive bowel sounds, soft, nontender, nondistended  Musculoskeletal: RLE dressing in place  Psychiatric: Appropriate affect, cooperative  Neurologic: Oriented x 3, moves all extremities, speech clear  Skin: warm, dry, no visible rash       Results Reviewed:  LAB RESULTS:      Lab 24  0437 04/10/24  0629 24  0527 24  0616 24  0452 24  0456   WBC 6.25 5.13 4.97 4.82 4.13 5.40   HEMOGLOBIN 11.6* 11.4* 11.0* 10.5* 10.9* 10.5*   HEMATOCRIT 35.8* 35.5* 33.2* 31.9* 33.9* 31.8*   PLATELETS 194 221 204 203 201 210   NEUTROS ABS  --  3.17  --  3.90 3.36 3.80   IMMATURE GRANS (ABS)  --  0.19*  --  0.07* 0.05 0.06*   LYMPHS ABS  --  1.31  --  0.61* 0.48* 0.87   MONOS ABS  --  0.33  --  0.23 0.21 0.39   EOS ABS  --  0.10  --  0.00 0.01 0.25   MCV 94.0 93.9 93.0 91.1 91.9 92.4   CRP  --   --   --  3.40* 7.45* 9.73*         Lab 24  0437 24  0513 04/10/24  0629 24  0527 24  0616  04/07/24  0452 04/06/24  0456 04/06/24  0001   SODIUM 137 140 139 138 133* 137   < >  --    POTASSIUM 4.6 4.5 4.5 4.6 4.5 5.2   < >  --    CHLORIDE 103 104 105 105 100 103   < >  --    CO2 25.0 26.0 24.0 23.0 22.0 23.0   < >  --    ANION GAP 9.0 10.0 10.0 10.0 11.0 11.0   < >  --    BUN 28* 30* 26* 27* 29* 28*   < >  --    CREATININE 1.17 1.28* 1.17 1.35* 1.26 1.29*   < >  --    EGFR 72.3 64.9 72.3 60.9 66.1 64.3   < >  --    GLUCOSE 183* 160* 140* 195* 226* 256*   < >  --    CALCIUM 9.3 9.1 8.9 9.0 8.9 9.1   < >  --    MAGNESIUM  --   --   --   --   --  2.1  --  2.2   PHOSPHORUS  --   --   --   --   --  3.3  --   --     < > = values in this interval not displayed.         Lab 04/10/24  0629 04/08/24  0616 04/07/24 0452 04/06/24 0456   TOTAL PROTEIN 6.7 7.2 6.6 6.1   ALBUMIN 3.8 3.6 3.5 3.4*   GLOBULIN 2.9 3.6 3.1 2.7   ALT (SGPT) 53* 32 28 23   AST (SGOT) 48* 30 24 21   BILIRUBIN 0.2 0.3 0.4 0.5   ALK PHOS 126* 131* 162* 127*                     Brief Urine Lab Results       None            Microbiology Results Abnormal       Procedure Component Value - Date/Time    Wound Culture - Surgical Site, Foot, Right [[id number]] Collected: 04/12/24 1102    Lab Status: Preliminary result Specimen: Surgical Site from Foot, Right Updated: 04/12/24 1231     Gram Stain Rare (1+) WBCs seen      Many (4+) Red blood cells      No organisms seen    Anaerobic Culture - Wound, Toe, Right [[id number]]  (Normal) Collected: 04/08/24 1639    Lab Status: Preliminary result Specimen: Wound from Toe, Right Updated: 04/11/24 0729     Anaerobic Culture No anaerobes isolated at 3 days    Wound Culture - Wound, Toe, Right [[id number]] Collected: 04/08/24 1639    Lab Status: Final result Specimen: Wound from Toe, Right Updated: 04/11/24 0626     Wound Culture No growth at 3 days     Gram Stain No WBCs or organisms seen    Blood Culture - Blood, Arm, Right [[id number]]  (Normal) Collected: 04/02/24 1900    Lab Status: Final result Specimen: Blood  from Arm, Right Updated: 04/07/24 1946     Blood Culture No growth at 5 days    Blood Culture - Blood, Arm, Right [[id number]]  (Normal) Collected: 04/02/24 1833    Lab Status: Final result Specimen: Blood from Arm, Right Updated: 04/07/24 1900     Blood Culture No growth at 5 days    Narrative:      Less than seven (7) mL's of blood was collected.  Insufficient quantity may yield false negative results.    MRSA Screen, PCR (Inpatient) - Swab, Nares [[id number]]  (Normal) Collected: 04/02/24 1903    Lab Status: Final result Specimen: Swab from Nares Updated: 04/02/24 2047     MRSA PCR Negative    Narrative:      The negative predictive value of this diagnostic test is high and should only be used to consider de-escalating anti-MRSA therapy. A positive result may indicate colonization with MRSA and must be correlated clinically.  MRSA Negative            Peripheral Block    Result Date: 4/12/2024  William Lala CRNA     4/12/2024  9:32 AM Peripheral Block Patient reassessed immediately prior to procedure Patient location during procedure: pre-op Start time: 4/12/2024 9:26 AM Stop time: 4/12/2024 9:29 AM Reason for block: at surgeon's request and post-op pain management Performed by Anesthesiologist: Deborah Mckinney MD Assisted by: Jenny Sanches RN Preanesthetic Checklist Completed: patient identified, IV checked, site marked, risks and benefits discussed, surgical consent, monitors and equipment checked, pre-op evaluation and timeout performed Prep: Pt Position: left lateral decubitus Sterile barriers:cap, gloves, mask and washed/disinfected hands Prep: ChloraPrep Patient monitoring: blood pressure monitoring, continuous pulse oximetry and EKG Procedure Sedation: yes Performed under: local infiltration Guidance:ultrasound guided ULTRASOUND INTERPRETATION.  Using ultrasound guidance a 20 G gauge needle was placed in close proximity to the nerve, at which point, under ultrasound guidance anesthetic was injected  "in the area of the nerve and spread of the anesthesia was seen on ultrasound in close proximity thereto.  There were no abnormalities seen on ultrasound; a digital image was taken; and the patient tolerated the procedure with no complications. Images:still images obtained, printed/placed on chart Laterality:right Block Type:popliteal Injection Technique:single-shot Needle Type:echogenic and short-bevel Needle Gauge:20 G Resistance on Injection: none Medications Used: dexamethasone sodium phosphate injection - Injection  2 mg - 4/12/2024 9:29:00 AM bupivacaine PF (MARCAINE) 0.25 % injection - Injection  30 mL - 4/12/2024 9:29:00 AM Post Assessment Injection Assessment: negative aspiration for heme, no paresthesia on injection and incremental injection Patient Tolerance:comfortable throughout block Complications:no Additional Notes SINGLE shot  A high-frequency linear transducer, with sterile cover, was placed in the popliteal fossa to identify the popliteal artery and vein, Tibial nerve (TN) and Common Peroneal nerve (CP). The transducer was then moved in a cephalad fashion to observe the TN and CP nerve bifurcation to form the Sciatic Nerve. The insertion site was prepped and draped in sterile fashion. Skin and cutaneous tissue was infiltrated with 2-5 ml of 1% Lidocaine. Using ultrasound-guidance, a 20-gauge B-Aguilar 4\" Ultraplex 360 non-stimulating echogenic needle was then inserted and advanced in plane from lateral to medial. Preservative-free normal saline was utilized for hydro-dissection of tissue, advancement of Touhy, and to confirm final needle placement posterior to the nerves. Local anesthetic injection spread, in incremental 3-5 ml injections, to surround both nerve structures. Aspiration every 5 ml to prevent intravascular injection. Injection was completed with negative aspiration of blood and negative intravascular injection. Injection pressures were normal with minimal resistance          Current " medications:  Scheduled Meds:cefTRIAXone, 2,000 mg, Intravenous, Q24H  colchicine, 0.6 mg, Oral, Daily  DAPTOmycin, 8 mg/kg (Adjusted), Intravenous, Q24H  glipizide, 10 mg, Oral, QAM AC  heparin (porcine), 5,000 Units, Subcutaneous, Q8H  insulin detemir, 10 Units, Subcutaneous, Daily  insulin lispro, 2-9 Units, Subcutaneous, 4x Daily AC & at Bedtime  lisinopril, 40 mg, Oral, Daily  metoprolol succinate XL, 100 mg, Oral, Daily  pioglitazone, 30 mg, Oral, Daily  predniSONE, 10 mg, Oral, Daily With Breakfast  sodium chloride, 10 mL, Intravenous, Q12H      Continuous Infusions:lactated ringers, 9 mL/hr, Last Rate: 9 mL/hr (04/08/24 1515)  lactated ringers, 9 mL/hr, Last Rate: 9 mL/hr (04/12/24 1039)      PRN Meds:.  acetaminophen    senna-docusate sodium **AND** polyethylene glycol **AND** bisacodyl **AND** bisacodyl    Calcium Replacement - Follow Nurse / BPA Driven Protocol    dextrose    dextrose    glucagon (human recombinant)    HYDROmorphone    Magnesium Standard Dose Replacement - Follow Nurse / BPA Driven Protocol    Morphine    oxyCODONE    Phosphorus Replacement - Follow Nurse / BPA Driven Protocol    Potassium Replacement - Follow Nurse / BPA Driven Protocol    sodium chloride    sodium chloride    Assessment & Plan   Assessment & Plan     Active Hospital Problems    Diagnosis  POA    **Osteomyelitis [M86.9]  Yes    Stage 3a chronic kidney disease [N18.31]  Yes    History of myalgia due to HMG CoA reductase inhibitor [Z87.39]  Not Applicable    Microalbuminuric diabetic nephropathy [E11.21]  Yes    Hyperlipidemia [E78.5]  Yes    Type 2 diabetes mellitus with hyperglycemia [E11.65]  Yes    Lymphoma [C85.90]  Yes    Diabetic foot ulcer [E11.621, L97.509]  Yes    Hypertensive disorder [I10]  Yes      Resolved Hospital Problems   No resolved problems to display.        Brief Hospital Course to date:  Alvaro Sinha is a 58 y.o. male with past medical history of lymphoma, type 2 diabetes, hypertension,  hyperlipidemia, CKD, gout, chronic ulceration of the right foot, presented with worsening wound of right foot. MRI of R foot showed concerns for early osteomyelitis of the second and third middle and distal phalanges.  Orthopedic surgery and infectious disease consulted.  He underwent I&D in the OR by Dr. Hays on 4/12/2024     This patient's problems and plans were partially entered by my partner and updated as appropriate by me 04/12/24.     Assessment/Plan:      Nonhealing diabetic ulcer of the right foot with overlying cellulitis   Concern for foot osteomyelitis  Patient with chronic foot ulcer, currently with worsening cellulitis and possible osteomyelitis per MRI  Currently on Zosyn and daptomycin per ID  Restart plavix when ok with surgery (continuing to hold for now)  Arterial duplex and CTA runoff with no evidence of significant peripheral arterial disease  Dr. Hummel with Ortho following.  S/p right second and third ray amputation 4/8/24, wound vac in place  S/p I&D in the OR by Dr. Hays on 4/12/2024  AM labs     Acute gout flare  Did not improve with colchicine monotherapy but significant improvement after starting steroids on 4/6)  Continue prednisone, weaned to 10 mg daily on 4/20  Continue colchicine     Type 2 diabetes  A1c 8.5%  Has been off Trulicity due to insurance coverage  Continue glipizide, continue actos  continue Levemir 10 units daily  + SSI (changed to humalog from regular insulin 4/9/24)    Stage III CKD due to diabetes  Hypertension  Creatinine currently near baseline, continue to monitor  Continue home lisinopril     Lymphoma  History of lymphoma in chart, has been following with [facility] for this  Ongoing workup, is not currently on any immunotherapy or immunosuppressive medications  Will need appointment arranged with [facility] oncology prior to discharge so that patient does not get lost to follow-up     Statin intolerance  Rosuvastatin listed in medical history  however no recent statin.  History of myalgia        Expected Discharge Location and Transportation: TBD  Expected Discharge   Expected Discharge Date: 4/13/2024; Expected Discharge Time:       DVT prophylaxis:  Medical DVT prophylaxis orders are present.       AM-PAC 6 Clicks Score (PT): 16 (04/12/24 0930)    CODE STATUS:   Code Status and Medical Interventions:   Ordered at: 04/03/24 0013     Code Status (Patient has no pulse and is not breathing):    CPR (Attempt to Resuscitate)     Medical Interventions (Patient has pulse or is breathing):    Full Support       Danny Felipe, LUZMARIA  04/12/24

## 2024-04-12 NOTE — PROGRESS NOTES
Shindelorisl[place] SHANTEL Sinha       LOS: 9 days   Patient Care Team:  Martha Mckee MD as PCP - General (Family Medicine)    Chief Complaint: Right foot infection/osteomyelitis    Subjective     Interval History:     Resting comfortably in bed this morning.  Pain tolerable, no acute events overnight.      Review of Systems:      Gen- No fevers, chills  CV- No chest pain, palpitations  Resp- No cough, dyspnea  GI- No N/V/D, abd pain    Objective     Vital Signs  Vital Signs (last 24 hours)         04/07 0700  04/08 0659 04/08 0700 04/08 0735   Most Recent      Temp (°F) 97.3 -  97.8       97.8 (36.6) 04/08 0000    Heart Rate 45 -  70       48 04/08 0600    Resp 16 -  18       18 04/08 0000    /80 -  148/81       138/94 04/08 0000    SpO2 (%) 93 -  99       97 04/08 0600    Oxygen Concentration (%)   28       28 04/08 0418              Physical Exam:     No acute distress.  Nonlabored respirations.  Regular rate and rhythm.  Abdomen nondistended.  Right lower extremity: Operative dressing present is clean, dry, intact.  Wound VAC in place with adequate seal noted.  Minimal serosanguinous output.     Results Review:     I reviewed the patient's new clinical results.    Medication Review:   Hospital Medications (active)         Dose Frequency Start End    acetaminophen (TYLENOL) tablet 650 mg 650 mg Every 4 Hours PRN 4/2/2024 --    Admin Instructions: If given for fever, use fever parameter: fever greater than 100.4 °F  Based on patient request - if ordered for moderate or severe pain, provider allows for administration of a medication prescribed for a lower pain scale.    Do not exceed 4 grams of acetaminophen in a 24 hr period. Max dose of 2gm for AST/ALT greater than 120 units/L.    If given for pain, use the following pain scale:   Mild Pain = Pain Score of 1-3, CPOT 1-2  Moderate Pain = Pain Score of 4-6, CPOT 3-4  Severe Pain = Pain Score of 7-10, CPOT 5-8    Route: Oral    bisacodyl (DULCOLAX) EC  "tablet 5 mg 5 mg Daily PRN 4/2/2024 --    Admin Instructions: Use if no bowel movement after 12 hours.  Swallow whole. Do not crush, split, or chew tablet.    Route: Oral    Linked Group 1: Placed in \"And\" Linked Group        bisacodyl (DULCOLAX) suppository 10 mg 10 mg Daily PRN 4/2/2024 --    Admin Instructions: Use if no bowel movement after 12 hours.  Hold for diarrhea    Route: Rectal    Linked Group 1: Placed in \"And\" Linked Group        Calcium Replacement - Follow Nurse / BPA Driven Protocol  As Needed 4/2/2024 --    Admin Instructions: Open Order & Select \"BHS Electrolyte Replacement Protocol Algorithm\" to View Details    Route: Does not apply    clopidogrel (PLAVIX) tablet 75 mg ([Held by provider] since 4/3/2024 12:01 AM) 75 mg Daily 4/3/2024 --    Route: Oral    colchicine tablet 0.6 mg 0.6 mg Daily 4/4/2024 --    Admin Instructions: Group 2 (Pink) Hazardous Drug - Reproductive Risk Only - See Handling Guide    Route: Oral    DAPTOmycin (CUBICIN) 800 mg in sodium chloride 0.9 % 50 mL IVPB 8 mg/kg × 102 kg (Adjusted) Every 24 Hours 4/3/2024 5/3/2024    Admin Instructions: Caution: Look alike/sound alike drug alert.  Refrigerate. Do not shake.    Route: Intravenous    dextrose (D50W) (25 g/50 mL) IV injection 25 g 25 g Every 15 Minutes PRN 4/3/2024 --    Admin Instructions: Blood sugar less than 70; patient has IV access - Unresponsive, NPO or Unable To Safely Swallow    Route: Intravenous    dextrose (GLUTOSE) oral gel 15 g 15 g Every 15 Minutes PRN 4/3/2024 --    Admin Instructions: BS<70, Patient Alert, Is not NPO, Can safely swallow.    Route: Oral    glipizide (GLUCOTROL) tablet 10 mg 10 mg Every Morning Before Breakfast 4/3/2024 --    Admin Instructions: Caution: Look alike/sound alike drug alert    Route: Oral    glucagon (GLUCAGEN) injection 1 mg 1 mg Every 15 Minutes PRN 4/3/2024 --    Admin Instructions: Blood Glucose Less Than 70 - Patient Without IV Access - Unresponsive, NPO or Unable To " Safely Swallow  Reconstitute powder for injection by adding 1 mL of -supplied sterile diluent or sterile water for injection to a vial containing 1 mg of the drug, to provide solutions containing 1 mg/mL. Shake vial gently to dissolve.    Route: Intramuscular    heparin (porcine) 5000 UNIT/ML injection 5,000 Units 5,000 Units Every 8 Hours Scheduled 4/3/2024 --    Route: Subcutaneous    HYDROcodone-acetaminophen (NORCO) 5-325 MG per tablet 1 tablet 1 tablet Every 4 Hours PRN 4/2/2024 4/11/2024    Admin Instructions: Based on patient request - if ordered for moderate or severe pain, provider allows for administration of a medication prescribed for a lower pain scale.  [CULLEN]    Do not exceed 4 grams of acetaminophen in a 24 hr period. Max dose of 2gm for AST/ALT greater than 120 units/L        If given for pain, use the following pain scale:   Mild Pain = Pain Score of 1-3, CPOT 1-2  Moderate Pain = Pain Score of 4-6, CPOT 3-4  Severe Pain = Pain Score of 7-10, CPOT 5-8    Route: Oral    HYDROmorphone (DILAUDID) injection 0.5 mg 0.5 mg Every 3 Hours PRN 4/4/2024 4/12/2024    Admin Instructions: Based on patient request - if ordered for moderate or severe pain, provider allows for administration of a medication prescribed for a lower pain scale.  If given for pain, use the following pain scale:  Mild Pain = Pain Score of 1-3, CPOT 1-2  Moderate Pain = Pain Score of 4-6, CPOT 3-4  Severe Pain = Pain Score of 7-10, CPOT 5-8    Route: Intravenous    insulin detemir (LEVEMIR) injection 10 Units 10 Units Daily 4/7/2024 --    Admin Instructions: Do not hold basal insulin without an order. Consider requesting a dose edit, if needed.      Route: Subcutaneous    Insulin Lispro (humaLOG) injection 2-7 Units 2-7 Units 4 Times Daily Before Meals & Nightly 4/3/2024 --    Admin Instructions: Correction Insulin - Low Dose - Total Insulin Dose Less Than 40 units/day (Lean, Elderly or Renal Patients)    Blood Glucose  "150-199 mg/dL - 2 units  Blood Glucose 200-249 mg/dL - 3 units  Blood Glucose 250-299 mg/dL - 4 units  Blood Glucose 300-349 mg/dL - 5 units  Blood Glucose 350-400 mg/dL - 6 units  Blood Glucose Greater Than 400 mg/dL - 7 units & Call Provider   Caution: Look alike/sound alike drug alert    Route: Subcutaneous    lisinopril (PRINIVIL,ZESTRIL) tablet 40 mg 40 mg Daily 4/3/2024 --    Admin Instructions: Hold for SBP less than 100, DBP less than 60.    Route: Oral    Magnesium Standard Dose Replacement - Follow Nurse / BPA Driven Protocol  As Needed 4/2/2024 --    Admin Instructions: Open Order & Select \"BHS Electrolyte Replacement Protocol Algorithm\" to View Details    Route: Does not apply    metoprolol succinate XL (TOPROL-XL) 24 hr tablet 100 mg 100 mg Daily 4/3/2024 --    Admin Instructions: Hold for SBP less than 100, DBP less than 60, or heart rate less than 50    Do not crush or chew the capsules or tablets. The drug may not work as designed if the capsule or tablet is crushed or chewed. Swallow whole.  Do not crush or chew.    Route: Oral    Phosphorus Replacement - Follow Nurse / BPA Driven Protocol  As Needed 4/2/2024 --    Admin Instructions: Open Order & Select \"BHS Electrolyte Replacement Protocol Algorithm\" to View Details    Route: Does not apply    pioglitazone (ACTOS) tablet 30 mg 30 mg Daily 4/3/2024 --    Route: Oral    piperacillin-tazobactam (ZOSYN) 3.375 g IVPB in 100 mL NS MBP (CD) 3.375 g Every 8 Hours 4/3/2024 4/10/2024    Route: Intravenous    polyethylene glycol (MIRALAX) packet 17 g 17 g Daily PRN 4/2/2024 --    Admin Instructions: Use if no bowel movement after 12 hours. Mix in 6-8 ounces of water.  Use 4-8 ounces of water, tea, or juice for each 17 gram dose.    Route: Oral    Linked Group 1: Placed in \"And\" Linked Group        Potassium Replacement - Follow Nurse / BPA Driven Protocol  As Needed 4/2/2024 --    Admin Instructions: Open Order & Select \"BHS Electrolyte Replacement Protocol " "Algorithm\" to View Details    Route: Does not apply    predniSONE (DELTASONE) tablet 40 mg 40 mg Daily With Breakfast 4/7/2024 --    Admin Instructions: Take with food.    Route: Oral    sennosides-docusate (PERICOLACE) 8.6-50 MG per tablet 2 tablet 2 tablet 2 Times Daily PRN 4/2/2024 --    Admin Instructions: Start bowel management regimen if patient has not had a bowel movement after 12 hours.    Route: Oral    Linked Group 1: Placed in \"And\" Linked Group        sodium chloride 0.9 % flush 10 mL 10 mL Every 12 Hours Scheduled 4/3/2024 --    Route: Intravenous    sodium chloride 0.9 % flush 10 mL 10 mL As Needed 4/2/2024 --    Route: Intravenous    sodium chloride 0.9 % infusion 40 mL 40 mL As Needed 4/2/2024 --    Admin Instructions: Following administration of an IV intermittent medication, flush line with 40mL NS at 100mL/hr.    Route: Intravenous              Assessment & Plan     58-year-old male with multiple medical comorbidities, diabetes mellitus with right foot wound/infection status post right second and third ray amputation with wound vacuum-assisted closure April 8, 2024.      Osteomyelitis    Lymphoma    Type 2 diabetes mellitus with hyperglycemia    Diabetic foot ulcer    History of myalgia due to HMG CoA reductase inhibitor    Hypertensive disorder    Hyperlipidemia    Microalbuminuric diabetic nephropathy    Stage 3a chronic kidney disease    Nonweightbearing right lower extremity; okay for minimal protected heel weightbearing right lower extremity in postoperative shoe for transfers only    Plan to leave wound VAC in place for now, anticipated wound VAC change versus closure in the OR tomorrow 4/12  Follow intraoperative cultures.    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  He is excused from work until further recommendations.    I discussed with the patient regarding further management options today.  We discussed continued wound vacuum-assisted " closure versus repeat right foot irrigation, debridement, secondary closure versus wound VAC change.  The proposed procedure, risk/benefits, alternatives, expected perioperative course were discussed with the patient in detail.  Risks discussed include but are not limited to infection, bleeding, injury to adjacent structures, wound healing complications, need for further surgery, DVT/PE, loss of limb, life.  The patient seems to understand and accepts these risks and wishes to proceed with the proposed procedure.    Following a detailed discussion, plan for right foot irrigation, debridement, secondary closure versus wound vacuum-assisted closure change today.      Jeremi Hays Jr, MD  04/12/24  07:42 EDT

## 2024-04-12 NOTE — ANESTHESIA PREPROCEDURE EVALUATION
Anesthesia Evaluation     Patient summary reviewed and Nursing notes reviewed   no history of anesthetic complications:   NPO Solid Status: > 8 hours  NPO Liquid Status: > 2 hours           Airway   Mallampati: II  TM distance: >3 FB  Neck ROM: full  No difficulty expected  Dental - normal exam   (+) partials    Pulmonary - negative pulmonary ROS and normal exam   (+) ,sleep apnea  Cardiovascular - normal exam    ECG reviewed    (+) hypertension, PVD, hyperlipidemia      Neuro/Psych- negative ROS  GI/Hepatic/Renal/Endo    (+) morbid obesity, renal disease- CRI and stones, diabetes mellitus  (-) GERD, no thyroid disorder    Musculoskeletal (-) negative ROS    Abdominal  - normal exam    Bowel sounds: normal.   Substance History - negative use     OB/GYN negative ob/gyn ROS         Other      history of cancer                Anesthesia Plan    ASA 3     general     (POPLITEAL SS FOR POSTOP PAIN)  intravenous induction     Anesthetic plan, risks, benefits, and alternatives have been provided, discussed and informed consent has been obtained with: patient.    Plan discussed with CRNA.    CODE STATUS:    Code Status (Patient has no pulse and is not breathing): CPR (Attempt to Resuscitate)  Medical Interventions (Patient has pulse or is breathing): Full Support

## 2024-04-12 NOTE — OP NOTE
[facility]     OPERATIVE REPORT      PATIENT NAME:  Alvaro Sinha                            YOB: 1965       PREOP DIAGNOSIS:   Right foot wound status post second and third ray transmetatarsal amputation, wound vacuum assisted closure.    POSTOP DIAGNOSIS:   Same.    PROCEDURE:   Right     82449:  Debridement of skin, subcutaneous tissue, muscle  43622:  Secondary wound closure    SURGEON:     Jeremi Hays MD    OPERATIVE TEAM:   Circulator: Blanca Church RN; Adwoa Pablo RN  Scrub Person: Elvis Alex; Kim Khalil  Nursing Assistant: Kelsey Salcedo Teresa M    ANESTHETIST:  Anesthesiologist: Michael Vizcarra MD  CRNA: Cain Donnelly CRNA  Student Nurse Anesthetist: Niko King SRNA    ANESTHESIA:   Choice    ESTIMATED BLOOD LOSS:   < 30 ml    TOURNIQUET TIME:   Not utilized.    FINDINGS:     Remaining tissue appeared healthy.  Tension free wound closure.    IMPLANTS:     None.    SPECIMENS:     Specimens       ID Source Type Tests Collected By Collected At Frozen?    1 Foot, Right Tissue ANAEROBIC CULTURE (Canceled)  FUNGAL CULTURE  TISSUE / BONE CULTURE  AFB CULTURE  ANAEROBIC CULTURE 10 DAY INCUBATION   Jeremi Hays Jr., MD 4/12/24 1102     Description: right foot cultures            COMPLICATIONS:    None.    DISPOSITION:    Stable to recovery.     INDICATIONS:   58 y.o. male status post Right tansmetatarsal second and third ray amputation with wound vacuum assisted closure.  After discussion of risks, benefits, alternatives, the patient wished to proceed with Right foot debridement, irrigation, secondary wound closure versus wound vacuum assisted closure.    Risks of surgery were discussed which included but were not limited to pain, bleeding, infection, damage to adjacent structures, need for further surgery, wound healing complications, loss of limb and death.  The patient expressed verbal consent and written consent was obtained for the above  procedure.     NARRATIVE:    Patient was identified in preoperative holding.  Operative site was marked in indelible ink.  History, physical, consent were reviewed and updated.  Patient was surrendered to the anesthesia team and transported to the operative suite where anesthesia was induced.  Hip bump was placed on the ipsilateral side.  The wound vacuum assisted closure device was removed and the operative extremity was prepped and draped in the usual sterile fashion.  The operative team donned sterile gowns gloves and a timeout was called.  All in attendance agreed regarding the patient's identity, proposed operative procedure, and operative site.    Tourniquet was not utilized during this case.     I turned my attention to the right forefoot wound.  I took swab specimens which I sent for culture.  I debrided devitalized skin, subcutaneous tissue, and muscle of which there was minimal.  This was an excisional debridement of skin and subcutaneous tissue with a 15 blade scalpel and rongeur.  Deepest level was muscle.  I copiously irrigated with Irrisept and saline.  The remaining tissue appeared healthy and viable without readily apparent evidence of infection.  The remaining tissue appeared healthy and viable without readily apparent evidence of infection.  I applied vancomycin powder to the wound, then closed with monofilament suture.  Sterile dressing applied.    Anesthesia was concluded and the patient was transported to the PACU in stable condition.  Counts were correct x2.  There were no apparent complications.  I was present and scrubbed for the entire case.    Jeremi Hays Jr, MD  4/12/2024

## 2024-04-12 NOTE — PAYOR COMM NOTE
"Alvaro Harris \"NIK\" (58 y.o. Male)       Date of Birth   1965    Social Security Number       Address   [address] [place] [address]    Home Phone   [phone]    MRN   [id number]       [facility]    Marital Status                               Admission Date   4/2/24    Admission Type   Emergency    Admitting Provider   Kirby Khan MD    Attending Provider   Kirby Khan MD    Department, Room/Bed   [facility] OR, [facility] OR/MAIN OR       Discharge Date       Discharge Disposition       Discharge Destination                                 Attending Provider: Kirby Khan MD    Allergies: Statins    Isolation: None   Infection: None   Code Status: CPR    Ht: 185.4 cm (72.99\")   Wt: 136 kg (300 lb)    Admission Cmt: None   Principal Problem: Osteomyelitis [M86.9]                   Active Insurance as of 4/2/2024       Primary Coverage       Payor Plan Insurance Group Employer/Plan Group    ANTHEM BLUE CROSS ANTHEM BLUE CROSS BLUE SHIELD PPO [id number]       Payor Plan Address Payor Plan Phone Number Payor Plan Fax Number Effective Dates    [address] [phone]  1/1/2022 - None Entered    [place] [address]         Subscriber Name Subscriber Birth Date Member ID       ALVARO HARRIS 1965 [id number]                     Emergency Contacts        (Rel.) Home Phone Work Phone Mobile Phone    keshav harris (Spouse) -- -- [phone]              [place]: NPI [id number] Tax ID [id number]  Insurance Information                  ANTHEM BLUE CROSS/ANTHEM BLUE CROSS BLUE SHIELD PPO Phone: [phone]    Subscriber: Alvaro Harris Subscriber#: [id number]    Group#: [id number] Precert#: --          Current Facility-Administered Medications   Medication Dose Route Frequency Provider Last Rate Last Admin    [Transfer Hold] acetaminophen (TYLENOL) tablet 650 mg  650 mg Oral Q4H PRN Jeremi Hays Jr., MD  "  650 mg at 04/04/24 2022    [Transfer Hold] sennosides-docusate (PERICOLACE) 8.6-50 MG per tablet 2 tablet  2 tablet Oral BID PRN Jeremi Hays Jr., MD        And    [Transfer Hold] polyethylene glycol (MIRALAX) packet 17 g  17 g Oral Daily PRN Jeremi Hays Jr., MD        And    [Transfer Hold] bisacodyl (DULCOLAX) EC tablet 5 mg  5 mg Oral Daily PRN Jeremi Hays Jr., MD   5 mg at 04/05/24 0827    And    [Transfer Hold] bisacodyl (DULCOLAX) suppository 10 mg  10 mg Rectal Daily PRN Jeremi Hays Jr., MD        [Transfer Hold] Calcium Replacement - Follow Nurse / BPA Driven Protocol   Does not apply PRN Jeremi Hays Jr., MD        cefTRIAXone (ROCEPHIN) 2,000 mg in sodium chloride 0.9 % 100 mL MBP  2,000 mg Intravenous Q24H Aung Jameson  mL/hr at 04/11/24 1807 2,000 mg at 04/11/24 1807    [Transfer Hold] colchicine tablet 0.6 mg  0.6 mg Oral Daily Jeremi Hays Jr., MD   0.6 mg at 04/11/24 0909    DAPTOmycin (CUBICIN) 800 mg in sodium chloride 0.9 % 50 mL IVPB  8 mg/kg (Adjusted) Intravenous Q24H Jeremi Hays Jr.,  mL/hr at 04/11/24 1053 800 mg at 04/11/24 1053    [Transfer Hold] dextrose (D50W) (25 g/50 mL) IV injection 25 g  25 g Intravenous Q15 Min PRN Jeremi Hays Jr., MD        [Transfer Hold] dextrose (GLUTOSE) oral gel 15 g  15 g Oral Q15 Min PRN Jeremi Hays Jr., MD        ethyl alcohol 62 % 2 each  2 Swab Nasal Once Lexi Bronson PA        [Transfer Hold] glipizide (GLUCOTROL) tablet 10 mg  10 mg Oral QAM AC Jeremi Hays Jr., MD   10 mg at 04/11/24 0909    [Transfer Hold] glucagon (GLUCAGEN) injection 1 mg  1 mg Intramuscular Q15 Min PRN Jeremi Hays Jr., MD        [Transfer Hold] heparin (porcine) 5000 UNIT/ML injection 5,000 Units  5,000 Units Subcutaneous Q8H Jeremi Hays Jr., MD   5,000 Units at 04/12/24 0509    [Transfer Hold] HYDROmorphone (DILAUDID) injection 0.5 mg  0.5 mg Intravenous Q3H PRN Jeremi Hays Jr., MD         [Transfer Hold] insulin detemir (LEVEMIR) injection 10 Units  10 Units Subcutaneous Daily Jeremi Hays Jr., MD   10 Units at 04/11/24 0909    [Transfer Hold] Insulin Lispro (humaLOG) injection 2-9 Units  2-9 Units Subcutaneous 4x Daily AC & at Bedtime Karena Quiros APRN   6 Units at 04/11/24 2117    lactated ringers infusion  9 mL/hr Intravenous Continuous Jeremi Hays Jr., MD 9 mL/hr at 04/08/24 1515 9 mL/hr at 04/08/24 1515    lactated ringers infusion  9 mL/hr Intravenous Continuous Michael Vizcarra MD 9 mL/hr at 04/12/24 0909 9 mL/hr at 04/12/24 0909    lidocaine PF 1% (XYLOCAINE) injection 0.5 mL  0.5 mL Injection Once PRN Michael Vizcarra MD        lisinopril (PRINIVIL,ZESTRIL) tablet 40 mg  40 mg Oral Daily Jeremi Hays Jr., MD   40 mg at 04/11/24 0909    [Transfer Hold] Magnesium Standard Dose Replacement - Follow Nurse / BPA Driven Protocol   Does not apply PRN Jeermi Hays Jr., MD        metoprolol succinate XL (TOPROL-XL) 24 hr tablet 100 mg  100 mg Oral Daily Jeremi Hays Jr., MD   100 mg at 04/12/24 0911    midazolam (VERSED) injection 1 mg  1 mg Intravenous Q10 Min PRN Michael Vizcarra MD        [Transfer Hold] Morphine PF injection 2 mg  2 mg Intravenous Q4H PRN Jeremi Hays Jr., MD        [Transfer Hold] oxyCODONE (ROXICODONE) immediate release tablet 5 mg  5 mg Oral Q4H PRN Jeremi Hays Jr., MD        [Transfer Hold] Phosphorus Replacement - Follow Nurse / BPA Driven Protocol   Does not apply PRN Jeremi Hays Jr., MD        [Transfer Hold] pioglitazone (ACTOS) tablet 30 mg  30 mg Oral Daily Jeremi Hays Jr., MD   30 mg at 04/11/24 0909    Potassium Replacement - Follow Nurse / BPA Driven Protocol   Does not apply PRN Jeremi Hays Jr., MD        [Transfer Hold] predniSONE (DELTASONE) tablet 10 mg  10 mg Oral Daily With Breakfast Theresa Peterson, LUZMAIRA        [Transfer Hold] sodium chloride 0.9 % flush 10 mL  10 mL Intravenous Q12H  Jeremi Hays Jr., MD   10 mL at 04/11/24 2117    [Transfer Hold] sodium chloride 0.9 % flush 10 mL  10 mL Intravenous PRN Jeremi Hays Jr., MD        sodium chloride 0.9 % flush 10 mL  10 mL Intravenous Q12H Michael Vizcarra MD   10 mL at 04/12/24 0908    sodium chloride 0.9 % flush 10 mL  10 mL Intravenous PRN Michael Vizcarra MD        [Transfer Hold] sodium chloride 0.9 % infusion 40 mL  40 mL Intravenous PRN Jeremi Hays Jr., MD        sodium chloride 0.9 % infusion 40 mL  40 mL Intravenous PRN Michael Vizcarra MD         Lab Results (last 24 hours)       Procedure Component Value Units Date/Time    POC Glucose Once [[id number]]  (Abnormal) Collected: 04/12/24 0757    Specimen: Blood Updated: 04/12/24 0805     Glucose 154 mg/dL     Basic Metabolic Panel [[id number]]  (Abnormal) Collected: 04/12/24 0437    Specimen: Blood Updated: 04/12/24 0539     Glucose 183 mg/dL      BUN 28 mg/dL      Creatinine 1.17 mg/dL      Sodium 137 mmol/L      Potassium 4.6 mmol/L      Chloride 103 mmol/L      CO2 25.0 mmol/L      Calcium 9.3 mg/dL      BUN/Creatinine Ratio 23.9     Anion Gap 9.0 mmol/L      eGFR 72.3 mL/min/1.73     Narrative:      GFR Normal >60  Chronic Kidney Disease <60  Kidney Failure <15      CBC (No Diff) [[id number]]  (Abnormal) Collected: 04/12/24 0437    Specimen: Blood Updated: 04/12/24 0519     WBC 6.25 10*3/mm3      RBC 3.81 10*6/mm3      Hemoglobin 11.6 g/dL      Hematocrit 35.8 %      MCV 94.0 fL      MCH 30.4 pg      MCHC 32.4 g/dL      RDW 12.9 %      RDW-SD 44.5 fl      MPV 10.7 fL      Platelets 194 10*3/mm3     POC Glucose Once [[id number]]  (Abnormal) Collected: 04/11/24 2014    Specimen: Blood Updated: 04/11/24 2016     Glucose 266 mg/dL     POC Glucose Once [[id number]]  (Abnormal) Collected: 04/11/24 1643    Specimen: Blood Updated: 04/11/24 1645     Glucose 261 mg/dL     POC Glucose Once [[id number]]  (Abnormal) Collected: 04/11/24 1053    Specimen: Blood Updated: 04/11/24 1058      Glucose 168 mg/dL           Imaging Results (Last 24 Hours)       ** No results found for the last 24 hours. **          Orders (last 24 hrs)        Start     Ordered    04/12/24 0900  sodium chloride 0.9 % flush 10 mL  Every 12 Hours Scheduled         04/12/24 0838    04/12/24 0840  ethyl alcohol 62 % 2 each  Once         04/12/24 0838    04/12/24 0840  lactated ringers infusion  Continuous         04/12/24 0838    04/12/24 0840  famotidine (PEPCID) tablet 20 mg  Once         04/12/24 0838    04/12/24 0839  Follow Anesthesia Guidelines / Protocol  Once         04/12/24 0838    04/12/24 0839  Obtain Informed Consent (If Not Done Inpatient or PAT)  Once         04/12/24 0838    04/12/24 0839  Continuous Pulse Oximetry  Continuous         04/12/24 0838    04/12/24 0839  Insert Peripheral IV  Once         04/12/24 0838    04/12/24 0839  Saline Lock & Maintain IV Access  Continuous         04/12/24 0838    04/12/24 0839  May take Beta Blocker from home with sip of water.  Once        Comments: If patient currently taking Beta Blocker and has not taken within 24 hours    04/12/24 0838    04/12/24 0838  sodium chloride 0.9 % flush 10 mL  As Needed         04/12/24 0838    04/12/24 0838  sodium chloride 0.9 % infusion 40 mL  As Needed         04/12/24 0838    04/12/24 0838  lidocaine PF 1% (XYLOCAINE) injection 0.5 mL  Once As Needed         04/12/24 0838    04/12/24 0838  midazolam (VERSED) injection 1 mg  Every 10 Minutes PRN         04/12/24 0838    04/12/24 0806  POC Glucose Once  PROCEDURE ONCE        Comments: Complete no more than 45 minutes prior to patient eating      04/12/24 0757    04/12/24 0800  [Transfer Hold]  predniSONE (DELTASONE) tablet 10 mg  Daily With Breakfast        (Medication was not reviewed on transfer)    04/11/24 1337    04/12/24 0600  CBC (No Diff)  Morning Draw         04/11/24 1340    04/12/24 0600  Basic Metabolic Panel  Morning Draw         04/11/24 1340    04/12/24 0001  NPO Diet NPO  Type: Strict NPO  Diet Effective Midnight         04/11/24 0752    04/11/24 2017  POC Glucose Once  PROCEDURE ONCE        Comments: Complete no more than 45 minutes prior to patient eating      04/11/24 2014 04/11/24 1800  Dietary Nutrition Supplements Boost Glucose Control; chocolate  Daily With Lunch & Dinner       04/11/24 1418    04/11/24 1646  POC Glucose Once  PROCEDURE ONCE        Comments: Complete no more than 45 minutes prior to patient eating      04/11/24 1643    04/11/24 1055  POC Glucose Once  PROCEDURE ONCE        Comments: Complete no more than 45 minutes prior to patient eating      04/11/24 1053    04/09/24 1900  cefTRIAXone (ROCEPHIN) 2,000 mg in sodium chloride 0.9 % 100 mL MBP  Every 24 Hours         04/09/24 1748    04/09/24 1730  [Transfer Hold]  Insulin Lispro (humaLOG) injection 2-9 Units  4 Times Daily Before Meals & Nightly        (Medication was not reviewed on transfer)    04/09/24 1522    04/09/24 0800  predniSONE (DELTASONE) tablet 20 mg  Daily With Breakfast,   Status:  Discontinued         04/08/24 1020    04/08/24 1805  [Transfer Hold]  oxyCODONE (ROXICODONE) immediate release tablet 5 mg  Every 4 Hours PRN        (Medication was not reviewed on transfer)    04/08/24 1806    04/08/24 1805  [Transfer Hold]  Morphine PF injection 2 mg  Every 4 Hours PRN        (Medication was not reviewed on transfer)    04/08/24 1806    04/08/24 1540  lactated ringers infusion  Continuous         04/08/24 1538    04/08/24 0000  oxyCODONE (ROXICODONE) 5 MG immediate release tablet  Every 4 Hours PRN         04/08/24 1915    04/08/24 0000  naloxone (NARCAN) 4 MG/0.1ML nasal spray         04/08/24 1915    04/07/24 1415  [Transfer Hold]  insulin detemir (LEVEMIR) injection 10 Units  Daily        (Medication was not reviewed on transfer)    04/07/24 1319    04/04/24 1800  Dietary Nutrition Supplements Boost Glucose Control  Daily With Lunch & Dinner,   Status:  Canceled       04/04/24 1258    04/04/24  1030  [Transfer Hold]  colchicine tablet 0.6 mg  Daily        (Medication was not reviewed on transfer)    04/04/24 0839    04/04/24 0840  [Transfer Hold]  HYDROmorphone (DILAUDID) injection 0.5 mg  Every 3 Hours PRN        (Medication was not reviewed on transfer)    04/04/24 0840    04/04/24 0600  Wound Care  Daily      Comments: Dry dressing if needed    04/03/24 1404    04/03/24 1100  DAPTOmycin (CUBICIN) 800 mg in sodium chloride 0.9 % 50 mL IVPB  Every 24 Hours         04/03/24 0934    04/03/24 0900  lisinopril (PRINIVIL,ZESTRIL) tablet 40 mg  Daily         04/03/24 0001    04/03/24 0900  metoprolol succinate XL (TOPROL-XL) 24 hr tablet 100 mg  Daily         04/03/24 0001    04/03/24 0900  [Transfer Hold]  pioglitazone (ACTOS) tablet 30 mg  Daily        (Medication was not reviewed on transfer)    04/03/24 0001    04/03/24 0800  Oral Care  2 Times Daily       04/03/24 0001    04/03/24 0730  [Transfer Hold]  glipizide (GLUCOTROL) tablet 10 mg  Every Morning Before Breakfast        (Medication was not reviewed on transfer)    04/03/24 0001    04/03/24 0700  POC Glucose 4x Daily Before Meals & at Bedtime  4 Times Daily Before Meals & at Bedtime      Comments: Complete no more than 45 minutes prior to patient eating      04/03/24 0002    04/03/24 0600  [Transfer Hold]  heparin (porcine) 5000 UNIT/ML injection 5,000 Units  Every 8 Hours Scheduled        (Medication was not reviewed on transfer)    04/03/24 0001    04/03/24 0100  [Transfer Hold]  sodium chloride 0.9 % flush 10 mL  Every 12 Hours Scheduled        (Medication was not reviewed on transfer)    04/03/24 0001    04/03/24 0002  [Transfer Hold]  dextrose (GLUTOSE) oral gel 15 g  Every 15 Minutes PRN        (Medication was not reviewed on transfer)    04/03/24 0002    04/03/24 0002  [Transfer Hold]  dextrose (D50W) (25 g/50 mL) IV injection 25 g  Every 15 Minutes PRN        (Medication was not reviewed on transfer)    04/03/24 0002    04/03/24 0002   "[Transfer Hold]  glucagon (GLUCAGEN) injection 1 mg  Every 15 Minutes PRN        (Medication was not reviewed on transfer)    04/03/24 0002    04/03/24 0000  Vital Signs  Every 4 Hours       04/03/24 0001    04/02/24 2358  Potassium Replacement - Follow Nurse / BPA Driven Protocol  As Needed         04/03/24 0001    04/02/24 2358  [Transfer Hold]  Magnesium Standard Dose Replacement - Follow Nurse / BPA Driven Protocol  As Needed        (Medication was not reviewed on transfer)    04/03/24 0001    04/02/24 2358  [Transfer Hold]  Phosphorus Replacement - Follow Nurse / BPA Driven Protocol  As Needed        (Medication was not reviewed on transfer)    04/03/24 0001    04/02/24 2358  [Transfer Hold]  Calcium Replacement - Follow Nurse / BPA Driven Protocol  As Needed        (Medication was not reviewed on transfer)    04/03/24 0001    04/02/24 2358  Intake & Output  Every Shift       04/03/24 0001    04/02/24 2357  [Transfer Hold]  sodium chloride 0.9 % flush 10 mL  As Needed        (Medication was not reviewed on transfer)    04/03/24 0001    04/02/24 2357  [Transfer Hold]  sodium chloride 0.9 % infusion 40 mL  As Needed        (Medication was not reviewed on transfer)    04/03/24 0001    04/02/24 2307  HYDROcodone-acetaminophen (NORCO) 5-325 MG per tablet 1 tablet  Every 4 Hours PRN         04/02/24 2307 04/02/24 2307  [Transfer Hold]  acetaminophen (TYLENOL) tablet 650 mg  Every 4 Hours PRN        (Medication was not reviewed on transfer)    04/02/24 2307 04/02/24 2307  [Transfer Hold]  sennosides-docusate (PERICOLACE) 8.6-50 MG per tablet 2 tablet  2 Times Daily PRN        (Medication was not reviewed on transfer)   Placed in \"And\" Linked Group    04/02/24 2307 04/02/24 2307  [Transfer Hold]  polyethylene glycol (MIRALAX) packet 17 g  Daily PRN        (Medication was not reviewed on transfer)   Placed in \"And\" Linked Group    04/02/24 2307 04/02/24 2307  [Transfer Hold]  bisacodyl (DULCOLAX) EC tablet 5 " "mg  Daily PRN        (Medication was not reviewed on transfer)   Placed in \"And\" Linked Group    04/02/24 2307 04/02/24 2307  [Transfer Hold]  bisacodyl (DULCOLAX) suppository 10 mg  Daily PRN        (Medication was not reviewed on transfer)   Placed in \"And\" Linked Group    04/02/24 2307    Unscheduled  Follow Hypoglycemia Standing Orders For Blood Glucose <70 & Notify Provider of Treatment  As Needed      Comments: Follow Hypoglycemia Orders As Outlined in Process Instructions (Open Order Report to View Full Instructions)  Notify Provider Any Time Hypoglycemia Treatment is Administered    04/03/24 0002    Unscheduled  Vital Signs - Per Anesthesia Protocol  As Needed       04/12/24 0838    --  allopurinol (ZYLOPRIM) 300 MG tablet  Daily         04/02/24 1738    --  pioglitazone (ACTOS) 30 MG tablet  Daily         04/02/24 1738    Signed and Held  famotidine (PEPCID) tablet 20 mg  Once,   Status:  Canceled         Signed and Held    Signed and Held  ceFAZolin in Sodium Chloride (ANCEF) IVPB solution 3,000 mg  Once         Signed and Held    Signed and Held  Oxygen Therapy- Nasal Cannula; Titrate 1-6 LPM Per SpO2; 90 - 95%  Continuous,   Status:  Canceled         Signed and Held    Signed and Held  POC Glucose Once  Once,   Status:  Canceled        Comments: For all diabetic patients. Notify Anesthesiologist for blood sugar > 180.      Signed and Held    Signed and Held  Potassium  Once,   Status:  Canceled        Comments: For all patients with renal disease within 7 days, ESRD day of, within 3 days if taking digoxin, potassium-depleting anti-hypertensives or diuretics within 7 days. Nursing: discontinue this order if not completed in pre-op; there is no need to act on this order once the patient is out of surgery.      Signed and Held    Signed and Held  ECG 12 Lead Pre-Op / Pre-Procedure  Once,   Status:  Canceled        Comments: If no ECG within the previous 6 months or any of the following: Heart/Valvular " Disease; Previous Abnormal EKG; Diabetic; Renal Failure; Chest Pain; Hypertension; Emphysema/Respiratory Disease - Read by Anesthesiologist    Signed and Held    Signed and Held  famotidine (PEPCID) injection 20 mg  Once,   Status:  Canceled         Signed and Held                     Operative/Procedure Notes (all)        Jeremi Hays Jr., MD at 04/08/24 1631  Version 1 of 1         [facility]     OPERATIVE REPORT      PATIENT NAME:  Alvaro Sinha                            YOB: 1965       PREOP DIAGNOSIS:   Right forefoot infection    POSTOP DIAGNOSIS:   Same.    PROCEDURE:   Right     31751:  foot 2nd ray amputation  85804:  foot 2nd ray amputation  28579:  Wound vacuum assisted closure: 4 cm x 3 cm x 3 cm    SURGEON:     Jeremi Hays MD    OPERATIVE TEAM:   Circulator: Adwoa Pablo RN  Scrub Person: Sudha Roper    ANESTHETIST:  Anesthesiologist: Silver Redmond MD  CRNA: Evans Lugo CRNA; Ewa Barragan CRNA  Student Nurse Anesthetist: Joe Madrigal SRNA    ANESTHESIA:   Choice    ESTIMATED BLOOD LOSS:   < 30 ml    TOURNIQUET TIME:   < 15 minutes    FINDINGS:     Remaining tissue appeared healthy.    IMPLANTS:     None.    SPECIMENS:     Specimens       ID Source Type Tests Collected By Collected At Frozen?    1 Toe, Right Wound ANAEROBIC CULTURE  WOUND CULTURE   Jeremi Hays Jr., MD 4/8/24 1639     Description: Right 2nd and 3rd toe aerobic and anaerobic cultures    A Toe, Right Tissue TISSUE PATHOLOGY EXAM   Jeremi Hays Jr., MD 4/8/24 1645     Description: 2nd and 3rd ray amputation for permanent            COMPLICATIONS:    None.    DISPOSITION:    Stable to recovery.     INDICATIONS:   58 y.o. male with Right plantar ulcer beneath the second metatarsal, purulent drainage from the second toe.  He was admitted early last week after I was contacted by the emergency department.  At that point [place] advised [place] was [place] and recommended  transfer.  He underwent MRI which demonstrated plantar ulcer, diffuse edema in the soft tissue, questionable early osteomyelitic changes of the second and third metatarsal head.  He has been admitted since last Tuesday on broad-spectrum antibiotics.  I had a discussion with him regarding further management.  We had a lengthy discussion regarding further conservative wound care, debridement, toe amputation, ray amputation versus transmetatarsal amputation.  Ultimately, after lengthy discussion, he wished to proceed with right second and third ray amputation, wound vacuum-assisted closure.     Risks of surgery were discussed which included but were not limited to pain, bleeding, infection, damage to adjacent structures, need for further surgery, wound healing complications, loss of limb and death.  The patient expressed verbal consent and written consent was obtained for the above procedure.     NARRATIVE:    Patient was identified in preoperative holding.  Operative site was marked in indelible ink.  History, physical, consent were reviewed and updated.  Patient was surrendered to the anesthesia team and transported to the operative suite where anesthesia was induced.  Hip bump was placed on the ipsilateral side.  Operative extremity was prepped and draped in the usual sterile fashion.  The operative team donned sterile gowns and gloves and a timeout was called.  All in attendance agreed regarding the patient's identity, proposed operative procedure, and operative site.    An Esmarch bandage was placed in the supramalleolar region and removed mid case.    I made a longitudinal incision centered over the second and third ray, incorporated the second and third toe base using a racquet style incision, carried this distally to incorporate the plantar ulcer.  Utilizing Bovie electrocautery I dissected down to the second and third metatarsals, carried the dissection distally, circumferentially dissected around the second  and third toes which I removed and sent for pathology.  I noted turbid fluid, swab specimens of which I sent for culture.  I exposed the second and third metatarsal shafts and utilizing a sagittal saw, transected them and sent this for pathology.  I took down the Esmarch, copiously irrigated with Irrisept and saline.  I achieved hemostasis, closed the proximal wound with nylon suture.  The resulting wound measured 3 cm x 4 cm x 3 cm.  I applied vancomycin powder to the wound, placed a wound vacuum-assisted closure device was noted to have good seal.    Sterile dressing applied.    Anesthesia was concluded and the patient was transported to the PACU in stable condition.  Counts were correct x2.  There were no apparent complications.  I was present and scrubbed for the entire case.    Jeremi Hays Jr, MD  4/8/2024     Electronically signed by Jeremi Hays Jr., MD at 04/08/24 1914          Physician Progress Notes (last 24 hours)        Jeremi Hays Jr., MD at 04/12/24 0742          Alvaro Sinha       LOS: 9 days   Patient Care Team:  Martha Mckee MD as PCP - General (Family Medicine)    Chief Complaint: Right foot infection/osteomyelitis    Subjective     Interval History:     Resting comfortably in bed this morning.  Pain tolerable, no acute events overnight.      Review of Systems:      Gen- No fevers, chills  CV- No chest pain, palpitations  Resp- No cough, dyspnea  GI- No N/V/D, abd pain    Objective     Vital Signs  Vital Signs (last 24 hours)         04/07 0700  04/08 0659 04/08 0700 04/08 0735   Most Recent      Temp (°F) 97.3 -  97.8       97.8 (36.6) 04/08 0000    Heart Rate 45 -  70       48 04/08 0600    Resp 16 -  18       18 04/08 0000    /80 -  148/81       138/94 04/08 0000    SpO2 (%) 93 -  99       97 04/08 0600    Oxygen Concentration (%)   28       28 04/08 0418              Physical Exam:     No acute distress.  Nonlabored respirations.  Regular rate and  "rhythm.  Abdomen nondistended.  Right lower extremity: Operative dressing present is clean, dry, intact.  Wound VAC in place with adequate seal noted.  Minimal serosanguinous output.     Results Review:     I reviewed the patient's new clinical results.    Medication Review:   Hospital Medications (active)         Dose Frequency Start End    acetaminophen (TYLENOL) tablet 650 mg 650 mg Every 4 Hours PRN 4/2/2024 --    Admin Instructions: If given for fever, use fever parameter: fever greater than 100.4 °F  Based on patient request - if ordered for moderate or severe pain, provider allows for administration of a medication prescribed for a lower pain scale.    Do not exceed 4 grams of acetaminophen in a 24 hr period. Max dose of 2gm for AST/ALT greater than 120 units/L.    If given for pain, use the following pain scale:   Mild Pain = Pain Score of 1-3, CPOT 1-2  Moderate Pain = Pain Score of 4-6, CPOT 3-4  Severe Pain = Pain Score of 7-10, CPOT 5-8    Route: Oral    bisacodyl (DULCOLAX) EC tablet 5 mg 5 mg Daily PRN 4/2/2024 --    Admin Instructions: Use if no bowel movement after 12 hours.  Swallow whole. Do not crush, split, or chew tablet.    Route: Oral    Linked Group 1: Placed in \"And\" Linked Group        bisacodyl (DULCOLAX) suppository 10 mg 10 mg Daily PRN 4/2/2024 --    Admin Instructions: Use if no bowel movement after 12 hours.  Hold for diarrhea    Route: Rectal    Linked Group 1: Placed in \"And\" Linked Group        Calcium Replacement - Follow Nurse / BPA Driven Protocol  As Needed 4/2/2024 --    Admin Instructions: Open Order & Select \"BHS Electrolyte Replacement Protocol Algorithm\" to View Details    Route: Does not apply    clopidogrel (PLAVIX) tablet 75 mg ([Held by provider] since 4/3/2024 12:01 AM) 75 mg Daily 4/3/2024 --    Route: Oral    colchicine tablet 0.6 mg 0.6 mg Daily 4/4/2024 --    Admin Instructions: Group 2 (Pink) Hazardous Drug - Reproductive Risk Only - See Handling Guide    Route: " Oral    DAPTOmycin (CUBICIN) 800 mg in sodium chloride 0.9 % 50 mL IVPB 8 mg/kg × 102 kg (Adjusted) Every 24 Hours 4/3/2024 5/3/2024    Admin Instructions: Caution: Look alike/sound alike drug alert.  Refrigerate. Do not shake.    Route: Intravenous    dextrose (D50W) (25 g/50 mL) IV injection 25 g 25 g Every 15 Minutes PRN 4/3/2024 --    Admin Instructions: Blood sugar less than 70; patient has IV access - Unresponsive, NPO or Unable To Safely Swallow    Route: Intravenous    dextrose (GLUTOSE) oral gel 15 g 15 g Every 15 Minutes PRN 4/3/2024 --    Admin Instructions: BS<70, Patient Alert, Is not NPO, Can safely swallow.    Route: Oral    glipizide (GLUCOTROL) tablet 10 mg 10 mg Every Morning Before Breakfast 4/3/2024 --    Admin Instructions: Caution: Look alike/sound alike drug alert    Route: Oral    glucagon (GLUCAGEN) injection 1 mg 1 mg Every 15 Minutes PRN 4/3/2024 --    Admin Instructions: Blood Glucose Less Than 70 - Patient Without IV Access - Unresponsive, NPO or Unable To Safely Swallow  Reconstitute powder for injection by adding 1 mL of -supplied sterile diluent or sterile water for injection to a vial containing 1 mg of the drug, to provide solutions containing 1 mg/mL. Shake vial gently to dissolve.    Route: Intramuscular    heparin (porcine) 5000 UNIT/ML injection 5,000 Units 5,000 Units Every 8 Hours Scheduled 4/3/2024 --    Route: Subcutaneous    HYDROcodone-acetaminophen (NORCO) 5-325 MG per tablet 1 tablet 1 tablet Every 4 Hours PRN 4/2/2024 4/11/2024    Admin Instructions: Based on patient request - if ordered for moderate or severe pain, provider allows for administration of a medication prescribed for a lower pain scale.  [CULLEN]    Do not exceed 4 grams of acetaminophen in a 24 hr period. Max dose of 2gm for AST/ALT greater than 120 units/L        If given for pain, use the following pain scale:   Mild Pain = Pain Score of 1-3, CPOT 1-2  Moderate Pain = Pain Score of 4-6, CPOT  "3-4  Severe Pain = Pain Score of 7-10, CPOT 5-8    Route: Oral    HYDROmorphone (DILAUDID) injection 0.5 mg 0.5 mg Every 3 Hours PRN 4/4/2024 4/12/2024    Admin Instructions: Based on patient request - if ordered for moderate or severe pain, provider allows for administration of a medication prescribed for a lower pain scale.  If given for pain, use the following pain scale:  Mild Pain = Pain Score of 1-3, CPOT 1-2  Moderate Pain = Pain Score of 4-6, CPOT 3-4  Severe Pain = Pain Score of 7-10, CPOT 5-8    Route: Intravenous    insulin detemir (LEVEMIR) injection 10 Units 10 Units Daily 4/7/2024 --    Admin Instructions: Do not hold basal insulin without an order. Consider requesting a dose edit, if needed.      Route: Subcutaneous    Insulin Lispro (humaLOG) injection 2-7 Units 2-7 Units 4 Times Daily Before Meals & Nightly 4/3/2024 --    Admin Instructions: Correction Insulin - Low Dose - Total Insulin Dose Less Than 40 units/day (Lean, Elderly or Renal Patients)    Blood Glucose 150-199 mg/dL - 2 units  Blood Glucose 200-249 mg/dL - 3 units  Blood Glucose 250-299 mg/dL - 4 units  Blood Glucose 300-349 mg/dL - 5 units  Blood Glucose 350-400 mg/dL - 6 units  Blood Glucose Greater Than 400 mg/dL - 7 units & Call Provider   Caution: Look alike/sound alike drug alert    Route: Subcutaneous    lisinopril (PRINIVIL,ZESTRIL) tablet 40 mg 40 mg Daily 4/3/2024 --    Admin Instructions: Hold for SBP less than 100, DBP less than 60.    Route: Oral    Magnesium Standard Dose Replacement - Follow Nurse / BPA Driven Protocol  As Needed 4/2/2024 --    Admin Instructions: Open Order & Select \"BHS Electrolyte Replacement Protocol Algorithm\" to View Details    Route: Does not apply    metoprolol succinate XL (TOPROL-XL) 24 hr tablet 100 mg 100 mg Daily 4/3/2024 --    Admin Instructions: Hold for SBP less than 100, DBP less than 60, or heart rate less than 50    Do not crush or chew the capsules or tablets. The drug may not work as " "designed if the capsule or tablet is crushed or chewed. Swallow whole.  Do not crush or chew.    Route: Oral    Phosphorus Replacement - Follow Nurse / BPA Driven Protocol  As Needed 4/2/2024 --    Admin Instructions: Open Order & Select \"BHS Electrolyte Replacement Protocol Algorithm\" to View Details    Route: Does not apply    pioglitazone (ACTOS) tablet 30 mg 30 mg Daily 4/3/2024 --    Route: Oral    piperacillin-tazobactam (ZOSYN) 3.375 g IVPB in 100 mL NS MBP (CD) 3.375 g Every 8 Hours 4/3/2024 4/10/2024    Route: Intravenous    polyethylene glycol (MIRALAX) packet 17 g 17 g Daily PRN 4/2/2024 --    Admin Instructions: Use if no bowel movement after 12 hours. Mix in 6-8 ounces of water.  Use 4-8 ounces of water, tea, or juice for each 17 gram dose.    Route: Oral    Linked Group 1: Placed in \"And\" Linked Group        Potassium Replacement - Follow Nurse / BPA Driven Protocol  As Needed 4/2/2024 --    Admin Instructions: Open Order & Select \"BHS Electrolyte Replacement Protocol Algorithm\" to View Details    Route: Does not apply    predniSONE (DELTASONE) tablet 40 mg 40 mg Daily With Breakfast 4/7/2024 --    Admin Instructions: Take with food.    Route: Oral    sennosides-docusate (PERICOLACE) 8.6-50 MG per tablet 2 tablet 2 tablet 2 Times Daily PRN 4/2/2024 --    Admin Instructions: Start bowel management regimen if patient has not had a bowel movement after 12 hours.    Route: Oral    Linked Group 1: Placed in \"And\" Linked Group        sodium chloride 0.9 % flush 10 mL 10 mL Every 12 Hours Scheduled 4/3/2024 --    Route: Intravenous    sodium chloride 0.9 % flush 10 mL 10 mL As Needed 4/2/2024 --    Route: Intravenous    sodium chloride 0.9 % infusion 40 mL 40 mL As Needed 4/2/2024 --    Admin Instructions: Following administration of an IV intermittent medication, flush line with 40mL NS at 100mL/hr.    Route: Intravenous              Assessment & Plan     58-year-old male with multiple medical comorbidities, " diabetes mellitus with right foot wound/infection status post right second and third ray amputation with wound vacuum-assisted closure April 8, 2024.      Osteomyelitis    Lymphoma    Type 2 diabetes mellitus with hyperglycemia    Diabetic foot ulcer    History of myalgia due to HMG CoA reductase inhibitor    Hypertensive disorder    Hyperlipidemia    Microalbuminuric diabetic nephropathy    Stage 3a chronic kidney disease    Nonweightbearing right lower extremity; okay for minimal protected heel weightbearing right lower extremity in postoperative shoe for transfers only    Plan to leave wound VAC in place for now, anticipated wound VAC change versus closure in the OR tomorrow 4/12  Follow intraoperative cultures.    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  He is excused from work until further recommendations.    I discussed with the patient regarding further management options today.  We discussed continued wound vacuum-assisted closure versus repeat right foot irrigation, debridement, secondary closure versus wound VAC change.  The proposed procedure, risk/benefits, alternatives, expected perioperative course were discussed with the patient in detail.  Risks discussed include but are not limited to infection, bleeding, injury to adjacent structures, wound healing complications, need for further surgery, DVT/PE, loss of limb, life.  The patient seems to understand and accepts these risks and wishes to proceed with the proposed procedure.    Following a detailed discussion, plan for right foot irrigation, debridement, secondary closure versus wound vacuum-assisted closure change today.      Jeremi Hays Jr, MD  04/12/24  07:42 EDT            Electronically signed by Jeremi Hays Jr., MD at 04/12/24 0779       Aung Jameson MD at 04/11/24 1045              [facility]    INFECTIOUS DISEASE CONSULT/INITIAL HOSPITAL  VISIT    Alvaro Sinha  1965  [id number]    Date of consult: 4/3/2024    Admit date: 4/2/2024    Requesting Provider: No ref. provider found  Evaluating physician: Aung Jameson MD  Reason for Consultation: Right foot diabetic wound ulcers with underlying osteomyelitis second and third toe  Chief Complaint: Above      Subjective   History of present illness:  Patient is a  58 y.o.  Yr old male with a history of diabetes mellitus type 2, essential hypertension, hyperlipidemia, CKD stage IIIa, gout, with chronic right foot ulceration being followed at the [facility] which worsened 3/31/2024.  He has been using a boot to offload his foot, but was also working at [email or web address], although he has been off work recently.  MRI of the foot 4/2/2024 was consistent with possible early osteomyelitis second and third middle and distal phalanges.  He has a plantar draining fistulous track below his second and third metatarsal and an anterior web skin breakdown between his second and third toe on his foot.  He was awaiting possible transfer to the [facility] for further orthopedic evaluation.  He has no other localizing signs or symptoms of infection.  I was consulted on 4/3/2024 for further evaluation and treatment.    4/4/2024 history reviewed.  No high fevers or chills.  Tolerating antibiotics for right foot osteomyelitis.    4/5/2024 history reviewed.  Tolerating his antibiotics for right foot osteomyelitis second and third metatarsal.  No high fever.  Awaits surgical disposition.    4/8/2024 history reviewed.  No high fever.  Tolerating antibiotics for right foot second and third toe osteomyelitis.  Awaits possible second and third metatarsal resection by surgery.    4/9/24 history reviewed.  Status post ray resection second and third right toe by Dr. Hays on 4/8/2024.  No high fever.  Tolerating antibiotics.  Changing to ceftriaxone and daptomycin for Streptococcus, Eikenella (should be  sensitive to ceftriaxone).    4/10/24 history reviewed.  Tolerating antibiotics for right foot osteomyelitis with daptomycin and ceftriaxone until 5/22.  No respiratory complaints or pain.  No fever.    4/11/2024 history reviewed.  Continues on antibiotics for right foot osteomyelitis with ceftriaxone and daptomycin until 5/22.  Awaits revision surgery.    Past Medical History:   Diagnosis Date    Diabetes mellitus     Hypertension        Past Surgical History:   Procedure Laterality Date    BONE RESECTION, RIB      TRANS METATARSAL AMPUTATION Right 4/8/2024    Procedure: AMPUTATION TRANS METATARSAL RIGHT;  Surgeon: Jeremi Hays Jr., MD;  [facility];  Service: Orthopedics;  Laterality: Right;       Pediatric History   Patient Parents    Not on file     Other Topics Concern    Not on file   Social History Narrative    Not on file   No cigarettes, occasional alcohol, no drug use,  to [facility] with 1 child.  Works at [employer].    family history is not on file.    Allergies   Allergen Reactions    Statins Myalgia       Immunization History   Administered Date(s) Administered    COVID-19 (MODERNA) 1st,2nd,3rd Dose Monovalent 09/23/2021, 11/02/2021       Medication:  @Scheduled Meds:cefTRIAXone, 2,000 mg, Intravenous, Q24H  colchicine, 0.6 mg, Oral, Daily  DAPTOmycin, 8 mg/kg (Adjusted), Intravenous, Q24H  glipizide, 10 mg, Oral, QAM AC  heparin (porcine), 5,000 Units, Subcutaneous, Q8H  insulin detemir, 10 Units, Subcutaneous, Daily  insulin lispro, 2-9 Units, Subcutaneous, 4x Daily AC & at Bedtime  lisinopril, 40 mg, Oral, Daily  metoprolol succinate XL, 100 mg, Oral, Daily  pioglitazone, 30 mg, Oral, Daily  predniSONE, 20 mg, Oral, Daily With Breakfast  sodium chloride, 10 mL, Intravenous, Q12H      Continuous Infusions:lactated ringers, 9 mL/hr, Last Rate: 9 mL/hr (04/08/24 1515)      PRN Meds:.  acetaminophen    senna-docusate sodium **AND** polyethylene glycol **AND** bisacodyl **AND**  "bisacodyl    Calcium Replacement - Follow Nurse / BPA Driven Protocol    dextrose    dextrose    glucagon (human recombinant)    HYDROcodone-acetaminophen    HYDROmorphone    Magnesium Standard Dose Replacement - Follow Nurse / BPA Driven Protocol    Morphine    oxyCODONE    Phosphorus Replacement - Follow Nurse / BPA Driven Protocol    Potassium Replacement - Follow Nurse / BPA Driven Protocol    sodium chloride    sodium chloride     Please refer to the medical record for a full medication list    Review of Systems:    Constitutional-- No Fever, chills or sweats.  Appetite good, and no malaise. No fatigue.  HEENT-- No new vision, hearing or throat complaints.  No epistaxis or oral sores.  Denies odynophagia or dysphagia.  No odynophagia or dysphagia. No headache, photophobia or neck stiffness.  CV-- No chest pain, palpitation or syncope  Resp-- No SOB/cough/Hemoptysis, no wheeze  GI- No nausea, vomiting, or diarrhea.  No hematochezia, melena, or hematemesis. Denies jaundice or chronic liver disease.  -- No dysuria, hematuria, or flank pain.  Denies hesitancy, urgency.  Lymph- no swollen lymph nodes in neck/axilla or groin.   Heme- No active bruising or bleeding; no Hx of DVT or PE.  MS-- no swelling or pain in the bones or joints of arms/legs.  No new back pain.  Neuro-- No acute focal weakness or numbness in the arms or legs.  No seizures.  Skin--No rashes or lesions, except right foot as per HPI    Physical Exam:   Vital Signs   Temp:  [96.7 °F (35.9 °C)-98.1 °F (36.7 °C)] 96.7 °F (35.9 °C)  Heart Rate:  [56-76] 74  Resp:  [15-18] 18  BP: (131-150)/(84-95) 136/94    Blood pressure 136/94, pulse 74, temperature 96.7 °F (35.9 °C), temperature source Oral, resp. rate 18, height 185.4 cm (72.99\"), weight 136 kg (300 lb), SpO2 93%.  GENERAL: Awake and alert, in minimal distress. Appears  stated age.  Resting in bed.  Watching TV.  HEENT:  Normocephalic, atraumatic.  Oropharynx without thrush. Dentition in good " repair. No cervical adenopathy. No neck masses.  Ears externally normal, Nose externally normal.  EYES:  No conjunctival injection. No icterus. EOM full.  LYMPHATICS: No lymphadenopathy of the neck or axillary or inguinal regions.   HEART: No murmur, gallop, or pericardial friction rub. Reg rate rhythm.    LUNGS: Clear to auscultation and percussion. No respiratory distress, no use of accessory muscles.  ABDOMEN: Soft, nontender, nondistended. No appreciable HSM.  Bowel sounds normal.  Obese.  No mass.  SKIN: Warm and dry without cutaneous eruptions.  No nodules.  Right foot surgical dressing in place.  PSYCHIATRIC: Mental status lucid. No confusion.  EXT:  No cellulitic change.  NEURO: Oriented to name, nonfocal.              4/3/24 right foot wounds prior to ray resections 4/8    Results Review:   I reviewed the patient's new clinical results.  I reviewed the patient's new imaging results and agree with the interpretation.  I reviewed the patient's other test results and agree with the interpretation    Results from last 7 days   Lab Units 04/10/24  0629 04/09/24  0527 04/08/24  0616   WBC 10*3/mm3 5.13 4.97 4.82   HEMOGLOBIN g/dL 11.4* 11.0* 10.5*   HEMATOCRIT % 35.5* 33.2* 31.9*   PLATELETS 10*3/mm3 221 204 203     Results from last 7 days   Lab Units 04/11/24  0513   SODIUM mmol/L 140   POTASSIUM mmol/L 4.5   CHLORIDE mmol/L 104   CO2 mmol/L 26.0   BUN mg/dL 30*   CREATININE mg/dL 1.28*   GLUCOSE mg/dL 160*   CALCIUM mg/dL 9.1     Results from last 7 days   Lab Units 04/10/24  0629   ALK PHOS U/L 126*   BILIRUBIN mg/dL 0.2   ALT (SGPT) U/L 53*   AST (SGOT) U/L 48*     Results from last 7 days   Lab Units 04/05/24  0547   SED RATE mm/hr 98*     Results from last 7 days   Lab Units 04/08/24  0616   CRP mg/dL 3.40*               Estimated Creatinine Clearance: 90.8 mL/min (A) (by C-G formula based on SCr of 1.28 mg/dL (H)).  CPK          4/10/2024    06:29   Common Labs   Creatine Kinase 21       Procalitonin  "Results:       Brief Urine Lab Results       None           No results found for: \"SITE\", \"ALLENTEST\", \"PHART\", \"BWL9LEB\", \"PO2ART\", \"OIT8QDE\", \"BASEEXCESS\", \"J9WHVIFU\", \"HGBBG\", \"HCTABG\", \"OXYHEMOGLOBI\", \"METHHGBN\", \"CARBOXYHGB\", \"CO2CT\", \"BAROMETRIC\", \"MODALITY\", \"FIO2\"     Microbiology:    Right foot wound culture 4/2/2024 with gram-positive cocci, gram-negative rods on Gram stain, MRSA screen negative, blood cultures x 2 from 4/2 negative.  Streptococcus, group B strep, Eikenella.    Results for orders placed or performed during the hospital encounter of 04/02/24   Blood Culture - Blood, Arm, Right    Specimen: Arm, Right; Blood   Result Value Ref Range    Blood Culture No growth at 5 days          Culture results from last 30 days   Lab 04/08/24  1639 04/02/24  1900   WOUNDCX No growth at 3 days Heavy growth (4+) Streptococcus agalactiae (Group B)*  Heavy growth (4+) Streptococcus mitis / oralis*  Light growth (2+) Eikenella corrodens*  Moderate growth (3+) Normal Skin Terri   ANACX No anaerobes isolated at 3 days  --       Brief Urine Lab Results       None        Blood cultures x 2 from 4/2 are negative.  Wound culture right foot growing group B streptococcus to date, MRSA screen negative.    Radiology:  Imaging Results (Last 72 Hours)       ** No results found for the last 72 hours. **            IMPRESSION:     Second and third distal and middle phalanges MRI changes with bone marrow edema in the setting of diabetes and chronic wounds of the right foot with increased pretest probability for underlying osteomyelitis, versus reactive edema.  I would favor osteomyelitis given the way his right foot looks with a fistulous tract that probes deep to bone.  No erosions in the cortical structure noted.  Usual organisms will be mixed terri.  MRSA screen negative, wound culture Gram stain with gram-negative rods and gram-positive cocci, blood cultures x 2 from 4/2 are negative to date.  Group B streptococcus " growing as of 4/4/2024, and Streptococcus mitis.  Status post right second and third ray resection 4/8/2024.  Dr. Hays.  Right foot cellulitis and wound infection associated with diabetes, despite use of a surgical boot.  Diabetes mellitus type 2 with increased risk for infection.  Chronic kidney disease stage IIIa, creatinine 1.18 on 4/3/2024.  1.23 on 4/4/2024.  1.47 on 4/5.  1.26 on 4/8.  1.35 on 4/9.  1.17 on 4/10.  1.28 on 4/11, worse.  Anemia, chronic disease.  Ongoing.  Elevated alkaline phosphatase new 126, ALT 53, AST 48.  Likely related to above issues and medications.    PLAN:    Diagnostically, continue to follow patient's physical exam, CBC, CMP, CRP.  Cultures which were obtained from the foot.  Therapeutically, changed to daptomycin and ceftriaxone on 4/9 in anticipation of outpatient therapy.  This would provide coverage for Streptococcus and Eikenella.    Supportive care.  Wound care.  Nonweightbearing on right foot.  Status post third and second toe ray resection 4/8/2024..  PICC versus [place].    I discussed the patient's findings and my recommendations with patient, family, and nursing staff    Case management orders: Please arrange for home antibiotics with [facility] with daptomycin 700 mg IV daily, ceftriaxone 2 g IV daily to continue until 5/22/2024.  Check CBC, CMP, CRP, CPK weekly while on IV antibiotics.  Fax orders to [phone], call [phone] with final arrangements.  Arrange for follow-up with me in 1 week postdischarge.    Thank you for asking me to see Alvaro Sinha.  Our group would be pleased to follow this patient over the course of their hospitalization and assist with outpatient antimicrobial therapy, as indicated.  Further recommendations depend on the results of the cultures and clinical course.  Increased risk for adverse drug reactions, complications of IV access, readmission, loss of limb.  Side effects discussed.      Aung FERNÁNDEZ  MD [place]  4/11/2024     Electronically signed by Aung Jameson MD at 04/11/24 1532       Consult Notes (last 24 hours)  Notes from 04/11/24 1039 through 04/12/24 1039   No notes of this type exist for this encounter.

## 2024-04-12 NOTE — PROGRESS NOTES
[facility]    INFECTIOUS DISEASE CONSULT/INITIAL HOSPITAL VISIT    Alvaro Sinha  1965  [id number]    Date of consult: 4/3/2024    Admit date: 4/2/2024    Requesting Provider: No ref. provider found  Evaluating physician: Aung Jameson MD  Reason for Consultation: Right foot diabetic wound ulcers with underlying osteomyelitis second and third toe  Chief Complaint: Above      Subjective   History of present illness:  Patient is a  58 y.o.  Yr old male with a history of diabetes mellitus type 2, essential hypertension, hyperlipidemia, CKD stage IIIa, gout, with chronic right foot ulceration being followed at the [facility] which worsened 3/31/2024.  He has been using a boot to offload his foot, but was also working at [employer], although he has been off work recently.  MRI of the foot 4/2/2024 was consistent with possible early osteomyelitis second and third middle and distal phalanges.  He has a plantar draining fistulous track below his second and third metatarsal and an anterior web skin breakdown between his second and third toe on his foot.  He was awaiting possible transfer to the [facility] for further orthopedic evaluation.  He has no other localizing signs or symptoms of infection.  I was consulted on 4/3/2024 for further evaluation and treatment.    4/4/2024 history reviewed.  No high fevers or chills.  Tolerating antibiotics for right foot osteomyelitis.    4/5/2024 history reviewed.  Tolerating his antibiotics for right foot osteomyelitis second and third metatarsal.  No high fever.  Awaits surgical disposition.    4/8/2024 history reviewed.  No high fever.  Tolerating antibiotics for right foot second and third toe osteomyelitis.  Awaits possible second and third metatarsal resection by surgery.    4/9/24 history reviewed.  Status post ray resection second and third right toe by Dr. Hays on 4/8/2024.  No high fever.  Tolerating  antibiotics.  Changing to ceftriaxone and daptomycin for Streptococcus, Eikenella (should be sensitive to ceftriaxone).    4/10/24 history reviewed.  Tolerating antibiotics for right foot osteomyelitis with daptomycin and ceftriaxone until 5/22.  No respiratory complaints or pain.  No fever.    4/11/2024 history reviewed.  Continues on antibiotics for right foot osteomyelitis with ceftriaxone and daptomycin until 5/22.  Awaits revision surgery.    4/12/2024 history reviewed.  Tolerating antibiotics for right foot osteomyelitis with daptomycin and ceftriaxone until 5/22.  No high fever.  Underwent revision surgery today.    Past Medical History:   Diagnosis Date    Diabetes mellitus     Gout     Hypertension     Osteomyelitis     right foot    Sleep apnea     CPAP       Past Surgical History:   Procedure Laterality Date    BONE RESECTION, RIB      TRANS METATARSAL AMPUTATION Right 4/8/2024    Procedure: AMPUTATION TRANS METATARSAL RIGHT;  Surgeon: Jeremi Hays Jr., MD;  Location: [facility];  Service: Orthopedics;  Laterality: Right;       Pediatric History   Patient Parents    Not on file     Other Topics Concern    Not on file   Social History Narrative    Not on file   No cigarettes, occasional alcohol, no drug use,  to [facility] with 1 child.  Works at [employer].    family history is not on file.    Allergies   Allergen Reactions    Statins Myalgia       Immunization History   Administered Date(s) Administered    COVID-19 (MODERNA) 1st,2nd,3rd Dose Monovalent 09/23/2021, 11/02/2021       Medication:  @Scheduled Meds:cefTRIAXone, 2,000 mg, Intravenous, Q24H  colchicine, 0.6 mg, Oral, Daily  DAPTOmycin, 8 mg/kg (Adjusted), Intravenous, Q24H  glipizide, 10 mg, Oral, QAM AC  heparin (porcine), 5,000 Units, Subcutaneous, Q8H  insulin detemir, 10 Units, Subcutaneous, Daily  insulin lispro, 2-9 Units, Subcutaneous, 4x Daily AC & at Bedtime  lisinopril, 40 mg, Oral, Daily  metoprolol succinate XL, 100 mg, Oral,  Daily  pioglitazone, 30 mg, Oral, Daily  predniSONE, 10 mg, Oral, Daily With Breakfast  sodium chloride, 10 mL, Intravenous, Q12H      Continuous Infusions:lactated ringers, 9 mL/hr, Last Rate: 9 mL/hr (04/12/24 1039)      PRN Meds:.  acetaminophen    senna-docusate sodium **AND** polyethylene glycol **AND** bisacodyl **AND** bisacodyl    Calcium Replacement - Follow Nurse / BPA Driven Protocol    dextrose    dextrose    glucagon (human recombinant)    Magnesium Standard Dose Replacement - Follow Nurse / BPA Driven Protocol    Morphine    oxyCODONE    Phosphorus Replacement - Follow Nurse / BPA Driven Protocol    Potassium Replacement - Follow Nurse / BPA Driven Protocol    sodium chloride    sodium chloride     Please refer to the medical record for a full medication list    Review of Systems:    Constitutional-- No Fever, chills or sweats.  Appetite good, and no malaise.  Occasional fatigue.  HEENT-- No new vision, hearing or throat complaints.  No epistaxis or oral sores.  Denies odynophagia or dysphagia.  No odynophagia or dysphagia. No headache, photophobia or neck stiffness.  CV-- No chest pain, palpitation or syncope  Resp-- No SOB/cough/Hemoptysis, no wheeze  GI- No nausea, vomiting, or diarrhea.  No hematochezia, melena, or hematemesis. Denies jaundice or chronic liver disease.  -- No dysuria, hematuria, or flank pain.  Denies hesitancy, urgency.  Lymph- no swollen lymph nodes in neck/axilla or groin.   Heme- No active bruising or bleeding; no Hx of DVT or PE.  MS-- no swelling or pain in the bones or joints of arms/legs.  No new back pain.  Neuro-- No acute focal weakness or numbness in the arms or legs.  No seizures.  Skin--No rashes or lesions, except right foot as per HPI, no nodules    Physical Exam:   Vital Signs   Temp:  [97.2 °F (36.2 °C)-98.2 °F (36.8 °C)] 97.4 °F (36.3 °C)  Heart Rate:  [53-86] 65  Resp:  [14-18] 16  BP: ()/(55-88) 131/79    Blood pressure 131/79, pulse 65, temperature  "97.4 °F (36.3 °C), resp. rate 16, height 185.4 cm (72.99\"), weight 136 kg (300 lb), SpO2 95%.  GENERAL: Awake and alert, in minimal distress. Appears  stated age.  Resting in bed.  Watching TV.  HEENT:  Normocephalic, atraumatic.  Oropharynx without thrush. Dentition in good repair. No cervical adenopathy. No neck masses.  Ears externally normal, Nose externally normal.  EYES:  No conjunctival injection. No icterus. EOM full.  LYMPHATICS: No lymphadenopathy of the neck or axillary or inguinal regions.   HEART: No murmur, gallop, or pericardial friction rub. Reg rate rhythm.  No JVD.  LUNGS: Clear to auscultation and percussion. No respiratory distress, no use of accessory muscles.  ABDOMEN: Soft, nontender, nondistended. No appreciable HSM.  Bowel sounds normal.  Obese.  No mass.  SKIN: Warm and dry without cutaneous eruptions.  No nodules.  Right foot surgical dressing in place.  PSYCHIATRIC: Mental status lucid. No confusion.  EXT:  No cellulitic change.  NEURO: Oriented to name, nonfocal.              4/3/24 right foot wounds prior to ray resections 4/8    Results Review:   I reviewed the patient's new clinical results.  I reviewed the patient's new imaging results and agree with the interpretation.  I reviewed the patient's other test results and agree with the interpretation    Results from last 7 days   Lab Units 04/12/24  0437 04/10/24  0629 04/09/24  0527   WBC 10*3/mm3 6.25 5.13 4.97   HEMOGLOBIN g/dL 11.6* 11.4* 11.0*   HEMATOCRIT % 35.8* 35.5* 33.2*   PLATELETS 10*3/mm3 194 221 204     Results from last 7 days   Lab Units 04/12/24  0437   SODIUM mmol/L 137   POTASSIUM mmol/L 4.6   CHLORIDE mmol/L 103   CO2 mmol/L 25.0   BUN mg/dL 28*   CREATININE mg/dL 1.17   GLUCOSE mg/dL 183*   CALCIUM mg/dL 9.3     Results from last 7 days   Lab Units 04/10/24  0629   ALK PHOS U/L 126*   BILIRUBIN mg/dL 0.2   ALT (SGPT) U/L 53*   AST (SGOT) U/L 48*           Results from last 7 days   Lab Units 04/08/24  0616   CRP " "mg/dL 3.40*               Estimated Creatinine Clearance: 99.3 mL/min (by C-G formula based on SCr of 1.17 mg/dL).  CPK          4/10/2024    06:29   Common Labs   Creatine Kinase 21       Procalitonin Results:       Brief Urine Lab Results       None           No results found for: \"SITE\", \"ALLENTEST\", \"PHART\", \"TDJ8OWB\", \"PO2ART\", \"NPY7TQZ\", \"BASEEXCESS\", \"T3NFVHGX\", \"HGBBG\", \"HCTABG\", \"OXYHEMOGLOBI\", \"METHHGBN\", \"CARBOXYHGB\", \"CO2CT\", \"BAROMETRIC\", \"MODALITY\", \"FIO2\"     Microbiology:    Right foot wound culture 4/2/2024 with Streptococcus agalactiae, Streptococcus mitis, Eikenella, gram-negative rods on Gram stain, MRSA screen negative, blood cultures x 2 from 4/2 negative.  Streptococcus, group B strep, Eikenella.    Results for orders placed or performed during the hospital encounter of 04/02/24   Blood Culture - Blood, Arm, Right    Specimen: Arm, Right; Blood   Result Value Ref Range    Blood Culture No growth at 5 days          Culture results from last 30 days   Lab 04/08/24  1639 04/02/24  1900   WOUNDCX No growth at 3 days Heavy growth (4+) Streptococcus agalactiae (Group B)*  Heavy growth (4+) Streptococcus mitis / oralis*  Light growth (2+) Eikenella corrodens*  Moderate growth (3+) Normal Skin Flor   ANACX No anaerobes isolated at 3 days  --       Brief Urine Lab Results       None        Blood cultures x 2 from 4/2 are negative.  Wound culture right foot growing group B streptococcus to date, MRSA screen negative.    Radiology:  Imaging Results (Last 72 Hours)       ** No results found for the last 72 hours. **            IMPRESSION:     Second and third distal and middle phalanges MRI changes with bone marrow edema in the setting of diabetes and chronic wounds of the right foot with increased pretest probability for underlying osteomyelitis, versus reactive edema.  I would favor osteomyelitis given the way his right foot looks with a fistulous tract that probes deep to bone.  No erosions in the " cortical structure noted.  Usual organisms will be mixed terri.  MRSA screen negative, wound culture Gram stain with gram-negative rods and gram-positive cocci, blood cultures x 2 from 4/2 are negative to date.  Group B streptococcus growing as of 4/4/2024, and Streptococcus mitis.  Status post right second and third ray resection 4/8/2024.  Dr. Hays.  Revision surgery on 4/12.  Right foot cellulitis and wound infection associated with diabetes, despite use of a surgical boot.  Diabetes mellitus type 2 with increased risk for infection.  Chronic kidney disease stage IIIa, creatinine 1.18 on 4/3/2024.  1.23 on 4/4/2024.  1.47 on 4/5.  1.26 on 4/8.  1.35 on 4/9.  1.17 on 4/10.  1.28 on 4/11, 1.17 on 4/12.  Anemia, chronic disease.  Ongoing.  Elevated alkaline phosphatase new 126, ALT 53, AST 48.  Likely related to above issues and medications.    PLAN:    Diagnostically, continue to follow patient's physical exam, CBC, CMP, CRP.  Cultures which were obtained from the foot.  Therapeutically, changed to daptomycin and ceftriaxone on 4/9 in anticipation of outpatient therapy for 6 weeks.  This would provide coverage for Streptococcus and Eikenella.    Supportive care.  Wound care.  Nonweightbearing on right foot.  Status post third and second toe ray resection 4/8/2024..  PICC versus [place].    I discussed the patient's findings and my recommendations with patient, family, and nursing staff    Case management orders: Please arrange for home antibiotics with [facility] with daptomycin 700 mg IV daily, ceftriaxone 2 g IV daily to continue until 5/22/2024.  Check CBC, CMP, CRP, CPK weekly while on IV antibiotics.  Fax orders to [phone], call [phone] with final arrangements.  Arrange for follow-up with me in 1 week postdischarge.    Thank you for asking me to see Alvaro Sinha.  Our group would be pleased to follow this patient over the course of their hospitalization and assist with  outpatient antimicrobial therapy, as indicated.  Further recommendations depend on the results of the cultures and clinical course.  Increased risk for adverse drug reactions, complications of IV access, readmission, loss of limb.  Side effects discussed.  See next on Monday, call sooner if needed.    Aung Jameson MD  4/12/2024

## 2024-04-12 NOTE — ANESTHESIA PROCEDURE NOTES
Airway  Urgency: elective    Date/Time: 4/12/2024 10:42 AM  Airway not difficult    General Information and Staff    Patient location during procedure: OR  SRNA: Niko King SRNA  Indications and Patient Condition  Indications for airway management: airway protection    Preoxygenated: yes  Mask difficulty assessment: 1 - vent by mask    Final Airway Details  Final airway type: supraglottic airway      Successful airway: I-gel  Size 5     Number of attempts at approach: 1  Assessment: lips, teeth, and gum same as pre-op    Additional Comments  LMA placed without difficulty, ventilation with assist, equal breath sounds and symmetric chest rise and fall

## 2024-04-12 NOTE — ANESTHESIA POSTPROCEDURE EVALUATION
Patient: Alvaro Sinha    Procedure Summary       Date: 04/12/24 Room / Location:  [facility]  [facility]  [facility]    Anesthesia Start: 1039 Anesthesia Stop: 1119    Procedure: Right foot irrigation, debridement, secondary closure (Right: Foot) Diagnosis:       Osteomyelitis of right foot, unspecified type      (Osteomyelitis of right foot, unspecified type [M86.9])    Surgeons: Jeremi Hays Jr., MD Provider: Michael Vizcarra MD    Anesthesia Type: general ASA Status: 3            Anesthesia Type: general    Vitals  Vitals Value Taken Time   /79 04/12/24 1145   Temp 97.4 °F (36.3 °C) 04/12/24 1145   Pulse 64 04/12/24 1153   Resp 16 04/12/24 1145   SpO2 96 % 04/12/24 1153   Vitals shown include unfiled device data.          Post Anesthesia Care and Evaluation    Patient location during evaluation: PACU  Patient participation: complete - patient participated  Level of consciousness: awake and alert  Pain management: adequate    Airway patency: patent  Anesthetic complications: No anesthetic complications  PONV Status: none  Cardiovascular status: hemodynamically stable and acceptable  Respiratory status: nonlabored ventilation, acceptable, nasal cannula and oral airway  Hydration status: acceptable

## 2024-04-12 NOTE — ANESTHESIA PROCEDURE NOTES
Peripheral Block      Patient reassessed immediately prior to procedure    Patient location during procedure: pre-op  Start time: 4/12/2024 9:26 AM  Stop time: 4/12/2024 9:29 AM  Reason for block: at surgeon's request and post-op pain management  Performed by  Anesthesiologist: Deborah Mckinney MD  Assisted by: Jenny Sanches RN  Preanesthetic Checklist  Completed: patient identified, IV checked, site marked, risks and benefits discussed, surgical consent, monitors and equipment checked, pre-op evaluation and timeout performed  Prep:  Pt Position: left lateral decubitus  Sterile barriers:cap, gloves, mask and washed/disinfected hands  Prep: ChloraPrep  Patient monitoring: blood pressure monitoring, continuous pulse oximetry and EKG  Procedure    Sedation: yes  Performed under: local infiltration  Guidance:ultrasound guided    ULTRASOUND INTERPRETATION.  Using ultrasound guidance a 20 G gauge needle was placed in close proximity to the nerve, at which point, under ultrasound guidance anesthetic was injected in the area of the nerve and spread of the anesthesia was seen on ultrasound in close proximity thereto.  There were no abnormalities seen on ultrasound; a digital image was taken; and the patient tolerated the procedure with no complications. Images:still images obtained, printed/placed on chart    Laterality:right  Block Type:popliteal  Injection Technique:single-shot  Needle Type:echogenic and short-bevel  Needle Gauge:20 G  Resistance on Injection: none    Medications Used: dexamethasone sodium phosphate injection - Injection   2 mg - 4/12/2024 9:29:00 AM  bupivacaine PF (MARCAINE) 0.25 % injection - Injection   30 mL - 4/12/2024 9:29:00 AM      Post Assessment  Injection Assessment: negative aspiration for heme, no paresthesia on injection and incremental injection  Patient Tolerance:comfortable throughout block  Complications:no  Additional Notes  SINGLE shot    A high-frequency linear transducer, with  "sterile cover, was placed in the popliteal fossa to identify the popliteal artery and vein, Tibial nerve (TN) and Common Peroneal nerve (CP). The transducer was then moved in a cephalad fashion to observe the TN and CP nerve bifurcation to form the Sciatic Nerve. The insertion site was prepped and draped in sterile fashion. Skin and cutaneous tissue was infiltrated with 2-5 ml of 1% Lidocaine. Using ultrasound-guidance, a 20-gauge B-Aguilar 4\" Ultraplex 360 non-stimulating echogenic needle was then inserted and advanced in plane from lateral to medial. Preservative-free normal saline was utilized for hydro-dissection of tissue, advancement of Touhy, and to confirm final needle placement posterior to the nerves. Local anesthetic injection spread, in incremental 3-5 ml injections, to surround both nerve structures. Aspiration every 5 ml to prevent intravascular injection. Injection was completed with negative aspiration of blood and negative intravascular injection. Injection pressures were normal with minimal resistance              "

## 2024-04-13 LAB
ANION GAP SERPL CALCULATED.3IONS-SCNC: 9 MMOL/L (ref 5–15)
BACTERIA SPEC ANAEROBE CULT: NORMAL
BUN SERPL-MCNC: 28 MG/DL (ref 6–20)
BUN/CREAT SERPL: 23.1 (ref 7–25)
CALCIUM SPEC-SCNC: 9.2 MG/DL (ref 8.6–10.5)
CHLORIDE SERPL-SCNC: 102 MMOL/L (ref 98–107)
CO2 SERPL-SCNC: 26 MMOL/L (ref 22–29)
CREAT SERPL-MCNC: 1.21 MG/DL (ref 0.76–1.27)
DEPRECATED RDW RBC AUTO: 43.1 FL (ref 37–54)
EGFRCR SERPLBLD CKD-EPI 2021: 69.4 ML/MIN/1.73
ERYTHROCYTE [DISTWIDTH] IN BLOOD BY AUTOMATED COUNT: 12.8 % (ref 12.3–15.4)
GLUCOSE BLDC GLUCOMTR-MCNC: 137 MG/DL (ref 70–130)
GLUCOSE BLDC GLUCOMTR-MCNC: 205 MG/DL (ref 70–130)
GLUCOSE BLDC GLUCOMTR-MCNC: 239 MG/DL (ref 70–130)
GLUCOSE BLDC GLUCOMTR-MCNC: 245 MG/DL (ref 70–130)
GLUCOSE SERPL-MCNC: 184 MG/DL (ref 65–99)
HCT VFR BLD AUTO: 36.5 % (ref 37.5–51)
HGB BLD-MCNC: 11.9 G/DL (ref 13–17.7)
MAGNESIUM SERPL-MCNC: 1.9 MG/DL (ref 1.6–2.6)
MCH RBC QN AUTO: 30.4 PG (ref 26.6–33)
MCHC RBC AUTO-ENTMCNC: 32.6 G/DL (ref 31.5–35.7)
MCV RBC AUTO: 93.4 FL (ref 79–97)
PLATELET # BLD AUTO: 187 10*3/MM3 (ref 140–450)
PMV BLD AUTO: 10.7 FL (ref 6–12)
POTASSIUM SERPL-SCNC: 4.9 MMOL/L (ref 3.5–5.2)
RBC # BLD AUTO: 3.91 10*6/MM3 (ref 4.14–5.8)
SODIUM SERPL-SCNC: 137 MMOL/L (ref 136–145)
WBC NRBC COR # BLD AUTO: 8.04 10*3/MM3 (ref 3.4–10.8)

## 2024-04-13 PROCEDURE — 97164 PT RE-EVAL EST PLAN CARE: CPT

## 2024-04-13 PROCEDURE — 97116 GAIT TRAINING THERAPY: CPT

## 2024-04-13 PROCEDURE — 97165 OT EVAL LOW COMPLEX 30 MIN: CPT

## 2024-04-13 PROCEDURE — 63710000001 INSULIN DETEMIR PER 5 UNITS: Performed by: INTERNAL MEDICINE

## 2024-04-13 PROCEDURE — 83735 ASSAY OF MAGNESIUM: CPT | Performed by: NURSE PRACTITIONER

## 2024-04-13 PROCEDURE — 25010000002 DAPTOMYCIN PER 1 MG: Performed by: ORTHOPAEDIC SURGERY

## 2024-04-13 PROCEDURE — 97110 THERAPEUTIC EXERCISES: CPT

## 2024-04-13 PROCEDURE — 99232 SBSQ HOSP IP/OBS MODERATE 35: CPT | Performed by: NURSE PRACTITIONER

## 2024-04-13 PROCEDURE — 25010000002 HEPARIN (PORCINE) PER 1000 UNITS: Performed by: INTERNAL MEDICINE

## 2024-04-13 PROCEDURE — 97535 SELF CARE MNGMENT TRAINING: CPT

## 2024-04-13 PROCEDURE — 80048 BASIC METABOLIC PNL TOTAL CA: CPT | Performed by: NURSE PRACTITIONER

## 2024-04-13 PROCEDURE — 25010000002 CEFTRIAXONE PER 250 MG: Performed by: INTERNAL MEDICINE

## 2024-04-13 PROCEDURE — 63710000001 PREDNISONE PER 5 MG: Performed by: INTERNAL MEDICINE

## 2024-04-13 PROCEDURE — 94799 UNLISTED PULMONARY SVC/PX: CPT

## 2024-04-13 PROCEDURE — 85027 COMPLETE CBC AUTOMATED: CPT | Performed by: NURSE PRACTITIONER

## 2024-04-13 PROCEDURE — 63710000001 INSULIN LISPRO (HUMAN) PER 5 UNITS: Performed by: INTERNAL MEDICINE

## 2024-04-13 PROCEDURE — 97530 THERAPEUTIC ACTIVITIES: CPT

## 2024-04-13 PROCEDURE — 94660 CPAP INITIATION&MGMT: CPT

## 2024-04-13 PROCEDURE — 82948 REAGENT STRIP/BLOOD GLUCOSE: CPT

## 2024-04-13 RX ADMIN — PREDNISONE 10 MG: 10 TABLET ORAL at 08:35

## 2024-04-13 RX ADMIN — METOPROLOL SUCCINATE 100 MG: 50 TABLET, EXTENDED RELEASE ORAL at 08:35

## 2024-04-13 RX ADMIN — Medication 10 ML: at 08:36

## 2024-04-13 RX ADMIN — CEFTRIAXONE 2000 MG: 2 INJECTION, POWDER, FOR SOLUTION INTRAMUSCULAR; INTRAVENOUS at 18:02

## 2024-04-13 RX ADMIN — HEPARIN SODIUM 5000 UNITS: 5000 INJECTION INTRAVENOUS; SUBCUTANEOUS at 21:12

## 2024-04-13 RX ADMIN — GLIPIZIDE 10 MG: 10 TABLET ORAL at 08:35

## 2024-04-13 RX ADMIN — INSULIN DETEMIR 10 UNITS: 100 INJECTION, SOLUTION SUBCUTANEOUS at 08:35

## 2024-04-13 RX ADMIN — PIOGLITAZONE 30 MG: 15 TABLET ORAL at 08:35

## 2024-04-13 RX ADMIN — LISINOPRIL 40 MG: 40 TABLET ORAL at 08:35

## 2024-04-13 RX ADMIN — Medication 3 ML: at 21:36

## 2024-04-13 RX ADMIN — Medication 3 ML: at 08:36

## 2024-04-13 RX ADMIN — COLCHICINE 0.6 MG: 0.6 TABLET ORAL at 08:35

## 2024-04-13 RX ADMIN — INSULIN LISPRO 4 UNITS: 100 INJECTION, SOLUTION INTRAVENOUS; SUBCUTANEOUS at 08:37

## 2024-04-13 RX ADMIN — Medication 10 ML: at 21:36

## 2024-04-13 RX ADMIN — DAPTOMYCIN 800 MG: 500 INJECTION, POWDER, LYOPHILIZED, FOR SOLUTION INTRAVENOUS at 10:12

## 2024-04-13 RX ADMIN — INSULIN LISPRO 4 UNITS: 100 INJECTION, SOLUTION INTRAVENOUS; SUBCUTANEOUS at 21:12

## 2024-04-13 RX ADMIN — INSULIN LISPRO 4 UNITS: 100 INJECTION, SOLUTION INTRAVENOUS; SUBCUTANEOUS at 18:02

## 2024-04-13 RX ADMIN — HEPARIN SODIUM 5000 UNITS: 5000 INJECTION INTRAVENOUS; SUBCUTANEOUS at 05:46

## 2024-04-13 RX ADMIN — HYDROCODONE BITARTRATE AND ACETAMINOPHEN 1 TABLET: 5; 325 TABLET ORAL at 05:51

## 2024-04-13 RX ADMIN — HEPARIN SODIUM 5000 UNITS: 5000 INJECTION INTRAVENOUS; SUBCUTANEOUS at 13:40

## 2024-04-13 NOTE — PROGRESS NOTES
Alvaro Sinha       LOS: 10 days   Patient Care Team:  Martha Mckee MD as PCP - General (Family Medicine)    Chief Complaint: Right foot infection/osteomyelitis    Subjective     Interval History:     Resting comfortably in bed this morning.  Pain tolerable, no acute events overnight.      Review of Systems:      Gen- No fevers, chills  CV- No chest pain, palpitations  Resp- No cough, dyspnea  GI- No N/V/D, abd pain    Objective     Vital Signs  Vital Signs (last 24 hours)         04/07 0700  04/08 0659 04/08 0700 04/08 0735   Most Recent      Temp (°F) 97.3 -  97.8       97.8 (36.6) 04/08 0000    Heart Rate 45 -  70       48 04/08 0600    Resp 16 -  18       18 04/08 0000    /80 -  148/81       138/94 04/08 0000    SpO2 (%) 93 -  99       97 04/08 0600    Oxygen Concentration (%)   28       28 04/08 0418              Physical Exam:     No acute distress.  Nonlabored respirations.  Regular rate and rhythm.  Abdomen nondistended.  Right lower extremity: Operative dressing present is clean, dry, intact.      Results Review:     I reviewed the patient's new clinical results.    Medication Review:   Hospital Medications (active)         Dose Frequency Start End    acetaminophen (TYLENOL) tablet 650 mg 650 mg Every 4 Hours PRN 4/2/2024 --    Admin Instructions: If given for fever, use fever parameter: fever greater than 100.4 °F  Based on patient request - if ordered for moderate or severe pain, provider allows for administration of a medication prescribed for a lower pain scale.    Do not exceed 4 grams of acetaminophen in a 24 hr period. Max dose of 2gm for AST/ALT greater than 120 units/L.    If given for pain, use the following pain scale:   Mild Pain = Pain Score of 1-3, CPOT 1-2  Moderate Pain = Pain Score of 4-6, CPOT 3-4  Severe Pain = Pain Score of 7-10, CPOT 5-8    Route: Oral    bisacodyl (DULCOLAX) EC tablet 5 mg 5 mg Daily PRN 4/2/2024 --    Admin Instructions: Use if no bowel  "movement after 12 hours.  Swallow whole. Do not crush, split, or chew tablet.    Route: Oral    Linked Group 1: Placed in \"And\" Linked Group        bisacodyl (DULCOLAX) suppository 10 mg 10 mg Daily PRN 4/2/2024 --    Admin Instructions: Use if no bowel movement after 12 hours.  Hold for diarrhea    Route: Rectal    Linked Group 1: Placed in \"And\" Linked Group        Calcium Replacement - Follow Nurse / BPA Driven Protocol  As Needed 4/2/2024 --    Admin Instructions: Open Order & Select \"BHS Electrolyte Replacement Protocol Algorithm\" to View Details    Route: Does not apply    clopidogrel (PLAVIX) tablet 75 mg ([Held by provider] since 4/3/2024 12:01 AM) 75 mg Daily 4/3/2024 --    Route: Oral    colchicine tablet 0.6 mg 0.6 mg Daily 4/4/2024 --    Admin Instructions: Group 2 (Pink) Hazardous Drug - Reproductive Risk Only - See Handling Guide    Route: Oral    DAPTOmycin (CUBICIN) 800 mg in sodium chloride 0.9 % 50 mL IVPB 8 mg/kg × 102 kg (Adjusted) Every 24 Hours 4/3/2024 5/3/2024    Admin Instructions: Caution: Look alike/sound alike drug alert.  Refrigerate. Do not shake.    Route: Intravenous    dextrose (D50W) (25 g/50 mL) IV injection 25 g 25 g Every 15 Minutes PRN 4/3/2024 --    Admin Instructions: Blood sugar less than 70; patient has IV access - Unresponsive, NPO or Unable To Safely Swallow    Route: Intravenous    dextrose (GLUTOSE) oral gel 15 g 15 g Every 15 Minutes PRN 4/3/2024 --    Admin Instructions: BS<70, Patient Alert, Is not NPO, Can safely swallow.    Route: Oral    glipizide (GLUCOTROL) tablet 10 mg 10 mg Every Morning Before Breakfast 4/3/2024 --    Admin Instructions: Caution: Look alike/sound alike drug alert    Route: Oral    glucagon (GLUCAGEN) injection 1 mg 1 mg Every 15 Minutes PRN 4/3/2024 --    Admin Instructions: Blood Glucose Less Than 70 - Patient Without IV Access - Unresponsive, NPO or Unable To Safely Swallow  Reconstitute powder for injection by adding 1 mL of " -supplied sterile diluent or sterile water for injection to a vial containing 1 mg of the drug, to provide solutions containing 1 mg/mL. Shake vial gently to dissolve.    Route: Intramuscular    heparin (porcine) 5000 UNIT/ML injection 5,000 Units 5,000 Units Every 8 Hours Scheduled 4/3/2024 --    Route: Subcutaneous    HYDROcodone-acetaminophen (NORCO) 5-325 MG per tablet 1 tablet 1 tablet Every 4 Hours PRN 4/2/2024 4/11/2024    Admin Instructions: Based on patient request - if ordered for moderate or severe pain, provider allows for administration of a medication prescribed for a lower pain scale.  [CULLEN]    Do not exceed 4 grams of acetaminophen in a 24 hr period. Max dose of 2gm for AST/ALT greater than 120 units/L        If given for pain, use the following pain scale:   Mild Pain = Pain Score of 1-3, CPOT 1-2  Moderate Pain = Pain Score of 4-6, CPOT 3-4  Severe Pain = Pain Score of 7-10, CPOT 5-8    Route: Oral    HYDROmorphone (DILAUDID) injection 0.5 mg 0.5 mg Every 3 Hours PRN 4/4/2024 4/12/2024    Admin Instructions: Based on patient request - if ordered for moderate or severe pain, provider allows for administration of a medication prescribed for a lower pain scale.  If given for pain, use the following pain scale:  Mild Pain = Pain Score of 1-3, CPOT 1-2  Moderate Pain = Pain Score of 4-6, CPOT 3-4  Severe Pain = Pain Score of 7-10, CPOT 5-8    Route: Intravenous    insulin detemir (LEVEMIR) injection 10 Units 10 Units Daily 4/7/2024 --    Admin Instructions: Do not hold basal insulin without an order. Consider requesting a dose edit, if needed.      Route: Subcutaneous    Insulin Lispro (humaLOG) injection 2-7 Units 2-7 Units 4 Times Daily Before Meals & Nightly 4/3/2024 --    Admin Instructions: Correction Insulin - Low Dose - Total Insulin Dose Less Than 40 units/day (Lean, Elderly or Renal Patients)    Blood Glucose 150-199 mg/dL - 2 units  Blood Glucose 200-249 mg/dL - 3 units  Blood  "Glucose 250-299 mg/dL - 4 units  Blood Glucose 300-349 mg/dL - 5 units  Blood Glucose 350-400 mg/dL - 6 units  Blood Glucose Greater Than 400 mg/dL - 7 units & Call Provider   Caution: Look alike/sound alike drug alert    Route: Subcutaneous    lisinopril (PRINIVIL,ZESTRIL) tablet 40 mg 40 mg Daily 4/3/2024 --    Admin Instructions: Hold for SBP less than 100, DBP less than 60.    Route: Oral    Magnesium Standard Dose Replacement - Follow Nurse / BPA Driven Protocol  As Needed 4/2/2024 --    Admin Instructions: Open Order & Select \"BHS Electrolyte Replacement Protocol Algorithm\" to View Details    Route: Does not apply    metoprolol succinate XL (TOPROL-XL) 24 hr tablet 100 mg 100 mg Daily 4/3/2024 --    Admin Instructions: Hold for SBP less than 100, DBP less than 60, or heart rate less than 50    Do not crush or chew the capsules or tablets. The drug may not work as designed if the capsule or tablet is crushed or chewed. Swallow whole.  Do not crush or chew.    Route: Oral    Phosphorus Replacement - Follow Nurse / BPA Driven Protocol  As Needed 4/2/2024 --    Admin Instructions: Open Order & Select \"BHS Electrolyte Replacement Protocol Algorithm\" to View Details    Route: Does not apply    pioglitazone (ACTOS) tablet 30 mg 30 mg Daily 4/3/2024 --    Route: Oral    piperacillin-tazobactam (ZOSYN) 3.375 g IVPB in 100 mL NS MBP (CD) 3.375 g Every 8 Hours 4/3/2024 4/10/2024    Route: Intravenous    polyethylene glycol (MIRALAX) packet 17 g 17 g Daily PRN 4/2/2024 --    Admin Instructions: Use if no bowel movement after 12 hours. Mix in 6-8 ounces of water.  Use 4-8 ounces of water, tea, or juice for each 17 gram dose.    Route: Oral    Linked Group 1: Placed in \"And\" Linked Group        Potassium Replacement - Follow Nurse / BPA Driven Protocol  As Needed 4/2/2024 --    Admin Instructions: Open Order & Select \"BHS Electrolyte Replacement Protocol Algorithm\" to View Details    Route: Does not apply    predniSONE " "(DELTASONE) tablet 40 mg 40 mg Daily With Breakfast 4/7/2024 --    Admin Instructions: Take with food.    Route: Oral    sennosides-docusate (PERICOLACE) 8.6-50 MG per tablet 2 tablet 2 tablet 2 Times Daily PRN 4/2/2024 --    Admin Instructions: Start bowel management regimen if patient has not had a bowel movement after 12 hours.    Route: Oral    Linked Group 1: Placed in \"And\" Linked Group        sodium chloride 0.9 % flush 10 mL 10 mL Every 12 Hours Scheduled 4/3/2024 --    Route: Intravenous    sodium chloride 0.9 % flush 10 mL 10 mL As Needed 4/2/2024 --    Route: Intravenous    sodium chloride 0.9 % infusion 40 mL 40 mL As Needed 4/2/2024 --    Admin Instructions: Following administration of an IV intermittent medication, flush line with 40mL NS at 100mL/hr.    Route: Intravenous              Assessment & Plan     58-year-old male with multiple medical comorbidities, diabetes mellitus with right foot wound/infection status post right second and third ray amputation with wound vacuum-assisted closure April 8, 2024, delayed closure 4/12/24.      Osteomyelitis    Lymphoma    Type 2 diabetes mellitus with hyperglycemia    Diabetic foot ulcer    History of myalgia due to HMG CoA reductase inhibitor    Hypertensive disorder    Hyperlipidemia    Microalbuminuric diabetic nephropathy    Stage 3a chronic kidney disease    Nonweightbearing right lower extremity; okay for minimal protected heel weightbearing right lower extremity in postoperative shoe for transfers only    Follow cultures.    Okay for discharge from surgical perspective.  Daily dressing change: xeroform, 4x4, kerlix, ace  Follow up next Friday 4/19/24 for wound check and xrays.    Jeremi Hays Jr, MD  04/13/24  06:13 EDT          "

## 2024-04-13 NOTE — CONSULTS
Patient Name:  Alvaro Sinha  YOB: 1965  [id number]       Patient Care Team:  Martha Mckee MD as PCP - General (Family Medicine)      General Surgery Consult Note     Date of Consultation: 04/13/24    Consulting Physician : Kirby Khan MD    Reason for Consult : Osteomyelitis    Subjective     I have been asked to see  Alvaro Sinha , a 58 y.o. male in consultation for osteomyelitis.  He has a known past medical history of diabetes, hypertension, chronic kidney disease and gout who had a worsening right foot wound.  Ultimately, he required a second and third ray transmetatarsal amputation with subsequent debridement and closure.  He is going to require long-term IV antibiotics, and due to his chronic renal insufficiency, he is not a candidate for PICC line placement.  We have been asked to see him for Groshong placement.  The patient reports no current fevers, and has never had a central venous access to his knowledge.  Otherwise stable.      Allergy:   Allergies   Allergen Reactions    Statins Myalgia       Medications:  cefTRIAXone, 2,000 mg, Intravenous, Q24H  colchicine, 0.6 mg, Oral, Daily  DAPTOmycin, 8 mg/kg (Adjusted), Intravenous, Q24H  glipizide, 10 mg, Oral, QAM AC  heparin (porcine), 5,000 Units, Subcutaneous, Q8H  insulin detemir, 10 Units, Subcutaneous, Daily  insulin lispro, 2-9 Units, Subcutaneous, 4x Daily AC & at Bedtime  lisinopril, 40 mg, Oral, Daily  metoprolol succinate XL, 100 mg, Oral, Daily  pioglitazone, 30 mg, Oral, Daily  predniSONE, 10 mg, Oral, Daily With Breakfast  sodium chloride, 10 mL, Intravenous, Q12H  sodium chloride, 3 mL, Intravenous, Q12H      lactated ringers, 9 mL/hr, Last Rate: 9 mL/hr (04/12/24 1039)      No current facility-administered medications on file prior to encounter.     Current Outpatient Medications on File Prior to Encounter   Medication Sig    allopurinol (ZYLOPRIM) 300 MG tablet Take 1 tablet by mouth Daily.     glimepiride (AMARYL) 4 MG tablet Take 1 tablet by mouth Daily.    lisinopril (PRINIVIL,ZESTRIL) 40 MG tablet Take 1 tablet by mouth Daily.    metFORMIN (GLUCOPHAGE) 1000 MG tablet Take 1 tablet by mouth 2 (Two) Times a Day With Meals.    metoprolol succinate XL (TOPROL-XL) 100 MG 24 hr tablet Take 1 tablet by mouth Daily.    pioglitazone (ACTOS) 30 MG tablet Take 1 tablet by mouth Daily.    sildenafil (VIAGRA) 100 MG tablet Take 1 tablet by mouth Daily As Needed.    colchicine 0.6 MG tablet Take 1 tablet by mouth As Needed.    ergocalciferol (ERGOCALCIFEROL) 1.25 MG (40797 UT) capsule Take 1,250 mcg by mouth Daily.    HYDROcodone-acetaminophen (NORCO) 7.5-325 MG per tablet Take 1 tablet by mouth Every 6 (Six) Hours As Needed for Moderate Pain .    ondansetron ODT (ZOFRAN-ODT) 4 MG disintegrating tablet Place 1 tablet on the tongue Every 6 (Six) Hours As Needed for Nausea.    silver sulfadiazine (SILVADENE, SSD) 1 % cream     Trulicity 4.5 MG/0.5ML solution pen-injector INJECT 0.5 ML EVERY WEEK BY SUBCUTANEOUS ROUTE FOR 90 DAYS.       PMHx:   Past Medical History:   Diagnosis Date    Diabetes mellitus     Gout     Hypertension     Osteomyelitis     right foot    Sleep apnea     CPAP       Past Surgical History:  Past Surgical History:   Procedure Laterality Date    BONE RESECTION, RIB      TRANS METATARSAL AMPUTATION Right 4/8/2024    Procedure: AMPUTATION TRANS METATARSAL RIGHT;  Surgeon: Jeremi Hays Jr., MD;  Location: [facility];  Service: Orthopedics;  Laterality: Right;   -Left rib resection as a child      Family History: Significant for diabetes and coronary artery disease    Social History: Pt lives in [place].    Tobacco use: Denies     EtOH use : Rare   Illicit drug use: Denies      Review of Systems        Constitutional: No fevers, chills or malaise   Eyes: Denies visual changes    Cardiovascular: Denies chest pain, palpitations   Pulmonary: Denies cough or shortness of breath   Abdominal/  "GI: See HPI    Genitourinary: Denies dysuria or hematuria   Musculoskeletal: Denies any but chronic joint aches, pains or deformities   Psychiatric: No recent mood changes   Neurologic: No paresthesias or loss of function          Objective     Physical Exam:      Vital Signs  /75 (BP Location: Left arm, Patient Position: Lying)   Pulse 64   Temp 96.8 °F (36 °C) (Axillary)   Resp 16   Ht 185.4 cm (72.99\")   Wt 136 kg (300 lb)   SpO2 99%   BMI 39.59 kg/m²     Intake/Output Summary (Last 24 hours) at 4/13/2024 1048  Last data filed at 4/13/2024 0700  Gross per 24 hour   Intake 440 ml   Output 0 ml   Net 440 ml         Physical Exam:    Head: Normocephalic, atraumatic.   Eyes: Pupils equal, round, react to light and accommodation.   Mouth: Oral mucosa without lesions,   Neck: No masses, lymphadenopathy or carotid bruits bilaterally   CV: Rhythm  and rate regular , no  murmurs, rubs or gallops  Lungs: Clear  to auscultation bilaterally   Abdomen: Bowel sounds positive  , soft, nontender  Groin : No obvious hernias bilaterally   Extremities:  No cyanosis, clubbing or edema bilaterally.  His right foot is bandaged from his recent operation  Lymphatics: No abnormal lymphadenopathy appreciated   Neurologic: No gross deficits       Results Review: I have personally reviewed all of the recent lab and imaging results available at this time.  Laboratory exam reveals a white count of 8000 with hemoglobin 11.9 and a platelet count of 187.  Comprehensive metabolic panel was reviewed which shows a creatinine of 1.21.  Glucose is 205.           Assessment and Plan:    Problem List Items Addressed This Visit          Endocrine and Metabolic    Diabetic foot ulcer-  We will plan for Groshong placement with fluoroscopy tomorrow.  I have discussed the risks and benefits at length with the patient, who is agreeable to proceed.    Relevant Medications    pioglitazone (ACTOS) 30 MG tablet    glipizide (GLUCOTROL) tablet 10 mg "    pioglitazone (ACTOS) tablet 30 mg    dextrose (GLUTOSE) oral gel 15 g    glucagon (GLUCAGEN) injection 1 mg    insulin detemir (LEVEMIR) injection 10 Units    Insulin Lispro (humaLOG) injection 2-9 Units       Other    * (Principal) Osteomyelitis - Primary    Relevant Medications    oxyCODONE (ROXICODONE) 5 MG immediate release tablet    cefTRIAXone (ROCEPHIN) 2,000 mg in sodium chloride 0.9 % 100 mL MBP    Other Relevant Orders    Case Request (Completed)    Anaerobic Culture - Wound, Toe, Right (Completed)    Wound Culture - Wound, Toe, Right (Completed)    Tissue Pathology Exam (Completed)    Case Request (Completed)    Fungus Culture - Tissue, Foot, Right    AFB Culture - Tissue, Foot, Right    Anaerobic Culture 10 Day Incubation - Tissue, Foot, Right     Other Visit Diagnoses       Cellulitis of right foot        History of diabetes mellitus                 Active Hospital Problems    Diagnosis  POA    **Osteomyelitis [M86.9]  Yes    Stage 3a chronic kidney disease [N18.31]  Yes    History of myalgia due to HMG CoA reductase inhibitor [Z87.39]  Not Applicable    Microalbuminuric diabetic nephropathy [E11.21]  Yes    Hyperlipidemia [E78.5]  Yes    Type 2 diabetes mellitus with hyperglycemia [E11.65]  Yes    Lymphoma [C85.90]  Yes    Diabetic foot ulcer [E11.621, L97.509]  Yes    Hypertensive disorder [I10]  Yes      Resolved Hospital Problems   No resolved problems to display.            I discussed the patient's findings and my recommendations with the patient and/or family, as well as the primary team     Israel Preciado MD  04/13/24  10:48 EDT        Dictated using Dragon Dictation

## 2024-04-13 NOTE — PLAN OF CARE
Goal Outcome Evaluation:  Plan of Care Reviewed With: patient, spouse, family        Progress: improving  Outcome Evaluation: PT re-eval completed. Seen POD 1 s/p I & D, & wd closure, of TM amp site of R toes 2 & 3, & instructed in protected heel WB RLE wearing post-op shoe (allow by MD to amb to/from BR). Amb 100 + 700 ft w/ R knee scooter, w/ 1 + 3 stand.rests (returned to room to don L croc.shoe), w/ CGA, & transf to/from EOB & recliner w/ CGA, then performed ther exer per issued HEP, but limited by HR to 106, R foot pain, & elev BP. Needs to practice 2 steps w/ cxs (has pair@home). Recommend home w/ wife's assist at d/c (do not anticipate requiring f/u PT).      Anticipated Discharge Disposition (PT): home with assist

## 2024-04-13 NOTE — THERAPY DISCHARGE NOTE
Acute Care - Occupational Therapy Discharge  [facility]    Patient Name: Alvaro Sinha  : 1965    MRN: [id number]                              Today's Date: 2024       Admit Date: 2024    Visit Dx:     ICD-10-CM ICD-9-CM   1. Osteomyelitis of right foot, unspecified type  M86.9 730.27   2. Cellulitis of right foot  L03.115 682.7   3. Diabetic ulcer of right foot associated with other specified diabetes mellitus, unspecified part of foot, unspecified ulcer stage  E13.621 250.80    L97.519 707.15   4. History of diabetes mellitus  Z86.39 V12.29     Patient Active Problem List   Diagnosis    Nephrolithiasis    Lymphadenopathy    Lymphoma    Osteomyelitis    Type 2 diabetes mellitus with hyperglycemia    Elevated serum immunoglobulin free light chains    Elevated erythrocyte sedimentation rate    Diabetic foot ulcer    History of myalgia due to HMG CoA reductase inhibitor    Gout    Hypertensive disorder    Hyperlipidemia    Hypercalcemia    Microalbuminuric diabetic nephropathy    Peripheral angiopathy due to diabetes mellitus    Stage 3a chronic kidney disease     Past Medical History:   Diagnosis Date    Diabetes mellitus     Gout     Hypertension     Osteomyelitis     right foot    Sleep apnea     CPAP     Past Surgical History:   Procedure Laterality Date    BONE RESECTION, RIB      TRANS METATARSAL AMPUTATION Right 2024    Procedure: AMPUTATION TRANS METATARSAL RIGHT;  Surgeon: Jeremi Hays Jr., MD;  Location: [facility];  Service: Orthopedics;  Laterality: Right;      General Information       Row Name 24 1059          OT Time and Intention    Document Type discharge evaluation/summary  -STEPHY     Mode of Treatment individual therapy;occupational therapy  -STEPHY       Row Name 24 1059          General Information    Patient Profile Reviewed yes  -STEPHY     Prior Level of Function independent:;all household mobility;community mobility;gait;transfer;bed mobility;driving;work;mod  assist:;home management  pt. shared HM with his wife  -STEPHY     Existing Precautions/Restrictions other (see comments)  per ortho note, NWB RLE minus protected heel WB LLE in post op shoe, per pt. MD stated he could heel WB to and from bathroom for BM's  -STEPHY     Barriers to Rehab previous functional deficit;medically complex  -STEPHY       Row Name 04/13/24 1059          Occupational Profile    Environmental Supports and Barriers (Occupational Profile) tub shower with glass doors, shower chair, per pt. he has a wx from sister, wife got knee scooter ordered, high toilet  -STEPHY       Row Name 04/13/24 1059          Living Environment    People in Home spouse  -STEPHY       Row Name 04/13/24 1059          Home Main Entrance    Number of Stairs, Main Entrance two  -STEPHY     Stair Railings, Main Entrance railings safe and in good condition  -STEPHY       Row Name 04/13/24 1059          Stairs Within Home, Primary    Number of Stairs, Within Home, Primary none  -STEPHY       Row Name 04/13/24 1059          Cognition    Orientation Status (Cognition) oriented x 4;verbal cues/prompts needed for orientation  pt. knew he was at hospital, but not name  -STEPHY       Row Name 04/13/24 1059          Safety Issues, Functional Mobility    Safety Issues Affecting Function (Mobility) safety precaution awareness;safety precautions follow-through/compliance;unable to maintain weight-bearing restrictions  unable to NWB with rx wx, can WB in heel  -STEPHY     Impairments Affecting Function (Mobility) endurance/activity tolerance;pain;strength  -STEPHY               User Key  (r) = Recorded By, (t) = Taken By, (c) = Cosigned By      Initials Name Provider Type    Nicky Gardner OT Occupational Therapist                   Mobility/ADL's       Row Name 04/13/24 1110          Bed Mobility    Rolling Right [place] (Bed Mobility) modified independence  -STEPHY     Scooting/Bridging [place] (Bed Mobility) modified independence  -STEPHY     Supine-Sit [place] (Bed  Mobility) modified independence  -     Assistive Device (Bed Mobility) head of bed elevated  -[facility] Name 04/13/24 1110          Transfers    Transfers sit-stand transfer;stand-sit transfer  -[facility] Name 04/13/24 1110          Sit-Stand Transfer    Sit-Stand [place] (Transfers) standby assist  -     Assistive Device (Sit-Stand Transfers) walker, front-wheeled  -     Comment, (Sit-Stand Transfer) heel WB in post op shoe  -[facility] Name 04/13/24 1110          Stand-Sit Transfer    Stand-Sit [place] (Transfers) standby assist  -     Assistive Device (Stand-Sit Transfers) walker, front-wheeled  -     Comment, (Stand-Sit Transfer) heel WB in post op shoe  -[facility] Name 04/13/24 1110          Functional Mobility    Functional Mobility- Ind. Level contact guard assist  -     Functional Mobility- Device walker, front-wheeled  -     Functional Mobility-Distance (Feet) 8  -     Functional Mobility- Safety Issues step length decreased  -     Functional Mobility- Comment Pt. with walker unable to keep NWB, placing heel down in post op shoe, cues to roll walker, encouraged pt. to only use knee walker for movement and only heel WB for transfers or if unbalanced to prevent a fall, educated to have on post op shoe  -[facility] Name 04/13/24 1110          Activities of Daily Living    BADL Assessment/Intervention bathing;lower body dressing;toileting  -[facility] Name 04/13/24 1110          Mobility    Extremity Weight-bearing Status right lower extremity  -     Right Lower Extremity (Weight-bearing Status) non weight-bearing (NWB)  per surgery note pt. can WB R heel protected for transfers  -[facility] Name 04/13/24 1110          Bathing Assessment/Intervention    Position (Bathing) edge of bed sitting  -     Comment, (Bathing) educated on not being able to get R foot wet and line to be placed prior to discharge, pt. to get a shower boot for RLE, educated on use of shower  chair and a non-slip surface in tub  -[facility] Name 04/13/24 1110          Lower Body Dressing Assessment/Training    [place] Level (Lower Body Dressing) don;shoes/slippers;socks;set up  -STEPHY     Position (Lower Body Dressing) edge of bed sitting  -STEPHY     Comment, (Lower Body Dressing) pt. unable to cross up foot over knee and can only lean forward, encouraged to try to only put weight on R heel with performance.  -[facility] Name 04/13/24 1110          Toileting Assessment/Training    [place] Level (Toileting) adjust/manage clothing  -STEPHY     Position (Toileting) unsupported standing  -STEPHY     Comment, (Toileting) Pt. simulated in standing with tranfer pulling up and down pants  -               User Key  (r) = Recorded By, (t) = Taken By, (c) = Cosigned By      Initials Name Provider Type    Nicky Gardner OT Occupational Therapist                   Obj/Interventions       [facility] Name 04/13/24 1117          Sensory Assessment (Somatosensory)    Sensory Assessment (Somatosensory) UE sensation intact  -STEPHY       Row Name 04/13/24 1117          Vision Assessment/Intervention    Visual Impairment/Limitations WFL  -STEPHY       Row Name 04/13/24 1117          Range of Motion Comprehensive    General Range of Motion bilateral upper extremity ROM WFL  -[facility] Name 04/13/24 1117          Strength Comprehensive (MMT)    General Manual Muscle Testing (MMT) Assessment no strength deficits identified  -STEPHY     Comment, General Manual Muscle Testing (MMT) Assessment BUE  -STEPHY       Row Name 04/13/24 1117          Balance    Balance Assessment sitting static balance;sitting dynamic balance  -STEPHY     Static Sitting Balance independent  -STEPHY     Dynamic Sitting Balance independent  -STEPHY     Position, Sitting Balance unsupported;sitting edge of bed  -STEPHY     Static Standing Balance standby assist  -STEPHY     Dynamic Standing Balance standby assist  -STEPHY     Position/Device Used, Standing Balance  unsupported;supported;walker, front-wheeled  -STEPHY     Balance Interventions sit to stand;occupation based/functional task  -STEPHY     Comment, Balance LBD, and toileting simulation  -STEPHY               User Key  (r) = Recorded By, (t) = Taken By, (c) = Cosigned By      Initials Name Provider Type    Nicky Gardner, OT Occupational Therapist                   Goals/Plan    No documentation.                  Clinical Impression       Row Name 04/13/24 1117          Pain Assessment    Pretreatment Pain Rating 2/10  -STEPHY     Posttreatment Pain Rating 2/10  -STEPHY     Pain Location - Side/Orientation Right  -STEPHY     Pain Location lower  -STEPHY     Pain Location - extremity  -STEPHY     Pre/Posttreatment Pain Comment pt. declined RLE elevation  -STEPHY     Pain Intervention(s) Repositioned  -STEPHY       Row Name 04/13/24 1117          Plan of Care Review    Plan of Care Reviewed With patient  -STEPHY     Progress improving  -STEPHY     Outcome Evaluation Patient educated on tub safety and WB precautions and use of p/o shoe with heel WB with transfers, for fall prevention using walker to transfer and limiting WB with LBD.  Pt. demonstrated skilled for heel WB transfers and BADL performance.  Recommend home with spouse assist at discharge. Recommended grab bar and non-slip surface for tub.  Pt. has a shower chair, a wx borrowed from sister and wife has ordered and knee scooter.  -STEPHY       Row Name 04/13/24 1117          Therapy Assessment/Plan (OT)    Patient/Family Therapy Goal Statement (OT) be able to discharge home with spouse  -STEPHY     Criteria for Skilled Therapeutic Interventions Met (OT) no;skilled treatment is necessary  -STEPHY     Therapy Frequency (OT) evaluation only  -STEPHY       Row Name 04/13/24 1117          Therapy Plan Review/Discharge Plan (OT)    Equipment Needs Upon Discharge (OT) other (see comments)  grab bar, non-slip surface tub  -STEPHY     Anticipated Discharge Disposition (OT) home with assist  -STEPHY       Row Name 04/13/24 1117           Vital Signs    Pre Systolic BP Rehab 125  -STEPHY     Pre Treatment Diastolic BP 75  -STEPHY     Pretreatment Heart Rate (beats/min) 64  -STEPHY     Posttreatment Heart Rate (beats/min) 65  -STEPHY     Pre SpO2 (%) 97  -STEPHY     O2 Delivery Pre Treatment room air  -STEPHY     O2 Delivery Intra Treatment room air  -STEPHY     Post SpO2 (%) 98  -STEPHY     O2 Delivery Post Treatment room air  -STEPHY     Pre Patient Position Supine  -STEPHY     Intra Patient Position Standing  -STEPHY     Post Patient Position Supine  -STEPHY       Row Name 04/13/24 1117          Positioning and Restraints    Pre-Treatment Position in bed  -STEPHY     Post Treatment Position bed  -STEPHY     In Bed call light within reach;encouraged to call for assist;with nsg;sitting  -STEPHY               User Key  (r) = Recorded By, (t) = Taken By, (c) = Cosigned By      Initials Name Provider Type    Nicky Gardner, OT Occupational Therapist                   Outcome Measures       Row Name 04/13/24 1129          How much help from another is currently needed...    Putting on and taking off regular lower body clothing? 4  post setup  -STEPHY     Bathing (including washing, rinsing, and drying) 3  -STEPHY     Toileting (which includes using toilet bed pan or urinal) 4  -STEPHY     Putting on and taking off regular upper body clothing 4  -STEPHY     Taking care of personal grooming (such as brushing teeth) 4  -STEPHY     Eating meals 4  -STEPHY     AM-PAC 6 Clicks Score (OT) 23  -STEPHY       Row Name 04/13/24 0715          How much help from another person do you currently need...    Turning from your back to your side while in flat bed without using bedrails? 4  -MM     Moving from lying on back to sitting on the side of a flat bed without bedrails? 3  -MM     Moving to and from a bed to a chair (including a wheelchair)? 2  -MM     Standing up from a chair using your arms (e.g., wheelchair, bedside chair)? 2  -MM     Climbing 3-5 steps with a railing? 2  -MM     To walk in hospital room? 3  -MM     AM-PAC 6 Clicks Score (PT) 16   -MM     Highest Level of Mobility Goal 5 --> Static standing  -MM       Row Name 04/13/24 1129          Functional Assessment    Outcome Measure Options AM-PAC 6 Clicks Daily Activity (OT)  -STEPHY               User Key  (r) = Recorded By, (t) = Taken By, (c) = Cosigned By      Initials Name Provider Type    Nicky Gardner, OT Occupational Therapist    Renetta France, RN Registered Nurse                  Occupational Therapy Education       Title: PT OT SLP Therapies (In Progress)       Topic: Occupational Therapy (In Progress)       Point: ADL training (Done)       Description:   Instruct learner(s) on proper safety adaptation and remediation techniques during self care or transfers.   Instruct in proper use of assistive devices.                  Learning Progress Summary             Patient Acceptance, E, VU by STEPHY at 4/13/2024 1130    Comment: reason for consult, home safety recommendations, use of post op shoe with wx or knee scooter for transfers heel WB only, BADLs heel WB only, bathroom safety recommendations.                         Point: Home exercise program (Not Started)       Description:   Instruct learner(s) on appropriate technique for monitoring, assisting and/or progressing therapeutic exercises/activities.                  Learner Progress:  Not documented in this visit.              Point: Precautions (Done)       Description:   Instruct learner(s) on prescribed precautions during self-care and functional transfers.                  Learning Progress Summary             Patient Acceptance, E, VU by STEPHY at 4/13/2024 1130    Comment: reason for consult, home safety recommendations, use of post op shoe with wx or knee scooter for transfers heel WB only, BADLs heel WB only, bathroom safety recommendations.                         Point: Body mechanics (Not Started)       Description:   Instruct learner(s) on proper positioning and spine alignment during self-care, functional mobility activities  and/or exercises.                  Learner Progress:  Not documented in this visit.                              User Key       Initials Effective Dates Name Provider Type Discipline     07/11/23 -  Nicky Young, OT Occupational Therapist OT                  OT Recommendation and Plan  Therapy Frequency (OT): evaluation only  Plan of Care Review  Plan of Care Reviewed With: patient  Progress: improving  Outcome Evaluation: Patient educated on tub safety and WB precautions and use of p/o shoe with heel WB with transfers, for fall prevention using walker to transfer and limiting WB with LBD.  Pt. demonstrated skilled for heel WB transfers and BADL performance.  Recommend home with spouse assist at discharge. Recommended grab bar and non-slip surface for tub.  Pt. has a shower chair, a wx borrowed from sister and wife has ordered and knee scooter.  Plan of Care Reviewed With: patient  Outcome Evaluation: Patient educated on tub safety and WB precautions and use of p/o shoe with heel WB with transfers, for fall prevention using walker to transfer and limiting WB with LBD.  Pt. demonstrated skilled for heel WB transfers and BADL performance.  Recommend home with spouse assist at discharge. Recommended grab bar and non-slip surface for tub.  Pt. has a shower chair, a wx borrowed from sister and wife has ordered and knee scooter.     Time Calculation:   Evaluation Complexity (OT)  Review Occupational Profile/Medical/Therapy History Complexity: brief/low complexity  Assessment, Occupational Performance/Identification of Deficit Complexity: 1-3 performance deficits  Clinical Decision Making Complexity (OT): problem focused assessment/low complexity  Overall Complexity of Evaluation (OT): low complexity     Time Calculation- OT       Row Name 04/13/24 1133             Time Calculation- OT    OT Start Time 1057  -STEPHY      OT Received On 04/13/24  -STEPHY         Timed Charges    60432 - OT Therapeutic Activity Minutes 8  -STEPHY       11240 - OT Self Care/Mgmt Minutes 10  -STEPHY         Untimed Charges    OT Eval/Re-eval Minutes 43  -STEPHY         Total Minutes    Timed Charges Total Minutes 18  -STEPHY      Untimed Charges Total Minutes 43  -STEPHY       Total Minutes 61  -STEPHY                User Key  (r) = Recorded By, (t) = Taken By, (c) = Cosigned By      Initials Name Provider Type    Nicky Gardner, OT Occupational Therapist                  Therapy Charges for Today       Code Description Service Date Service Provider Modifiers Qty    [id number] HC OT SELF CARE/MGMT/TRAIN EA 15 MIN 4/13/2024 Nicky Young OT GO 1    [id number]  OT EVAL LOW COMPLEXITY 3 4/13/2024 Nicky Young OT GO 1               OT Discharge Summary  Anticipated Discharge Disposition (OT): home with assist  Reason for Discharge: other (comment) (education complete)  Outcomes Achieved: Other (no further skilled OT services needed)    Nicky Young OT  4/13/2024

## 2024-04-13 NOTE — PROGRESS NOTES
[facility] [facility]    INFECTIOUS DISEASE CONSULT/INITIAL HOSPITAL VISIT    Alvaro Sinha  1965  [id number]    Date of consult: 4/3/2024    Admit date: 4/2/2024    Requesting Provider: No ref. provider found  Evaluating physician: Aung Jameson MD  Reason for Consultation: Right foot diabetic wound ulcers with underlying osteomyelitis second and third toe  Chief Complaint: Above      Subjective   History of present illness:  Patient is a  58 y.o.  Yr old male with a history of diabetes mellitus type 2, essential hypertension, hyperlipidemia, CKD stage IIIa, gout, with chronic right foot ulceration being followed at the [facility] which worsened 3/31/2024.  He has been using a boot to offload his foot, but was also working at [employer], although he has been off work recently.  MRI of the foot 4/2/2024 was consistent with possible early osteomyelitis second and third middle and distal phalanges.  He has a plantar draining fistulous track below his second and third metatarsal and an anterior web skin breakdown between his second and third toe on his foot.  He was awaiting possible transfer to the [facility] for further orthopedic evaluation.  He has no other localizing signs or symptoms of infection.  I was consulted on 4/3/2024 for further evaluation and treatment.    4/4/2024 history reviewed.  No high fevers or chills.  Tolerating antibiotics for right foot osteomyelitis.    4/5/2024 history reviewed.  Tolerating his antibiotics for right foot osteomyelitis second and third metatarsal.  No high fever.  Awaits surgical disposition.    4/8/2024 history reviewed.  No high fever.  Tolerating antibiotics for right foot second and third toe osteomyelitis.  Awaits possible second and third metatarsal resection by surgery.    4/9/24 history reviewed.  Status post ray resection second and third right toe by Dr. Hays on 4/8/2024.  No high fever.  Tolerating  antibiotics.  Changing to ceftriaxone and daptomycin for Streptococcus, Eikenella (should be sensitive to ceftriaxone).    4/10/24 history reviewed.  Tolerating antibiotics for right foot osteomyelitis with daptomycin and ceftriaxone until 5/22.  No respiratory complaints or pain.  No fever.    4/11/2024 history reviewed.  Continues on antibiotics for right foot osteomyelitis with ceftriaxone and daptomycin until 5/22.  Awaits revision surgery.    4/12/2024 history reviewed.  Tolerating antibiotics for right foot osteomyelitis with daptomycin and ceftriaxone until 5/22.  No high fever.  Underwent revision surgery today.    4/13/2024 history reviewed.  On antibiotics for right foot osteomyelitis with ceftriaxone and daptomycin with cultures positive for Eikenella and Streptococcus to continue until 5/22.  Status post surgical resection of second and third right toes on 4/8.  No high fever.    Past Medical History:   Diagnosis Date    Diabetes mellitus     Gout     Hypertension     Osteomyelitis     right foot    Sleep apnea     CPAP       Past Surgical History:   Procedure Laterality Date    BONE RESECTION, RIB      TRANS METATARSAL AMPUTATION Right 4/8/2024    Procedure: AMPUTATION TRANS METATARSAL RIGHT;  Surgeon: Jeremi Hays Jr., MD;  Location: [facility];  Service: Orthopedics;  Laterality: Right;       Pediatric History   Patient Parents    Not on file     Other Topics Concern    Not on file   Social History Narrative    Not on file   No cigarettes, occasional alcohol, no drug use,  to [facility] with 1 child.  Works at [employer].    family history is not on file.    Allergies   Allergen Reactions    Statins Myalgia       Immunization History   Administered Date(s) Administered    COVID-19 (MODERNA) 1st,2nd,3rd Dose Monovalent 09/23/2021, 11/02/2021       Medication:  @Scheduled Meds:cefTRIAXone, 2,000 mg, Intravenous, Q24H  colchicine, 0.6 mg, Oral, Daily  DAPTOmycin, 8 mg/kg (Adjusted), Intravenous,  Q24H  glipizide, 10 mg, Oral, QAM AC  heparin (porcine), 5,000 Units, Subcutaneous, Q8H  insulin detemir, 10 Units, Subcutaneous, Daily  insulin lispro, 2-9 Units, Subcutaneous, 4x Daily AC & at Bedtime  lisinopril, 40 mg, Oral, Daily  metoprolol succinate XL, 100 mg, Oral, Daily  pioglitazone, 30 mg, Oral, Daily  predniSONE, 10 mg, Oral, Daily With Breakfast  sodium chloride, 10 mL, Intravenous, Q12H  sodium chloride, 3 mL, Intravenous, Q12H      Continuous Infusions:lactated ringers, 9 mL/hr, Last Rate: 9 mL/hr (04/12/24 1039)      PRN Meds:.  acetaminophen    senna-docusate sodium **AND** polyethylene glycol **AND** bisacodyl **AND** bisacodyl    Calcium Replacement - Follow Nurse / BPA Driven Protocol    dextrose    dextrose    droperidol **OR** droperidol    fentanyl    glucagon (human recombinant)    hydrALAZINE    HYDROcodone-acetaminophen    HYDROmorphone    ipratropium-albuterol    labetalol    lactated ringers    Magnesium Standard Dose Replacement - Follow Nurse / BPA Driven Protocol    meperidine    Morphine    naloxone    ondansetron    oxyCODONE    Phosphorus Replacement - Follow Nurse / BPA Driven Protocol    Potassium Replacement - Follow Nurse / BPA Driven Protocol    promethazine **OR** promethazine    sodium chloride    sodium chloride    sodium chloride    sodium chloride     Please refer to the medical record for a full medication list    Review of Systems:    Constitutional-- No Fever, chills or sweats.  Appetite good, and no malaise.  Occasional fatigue.  HEENT-- No new vision, hearing or throat complaints.  No epistaxis or oral sores.  Denies odynophagia or dysphagia.  No odynophagia or dysphagia. No headache, photophobia or neck stiffness.  CV-- No chest pain, palpitation or syncope  Resp-- No SOB/cough/Hemoptysis, no wheeze  GI- No nausea, vomiting, or diarrhea.  No hematochezia, melena, or hematemesis. Denies jaundice or chronic liver disease.  -- No dysuria, hematuria, or flank pain.   "Denies hesitancy, urgency.  Lymph- no swollen lymph nodes in neck/axilla or groin.   Heme- No active bruising or bleeding; no Hx of DVT or PE.  MS-- no swelling or pain in the bones or joints of arms/legs.  No new back pain.  Neuro-- No acute focal weakness or numbness in the arms or legs.  No seizures.  Skin--No rashes or lesions, except right foot as per HPI    Physical Exam:   Vital Signs   Temp:  [96.8 °F (36 °C)-98.5 °F (36.9 °C)] 96.8 °F (36 °C)  Heart Rate:  [58-86] 64  Resp:  [16-20] 16  BP: ()/(55-90) 125/75    Blood pressure 125/75, pulse 64, temperature 96.8 °F (36 °C), temperature source Axillary, resp. rate 16, height 185.4 cm (72.99\"), weight 136 kg (300 lb), SpO2 99%.  GENERAL: Awake and alert, in minor distress. Appears  stated age.  Resting in bed.    HEENT:  Normocephalic, atraumatic.  Oropharynx without thrush. Dentition in good repair. No cervical adenopathy. No neck masses.  Ears externally normal, Nose externally normal.  EYES:  No conjunctival injection. No icterus. EOM full.  LYMPHATICS: No lymphadenopathy of the neck or axillary or inguinal regions.   HEART: No murmur, gallop, or pericardial friction rub. Reg rate rhythm.    LUNGS: Clear to auscultation and percussion. No respiratory distress, no use of accessory muscles.  ABDOMEN: Soft, nontender, nondistended. No appreciable HSM.  Bowel sounds normal.  Obese.  No mass.  SKIN: Warm and dry without cutaneous eruptions.  No nodules.  Right foot surgical dressing in place.  PSYCHIATRIC: Mental status lucid. No confusion.  EXT:  No cellulitic change.  NEURO: Oriented to name, nonfocal.              4/3/24 right foot wounds prior to ray resections 4/8          Right foot 4/13/24 post surgery    Results Review:   I reviewed the patient's new clinical results.  I reviewed the patient's new imaging results and agree with the interpretation.  I reviewed the patient's other test results and agree with the interpretation    Results from last 7 " "days   Lab Units 04/13/24  0342 04/12/24  0437 04/10/24  0629   WBC 10*3/mm3 8.04 6.25 5.13   HEMOGLOBIN g/dL 11.9* 11.6* 11.4*   HEMATOCRIT % 36.5* 35.8* 35.5*   PLATELETS 10*3/mm3 187 194 221     Results from last 7 days   Lab Units 04/13/24  0342   SODIUM mmol/L 137   POTASSIUM mmol/L 4.9   CHLORIDE mmol/L 102   CO2 mmol/L 26.0   BUN mg/dL 28*   CREATININE mg/dL 1.21   GLUCOSE mg/dL 184*   CALCIUM mg/dL 9.2     Results from last 7 days   Lab Units 04/10/24  0629   ALK PHOS U/L 126*   BILIRUBIN mg/dL 0.2   ALT (SGPT) U/L 53*   AST (SGOT) U/L 48*           Results from last 7 days   Lab Units 04/08/24  0616   CRP mg/dL 3.40*               Estimated Creatinine Clearance: 96 mL/min (by C-G formula based on SCr of 1.21 mg/dL).  CPK          4/10/2024    06:29   Common Labs   Creatine Kinase 21       Procalitonin Results:       Brief Urine Lab Results       None           No results found for: \"SITE\", \"ALLENTEST\", \"PHART\", \"OBQ3QJR\", \"PO2ART\", \"MDK1QTH\", \"BASEEXCESS\", \"Z2BJAPLJ\", \"HGBBG\", \"HCTABG\", \"OXYHEMOGLOBI\", \"METHHGBN\", \"CARBOXYHGB\", \"CO2CT\", \"BAROMETRIC\", \"MODALITY\", \"FIO2\"     Microbiology:    Right foot wound culture 4/2/2024 with Streptococcus agalactiae, Streptococcus mitis, Eikenella, gram-negative rods on Gram stain, MRSA screen negative, blood cultures x 2 from 4/2 negative.  Streptococcus, group B strep, Eikenella.    Results for orders placed or performed during the hospital encounter of 04/02/24   Blood Culture - Blood, Arm, Right    Specimen: Arm, Right; Blood   Result Value Ref Range    Blood Culture No growth at 5 days          Culture results from last 30 days   Lab 04/12/24  1102 04/08/24  1639 04/02/24  1900   WOUNDCX No growth No growth at 3 days Heavy growth (4+) Streptococcus agalactiae (Group B)*  Heavy growth (4+) Streptococcus mitis / oralis*  Light growth (2+) Eikenella corrodens*  Moderate growth (3+) Normal Skin Flor   ANACX  --  No anaerobes isolated at 3 days  --       Brief Urine " Lab Results       None        Blood cultures x 2 from 4/2 are negative.  Wound culture right foot growing group B streptococcus to date, MRSA screen negative.    Radiology:  Imaging Results (Last 72 Hours)       ** No results found for the last 72 hours. **            IMPRESSION:     Second and third distal and middle phalanges MRI changes with bone marrow edema in the setting of diabetes and chronic wounds of the right foot with increased pretest probability for underlying osteomyelitis, versus reactive edema.  I would favor osteomyelitis given the way his right foot looks with a fistulous tract that probes deep to bone.  No erosions in the cortical structure noted.  Usual organisms will be mixed terri.  MRSA screen negative, wound culture Gram stain with gram-negative rods and gram-positive cocci, blood cultures x 2 from 4/2 are negative to date.  Group B streptococcus growing as of 4/4/2024, and Streptococcus mitis.  Status post right second and third ray resection 4/8/2024.  Dr. Hays.  Revision surgery on 4/12.  Right foot cellulitis and wound infection associated with diabetes, despite use of a surgical boot.  Diabetes mellitus type 2 with increased risk for infection.  Chronic kidney disease stage IIIa, creatinine 1.18 on 4/3/2024.  1.23 on 4/4/2024.  1.47 on 4/5.  1.26 on 4/8.  1.35 on 4/9.  1.17 on 4/10.  1.28 on 4/11, 1.17 on 4/12.  1.21 on 4/13.  Anemia, chronic disease.  Continues.  Elevated alkaline phosphatase new 126, ALT 53, AST 48.  Likely related to above issues and medications.    PLAN:    Diagnostically, continue to follow patient's physical exam, CBC, CMP, CRP.  Cultures which were obtained from the foot.  Radiographs as needed.  Therapeutically, changed to daptomycin and ceftriaxone on 4/9 in anticipation of outpatient therapy for 6 weeks.  This would provide coverage for Streptococcus and Eikenella.  Then potential oral antibiotics.  Depends on wound healing.  Supportive care.  Wound care.   Nonweightbearing on right foot.  Status post third and second toe ray resection 4/8/2024..  PICC versus [place].  He does have CKD stage IIIa.    I discussed the patient's findings and my recommendations with patient, family, and nursing staff    Case management orders: Please arrange for home antibiotics with [facility] with daptomycin 700 mg IV daily, ceftriaxone 2 g IV daily to continue until 5/22/2024.  Check CBC, CMP, CRP, CPK weekly while on IV antibiotics.  Fax orders to [phone], call [phone] with final arrangements.  Arrange for follow-up with me in 1 week postdischarge.    Thank you for asking me to see Alvaro Sinha.  Our group would be pleased to follow this patient over the course of their hospitalization and assist with outpatient antimicrobial therapy, as indicated.  Further recommendations depend on the results of the cultures and clinical course.  Increased risk for adverse drug reactions, complications of IV access, readmission, loss of limb.  Side effects discussed.  See next on Monday, call sooner if needed.    Aung Jameson MD  4/13/2024

## 2024-04-13 NOTE — PLAN OF CARE
Goal Outcome Evaluation:  Plan of Care Reviewed With: patient        Progress: improving  Outcome Evaluation: Patient educated on tub safety and WB precautions and use of p/o shoe with heel WB with transfers, for fall prevention using walker to transfer and limiting WB with LBD.  Pt. demonstrated skilled for heel WB transfers and BADL performance.  Recommend home with spouse assist at discharge. Recommended grab bar and non-slip surface for tub.  Pt. has a shower chair, a wx borrowed from sister and wife has ordered and knee scooter.      Anticipated Discharge Disposition (OT): home with assist

## 2024-04-13 NOTE — THERAPY RE-EVALUATION
Patient Name: Alvaro Sinha  : 1965    MRN: [id number]                              Today's Date: 2024       Admit Date: 2024    Visit Dx:     ICD-10-CM ICD-9-CM   1. Osteomyelitis of right foot, unspecified type  M86.9 730.27   2. Cellulitis of right foot  L03.115 682.7   3. Diabetic ulcer of right foot associated with other specified diabetes mellitus, unspecified part of foot, unspecified ulcer stage  E13.621 250.80    L97.519 707.15   4. History of diabetes mellitus  Z86.39 V12.29     Patient Active Problem List   Diagnosis    Nephrolithiasis    Lymphadenopathy    Lymphoma    Osteomyelitis    Type 2 diabetes mellitus with hyperglycemia    Elevated serum immunoglobulin free light chains    Elevated erythrocyte sedimentation rate    Diabetic foot ulcer    History of myalgia due to HMG CoA reductase inhibitor    Gout    Hypertensive disorder    Hyperlipidemia    Hypercalcemia    Microalbuminuric diabetic nephropathy    Peripheral angiopathy due to diabetes mellitus    Stage 3a chronic kidney disease     Past Medical History:   Diagnosis Date    Diabetes mellitus     Gout     Hypertension     Osteomyelitis     right foot    Sleep apnea     CPAP     Past Surgical History:   Procedure Laterality Date    BONE RESECTION, RIB      TRANS METATARSAL AMPUTATION Right 2024    Procedure: AMPUTATION TRANS METATARSAL RIGHT;  Surgeon: Jeremi Hays Jr., MD;  Location: [facility];  Service: Orthopedics;  Laterality: Right;      General Information       Row Name 24 1422          Physical Therapy Time and Intention    Document Type re-evaluation  -DM     Mode of Treatment physical therapy  -DM       Row Name 24 1422          General Information    Patient Profile Reviewed yes  -DM     Prior Level of Function --  see IE  -DM     Existing Precautions/Restrictions other (see comments)  4-8: R TM amp. toes 2,3, & wd vac closure;-12: I & D R foot, wd closure;-14 (P): Adriana;Per MD order,  allowed protected WB R heel, wearing post-op shoe, for transfers; per pt, ortho MD told him 4-13 that he could WB R heel for gt to/from BR  -DM     Barriers to Rehab medically complex;previous functional deficit  -DM       Row Name 04/13/24 1422          Living Environment    People in Home --  PER IE  -DM               User Key  (r) = Recorded By, (t) = Taken By, (c) = Cosigned By      Initials Name Provider Type    DM Katia Thorpe, PT Physical Therapist                   Mobility       Row Name 04/13/24 1422          Bed Mobility    Comment, (Bed Mobility) brought loaner knee scooter from gym (wife states she ordered knee scooter but hasn't arrived);pt.sitting EOB, w/ mult visitors present,& said andrei,then pt donned chay eddy sitting, R post op shoe for protected heel WB (PT reviewed precautions), then donned leather belt in standing; sat EOB while PT placed gt.belt; reviewed correct use of knee scooter; instructed in PLB exer  -DM       Row Name 04/13/24 1422          Transfers    Comment, (Transfers) Cues for locking brakes & maintaining Heel WB RLE while transf onto knee scooter  -DM       Row Name 04/13/24 1422          Sit-Stand Transfer    Sit-Stand [place] (Transfers) verbal cues;minimum assist (75% patient effort)  -DM     Assistive Device (Sit-Stand Transfers) walker, knee scooter  -DM       Row Name 04/13/24 1422          Gait/Stairs (Locomotion)    [place] Level (Gait) verbal cues;contact guard  protected heelWB RLE;ret.to room to get croc.for L foot;donned in standing;3 stand.rests in julio d/t chay eddy re-anchored (loose fit);;desat96%;practiced backing up safely to recliner,locking brakes,&transf into chair while maintainingWB precaut.  -DM     Assistive Device (Gait) walker, knee scooter  -DM     Distance in Feet (Gait) 800  -DM     Deviations/Abnormal Patterns (Gait) bilateral deviations;franklyn decreased;weight shifting decreased  -DM     Comment, (Gait/Stairs) cues for  incr step length L foot,maintaining trunk ext, & PLB; verbally reviewed technique w/ wife & pt for ascending 1 + 1 steps into home (has both cxs, & FWW, as options, but states he prefers cxs; explained seq. for maintaining protected heel WB RLE);o2 Sat recovered to 98% UIC  -DM       Row Name 04/13/24 1422          Mobility    Extremity Weight-bearing Status right lower extremity  -DM     Right Lower Extremity (Weight-bearing Status) other (see comments)  protected Heel WB RLE wearing post op shoe, for transfers (& 4-13:MD verbally ok'd gt to/from BR)  -               User Key  (r) = Recorded By, (t) = Taken By, (c) = Cosigned By      Initials Name Provider Type    DM Katia Tohrpe, PT Physical Therapist                   Obj/Interventions       Row Name 04/13/24 1422          Motor Skills    Therapeutic Exercise shoulder;hip;knee;ankle  issued HEP & instructed; reviewed standing LE exer w/ RLE (while maintaining protected heel WB) while fully WB on LLE  -DM       Row Name 04/13/24 1422          Shoulder (Therapeutic Exercise)    Shoulder (Therapeutic Exercise) AROM (active range of motion)  -DM     Shoulder AROM (Therapeutic Exercise) bilateral;flexion;extension;aBduction;aDduction;sitting;10 repetitions;other (see comments)  biceps curls; reviewed armchair push ups  -DM       Row Name 04/13/24 1422          Hip (Therapeutic Exercise)    Hip (Therapeutic Exercise) AROM (active range of motion);isometric exercises  -DM     Hip AROM (Therapeutic Exercise) bilateral;flexion;extension;aBduction;aDduction;external rotation;internal rotation;sitting;10 repetitions  -DM     Hip Isometrics (Therapeutic Exercise) bilateral;gluteal sets;sitting;10 repetitions  -DM       Row Name 04/13/24 1422          Knee (Therapeutic Exercise)    Knee (Therapeutic Exercise) AROM (active range of motion);isometric exercises  -DM     Knee AROM (Therapeutic Exercise) bilateral;flexion;extension;SAQ (short arc quad);SLR (straight leg  raise);LAQ (long arc quad);heel slides;sitting;10 repetitions  cues to keep R foot elev off surface for h.slides  -DM     Knee Isometrics (Therapeutic Exercise) bilateral;hamstring sets;quad sets;sitting;5 repetitions;10 repetitions  -DM       Row Name 04/13/24 1422          Ankle (Therapeutic Exercise)    Ankle (Therapeutic Exercise) AROM (active range of motion)  -DM     Ankle AROM (Therapeutic Exercise) bilateral;dorsiflexion;plantarflexion;10 repetitions;other (see comments)  AC  -DM       Row Name 04/13/24 1422          Balance    Balance Assessment sitting static balance;sitting dynamic balance;standing static balance;standing dynamic balance  -DM     Static Sitting Balance independent  -DM     Dynamic Sitting Balance independent  -DM     Position, Sitting Balance unsupported;sitting in chair;sitting edge of bed  -DM     Static Standing Balance standby assist  -DM     Dynamic Standing Balance verbal cues;contact guard  -DM     Position/Device Used, Standing Balance other (see comments)  knee scooter  -DM     Balance Interventions sitting;standing;static;dynamic;weight shifting activity  -DM               User Key  (r) = Recorded By, (t) = Taken By, (c) = Cosigned By      Initials Name Provider Type    DM Katia Thorpe, PT Physical Therapist                   Goals/Plan       Row Name 04/13/24 1422          Bed Mobility Goal 1 (PT)    Activity/Assistive Device (Bed Mobility Goal 1, PT) bed mobility activities, all  -DM     [place] Level/Cues Needed (Bed Mobility Goal 1, PT) independent  -DM     Time Frame (Bed Mobility Goal 1, PT) short term goal (STG);3 days  -DM     Progress/Outcomes (Bed Mobility Goal 1, PT) goal revised this date  -DM       Row Name 04/13/24 1422          Transfer Goal 1 (PT)    Activity/Assistive Device (Transfer Goal 1, PT) sit-to-stand/stand-to-sit;bed-to-chair/chair-to-bed;other (see comments)  knee scooter, protected heel WB RLE wearing post op shoe  -DM     [place]  Level/Cues Needed (Transfer Goal 1, PT) independent  -DM     Time Frame (Transfer Goal 1, PT) short term goal (STG);3 days  -DM     Progress/Outcome (Transfer Goal 1, PT) goal revised this date  -DM       Row Name 04/13/24 1422          Gait Training Goal 1 (PT)    Activity/Assistive Device (Gait Training Goal 1, PT) gait (walking locomotion);other (see comments)  knee scooter; protected heel WB RLE wearing post op shoe  -DM     [place] Level (Gait Training Goal 1, PT) independent  -DM     Distance (Gait Training Goal 1, PT) 900  -DM     Time Frame (Gait Training Goal 1, PT) short term goal (STG);3 days  -DM     Progress/Outcome (Gait Training Goal 1, PT) goal revised this date  -DM       Row Name 04/13/24 1422          Stairs Goal 1 (PT)    Activity/Assistive Device (Stairs Goal 1, PT) ascending stairs;descending stairs;maintain weight-bearing status;crutches, axillary;step-to-step  -DM     [place] Level/Cues Needed (Stairs Goal 1, PT) minimum assist (75% or more patient effort)  -DM     Number of Stairs (Stairs Goal 1, PT) 2  -DM     Time Frame (Stairs Goal 1, PT) short term goal (STG);3 days  -DM     Progress/Outcome (Stairs Goal 1, PT) goal revised this date  -DM       Row Name 04/13/24 1422          Patient Education Goal (PT)    Activity (Patient Education Goal, PT) HEP exer  -DM     [place]/Cues/Accuracy (Memory Goal 2, PT) demonstrates adequately;verbalizes understanding  -DM     Time Frame (Patient Education Goal, PT) short term goal (STG);1 day  -DM     Progress/Outcome (Patient Education Goal, PT) goal met  -DM       Row Name 04/13/24 1422          Therapy Assessment/Plan (PT)    Planned Therapy Interventions (PT) bed mobility training;gait training;home exercise program;patient/family education;stair training;strengthening;transfer training  -DM               User Key  (r) = Recorded By, (t) = Taken By, (c) = Cosigned By      Initials Name Provider Type    DM Katia Thorpe, PT Physical  Therapist                   Clinical Impression       Row Name 04/13/24 1422          Pain    Pretreatment Pain Rating 0/10 - no pain  -DM     Posttreatment Pain Rating 0/10 - no pain  -DM     Pain Location - Side/Orientation Right  -DM     Pain Location - foot  -DM     Pain Intervention(s) Repositioned;Elevated;Rest  -DM     Additional Documentation Pain Scale: Numbers Pre/Post-Treatment (Group)  -DM       Row Name 04/13/24 1422          Plan of Care Review    Plan of Care Reviewed With patient;spouse;family  -DM     Progress improving  -DM     Outcome Evaluation PT re-eval completed. Seen POD 1 s/p I & D, & wd closure, of TM amp site of R toes 2 & 3, & instructed in protected heel WB RLE wearing post-op shoe (allow by MD to amb to/from BR). Amb 100 + 700 ft w/ R knee scooter, w/ 1 + 3 stand.rests (returned to room to don PRIYANK croc.shoe), w/ CGA, & transf to/from EOB & recliner w/ CGA, then performed ther exer per issued HEP, but limited by HR to 106, R foot pain, & elev BP. Needs to practice 2 steps w/ cxs (has pair@home). Recommend home w/ wife's assist at d/c (do not anticipate requiring f/u PT).  -DM       Row Name 04/13/24 1422          Therapy Assessment/Plan (PT)    Patient/Family Therapy Goals Statement (PT) improved funct mobil  -DM     Rehab Potential (PT) good, to achieve stated therapy goals  -DM     Criteria for Skilled Interventions Met (PT) yes;meets criteria;skilled treatment is necessary  -DM     Therapy Frequency (PT) daily  -DM       Row Name 04/13/24 1422          Vital Signs    Pre Systolic BP Rehab 125  -DM     Pre Treatment Diastolic BP 75  -DM     Post Systolic BP Rehab 131  -DM     Post Treatment Diastolic BP 95  -DM     Pretreatment Heart Rate (beats/min) 64  -DM     Intratreatment Heart Rate (beats/min) 106  -DM     Posttreatment Heart Rate (beats/min) 89  -DM     Pre SpO2 (%) 99  -DM     O2 Delivery Pre Treatment room air  -DM     Intra SpO2 (%) 96  -DM     O2 Delivery Intra Treatment room  air  -DM     Post SpO2 (%) 98  -DM     O2 Delivery Post Treatment room air  -DM     Pre Patient Position Sitting  -DM     Intra Patient Position Standing  -DM     Post Patient Position Sitting  -DM     Rest Breaks  4  -DM       Row Name 04/13/24 1422          Positioning and Restraints    Pre-Treatment Position in bed  -DM     Post Treatment Position chair  -DM     In Chair notified nsg;reclined;call light within reach;encouraged to call for assist;with family/caregiver;waffle cushion;legs elevated  RLE elev on pillow;PT ret.nagi knee scooter  -DM               User Key  (r) = Recorded By, (t) = Taken By, (c) = Cosigned By      Initials Name Provider Type    Katia Alberto, PT Physical Therapist                   Outcome Measures       Row Name 04/13/24 1422 04/13/24 0715       How much help from another person do you currently need...    Turning from your back to your side while in flat bed without using bedrails? 4  -DM 4  -MM    Moving from lying on back to sitting on the side of a flat bed without bedrails? 4  -DM 3  -MM    Moving to and from a bed to a chair (including a wheelchair)? 3  -DM 2  -MM    Standing up from a chair using your arms (e.g., wheelchair, bedside chair)? 3  -DM 2  -MM    Climbing 3-5 steps with a railing? 2  -DM 2  -MM    To walk in hospital room? 3  -DM 3  -MM    AM-PAC 6 Clicks Score (PT) 19  -DM 16  -MM    Highest Level of Mobility Goal 6 --> Walk 10 steps or more  -DM 5 --> Static standing  -MM      Row Name 04/13/24 1422 04/13/24 1129       Functional Assessment    Outcome Measure Options AM-PAC 6 Clicks Basic Mobility (PT)  -DM AM-PAC 6 Clicks Daily Activity (OT)  -STEPHY              User Key  (r) = Recorded By, (t) = Taken By, (c) = Cosigned By      Initials Name Provider Type    Nicky Gardner, OT Occupational Therapist    Katia Alberto, PT Physical Therapist    Renetta France, RN Registered Nurse                                 Physical Therapy Education        Title: PT OT SLP Therapies (In Progress)       Topic: Physical Therapy (Done)       Point: Mobility training (Done)       Learning Progress Summary             Patient Eager, E,D,H, VU,DU by DM at 4/13/2024 1601    Acceptance, E, VU by MM at 4/11/2024 0809    Acceptance, E, VU by KM at 4/7/2024 0023    Acceptance, E,TB, VU by TB at 4/5/2024 1410    Acceptance, E,TB, VU by TB at 4/3/2024 1520   Significant Other Eager, E,D,H, VU,DU by DM at 4/13/2024 1601                         Point: Home exercise program (Done)       Learning Progress Summary             Patient Eager, E,D,H, VU,DU by DM at 4/13/2024 1601    Acceptance, E, VU by MM at 4/11/2024 0809    Acceptance, E, VU by KM at 4/7/2024 0023    Acceptance, E,TB, VU by TB at 4/5/2024 1410    Acceptance, E,TB, VU by TB at 4/3/2024 1520   Significant Other Eager, E,D,H, VU,DU by DM at 4/13/2024 1601                         Point: Body mechanics (Done)       Learning Progress Summary             Patient Eager, E,D,H, VU,DU by DM at 4/13/2024 1601    Acceptance, E, VU by MM at 4/11/2024 0809    Acceptance, E, VU by KM at 4/7/2024 0023    Acceptance, E,TB, VU by TB at 4/5/2024 1410    Acceptance, E,TB, VU by TB at 4/3/2024 1520   Significant Other Eager, E,D,H, VU,DU by DM at 4/13/2024 1601                         Point: Precautions (Done)       Learning Progress Summary             Patient Eager, E,D,H, VU,DU by DM at 4/13/2024 1601    Acceptance, E, VU by MM at 4/11/2024 0809    Acceptance, E, VU by KM at 4/7/2024 0023    Acceptance, E,TB, VU by TB at 4/5/2024 1410    Acceptance, E,TB, VU by TB at 4/3/2024 1520   Significant Other Eager, E,D,H, LINDA,URIEL by DM at 4/13/2024 0949                                         User Key       Initials Effective Dates Name Provider Type Discipline    DM 02/03/23 -  Katia Thorpe, PT Physical Therapist PT    KM 06/16/21 -  Sarita Bui, RN Registered Nurse Nurse     02/16/24 -  Renetta Nieto, RN Registered Nurse Nurse     TB 01/16/24 -  Lexi Grady, PT Student PT Student PT                  PT Recommendation and Plan  Planned Therapy Interventions (PT): bed mobility training, gait training, home exercise program, patient/family education, stair training, strengthening, transfer training  Plan of Care Reviewed With: patient, spouse, family  Progress: improving  Outcome Evaluation: PT re-eval completed. Seen POD 1 s/p I & D, & wd closure, of TM amp site of R toes 2 & 3, & instructed in protected heel WB RLE wearing post-op shoe (allow by MD to amb to/from BR). Amb 100 + 700 ft w/ R knee scooter, w/ 1 + 3 stand.rests (returned to room to don L croc.shoe), w/ CGA, & transf to/from EOB & recliner w/ CGA, then performed ther exer per issued HEP, but limited by HR to 106, R foot pain, & elev BP. Needs to practice 2 steps w/ cxs (has pair@home). Recommend home w/ wife's assist at d/c (do not anticipate requiring f/u PT).     Time Calculation:   PT Evaluation Complexity  History, PT Evaluation Complexity: 3 or more personal factors and/or comorbidities  Examination of Body Systems (PT Eval Complexity): total of 3 or more elements  Clinical Presentation (PT Evaluation Complexity): evolving  Clinical Decision Making (PT Evaluation Complexity): moderate complexity  Overall Complexity (PT Evaluation Complexity): moderate complexity     PT Charges       Row Name 04/13/24 1601             Time Calculation    Start Time 1422  -DM      PT Received On 04/13/24  -DM      PT Goal Re-Cert Due Date 04/23/24  -DM         Timed Charges    40842 - PT Therapeutic Exercise Minutes 19  -DM      06595 - Gait Training Minutes  18  -DM      82222 - PT Therapeutic Activity Minutes 33  -DM         Untimed Charges    PT Eval/Re-eval Minutes 30  -DM         Total Minutes    Timed Charges Total Minutes 70  -DM      Untimed Charges Total Minutes 30  -DM       Total Minutes 100  -DM                User Key  (r) = Recorded By, (t) = Taken By, (c) = Cosigned By       Initials Name Provider Type    DM Katia Thorpe, PT Physical Therapist                  Therapy Charges for Today       Code Description Service Date Service Provider Modifiers Qty    11301642240 HC PT THER PROC EA 15 MIN 4/13/2024 Katia Thorpe, PT GP 1    32958606183 HC GAIT TRAINING EA 15 MIN 4/13/2024 Katia Thorpe, PT GP 1    81119487796 HC PT THERAPEUTIC ACT EA 15 MIN 4/13/2024 Katia Thorpe, PT GP 2    39357113203 HC PT RE-EVAL ESTABLISHED PLAN 2 4/13/2024 Katia Thrope, PT GP 1            PT G-Codes  Outcome Measure Options: AM-PAC 6 Clicks Basic Mobility (PT)  AM-PAC 6 Clicks Score (PT): 19  AM-PAC 6 Clicks Score (OT): 23  PT Discharge Summary  Anticipated Discharge Disposition (PT): home with assist    Katia Thorpe, PT  4/13/2024

## 2024-04-13 NOTE — PROGRESS NOTES
[facility] Medicine Services  PROGRESS NOTE    Patient Name: Alvaro Sinha  : 1965  MRN: [id number]    Date of Admission: 2024  Primary Care Physician: Martha Mckee MD    Subjective   Subjective     CC:  Follow-up osteomyelitis    HPI:  Patient seen resting in bed in no apparent distress.  No acute events overnight per nursing.  Pain is currently well-controlled.  ID recommended Groshong placement instead of PICC due to history of CKD.  Plan for Groshong placement tomorrow with Dr. Preciado.  N.p.o. after midnight.      Objective   Objective     Vital Signs:   Temp:  [96.8 °F (36 °C)-98.5 °F (36.9 °C)] 96.8 °F (36 °C)  Heart Rate:  [58-86] 64  Resp:  [16-20] 16  BP: ()/(55-90) 125/75  Flow (L/min):  [0-4] 0     Physical Exam:  Constitutional: No acute distress, awake, alert  HENT: NCAT, mucous membranes moist  Respiratory: Clear to auscultation bilaterally, respiratory effort normal   Cardiovascular: RRR, no murmurs, cap refill brisk   Gastrointestinal: Positive bowel sounds, soft, nontender, nondistended  Musculoskeletal: RLE dressing in place  Psychiatric: Appropriate affect, cooperative  Neurologic: Oriented x 3, moves all extremities, speech clear  Skin: warm, dry, no visible rash     Results Reviewed:  LAB RESULTS:      Lab 24  0342 24  0437 04/10/24  0629 24  0527 24  0616 24  0452   WBC 8.04 6.25 5.13 4.97 4.82 4.13   HEMOGLOBIN 11.9* 11.6* 11.4* 11.0* 10.5* 10.9*   HEMATOCRIT 36.5* 35.8* 35.5* 33.2* 31.9* 33.9*   PLATELETS 187 194 221 204 203 201   NEUTROS ABS  --   --  3.17  --  3.90 3.36   IMMATURE GRANS (ABS)  --   --  0.19*  --  0.07* 0.05   LYMPHS ABS  --   --  1.31  --  0.61* 0.48*   MONOS ABS  --   --  0.33  --  0.23 0.21   EOS ABS  --   --  0.10  --  0.00 0.01   MCV 93.4 94.0 93.9 93.0 91.1 91.9   CRP  --   --   --   --  3.40* 7.45*         Lab 24  0342 24  0437 24  0513 04/10/24  0629  04/09/24  0527 04/08/24  0616 04/07/24  0452   SODIUM 137 137 140 139 138   < > 137   POTASSIUM 4.9 4.6 4.5 4.5 4.6   < > 5.2   CHLORIDE 102 103 104 105 105   < > 103   CO2 26.0 25.0 26.0 24.0 23.0   < > 23.0   ANION GAP 9.0 9.0 10.0 10.0 10.0   < > 11.0   BUN 28* 28* 30* 26* 27*   < > 28*   CREATININE 1.21 1.17 1.28* 1.17 1.35*   < > 1.29*   EGFR 69.4 72.3 64.9 72.3 60.9   < > 64.3   GLUCOSE 184* 183* 160* 140* 195*   < > 256*   CALCIUM 9.2 9.3 9.1 8.9 9.0   < > 9.1   MAGNESIUM 1.9  --   --   --   --   --  2.1   PHOSPHORUS  --   --   --   --   --   --  3.3    < > = values in this interval not displayed.         Lab 04/10/24  0629 04/08/24  0616 04/07/24  0452   TOTAL PROTEIN 6.7 7.2 6.6   ALBUMIN 3.8 3.6 3.5   GLOBULIN 2.9 3.6 3.1   ALT (SGPT) 53* 32 28   AST (SGOT) 48* 30 24   BILIRUBIN 0.2 0.3 0.4   ALK PHOS 126* 131* 162*                     Brief Urine Lab Results       None            Microbiology Results Abnormal       Procedure Component Value - Date/Time    Wound Culture - Surgical Site, Foot, Right [[id number]] Collected: 04/12/24 1102    Lab Status: Preliminary result Specimen: Surgical Site from Foot, Right Updated: 04/13/24 0746     Wound Culture No growth     Gram Stain Rare (1+) WBCs seen      Many (4+) Red blood cells      No organisms seen    Anaerobic Culture - Wound, Toe, Right [[id number]]  (Normal) Collected: 04/08/24 1639    Lab Status: Preliminary result Specimen: Wound from Toe, Right Updated: 04/11/24 0729     Anaerobic Culture No anaerobes isolated at 3 days    Wound Culture - Wound, Toe, Right [[id number]] Collected: 04/08/24 1639    Lab Status: Final result Specimen: Wound from Toe, Right Updated: 04/11/24 0626     Wound Culture No growth at 3 days     Gram Stain No WBCs or organisms seen    Blood Culture - Blood, Arm, Right [[id number]]  (Normal) Collected: 04/02/24 1900    Lab Status: Final result Specimen: Blood from Arm, Right Updated: 04/07/24 1946     Blood Culture No growth at 5 days     Blood Culture - Blood, Arm, Right [[id number]]  (Normal) Collected: 04/02/24 1833    Lab Status: Final result Specimen: Blood from Arm, Right Updated: 04/07/24 1900     Blood Culture No growth at 5 days    Narrative:      Less than seven (7) mL's of blood was collected.  Insufficient quantity may yield false negative results.    MRSA Screen, PCR (Inpatient) - Swab, Nares [[id number]]  (Normal) Collected: 04/02/24 1903    Lab Status: Final result Specimen: Swab from Nares Updated: 04/02/24 2047     MRSA PCR Negative    Narrative:      The negative predictive value of this diagnostic test is high and should only be used to consider de-escalating anti-MRSA therapy. A positive result may indicate colonization with MRSA and must be correlated clinically.  MRSA Negative            Peripheral Block    Result Date: 4/12/2024  William Lala CRNA     4/12/2024  9:32 AM Peripheral Block Patient reassessed immediately prior to procedure Patient location during procedure: pre-op Start time: 4/12/2024 9:26 AM Stop time: 4/12/2024 9:29 AM Reason for block: at surgeon's request and post-op pain management Performed by Anesthesiologist: Deborah Mckinney MD Assisted by: Jenny Sanches RN Preanesthetic Checklist Completed: patient identified, IV checked, site marked, risks and benefits discussed, surgical consent, monitors and equipment checked, pre-op evaluation and timeout performed Prep: Pt Position: left lateral decubitus Sterile barriers:cap, gloves, mask and washed/disinfected hands Prep: ChloraPrep Patient monitoring: blood pressure monitoring, continuous pulse oximetry and EKG Procedure Sedation: yes Performed under: local infiltration Guidance:ultrasound guided ULTRASOUND INTERPRETATION.  Using ultrasound guidance a 20 G gauge needle was placed in close proximity to the nerve, at which point, under ultrasound guidance anesthetic was injected in the area of the nerve and spread of the anesthesia was seen on ultrasound  "in close proximity thereto.  There were no abnormalities seen on ultrasound; a digital image was taken; and the patient tolerated the procedure with no complications. Images:still images obtained, printed/placed on chart Laterality:right Block Type:popliteal Injection Technique:single-shot Needle Type:echogenic and short-bevel Needle Gauge:20 G Resistance on Injection: none Medications Used: dexamethasone sodium phosphate injection - Injection  2 mg - 4/12/2024 9:29:00 AM bupivacaine PF (MARCAINE) 0.25 % injection - Injection  30 mL - 4/12/2024 9:29:00 AM Post Assessment Injection Assessment: negative aspiration for heme, no paresthesia on injection and incremental injection Patient Tolerance:comfortable throughout block Complications:no Additional Notes SINGLE shot  A high-frequency linear transducer, with sterile cover, was placed in the popliteal fossa to identify the popliteal artery and vein, Tibial nerve (TN) and Common Peroneal nerve (CP). The transducer was then moved in a cephalad fashion to observe the TN and CP nerve bifurcation to form the Sciatic Nerve. The insertion site was prepped and draped in sterile fashion. Skin and cutaneous tissue was infiltrated with 2-5 ml of 1% Lidocaine. Using ultrasound-guidance, a 20-gauge B-Aguilar 4\" Ultraplex 360 non-stimulating echogenic needle was then inserted and advanced in plane from lateral to medial. Preservative-free normal saline was utilized for hydro-dissection of tissue, advancement of Touhy, and to confirm final needle placement posterior to the nerves. Local anesthetic injection spread, in incremental 3-5 ml injections, to surround both nerve structures. Aspiration every 5 ml to prevent intravascular injection. Injection was completed with negative aspiration of blood and negative intravascular injection. Injection pressures were normal with minimal resistance          Current medications:  Scheduled Meds:cefTRIAXone, 2,000 mg, Intravenous, " Q24H  colchicine, 0.6 mg, Oral, Daily  DAPTOmycin, 8 mg/kg (Adjusted), Intravenous, Q24H  glipizide, 10 mg, Oral, QAM AC  heparin (porcine), 5,000 Units, Subcutaneous, Q8H  insulin detemir, 10 Units, Subcutaneous, Daily  insulin lispro, 2-9 Units, Subcutaneous, 4x Daily AC & at Bedtime  lisinopril, 40 mg, Oral, Daily  metoprolol succinate XL, 100 mg, Oral, Daily  pioglitazone, 30 mg, Oral, Daily  predniSONE, 10 mg, Oral, Daily With Breakfast  sodium chloride, 10 mL, Intravenous, Q12H  sodium chloride, 3 mL, Intravenous, Q12H      Continuous Infusions:lactated ringers, 9 mL/hr, Last Rate: 9 mL/hr (04/12/24 1039)      PRN Meds:.  acetaminophen    senna-docusate sodium **AND** polyethylene glycol **AND** bisacodyl **AND** bisacodyl    Calcium Replacement - Follow Nurse / BPA Driven Protocol    dextrose    dextrose    droperidol **OR** droperidol    fentanyl    glucagon (human recombinant)    hydrALAZINE    HYDROcodone-acetaminophen    HYDROmorphone    ipratropium-albuterol    labetalol    lactated ringers    Magnesium Standard Dose Replacement - Follow Nurse / BPA Driven Protocol    meperidine    Morphine    naloxone    ondansetron    oxyCODONE    Phosphorus Replacement - Follow Nurse / BPA Driven Protocol    Potassium Replacement - Follow Nurse / BPA Driven Protocol    promethazine **OR** promethazine    sodium chloride    sodium chloride    sodium chloride    sodium chloride    Assessment & Plan   Assessment & Plan     Active Hospital Problems    Diagnosis  POA    **Osteomyelitis [M86.9]  Yes    Stage 3a chronic kidney disease [N18.31]  Yes    History of myalgia due to HMG CoA reductase inhibitor [Z87.39]  Not Applicable    Microalbuminuric diabetic nephropathy [E11.21]  Yes    Hyperlipidemia [E78.5]  Yes    Type 2 diabetes mellitus with hyperglycemia [E11.65]  Yes    Lymphoma [C85.90]  Yes    Diabetic foot ulcer [E11.621, L97.509]  Yes    Hypertensive disorder [I10]  Yes      Resolved Hospital Problems   No resolved  problems to display.        Brief Hospital Course to date:  Alvaro Sinha is a 58 y.o. male with past medical history of lymphoma, type 2 diabetes, hypertension, hyperlipidemia, CKD, gout, chronic ulceration of the right foot, presented with worsening wound of right foot. MRI of R foot showed concerns for early osteomyelitis of the second and third middle and distal phalanges.  Orthopedic surgery and infectious disease consulted.  He underwent I&D in the OR by Dr. Hays on 4/12/2024     This patient's problems and plans were partially entered by my partner and updated as appropriate by me 04/12/24.     Assessment/Plan:      Nonhealing diabetic ulcer of the right foot with overlying cellulitis   Concern for foot osteomyelitis  Patient with chronic foot ulcer, currently with worsening cellulitis and possible osteomyelitis per MRI  Arterial duplex and CTA runoff with no evidence of significant peripheral arterial disease  Dr. Hummel with Ortho following.  S/p right second and third ray amputation 4/8/24  S/p I&D in the OR by Dr. Hays on 4/12/2024  ID following, transition to daptomycin, Rocephin on 4/9 in anticipation of outpatient therapy for 6 weeks  Not a PICC candidate d/t hx of CKD per ID   General surgery consulted, plan for Groshong placement with Dr. Preciado tomorrow 4/14  Restart plavix when ok with surgery (continuing to hold for now)  NPO after MN   AM labs     Acute gout flare  Did not improve with colchicine monotherapy but significant improvement after starting steroids on 4/6)  Continue prednisone, weaned to 10 mg daily on 4/20  Continue colchicine     Type 2 diabetes  A1c 8.5%  Has been off Trulicity due to insurance coverage  Continue glipizide, continue actos  continue Levemir 10 units daily  + SSI (changed to humalog from regular insulin 4/9/24)     Stage III CKD due to diabetes  Hypertension  Creatinine currently near baseline, continue to monitor  Continue home lisinopril      Lymphoma  History of lymphoma in chart, has been following with [facility] for this  Ongoing workup, is not currently on any immunotherapy or immunosuppressive medications  Will need appointment arranged with [facility] oncology prior to discharge so that patient does not get lost to follow-up     Statin intolerance  Rosuvastatin listed in medical history however no recent statin.  History of myalgia     Expected Discharge Location and Transportation: Home, pending Groshong placement for home IV abx.   Expected Discharge   Expected Discharge Date: 4/14/2024; Expected Discharge Time:       DVT prophylaxis:  Medical and mechanical DVT prophylaxis orders are present.    AM-PAC 6 Clicks Score (PT): 16 (04/13/24 0715)    CODE STATUS:   Code Status and Medical Interventions:   Ordered at: 04/03/24 0013     Code Status (Patient has no pulse and is not breathing):    CPR (Attempt to Resuscitate)     Medical Interventions (Patient has pulse or is breathing):    Full Support       Danny Felipe, APRN  04/13/24

## 2024-04-14 ENCOUNTER — APPOINTMENT (OUTPATIENT)
Dept: GENERAL RADIOLOGY | Facility: HOSPITAL | Age: 59
DRG: 617 | End: 2024-04-14
Payer: COMMERCIAL

## 2024-04-14 ENCOUNTER — ANESTHESIA EVENT (OUTPATIENT)
Dept: PERIOP | Facility: HOSPITAL | Age: 59
End: 2024-04-14
Payer: COMMERCIAL

## 2024-04-14 ENCOUNTER — ANESTHESIA (OUTPATIENT)
Dept: PERIOP | Facility: HOSPITAL | Age: 59
End: 2024-04-14
Payer: COMMERCIAL

## 2024-04-14 LAB
ANION GAP SERPL CALCULATED.3IONS-SCNC: 10 MMOL/L (ref 5–15)
BUN SERPL-MCNC: 32 MG/DL (ref 6–20)
BUN/CREAT SERPL: 24.1 (ref 7–25)
CALCIUM SPEC-SCNC: 9 MG/DL (ref 8.6–10.5)
CHLORIDE SERPL-SCNC: 106 MMOL/L (ref 98–107)
CO2 SERPL-SCNC: 24 MMOL/L (ref 22–29)
CREAT SERPL-MCNC: 1.33 MG/DL (ref 0.76–1.27)
DEPRECATED RDW RBC AUTO: 45.2 FL (ref 37–54)
EGFRCR SERPLBLD CKD-EPI 2021: 62 ML/MIN/1.73
ERYTHROCYTE [DISTWIDTH] IN BLOOD BY AUTOMATED COUNT: 13.2 % (ref 12.3–15.4)
GLUCOSE BLDC GLUCOMTR-MCNC: 126 MG/DL (ref 70–130)
GLUCOSE BLDC GLUCOMTR-MCNC: 147 MG/DL (ref 70–130)
GLUCOSE BLDC GLUCOMTR-MCNC: 298 MG/DL (ref 70–130)
GLUCOSE BLDC GLUCOMTR-MCNC: 95 MG/DL (ref 70–130)
GLUCOSE SERPL-MCNC: 185 MG/DL (ref 65–99)
HCT VFR BLD AUTO: 36.9 % (ref 37.5–51)
HGB BLD-MCNC: 11.8 G/DL (ref 13–17.7)
MCH RBC QN AUTO: 30.4 PG (ref 26.6–33)
MCHC RBC AUTO-ENTMCNC: 32 G/DL (ref 31.5–35.7)
MCV RBC AUTO: 95.1 FL (ref 79–97)
PLATELET # BLD AUTO: 192 10*3/MM3 (ref 140–450)
PMV BLD AUTO: 11.2 FL (ref 6–12)
POTASSIUM SERPL-SCNC: 4.8 MMOL/L (ref 3.5–5.2)
RBC # BLD AUTO: 3.88 10*6/MM3 (ref 4.14–5.8)
SODIUM SERPL-SCNC: 140 MMOL/L (ref 136–145)
WBC NRBC COR # BLD AUTO: 8.22 10*3/MM3 (ref 3.4–10.8)

## 2024-04-14 PROCEDURE — 25010000002 PROPOFOL 10 MG/ML EMULSION: Performed by: NURSE ANESTHETIST, CERTIFIED REGISTERED

## 2024-04-14 PROCEDURE — 25010000002 CEFTRIAXONE PER 250 MG: Performed by: SURGERY

## 2024-04-14 PROCEDURE — 99232 SBSQ HOSP IP/OBS MODERATE 35: CPT | Performed by: INTERNAL MEDICINE

## 2024-04-14 PROCEDURE — 25810000003 SODIUM CHLORIDE 0.9 % SOLUTION: Performed by: ANESTHESIOLOGY

## 2024-04-14 PROCEDURE — 25010000002 FENTANYL CITRATE (PF) 100 MCG/2ML SOLUTION: Performed by: NURSE ANESTHETIST, CERTIFIED REGISTERED

## 2024-04-14 PROCEDURE — 82948 REAGENT STRIP/BLOOD GLUCOSE: CPT

## 2024-04-14 PROCEDURE — 71045 X-RAY EXAM CHEST 1 VIEW: CPT

## 2024-04-14 PROCEDURE — 80048 BASIC METABOLIC PNL TOTAL CA: CPT | Performed by: NURSE PRACTITIONER

## 2024-04-14 PROCEDURE — 76000 FLUOROSCOPY <1 HR PHYS/QHP: CPT

## 2024-04-14 PROCEDURE — 94799 UNLISTED PULMONARY SVC/PX: CPT

## 2024-04-14 PROCEDURE — 94660 CPAP INITIATION&MGMT: CPT

## 2024-04-14 PROCEDURE — C1751 CATH, INF, PER/CENT/MIDLINE: HCPCS | Performed by: SURGERY

## 2024-04-14 PROCEDURE — 0JH63XZ INSERTION OF TUNNELED VASCULAR ACCESS DEVICE INTO CHEST SUBCUTANEOUS TISSUE AND FASCIA, PERCUTANEOUS APPROACH: ICD-10-PCS | Performed by: SURGERY

## 2024-04-14 PROCEDURE — 63710000001 INSULIN LISPRO (HUMAN) PER 5 UNITS: Performed by: SURGERY

## 2024-04-14 PROCEDURE — 25010000002 HEPARIN (PORCINE) PER 1000 UNITS: Performed by: SURGERY

## 2024-04-14 PROCEDURE — 25810000003 LACTATED RINGERS PER 1000 ML: Performed by: NURSE ANESTHETIST, CERTIFIED REGISTERED

## 2024-04-14 PROCEDURE — 85027 COMPLETE CBC AUTOMATED: CPT | Performed by: NURSE PRACTITIONER

## 2024-04-14 PROCEDURE — 02HV33Z INSERTION OF INFUSION DEVICE INTO SUPERIOR VENA CAVA, PERCUTANEOUS APPROACH: ICD-10-PCS | Performed by: SURGERY

## 2024-04-14 PROCEDURE — 25010000002 DAPTOMYCIN PER 1 MG: Performed by: ORTHOPAEDIC SURGERY

## 2024-04-14 PROCEDURE — 25010000002 ONDANSETRON PER 1 MG: Performed by: NURSE ANESTHETIST, CERTIFIED REGISTERED

## 2024-04-14 DEVICE — GROSHONG 8F SINGLE-LUMEN CV CATHETER WITH SURECUFF TISSUE INGROWTH CUFF INTERMEDIATE TRAY
Type: IMPLANTABLE DEVICE | Site: CHEST | Status: FUNCTIONAL
Brand: GROSHONG

## 2024-04-14 RX ORDER — SODIUM CHLORIDE 9 MG/ML
40 INJECTION, SOLUTION INTRAVENOUS AS NEEDED
Status: DISCONTINUED | OUTPATIENT
Start: 2024-04-14 | End: 2024-04-14 | Stop reason: HOSPADM

## 2024-04-14 RX ORDER — OXYCODONE HYDROCHLORIDE 5 MG/1
5 TABLET ORAL EVERY 4 HOURS PRN
Status: DISCONTINUED | OUTPATIENT
Start: 2024-04-14 | End: 2024-04-15 | Stop reason: HOSPADM

## 2024-04-14 RX ORDER — BUPIVACAINE HYDROCHLORIDE AND EPINEPHRINE 2.5; 5 MG/ML; UG/ML
INJECTION, SOLUTION EPIDURAL; INFILTRATION; INTRACAUDAL; PERINEURAL AS NEEDED
Status: DISCONTINUED | OUTPATIENT
Start: 2024-04-14 | End: 2024-04-14 | Stop reason: HOSPADM

## 2024-04-14 RX ORDER — SODIUM CHLORIDE 0.9 % (FLUSH) 0.9 %
3-10 SYRINGE (ML) INJECTION AS NEEDED
Status: DISCONTINUED | OUTPATIENT
Start: 2024-04-14 | End: 2024-04-14 | Stop reason: HOSPADM

## 2024-04-14 RX ORDER — SODIUM CHLORIDE 9 MG/ML
9 INJECTION, SOLUTION INTRAVENOUS ONCE
Status: COMPLETED | OUTPATIENT
Start: 2024-04-14 | End: 2024-04-14

## 2024-04-14 RX ORDER — PROMETHAZINE HYDROCHLORIDE 25 MG/1
25 SUPPOSITORY RECTAL ONCE AS NEEDED
Status: DISCONTINUED | OUTPATIENT
Start: 2024-04-14 | End: 2024-04-14 | Stop reason: HOSPADM

## 2024-04-14 RX ORDER — HYDROMORPHONE HYDROCHLORIDE 1 MG/ML
0.5 INJECTION, SOLUTION INTRAMUSCULAR; INTRAVENOUS; SUBCUTANEOUS
Status: DISCONTINUED | OUTPATIENT
Start: 2024-04-14 | End: 2024-04-14 | Stop reason: HOSPADM

## 2024-04-14 RX ORDER — DROPERIDOL 2.5 MG/ML
0.62 INJECTION, SOLUTION INTRAMUSCULAR; INTRAVENOUS ONCE AS NEEDED
Status: DISCONTINUED | OUTPATIENT
Start: 2024-04-14 | End: 2024-04-14 | Stop reason: HOSPADM

## 2024-04-14 RX ORDER — PROMETHAZINE HYDROCHLORIDE 25 MG/1
25 TABLET ORAL ONCE AS NEEDED
Status: DISCONTINUED | OUTPATIENT
Start: 2024-04-14 | End: 2024-04-14 | Stop reason: HOSPADM

## 2024-04-14 RX ORDER — ONDANSETRON 2 MG/ML
4 INJECTION INTRAMUSCULAR; INTRAVENOUS ONCE AS NEEDED
Status: DISCONTINUED | OUTPATIENT
Start: 2024-04-14 | End: 2024-04-14 | Stop reason: HOSPADM

## 2024-04-14 RX ORDER — SODIUM CHLORIDE 0.9 % (FLUSH) 0.9 %
3 SYRINGE (ML) INJECTION EVERY 12 HOURS SCHEDULED
Status: DISCONTINUED | OUTPATIENT
Start: 2024-04-14 | End: 2024-04-14 | Stop reason: HOSPADM

## 2024-04-14 RX ORDER — DROPERIDOL 2.5 MG/ML
0.62 INJECTION, SOLUTION INTRAMUSCULAR; INTRAVENOUS
Status: DISCONTINUED | OUTPATIENT
Start: 2024-04-14 | End: 2024-04-14 | Stop reason: HOSPADM

## 2024-04-14 RX ORDER — HYDRALAZINE HYDROCHLORIDE 20 MG/ML
5 INJECTION INTRAMUSCULAR; INTRAVENOUS
Status: DISCONTINUED | OUTPATIENT
Start: 2024-04-14 | End: 2024-04-14 | Stop reason: HOSPADM

## 2024-04-14 RX ORDER — IPRATROPIUM BROMIDE AND ALBUTEROL SULFATE 2.5; .5 MG/3ML; MG/3ML
3 SOLUTION RESPIRATORY (INHALATION) ONCE AS NEEDED
Status: DISCONTINUED | OUTPATIENT
Start: 2024-04-14 | End: 2024-04-14 | Stop reason: HOSPADM

## 2024-04-14 RX ORDER — SODIUM CHLORIDE 0.9 % (FLUSH) 0.9 %
10 SYRINGE (ML) INJECTION AS NEEDED
Status: DISCONTINUED | OUTPATIENT
Start: 2024-04-14 | End: 2024-04-14 | Stop reason: HOSPADM

## 2024-04-14 RX ORDER — FAMOTIDINE 10 MG/ML
INJECTION, SOLUTION INTRAVENOUS
Status: COMPLETED
Start: 2024-04-14 | End: 2024-04-14

## 2024-04-14 RX ORDER — HYDROCODONE BITARTRATE AND ACETAMINOPHEN 5; 325 MG/1; MG/1
1 TABLET ORAL ONCE AS NEEDED
Status: DISCONTINUED | OUTPATIENT
Start: 2024-04-14 | End: 2024-04-14 | Stop reason: HOSPADM

## 2024-04-14 RX ORDER — MIDAZOLAM HYDROCHLORIDE 1 MG/ML
1 INJECTION INTRAMUSCULAR; INTRAVENOUS
Status: DISCONTINUED | OUTPATIENT
Start: 2024-04-14 | End: 2024-04-14 | Stop reason: HOSPADM

## 2024-04-14 RX ORDER — FAMOTIDINE 20 MG/1
20 TABLET, FILM COATED ORAL ONCE
Status: CANCELLED | OUTPATIENT
Start: 2024-04-14 | End: 2024-04-14

## 2024-04-14 RX ORDER — FENTANYL CITRATE 50 UG/ML
INJECTION, SOLUTION INTRAMUSCULAR; INTRAVENOUS AS NEEDED
Status: DISCONTINUED | OUTPATIENT
Start: 2024-04-14 | End: 2024-04-14 | Stop reason: SURG

## 2024-04-14 RX ORDER — DOCUSATE SODIUM 100 MG/1
100 CAPSULE, LIQUID FILLED ORAL 2 TIMES DAILY
Status: DISCONTINUED | OUTPATIENT
Start: 2024-04-14 | End: 2024-04-15

## 2024-04-14 RX ORDER — SODIUM CHLORIDE 0.9 % (FLUSH) 0.9 %
10 SYRINGE (ML) INJECTION EVERY 12 HOURS SCHEDULED
Status: CANCELLED | OUTPATIENT
Start: 2024-04-14

## 2024-04-14 RX ORDER — LIDOCAINE HYDROCHLORIDE 10 MG/ML
0.5 INJECTION, SOLUTION EPIDURAL; INFILTRATION; INTRACAUDAL; PERINEURAL ONCE AS NEEDED
Status: DISCONTINUED | OUTPATIENT
Start: 2024-04-14 | End: 2024-04-14 | Stop reason: HOSPADM

## 2024-04-14 RX ORDER — SODIUM CHLORIDE, SODIUM LACTATE, POTASSIUM CHLORIDE, CALCIUM CHLORIDE 600; 310; 30; 20 MG/100ML; MG/100ML; MG/100ML; MG/100ML
INJECTION, SOLUTION INTRAVENOUS CONTINUOUS PRN
Status: DISCONTINUED | OUTPATIENT
Start: 2024-04-14 | End: 2024-04-14 | Stop reason: SURG

## 2024-04-14 RX ORDER — LABETALOL HYDROCHLORIDE 5 MG/ML
5 INJECTION, SOLUTION INTRAVENOUS
Status: DISCONTINUED | OUTPATIENT
Start: 2024-04-14 | End: 2024-04-14 | Stop reason: HOSPADM

## 2024-04-14 RX ORDER — FENTANYL CITRATE 50 UG/ML
50 INJECTION, SOLUTION INTRAMUSCULAR; INTRAVENOUS
Status: DISCONTINUED | OUTPATIENT
Start: 2024-04-14 | End: 2024-04-14 | Stop reason: HOSPADM

## 2024-04-14 RX ORDER — DEXMEDETOMIDINE HYDROCHLORIDE 100 UG/ML
INJECTION, SOLUTION INTRAVENOUS AS NEEDED
Status: DISCONTINUED | OUTPATIENT
Start: 2024-04-14 | End: 2024-04-14 | Stop reason: SURG

## 2024-04-14 RX ORDER — NALOXONE HCL 0.4 MG/ML
0.4 VIAL (ML) INJECTION AS NEEDED
Status: DISCONTINUED | OUTPATIENT
Start: 2024-04-14 | End: 2024-04-14 | Stop reason: HOSPADM

## 2024-04-14 RX ORDER — ONDANSETRON 2 MG/ML
INJECTION INTRAMUSCULAR; INTRAVENOUS AS NEEDED
Status: DISCONTINUED | OUTPATIENT
Start: 2024-04-14 | End: 2024-04-14 | Stop reason: SURG

## 2024-04-14 RX ORDER — FAMOTIDINE 10 MG/ML
20 INJECTION, SOLUTION INTRAVENOUS ONCE
Status: COMPLETED | OUTPATIENT
Start: 2024-04-14 | End: 2024-04-14

## 2024-04-14 RX ORDER — LIDOCAINE HYDROCHLORIDE 10 MG/ML
INJECTION, SOLUTION EPIDURAL; INFILTRATION; INTRACAUDAL; PERINEURAL AS NEEDED
Status: DISCONTINUED | OUTPATIENT
Start: 2024-04-14 | End: 2024-04-14 | Stop reason: SURG

## 2024-04-14 RX ORDER — PROPOFOL 10 MG/ML
VIAL (ML) INTRAVENOUS AS NEEDED
Status: DISCONTINUED | OUTPATIENT
Start: 2024-04-14 | End: 2024-04-14 | Stop reason: SURG

## 2024-04-14 RX ORDER — SODIUM CHLORIDE, SODIUM LACTATE, POTASSIUM CHLORIDE, CALCIUM CHLORIDE 600; 310; 30; 20 MG/100ML; MG/100ML; MG/100ML; MG/100ML
9 INJECTION, SOLUTION INTRAVENOUS CONTINUOUS
Status: CANCELLED | OUTPATIENT
Start: 2024-04-14

## 2024-04-14 RX ORDER — MEPERIDINE HYDROCHLORIDE 25 MG/ML
12.5 INJECTION INTRAMUSCULAR; INTRAVENOUS; SUBCUTANEOUS
Status: DISCONTINUED | OUTPATIENT
Start: 2024-04-14 | End: 2024-04-14 | Stop reason: HOSPADM

## 2024-04-14 RX ADMIN — DEXMEDETOMIDINE HYDROCHLORIDE 8 MCG: 100 INJECTION, SOLUTION INTRAVENOUS at 09:33

## 2024-04-14 RX ADMIN — SODIUM CHLORIDE, POTASSIUM CHLORIDE, SODIUM LACTATE AND CALCIUM CHLORIDE: 600; 310; 30; 20 INJECTION, SOLUTION INTRAVENOUS at 09:28

## 2024-04-14 RX ADMIN — LISINOPRIL 40 MG: 40 TABLET ORAL at 13:05

## 2024-04-14 RX ADMIN — INSULIN LISPRO 6 UNITS: 100 INJECTION, SOLUTION INTRAVENOUS; SUBCUTANEOUS at 21:14

## 2024-04-14 RX ADMIN — DEXMEDETOMIDINE HYDROCHLORIDE 4 MCG: 100 INJECTION, SOLUTION INTRAVENOUS at 10:06

## 2024-04-14 RX ADMIN — FAMOTIDINE 20 MG: 10 INJECTION, SOLUTION INTRAVENOUS at 08:49

## 2024-04-14 RX ADMIN — CEFTRIAXONE 2000 MG: 2 INJECTION, POWDER, FOR SOLUTION INTRAMUSCULAR; INTRAVENOUS at 19:45

## 2024-04-14 RX ADMIN — PROPOFOL 200 MG: 10 INJECTION, EMULSION INTRAVENOUS at 09:33

## 2024-04-14 RX ADMIN — METOPROLOL SUCCINATE 100 MG: 50 TABLET, EXTENDED RELEASE ORAL at 13:07

## 2024-04-14 RX ADMIN — Medication 3 ML: at 13:13

## 2024-04-14 RX ADMIN — DEXMEDETOMIDINE HYDROCHLORIDE 8 MCG: 100 INJECTION, SOLUTION INTRAVENOUS at 09:45

## 2024-04-14 RX ADMIN — OXYCODONE 5 MG: 5 TABLET ORAL at 20:00

## 2024-04-14 RX ADMIN — DAPTOMYCIN 800 MG: 500 INJECTION, POWDER, LYOPHILIZED, FOR SOLUTION INTRAVENOUS at 13:05

## 2024-04-14 RX ADMIN — DEXMEDETOMIDINE HYDROCHLORIDE 8 MCG: 100 INJECTION, SOLUTION INTRAVENOUS at 09:40

## 2024-04-14 RX ADMIN — FENTANYL CITRATE 50 MCG: 50 INJECTION, SOLUTION INTRAMUSCULAR; INTRAVENOUS at 09:33

## 2024-04-14 RX ADMIN — GLIPIZIDE 10 MG: 10 TABLET ORAL at 05:29

## 2024-04-14 RX ADMIN — HEPARIN SODIUM 5000 UNITS: 5000 INJECTION INTRAVENOUS; SUBCUTANEOUS at 21:13

## 2024-04-14 RX ADMIN — LIDOCAINE HYDROCHLORIDE 50 MG: 10 INJECTION, SOLUTION EPIDURAL; INFILTRATION; INTRACAUDAL; PERINEURAL at 09:33

## 2024-04-14 RX ADMIN — FENTANYL CITRATE 50 MCG: 50 INJECTION, SOLUTION INTRAMUSCULAR; INTRAVENOUS at 09:45

## 2024-04-14 RX ADMIN — Medication 10 ML: at 21:14

## 2024-04-14 RX ADMIN — ONDANSETRON 4 MG: 2 INJECTION INTRAMUSCULAR; INTRAVENOUS at 09:52

## 2024-04-14 RX ADMIN — SODIUM CHLORIDE 9 ML/HR: 9 INJECTION, SOLUTION INTRAVENOUS at 08:34

## 2024-04-14 NOTE — PROGRESS NOTES
Shinjeovanny Sinha       LOS: 11 days   Patient Care Team:  Martha Mckee MD as PCP - General (Family Medicine)    Chief Complaint: Right foot infection/osteomyelitis    Subjective     Interval History:     Resting comfortably in bed this morning.  Pain tolerable, no acute events overnight.      Review of Systems:      Gen- No fevers, chills  CV- No chest pain, palpitations  Resp- No cough, dyspnea  GI- No N/V/D, abd pain    Objective     Vital Signs  Vital Signs (last 24 hours)         04/07 0700  04/08 0659 04/08 0700 04/08 0735   Most Recent      Temp (°F) 97.3 -  97.8       97.8 (36.6) 04/08 0000    Heart Rate 45 -  70       48 04/08 0600    Resp 16 -  18       18 04/08 0000    /80 -  148/81       138/94 04/08 0000    SpO2 (%) 93 -  99       97 04/08 0600    Oxygen Concentration (%)   28       28 04/08 0418              Physical Exam:     No acute distress.  Nonlabored respirations.  Regular rate and rhythm.  Abdomen nondistended.  Right lower extremity: dressing present is clean, dry, intact.  Incision inspected, well approximated.    Results Review:     I reviewed the patient's new clinical results.    Medication Review:   Hospital Medications (active)         Dose Frequency Start End    acetaminophen (TYLENOL) tablet 650 mg 650 mg Every 4 Hours PRN 4/2/2024 --    Admin Instructions: If given for fever, use fever parameter: fever greater than 100.4 °F  Based on patient request - if ordered for moderate or severe pain, provider allows for administration of a medication prescribed for a lower pain scale.    Do not exceed 4 grams of acetaminophen in a 24 hr period. Max dose of 2gm for AST/ALT greater than 120 units/L.    If given for pain, use the following pain scale:   Mild Pain = Pain Score of 1-3, CPOT 1-2  Moderate Pain = Pain Score of 4-6, CPOT 3-4  Severe Pain = Pain Score of 7-10, CPOT 5-8    Route: Oral    bisacodyl (DULCOLAX) EC tablet 5 mg 5 mg Daily PRN 4/2/2024 --    Admin  "Instructions: Use if no bowel movement after 12 hours.  Swallow whole. Do not crush, split, or chew tablet.    Route: Oral    Linked Group 1: Placed in \"And\" Linked Group        bisacodyl (DULCOLAX) suppository 10 mg 10 mg Daily PRN 4/2/2024 --    Admin Instructions: Use if no bowel movement after 12 hours.  Hold for diarrhea    Route: Rectal    Linked Group 1: Placed in \"And\" Linked Group        Calcium Replacement - Follow Nurse / BPA Driven Protocol  As Needed 4/2/2024 --    Admin Instructions: Open Order & Select \"BHS Electrolyte Replacement Protocol Algorithm\" to View Details    Route: Does not apply    clopidogrel (PLAVIX) tablet 75 mg ([Held by provider] since 4/3/2024 12:01 AM) 75 mg Daily 4/3/2024 --    Route: Oral    colchicine tablet 0.6 mg 0.6 mg Daily 4/4/2024 --    Admin Instructions: Group 2 (Pink) Hazardous Drug - Reproductive Risk Only - See Handling Guide    Route: Oral    DAPTOmycin (CUBICIN) 800 mg in sodium chloride 0.9 % 50 mL IVPB 8 mg/kg × 102 kg (Adjusted) Every 24 Hours 4/3/2024 5/3/2024    Admin Instructions: Caution: Look alike/sound alike drug alert.  Refrigerate. Do not shake.    Route: Intravenous    dextrose (D50W) (25 g/50 mL) IV injection 25 g 25 g Every 15 Minutes PRN 4/3/2024 --    Admin Instructions: Blood sugar less than 70; patient has IV access - Unresponsive, NPO or Unable To Safely Swallow    Route: Intravenous    dextrose (GLUTOSE) oral gel 15 g 15 g Every 15 Minutes PRN 4/3/2024 --    Admin Instructions: BS<70, Patient Alert, Is not NPO, Can safely swallow.    Route: Oral    glipizide (GLUCOTROL) tablet 10 mg 10 mg Every Morning Before Breakfast 4/3/2024 --    Admin Instructions: Caution: Look alike/sound alike drug alert    Route: Oral    glucagon (GLUCAGEN) injection 1 mg 1 mg Every 15 Minutes PRN 4/3/2024 --    Admin Instructions: Blood Glucose Less Than 70 - Patient Without IV Access - Unresponsive, NPO or Unable To Safely Swallow  Reconstitute powder for injection by " adding 1 mL of -supplied sterile diluent or sterile water for injection to a vial containing 1 mg of the drug, to provide solutions containing 1 mg/mL. Shake vial gently to dissolve.    Route: Intramuscular    heparin (porcine) 5000 UNIT/ML injection 5,000 Units 5,000 Units Every 8 Hours Scheduled 4/3/2024 --    Route: Subcutaneous    HYDROcodone-acetaminophen (NORCO) 5-325 MG per tablet 1 tablet 1 tablet Every 4 Hours PRN 4/2/2024 4/11/2024    Admin Instructions: Based on patient request - if ordered for moderate or severe pain, provider allows for administration of a medication prescribed for a lower pain scale.  [CULLEN]    Do not exceed 4 grams of acetaminophen in a 24 hr period. Max dose of 2gm for AST/ALT greater than 120 units/L        If given for pain, use the following pain scale:   Mild Pain = Pain Score of 1-3, CPOT 1-2  Moderate Pain = Pain Score of 4-6, CPOT 3-4  Severe Pain = Pain Score of 7-10, CPOT 5-8    Route: Oral    HYDROmorphone (DILAUDID) injection 0.5 mg 0.5 mg Every 3 Hours PRN 4/4/2024 4/12/2024    Admin Instructions: Based on patient request - if ordered for moderate or severe pain, provider allows for administration of a medication prescribed for a lower pain scale.  If given for pain, use the following pain scale:  Mild Pain = Pain Score of 1-3, CPOT 1-2  Moderate Pain = Pain Score of 4-6, CPOT 3-4  Severe Pain = Pain Score of 7-10, CPOT 5-8    Route: Intravenous    insulin detemir (LEVEMIR) injection 10 Units 10 Units Daily 4/7/2024 --    Admin Instructions: Do not hold basal insulin without an order. Consider requesting a dose edit, if needed.      Route: Subcutaneous    Insulin Lispro (humaLOG) injection 2-7 Units 2-7 Units 4 Times Daily Before Meals & Nightly 4/3/2024 --    Admin Instructions: Correction Insulin - Low Dose - Total Insulin Dose Less Than 40 units/day (Lean, Elderly or Renal Patients)    Blood Glucose 150-199 mg/dL - 2 units  Blood Glucose 200-249 mg/dL - 3  "units  Blood Glucose 250-299 mg/dL - 4 units  Blood Glucose 300-349 mg/dL - 5 units  Blood Glucose 350-400 mg/dL - 6 units  Blood Glucose Greater Than 400 mg/dL - 7 units & Call Provider   Caution: Look alike/sound alike drug alert    Route: Subcutaneous    lisinopril (PRINIVIL,ZESTRIL) tablet 40 mg 40 mg Daily 4/3/2024 --    Admin Instructions: Hold for SBP less than 100, DBP less than 60.    Route: Oral    Magnesium Standard Dose Replacement - Follow Nurse / BPA Driven Protocol  As Needed 4/2/2024 --    Admin Instructions: Open Order & Select \"BHS Electrolyte Replacement Protocol Algorithm\" to View Details    Route: Does not apply    metoprolol succinate XL (TOPROL-XL) 24 hr tablet 100 mg 100 mg Daily 4/3/2024 --    Admin Instructions: Hold for SBP less than 100, DBP less than 60, or heart rate less than 50    Do not crush or chew the capsules or tablets. The drug may not work as designed if the capsule or tablet is crushed or chewed. Swallow whole.  Do not crush or chew.    Route: Oral    Phosphorus Replacement - Follow Nurse / BPA Driven Protocol  As Needed 4/2/2024 --    Admin Instructions: Open Order & Select \"BHS Electrolyte Replacement Protocol Algorithm\" to View Details    Route: Does not apply    pioglitazone (ACTOS) tablet 30 mg 30 mg Daily 4/3/2024 --    Route: Oral    piperacillin-tazobactam (ZOSYN) 3.375 g IVPB in 100 mL NS MBP (CD) 3.375 g Every 8 Hours 4/3/2024 4/10/2024    Route: Intravenous    polyethylene glycol (MIRALAX) packet 17 g 17 g Daily PRN 4/2/2024 --    Admin Instructions: Use if no bowel movement after 12 hours. Mix in 6-8 ounces of water.  Use 4-8 ounces of water, tea, or juice for each 17 gram dose.    Route: Oral    Linked Group 1: Placed in \"And\" Linked Group        Potassium Replacement - Follow Nurse / BPA Driven Protocol  As Needed 4/2/2024 --    Admin Instructions: Open Order & Select \"BHS Electrolyte Replacement Protocol Algorithm\" to View Details    Route: Does not apply    " "predniSONE (DELTASONE) tablet 40 mg 40 mg Daily With Breakfast 4/7/2024 --    Admin Instructions: Take with food.    Route: Oral    sennosides-docusate (PERICOLACE) 8.6-50 MG per tablet 2 tablet 2 tablet 2 Times Daily PRN 4/2/2024 --    Admin Instructions: Start bowel management regimen if patient has not had a bowel movement after 12 hours.    Route: Oral    Linked Group 1: Placed in \"And\" Linked Group        sodium chloride 0.9 % flush 10 mL 10 mL Every 12 Hours Scheduled 4/3/2024 --    Route: Intravenous    sodium chloride 0.9 % flush 10 mL 10 mL As Needed 4/2/2024 --    Route: Intravenous    sodium chloride 0.9 % infusion 40 mL 40 mL As Needed 4/2/2024 --    Admin Instructions: Following administration of an IV intermittent medication, flush line with 40mL NS at 100mL/hr.    Route: Intravenous              Assessment & Plan     58-year-old male with multiple medical comorbidities, diabetes mellitus with right foot wound/infection status post right second and third ray amputation with wound vacuum-assisted closure April 8, 2024, delayed closure 4/12/24.      Osteomyelitis    Lymphoma    Type 2 diabetes mellitus with hyperglycemia    Diabetic foot ulcer    History of myalgia due to HMG CoA reductase inhibitor    Hypertensive disorder    Hyperlipidemia    Microalbuminuric diabetic nephropathy    Stage 3a chronic kidney disease    Nonweightbearing right lower extremity; okay for minimal protected heel weightbearing right lower extremity in postoperative shoe for transfers only    Follow cultures.    Okay for discharge from surgical perspective.  Daily dressing change: xeroform, 4x4, kerlix, ace  Follow up next Friday 4/19/24 for wound check and xrays.    Jeremi Hays Jr, MD  04/14/24  07:56 EDT          "

## 2024-04-14 NOTE — PROGRESS NOTES
"Patient Name:  Alvaro Sinha  YOB: 1965  [id number]    Surgery Post - Operative Note    Date of visit: 4/14/2024    Subjective   Subjective: Doing well, tolerating diet.       Objective     Objective:    /67   Pulse 78   Temp 98 °F (36.7 °C) (Oral)   Resp 20   Ht 185.4 cm (72.99\")   Wt 136 kg (299 lb 13.2 oz)   SpO2 96%   BMI 39.57 kg/m²     CV:  Rate  regular and rhythm  regular  L:  Clear  to auscultation bilaterally . Groshong c/d/i  ABD:  Soft, nontender  EXT:  No cyanosis, clubbing or edema. Dressings in place.    CXR shows good line placement, no PTX         Assessment/ Plan: Doing well after Groshong placement. Will sign off .Please call with questions. He may follow up once line is able to be removed.     Problem List Items Addressed This Visit          Endocrine and Metabolic    Diabetic foot ulcer    Relevant Medications    pioglitazone (ACTOS) 30 MG tablet    glipizide (GLUCOTROL) tablet 10 mg    pioglitazone (ACTOS) tablet 30 mg    dextrose (GLUTOSE) oral gel 15 g    glucagon (GLUCAGEN) injection 1 mg    insulin detemir (LEVEMIR) injection 10 Units    Insulin Lispro (humaLOG) injection 2-9 Units       Other    * (Principal) Osteomyelitis - Primary    Relevant Medications    oxyCODONE (ROXICODONE) 5 MG immediate release tablet    cefTRIAXone (ROCEPHIN) 2,000 mg in sodium chloride 0.9 % 100 mL MBP    Other Relevant Orders    Case Request (Completed)    Anaerobic Culture - Wound, Toe, Right (Completed)    Wound Culture - Wound, Toe, Right (Completed)    Tissue Pathology Exam (Completed)    Case Request (Completed)    Fungus Culture - Tissue, Foot, Right    AFB Culture - Tissue, Foot, Right (Completed)    Anaerobic Culture 10 Day Incubation - Tissue, Foot, Right     Other Visit Diagnoses       Cellulitis of right foot        History of diabetes mellitus                 Active Hospital Problems    Diagnosis  POA    **Osteomyelitis [M86.9]  Yes    Stage 3a chronic kidney " disease [N18.31]  Yes    History of myalgia due to HMG CoA reductase inhibitor [Z87.39]  Not Applicable    Microalbuminuric diabetic nephropathy [E11.21]  Yes    Hyperlipidemia [E78.5]  Yes    Type 2 diabetes mellitus with hyperglycemia [E11.65]  Yes    Lymphoma [C85.90]  Yes    Diabetic foot ulcer [E11.621, L97.509]  Yes    Hypertensive disorder [I10]  Yes      Resolved Hospital Problems   No resolved problems to display.            Israel Preciado MD  4/14/2024  17:49 EDT

## 2024-04-14 NOTE — ANESTHESIA PREPROCEDURE EVALUATION
Anesthesia Evaluation     Patient summary reviewed and Nursing notes reviewed   no history of anesthetic complications:   NPO Solid Status: > 8 hours  NPO Liquid Status: > 8 hours           Airway   Mallampati: II  TM distance: >3 FB  Neck ROM: full  No difficulty expected  Dental      Pulmonary - normal exam   (+) ,sleep apnea  Cardiovascular - normal exam    (+) hypertension, PVD, hyperlipidemia      Neuro/Psych  GI/Hepatic/Renal/Endo    (+) renal disease-, diabetes mellitus    Musculoskeletal     Abdominal    Substance History      OB/GYN          Other      history of cancer                Anesthesia Plan    ASA 3     general     intravenous induction     Anesthetic plan, risks, benefits, and alternatives have been provided, discussed and informed consent has been obtained with: patient.    Plan discussed with CRNA.    CODE STATUS:    Code Status (Patient has no pulse and is not breathing): CPR (Attempt to Resuscitate)  Medical Interventions (Patient has pulse or is breathing): Full Support

## 2024-04-14 NOTE — PROGRESS NOTES
[facility]  PROGRESS NOTE    Patient Name: Alvaro Sinha  : 1965  MRN: [id number]    Date of Admission: 2024  Primary Care Physician: Martha Mckee MD    Subjective   Subjective     CC:  Follow-up osteomyelitis    HPI:  Resting in bed in no acute distress.  Patient had good Shonk catheter placed by surgery today with no complication.  Patient tells me he is comfortable and he does not have any specific complaint.  No fever or chills.  No chest pain or palpitation or shortness of breath.  No nausea vomiting or diarrhea.      Objective   Objective     Vital Signs:   Temp:  [96.8 °F (36 °C)-98.1 °F (36.7 °C)] 98 °F (36.7 °C)  Heart Rate:  [51-79] 78  Resp:  [16-20] 20  BP: ()/(52-90) 102/67  Flow (L/min):  [0-2] 2     Physical Exam:  Constitutional: No acute distress, looks comfortable  HENT: NCAT, mucous membranes moist  Respiratory: Clear to auscultation bilaterally, respiratory effort normal   Cardiovascular: RRR, no murmurs, cap refill brisk   Gastrointestinal: Abdomen is obese.  Positive bowel sounds, soft, nontender, nondistended  Musculoskeletal: RLE dressing in place, morbid obesity  Psychiatric: Appropriate affect, cooperative  Neurologic: Oriented x 3, no focality appreciated, speech clear  Skin:  no visible rash     Results Reviewed:  LAB RESULTS:      Lab 24  0445 24  0342 24  0437 04/10/24  0629 24  0527 24  0616   WBC 8.22 8.04 6.25 5.13 4.97 4.82   HEMOGLOBIN 11.8* 11.9* 11.6* 11.4* 11.0* 10.5*   HEMATOCRIT 36.9* 36.5* 35.8* 35.5* 33.2* 31.9*   PLATELETS 192 187 194 221 204 203   NEUTROS ABS  --   --   --  3.17  --  3.90   IMMATURE GRANS (ABS)  --   --   --  0.19*  --  0.07*   LYMPHS ABS  --   --   --  1.31  --  0.61*   MONOS ABS  --   --   --  0.33  --  0.23   EOS ABS  --   --   --  0.10  --  0.00   MCV 95.1 93.4 94.0 93.9 93.0 91.1   CRP  --   --   --   --   --  3.40*         Lab  04/14/24  1328 04/13/24  0342 04/12/24  0437 04/11/24  0513 04/10/24  0629   SODIUM 140 137 137 140 139   POTASSIUM 4.8 4.9 4.6 4.5 4.5   CHLORIDE 106 102 103 104 105   CO2 24.0 26.0 25.0 26.0 24.0   ANION GAP 10.0 9.0 9.0 10.0 10.0   BUN 32* 28* 28* 30* 26*   CREATININE 1.33* 1.21 1.17 1.28* 1.17   EGFR 62.0 69.4 72.3 64.9 72.3   GLUCOSE 185* 184* 183* 160* 140*   CALCIUM 9.0 9.2 9.3 9.1 8.9   MAGNESIUM  --  1.9  --   --   --          Lab 04/10/24  0629 04/08/24  0616   TOTAL PROTEIN 6.7 7.2   ALBUMIN 3.8 3.6   GLOBULIN 2.9 3.6   ALT (SGPT) 53* 32   AST (SGOT) 48* 30   BILIRUBIN 0.2 0.3   ALK PHOS 126* 131*                     Brief Urine Lab Results       None            Microbiology Results Abnormal       Procedure Component Value - Date/Time    Wound Culture - Surgical Site, Foot, Right [[id number]] Collected: 04/12/24 1102    Lab Status: Preliminary result Specimen: Surgical Site from Foot, Right Updated: 04/14/24 0821     Wound Culture No growth at 2 days     Gram Stain Rare (1+) WBCs seen      Many (4+) Red blood cells      No organisms seen    Anaerobic Culture - Wound, Toe, Right [[id number]]  (Normal) Collected: 04/08/24 1639    Lab Status: Final result Specimen: Wound from Toe, Right Updated: 04/13/24 1152     Anaerobic Culture No anaerobes isolated at 5 days    AFB Culture - Tissue, Foot, Right [[id number]] Collected: 04/12/24 1102    Lab Status: Preliminary result Specimen: Tissue from Foot, Right Updated: 04/13/24 1109     AFB Stain No acid fast bacilli seen on concentrated smear    Wound Culture - Wound, Toe, Right [[id number]] Collected: 04/08/24 1639    Lab Status: Final result Specimen: Wound from Toe, Right Updated: 04/11/24 0626     Wound Culture No growth at 3 days     Gram Stain No WBCs or organisms seen    Blood Culture - Blood, Arm, Right [[id number]]  (Normal) Collected: 04/02/24 1900    Lab Status: Final result Specimen: Blood from Arm, Right Updated: 04/07/24 1946     Blood Culture No growth at  5 days    Blood Culture - Blood, Arm, Right [[id number]]  (Normal) Collected: 04/02/24 1833    Lab Status: Final result Specimen: Blood from Arm, Right Updated: 04/07/24 1900     Blood Culture No growth at 5 days    Narrative:      Less than seven (7) mL's of blood was collected.  Insufficient quantity may yield false negative results.    MRSA Screen, PCR (Inpatient) - Swab, Nares [[id number]]  (Normal) Collected: 04/02/24 1903    Lab Status: Final result Specimen: Swab from Nares Updated: 04/02/24 2047     MRSA PCR Negative    Narrative:      The negative predictive value of this diagnostic test is high and should only be used to consider de-escalating anti-MRSA therapy. A positive result may indicate colonization with MRSA and must be correlated clinically.  MRSA Negative            XR Chest 1 View    Result Date: 4/14/2024  XR CHEST 1 VW Date of Exam: 4/14/2024 10:33 AM EDT Indication: PNEUMOTHORAX eval line Comparison: PET/CT 5/27/2022 FINDINGS: No consolidations or pleural effusions are observed. There is no evidence for pneumothorax. Chronic changes are noted in the lingula. The cardiac silhouette is within normal limits for size. The mediastinum is unremarkable. No acute osseous abnormalities  are identified on this single view.     Impression: 1.No evidence for acute cardiopulmonary process. No evidence for pneumothorax. Electronically Signed: Erich Starkey MD  4/14/2024 10:36 AM EDT  Workstation ID: [id number]    FL C Arm During Surgery    Result Date: 4/14/2024  This procedure was auto-finalized with no dictation required.         Current medications:  Scheduled Meds:cefTRIAXone, 2,000 mg, Intravenous, Q24H  [Transfer Hold] colchicine, 0.6 mg, Oral, Daily  DAPTOmycin, 8 mg/kg (Adjusted), Intravenous, Q24H  [Transfer Hold] glipizide, 10 mg, Oral, QAM AC  [Transfer Hold] heparin (porcine), 5,000 Units, Subcutaneous, Q8H  [Transfer Hold] insulin detemir, 10 Units, Subcutaneous, Daily  [Transfer Hold] insulin  lispro, 2-9 Units, Subcutaneous, 4x Daily AC & at Bedtime  lisinopril, 40 mg, Oral, Daily  metoprolol succinate XL, 100 mg, Oral, Daily  [Transfer Hold] pioglitazone, 30 mg, Oral, Daily  [Transfer Hold] predniSONE, 10 mg, Oral, Daily With Breakfast  [Transfer Hold] sodium chloride, 10 mL, Intravenous, Q12H      Continuous Infusions:lactated ringers, 9 mL/hr, Last Rate: 9 mL/hr (04/12/24 1039)      PRN Meds:.  [Transfer Hold] acetaminophen    [Transfer Hold] senna-docusate sodium **AND** [Transfer Hold] polyethylene glycol **AND** [Transfer Hold] bisacodyl **AND** [Transfer Hold] bisacodyl    [Transfer Hold] Calcium Replacement - Follow Nurse / BPA Driven Protocol    [Transfer Hold] dextrose    [Transfer Hold] dextrose    [Transfer Hold] glucagon (human recombinant)    [Transfer Hold] Magnesium Standard Dose Replacement - Follow Nurse / BPA Driven Protocol    [Transfer Hold] oxyCODONE    [Transfer Hold] Phosphorus Replacement - Follow Nurse / BPA Driven Protocol    Potassium Replacement - Follow Nurse / BPA Driven Protocol    [Transfer Hold] sodium chloride    [Transfer Hold] sodium chloride    Assessment & Plan   Assessment & Plan     Active Hospital Problems    Diagnosis  POA    **Osteomyelitis [M86.9]  Yes    Stage 3a chronic kidney disease [N18.31]  Yes    History of myalgia due to HMG CoA reductase inhibitor [Z87.39]  Not Applicable    Microalbuminuric diabetic nephropathy [E11.21]  Yes    Hyperlipidemia [E78.5]  Yes    Type 2 diabetes mellitus with hyperglycemia [E11.65]  Yes    Lymphoma [C85.90]  Yes    Diabetic foot ulcer [E11.621, L97.509]  Yes    Hypertensive disorder [I10]  Yes      Resolved Hospital Problems   No resolved problems to display.        Brief Hospital Course to date:  Alvaro Sinha is a 58 y.o. male with past medical history of lymphoma, type 2 diabetes, hypertension, hyperlipidemia, CKD, gout, chronic ulceration of the right foot, presented with worsening wound of right foot. MRI of R  foot showed concerns for early osteomyelitis of the second and third middle and distal phalanges.  Orthopedic surgery and infectious disease consulted.  He underwent I&D in the OR by Dr. Hays on 4/12/2024     This patient's problems and plans were partially entered by my partner and updated as appropriate by me 04/14/24.     Assessment/Plan:      Nonhealing diabetic ulcer of the right foot with overlying cellulitis   Concern for foot osteomyelitis  Patient with chronic foot ulcer, currently with worsening cellulitis and possible osteomyelitis per MRI  Arterial duplex and CTA runoff with no evidence of significant peripheral arterial disease  Dr. Hummel with Ortho following.  S/p right second and third ray amputation 4/8/24  S/p I&D in the OR by Dr. Hays on 4/12/2024  ID following, transition to daptomycin, Rocephin on 4/9 in anticipation of outpatient therapy for 6 weeks  Not a PICC candidate d/t hx of CKD per ID   General surgery consulted, plan for Groshong placement with Dr. Preciado tomorrow 4/14  Restart plavix when ok with surgery (continuing to hold for now)         Acute gout flare  Did not improve with colchicine monotherapy but significant improvement after starting steroids on 4/6)  Continue prednisone, weaned to 10 mg daily on 4/20  Continue colchicine     Type 2 diabetes  A1c 8.5%  Has been off Trulicity due to insurance coverage  Continue glipizide, continue actos  continue Levemir 10 units daily  + SSI (changed to humalog from regular insulin 4/9/24)     Stage III CKD due to diabetes  Hypertension  Creatinine currently near baseline, continue to monitor  Continue home lisinopril  GFR currently is around 62.     Lymphoma  History of lymphoma in chart, has been following with [facility] for this  Ongoing workup, is not currently on any immunotherapy or immunosuppressive medications  Will need appointment arranged with [facility] oncology prior to discharge so that patient does not get  lost to follow-up     Statin intolerance  Rosuvastatin listed in medical history however no recent statin.  History of myalgia     Expected Discharge Location and Transportation: Home, pending Groshong placement for home IV abx.   Expected Discharge   Expected Discharge Date: 4/14/2024; Expected Discharge Time:       DVT prophylaxis:  Medical and mechanical DVT prophylaxis orders are present.    AM-PAC 6 Clicks Score (PT): 19 (04/13/24 1422)    CODE STATUS:   Code Status and Medical Interventions:   Ordered at: 04/03/24 0013     Code Status (Patient has no pulse and is not breathing):    CPR (Attempt to Resuscitate)     Medical Interventions (Patient has pulse or is breathing):    Full Support       Kirby Khan MD  04/14/24

## 2024-04-14 NOTE — ANESTHESIA PROCEDURE NOTES
Airway  Urgency: elective    Date/Time: 4/14/2024 9:35 AM  Airway not difficult    General Information and Staff    Patient location during procedure: OR  CRNA/CAA: William Lala CRNA    Indications and Patient Condition  Indications for airway management: airway protection    Preoxygenated: yes  Mask difficulty assessment: 0 - not attempted    Final Airway Details  Final airway type: supraglottic airway      Successful airway: I-gel  Size 4     Number of attempts at approach: 1  Assessment: lips, teeth, and gum same as pre-op    Additional Comments  LMA placed without difficulty, ventilation with assist, equal breath sounds and symmetric chest rise and fall

## 2024-04-14 NOTE — BRIEF OP NOTE
INSERTION VENOUS ACCESS DEVICE  Progress Note    Alvaro Sinha  4/14/2024    Pre-op Diagnosis:   Osteomyelitis       Post-Op Diagnosis Codes:  Same    Procedure/CPT® Codes:        Procedure(s):  INSERTION GROSHONG CATHETER WITH FLUOROSCOPY              Surgeon(s):  Israel Preciado MD    Anesthesia: Monitored Anesthesia Care    Staff:   Circulator: Tory Ambriz RN  Radiology Technologist: Gabrielle Spencer  Scrub Person: Kesha Hou         Estimated Blood Loss: minimal    Urine Voided: * No values recorded between 4/14/2024  9:27 AM and 4/14/2024 10:09 AM *    Specimens:                None          Drains: * No LDAs found *    Findings: 8 Welsh single-lumen Groshong placed in the right internal jugular vein, and tunneled to the right chest        Complications: None          Israel Preciado MD     Date: 4/14/2024  Time: 10:12 EDT

## 2024-04-14 NOTE — ANESTHESIA POSTPROCEDURE EVALUATION
Patient: Alvaro Sinha    Procedure Summary       Date: 04/14/24 Room / Location:  [facility]    Anesthesia Start: 0928 Anesthesia Stop: 1016    Procedure: INSERTION GROSHONG CATHETER WITH FLUOROSCOPY (Left) Diagnosis:     Surgeons: Israel Preciado MD Provider: Mukesh Gleason MD    Anesthesia Type: general ASA Status: 3            Anesthesia Type: general    Vitals  No vitals data found for the desired time range.          Post Anesthesia Care and Evaluation    Patient location during evaluation: PACU  Patient participation: complete - patient participated  Level of consciousness: awake  Pain management: adequate    Airway patency: patent  Anesthetic complications: No anesthetic complications  PONV Status: none  Cardiovascular status: hemodynamically stable and acceptable  Respiratory status: nonlabored ventilation, acceptable and nasal cannula  Hydration status: acceptable

## 2024-04-14 NOTE — OP NOTE
atient Name:  Alvaro Sinha  YOB: 1965  [id number]    4/14/2024      PREOPERATIVE DIAGNOSIS: Osteomyelitis      POSTOPERATIVE DIAGNOSIS: Same       PROCEDURE PERFORMED: Right internal jugular Groshong placement with fluoroscopy       SURGEON: Israel Preciado MD          SPECIMENS: None       ANESTHESIA: Local       FINDINGS:   1.  8 Sami single-lumen Groshong placed in the right internal jugular vein       INDICATIONS: The patient is a 58 y.o. male who presented with osteomyelitis. They are in need of central venous access. The risks and benefits of central line placement were discussed at length with the patient and their family, and they agreed to proceed.       DESCRIPTION OF PROCEDURE:      After obtaining informed consent, the patient was taken to the operating room and were placed in the Trendelenburg position.  After appropriate DVT and antibiotic prophylaxis they were sedated.  The neck and chest were prepped and draped in standard sterile fashion and after infiltrating the skin with local anesthetic, an 18-gauge needle advanced with difficulty into the right internal jugular vein under ultrasound guidance.  Ultrasound confirmed venous cannulation. A guidewire was placed through the needle to the level of the cavoatrial junction.  This was confirmed with fluoroscopy. The needle was removed over the guidewire.    At this point, an appropriate site for Groshong placement was determined on the right  chest.  The area around the wire was infiltrated with local anesthetic, a transverse 7 mm skin incision was made.  Under fluoroscopic guidance, a dilator introducer was advanced over the wire to the level of the cavoatrial junction.  The dilator and the wire were removed and through the introducer the catheter was placed, with the tip at the cavoatrial junction.  The introducer was then removed. After infiltrating with local anesthetic, a tunneler was then used to bring the catheter out to  the right chest at the previously marked portion.  The tunneler was then removed from the catheter, and the catheter and applied. The Groshong was sewn to the skin in standard fashion.    The Groshong was accessed, hamilton blood easily, was flushed with heparinized saline.  Fluoroscopy was again performed, confirming excellent placement of the catheter, with no kinking in the neck.     The skin over the neck stick site was then closed  using absorbable suture, and the skin over the neck stick site was also covered using absorbable suture.  The incisions were dressed in standard sterile fashion and covered with dry sterile dressings.  There were no  immediate complications.    All sponge and needle counts were correct times two at the completion of the procedure.  A postprocedure chest x-ray is pending at the time of this dictation.            Israel Preciado MD  4/14/2024  10:13 EDT

## 2024-04-15 ENCOUNTER — HOME HEALTH ADMISSION (OUTPATIENT)
Dept: HOME HEALTH SERVICES | Facility: HOME HEALTHCARE | Age: 59
End: 2024-04-15
Payer: COMMERCIAL

## 2024-04-15 VITALS
RESPIRATION RATE: 16 BRPM | TEMPERATURE: 98.6 F | OXYGEN SATURATION: 99 % | SYSTOLIC BLOOD PRESSURE: 118 MMHG | WEIGHT: 299.83 LBS | HEIGHT: 73 IN | BODY MASS INDEX: 39.74 KG/M2 | HEART RATE: 83 BPM | DIASTOLIC BLOOD PRESSURE: 77 MMHG

## 2024-04-15 LAB
BACTERIA SPEC AEROBE CULT: NORMAL
GLUCOSE BLDC GLUCOMTR-MCNC: 169 MG/DL (ref 70–130)
GLUCOSE BLDC GLUCOMTR-MCNC: 184 MG/DL (ref 70–130)
GRAM STN SPEC: NORMAL

## 2024-04-15 PROCEDURE — 97530 THERAPEUTIC ACTIVITIES: CPT

## 2024-04-15 PROCEDURE — 63710000001 INSULIN DETEMIR PER 5 UNITS: Performed by: SURGERY

## 2024-04-15 PROCEDURE — 63710000001 PREDNISONE PER 5 MG: Performed by: SURGERY

## 2024-04-15 PROCEDURE — 82948 REAGENT STRIP/BLOOD GLUCOSE: CPT

## 2024-04-15 PROCEDURE — 63710000001 INSULIN LISPRO (HUMAN) PER 5 UNITS: Performed by: SURGERY

## 2024-04-15 PROCEDURE — 25010000002 DAPTOMYCIN PER 1 MG: Performed by: SURGERY

## 2024-04-15 PROCEDURE — 99239 HOSP IP/OBS DSCHRG MGMT >30: CPT | Performed by: PHYSICIAN ASSISTANT

## 2024-04-15 PROCEDURE — 25010000002 HEPARIN (PORCINE) PER 1000 UNITS: Performed by: SURGERY

## 2024-04-15 PROCEDURE — 94660 CPAP INITIATION&MGMT: CPT

## 2024-04-15 PROCEDURE — 94799 UNLISTED PULMONARY SVC/PX: CPT

## 2024-04-15 RX ORDER — L.ACID,PARA/B.BIFIDUM/S.THERM 8B CELL
1 CAPSULE ORAL DAILY
Qty: 30 EACH | Refills: 1 | Status: SHIPPED | OUTPATIENT
Start: 2024-04-15

## 2024-04-15 RX ORDER — CLOPIDOGREL BISULFATE 75 MG/1
75 TABLET ORAL DAILY
Status: DISCONTINUED | OUTPATIENT
Start: 2024-04-15 | End: 2024-04-15

## 2024-04-15 RX ORDER — COLCHICINE 0.6 MG/1
0.6 TABLET ORAL DAILY PRN
Qty: 30 TABLET | Refills: 5 | Status: SHIPPED | OUTPATIENT
Start: 2024-04-15

## 2024-04-15 RX ORDER — L.ACID,PARA/B.BIFIDUM/S.THERM 8B CELL
1 CAPSULE ORAL DAILY
Status: DISCONTINUED | OUTPATIENT
Start: 2024-04-15 | End: 2024-04-15 | Stop reason: HOSPADM

## 2024-04-15 RX ORDER — ALLOPURINOL 300 MG/1
300 TABLET ORAL DAILY
Status: DISCONTINUED | OUTPATIENT
Start: 2024-04-15 | End: 2024-04-15 | Stop reason: HOSPADM

## 2024-04-15 RX ADMIN — Medication 1 CAPSULE: at 10:24

## 2024-04-15 RX ADMIN — PREDNISONE 10 MG: 10 TABLET ORAL at 08:24

## 2024-04-15 RX ADMIN — METOPROLOL SUCCINATE 100 MG: 50 TABLET, EXTENDED RELEASE ORAL at 08:25

## 2024-04-15 RX ADMIN — INSULIN LISPRO 2 UNITS: 100 INJECTION, SOLUTION INTRAVENOUS; SUBCUTANEOUS at 08:23

## 2024-04-15 RX ADMIN — INSULIN LISPRO 2 UNITS: 100 INJECTION, SOLUTION INTRAVENOUS; SUBCUTANEOUS at 11:37

## 2024-04-15 RX ADMIN — PIOGLITAZONE 30 MG: 15 TABLET ORAL at 08:25

## 2024-04-15 RX ADMIN — GLIPIZIDE 10 MG: 10 TABLET ORAL at 08:24

## 2024-04-15 RX ADMIN — ALLOPURINOL 300 MG: 300 TABLET ORAL at 10:24

## 2024-04-15 RX ADMIN — Medication 10 ML: at 08:26

## 2024-04-15 RX ADMIN — COLCHICINE 0.6 MG: 0.6 TABLET ORAL at 08:25

## 2024-04-15 RX ADMIN — LISINOPRIL 40 MG: 40 TABLET ORAL at 08:24

## 2024-04-15 RX ADMIN — INSULIN DETEMIR 10 UNITS: 100 INJECTION, SOLUTION SUBCUTANEOUS at 08:24

## 2024-04-15 RX ADMIN — DAPTOMYCIN 800 MG: 500 INJECTION, POWDER, LYOPHILIZED, FOR SOLUTION INTRAVENOUS at 10:24

## 2024-04-15 RX ADMIN — HEPARIN SODIUM 5000 UNITS: 5000 INJECTION INTRAVENOUS; SUBCUTANEOUS at 05:36

## 2024-04-15 NOTE — THERAPY TREATMENT NOTE
Patient Name: Alvaro Sinha  : 1965    MRN: [id number]                              Today's Date: 4/15/2024       Admit Date: 2024    Visit Dx:     ICD-10-CM ICD-9-CM   1. Osteomyelitis of right foot, unspecified type  M86.9 730.27   2. Cellulitis of right foot  L03.115 682.7   3. Diabetic ulcer of right foot associated with other specified diabetes mellitus, unspecified part of foot, unspecified ulcer stage  E13.621 250.80    L97.519 707.15   4. History of diabetes mellitus  Z86.39 V12.29     Patient Active Problem List   Diagnosis    Nephrolithiasis    Lymphadenopathy    Lymphoma    Osteomyelitis    Type 2 diabetes mellitus with hyperglycemia    Elevated serum immunoglobulin free light chains    Elevated erythrocyte sedimentation rate    Diabetic foot ulcer    History of myalgia due to HMG CoA reductase inhibitor    Gout    Hypertensive disorder    Hyperlipidemia    Hypercalcemia    Microalbuminuric diabetic nephropathy    Peripheral angiopathy due to diabetes mellitus    Stage 3a chronic kidney disease     Past Medical History:   Diagnosis Date    Diabetes mellitus     Gout     Hypertension     Osteomyelitis     right foot    Sleep apnea     CPAP     Past Surgical History:   Procedure Laterality Date    BONE RESECTION, RIB      FOOT IRRIGATION, DEBRIDEMENT AND REPAIR Right 2024    Procedure: foot irrigation, debridement, secondary closure Right;  Surgeon: Jeremi Hays Jr., MD;  Location:  [facility];  Service: Orthopedics;  Laterality: Right;    TRANS METATARSAL AMPUTATION Right 2024    Procedure: AMPUTATION TRANS METATARSAL RIGHT;  Surgeon: Jeremi Hays Jr., MD;  Location:  [facility];  Service: Orthopedics;  Laterality: Right;    VENOUS ACCESS DEVICE (PORT) INSERTION Left 2024    Procedure: INSERTION GROSHONG CATHETER WITH FLUOROSCOPY;  Surgeon: Israel Preciado MD;  Location:  [facility];  Service: General;  Laterality: Left;      General Information       Row Name  04/15/24 1510          Physical Therapy Time and Intention    Document Type therapy note (daily note)  -     Mode of Treatment physical therapy  -       Row Name 04/15/24 1510          General Information    Patient Profile Reviewed yes  -     Existing Precautions/Restrictions other (see comments)  heel WB for transfers in post op shoe otherwise NWB on RLE s/p 2nd and 3rd ray amp  -       Row Name 04/15/24 1510          Cognition    Orientation Status (Cognition) oriented x 3  -HM       Row Name 04/15/24 1510          Safety Issues, Functional Mobility    Safety Issues Affecting Function (Mobility) insight into deficits/self-awareness;awareness of need for assistance;safety precaution awareness;safety precautions follow-through/compliance;sequencing abilities  -     Impairments Affecting Function (Mobility) endurance/activity tolerance;pain;strength  -               User Key  (r) = Recorded By, (t) = Taken By, (c) = Cosigned By      Initials Name Provider Type     Maureen Nieto PT Physical Therapist                   Mobility       Row Name 04/15/24 1511          Bed Mobility    Comment, (Bed Mobility) UIC  -       Row Name 04/15/24 1511          Sit-Stand Transfer    Sit-Stand Hardeman (Transfers) contact guard  -     Assistive Device (Sit-Stand Transfers) walker, front-wheeled  -     Comment, (Sit-Stand Transfer) heel WB in post op shoe  -       Row Name 04/15/24 1511          Gait/Stairs (Locomotion)    Hardeman Level (Stairs) minimum assist (75% patient effort)  -     Assistive Device (Stairs) walker, front-wheeled  -     Number of Steps (Stairs) 1  -     Comment, (Gait/Stairs) PT wheeled pt into hallway in chair. Platform step brought into hallway and pt educated on NWB with walker utilizing backward technique ascending and forward technique descending. Cues for increased safety with maintaining self as close to step as possible and performing technique slowly for safety.  Pt able to perform CGA-Min A .  -               User Key  (r) = Recorded By, (t) = Taken By, (c) = Cosigned By      Initials Name Provider Type     Maureen Nieto PT Physical Therapist                   Obj/Interventions       Row Name 04/15/24 1516          Balance    Balance Assessment sitting static balance;sitting dynamic balance;sit to stand dynamic balance;standing static balance;standing dynamic balance  -     Static Sitting Balance independent  -     Dynamic Sitting Balance independent  -     Position, Sitting Balance unsupported;sitting in chair  -     Sit to Stand Dynamic Balance contact guard  -     Static Standing Balance standby assist  -     Dynamic Standing Balance contact guard;verbal cues  -     Position/Device Used, Standing Balance supported;walker, front-wheeled  -               User Key  (r) = Recorded By, (t) = Taken By, (c) = Cosigned By      Initials Name Provider Type     Maureen Nieto PT Physical Therapist                   Goals/Plan    No documentation.                  Clinical Impression       Row Name 04/15/24 1517          Pain    Pretreatment Pain Rating 0/10 - no pain  -     Posttreatment Pain Rating 0/10 - no pain  -     Pain Intervention(s) Ambulation/increased activity;Repositioned  -       Row Name 04/15/24 1517          Plan of Care Review    Plan of Care Reviewed With patient  -     Progress no change  -     Outcome Evaluation Pt able to complete 1 step utilizing platform step with FWx CGA-Min A with education on backward technique utilizing walker ascending and forward technique descending with cues for NWB and increased safety with sequencing and pacing. Pt continued to present below baseline function warranting IPPT POC. d/c rec home with assist.  -       Row Name 04/15/24 1517          Vital Signs    O2 Delivery Pre Treatment room air  -     O2 Delivery Intra Treatment room air  -     O2 Delivery Post Treatment room air  -      Pre Patient Position Sitting  -     Intra Patient Position Standing  -     Post Patient Position Sitting  -       Row Name 04/15/24 1517          Positioning and Restraints    Pre-Treatment Position sitting in chair/recliner  -     Post Treatment Position chair  -HM     In Chair notified nsg;call light within reach;sitting;encouraged to call for assist;with family/caregiver  -               User Key  (r) = Recorded By, (t) = Taken By, (c) = Cosigned By      Initials Name Provider Type     Maureen Nieto PT Physical Therapist                   Outcome Measures       Row Name 04/15/24 1520 04/15/24 0700       How much help from another person do you currently need...    Turning from your back to your side while in flat bed without using bedrails? 4  - 4  -KP    Moving from lying on back to sitting on the side of a flat bed without bedrails? 4  - 4  -KP    Moving to and from a bed to a chair (including a wheelchair)? 3  - 3  -KP    Standing up from a chair using your arms (e.g., wheelchair, bedside chair)? 3  - 3  -KP    Climbing 3-5 steps with a railing? 2  - 2  -KP    To walk in hospital room? 3  - 3  -KP    AM-PAC 6 Clicks Score (PT) 19  - 19  -KP    Highest Level of Mobility Goal 6 --> Walk 10 steps or more  - 6 --> Walk 10 steps or more  -KP      Row Name 04/15/24 1520          Functional Assessment    Outcome Measure Options AM-PAC 6 Clicks Basic Mobility (PT)  -               User Key  (r) = Recorded By, (t) = Taken By, (c) = Cosigned By      Initials Name Provider Type     Lia Hamm, RN Registered Nurse     Maureen Nieto, MOY Physical Therapist                                 Physical Therapy Education       Title: PT OT SLP Therapies (Done)       Topic: Physical Therapy (Done)       Point: Mobility training (Done)       Learning Progress Summary             Patient Acceptance, E,TB, VU,NR by  at 4/15/2024 1520    Comment: pt agreeable to education on stair  training NWB for his 1+1 step at home    Eager, E,D,H, VU,DU by DM at 4/13/2024 1601    Acceptance, E, VU by MM at 4/11/2024 0809    Acceptance, E, VU by KM at 4/7/2024 0023    Acceptance, E,TB, VU by TB at 4/5/2024 1410    Acceptance, E,TB, VU by TB at 4/3/2024 1520   Significant Other Eager, E,D,H, VU,DU by DM at 4/13/2024 1601                         Point: Home exercise program (Done)       Learning Progress Summary             Patient Eager, E,D,H, VU,DU by DM at 4/13/2024 1601    Acceptance, E, VU by MM at 4/11/2024 0809    Acceptance, E, VU by KM at 4/7/2024 0023    Acceptance, E,TB, VU by TB at 4/5/2024 1410    Acceptance, E,TB, VU by TB at 4/3/2024 1520   Significant Other Eager, E,D,H, VU,DU by DM at 4/13/2024 1601                         Point: Body mechanics (Done)       Learning Progress Summary             Patient Acceptance, E,TB, VU,NR by  at 4/15/2024 1520    Comment: pt agreeable to education on stair training NWB for his 1+1 step at home    Eager, E,D,H, VU,DU by DM at 4/13/2024 1601    Acceptance, E, VU by MM at 4/11/2024 0809    Acceptance, E, VU by KM at 4/7/2024 0023    Acceptance, E,TB, VU by TB at 4/5/2024 1410    Acceptance, E,TB, VU by TB at 4/3/2024 1520   Significant Other Eager, E,D,H, VU,DU by DM at 4/13/2024 1601                         Point: Precautions (Done)       Learning Progress Summary             Patient Acceptance, E,TB, VU,NR by  at 4/15/2024 1520    Comment: pt agreeable to education on stair training NWB for his 1+1 step at home    Eager, E,D,H, VU,DU by DM at 4/13/2024 1601    Acceptance, E, VU by MM at 4/11/2024 0809    Acceptance, E, VU by KM at 4/7/2024 0023    Acceptance, E,TB, VU by TB at 4/5/2024 1410    Acceptance, E,TB, VU by TB at 4/3/2024 1520   Significant Other Eager, E,D,H, VU,DU by DM at 4/13/2024 1601                                         User Key       Initials Effective Dates Name Provider Type Discipline    DM 02/03/23 -  Katia Thorpe, PT  Physical Therapist PT    KM 06/16/21 -  Sarita Bui, RN Registered Nurse Nurse     09/22/22 -  Maureen Nieto PT Physical Therapist PT    MM 02/16/24 -  Renetta Nieto, RN Registered Nurse Nurse     01/16/24 -  Lexi Grady PT Student PT Student PT                  PT Recommendation and Plan     Plan of Care Reviewed With: patient  Progress: no change  Outcome Evaluation: Pt able to complete 1 step utilizing platform step with FWx CGA-Min A with education on backward technique utilizing walker ascending and forward technique descending with cues for NWB and increased safety with sequencing and pacing. Pt continued to present below baseline function warranting IPPT POC. d/c rec home with assist.     Time Calculation:         PT Charges       Row Name 04/15/24 1542             Time Calculation    Start Time 1315  -HM      PT Received On 04/15/24  -HM         Timed Charges    40067 - PT Therapeutic Activity Minutes 10  -HM         Total Minutes    Timed Charges Total Minutes 10  -HM       Total Minutes 10  -HM                User Key  (r) = Recorded By, (t) = Taken By, (c) = Cosigned By      Initials Name Provider Type     Maureen Nieto, PT Physical Therapist                  Therapy Charges for Today       Code Description Service Date Service Provider Modifiers Qty    03376852806 HC PT THERAPEUTIC ACT EA 15 MIN 4/15/2024 Maureen Nieto, PT GP 1            PT G-Codes  Outcome Measure Options: AM-PAC 6 Clicks Basic Mobility (PT)  AM-PAC 6 Clicks Score (PT): 19  AM-PAC 6 Clicks Score (OT): 23  PT Discharge Summary  Anticipated Discharge Disposition (PT): home with assist    Maureen Nieto PT  4/15/2024

## 2024-04-15 NOTE — PLAN OF CARE
Problem: Adult Inpatient Plan of Care  Goal: Plan of Care Review  Outcome: Ongoing, Progressing  Goal: Patient-Specific Goal (Individualized)  Outcome: Ongoing, Progressing  Goal: Absence of Hospital-Acquired Illness or Injury  Outcome: Ongoing, Progressing  Intervention: Identify and Manage Fall Risk  Recent Flowsheet Documentation  Taken 4/15/2024 0600 by Leona Ag RN  Safety Promotion/Fall Prevention:   nonskid shoes/slippers when out of bed   safety round/check completed  Taken 4/15/2024 0400 by Leona Ag RN  Safety Promotion/Fall Prevention:   safety round/check completed   nonskid shoes/slippers when out of bed  Taken 4/15/2024 0200 by Leona Ag RN  Safety Promotion/Fall Prevention:   safety round/check completed   nonskid shoes/slippers when out of bed  Taken 4/15/2024 0000 by Leona Ag RN  Safety Promotion/Fall Prevention: safety round/check completed  Taken 4/14/2024 2200 by Leona Ag RN  Safety Promotion/Fall Prevention: safety round/check completed  Taken 4/14/2024 2000 by Leona Ag RN  Safety Promotion/Fall Prevention:   safety round/check completed   nonskid shoes/slippers when out of bed  Intervention: Prevent Skin Injury  Recent Flowsheet Documentation  Taken 4/14/2024 2000 by Leona Ag RN  Body Position: position changed independently  Skin Protection:   adhesive use limited   tubing/devices free from skin contact  Intervention: Prevent and Manage VTE (Venous Thromboembolism) Risk  Recent Flowsheet Documentation  Taken 4/14/2024 2200 by Leona Ag RN  Activity Management: activity encouraged  Taken 4/14/2024 2000 by Leona Ag RN  Activity Management: activity encouraged  VTE Prevention/Management: (see MAR) other (see comments)  Range of Motion: active ROM (range of motion) encouraged  Intervention: Prevent Infection  Recent Flowsheet Documentation  Taken 4/15/2024 0600 by Leona Ag RN  Infection Prevention:   hand hygiene  promoted   rest/sleep promoted  Taken 4/15/2024 0400 by Leona Ag RN  Infection Prevention:   hand hygiene promoted   rest/sleep promoted  Taken 4/15/2024 0000 by Leona Ag RN  Infection Prevention:   hand hygiene promoted   rest/sleep promoted  Taken 4/14/2024 2200 by Leona Ag RN  Infection Prevention:   hand hygiene promoted   rest/sleep promoted  Taken 4/14/2024 2000 by Leona Ag RN  Infection Prevention:   hand hygiene promoted   environmental surveillance performed   rest/sleep promoted  Goal: Optimal Comfort and Wellbeing  Outcome: Ongoing, Progressing  Intervention: Provide Person-Centered Care  Recent Flowsheet Documentation  Taken 4/14/2024 2000 by Leona Ag RN  Trust Relationship/Rapport:   care explained   choices provided   emotional support provided   questions answered   empathic listening provided   questions encouraged   reassurance provided   thoughts/feelings acknowledged  Goal: Readiness for Transition of Care  Outcome: Ongoing, Progressing     Problem: Diabetes Comorbidity  Goal: Blood Glucose Level Within Targeted Range  Outcome: Ongoing, Progressing  Intervention: Monitor and Manage Glycemia  Recent Flowsheet Documentation  Taken 4/14/2024 2000 by Leona Ag RN  Glycemic Management: blood glucose monitored     Problem: Hypertension Comorbidity  Goal: Blood Pressure in Desired Range  Outcome: Ongoing, Progressing  Intervention: Maintain Blood Pressure Management  Recent Flowsheet Documentation  Taken 4/14/2024 2000 by Leona Ag RN  Medication Review/Management: medications reviewed     Problem: Fall Injury Risk  Goal: Absence of Fall and Fall-Related Injury  Outcome: Ongoing, Progressing  Intervention: Identify and Manage Contributors  Recent Flowsheet Documentation  Taken 4/14/2024 2000 by Leona Ag RN  Medication Review/Management: medications reviewed  Self-Care Promotion:   independence encouraged   BADL personal objects within  reach  Intervention: Promote Injury-Free Environment  Recent Flowsheet Documentation  Taken 4/15/2024 0600 by Leona Ag RN  Safety Promotion/Fall Prevention:   nonskid shoes/slippers when out of bed   safety round/check completed  Taken 4/15/2024 0400 by Leona Ag RN  Safety Promotion/Fall Prevention:   safety round/check completed   nonskid shoes/slippers when out of bed  Taken 4/15/2024 0200 by Leona Ag RN  Safety Promotion/Fall Prevention:   safety round/check completed   nonskid shoes/slippers when out of bed  Taken 4/15/2024 0000 by Leona Ag RN  Safety Promotion/Fall Prevention: safety round/check completed  Taken 4/14/2024 2200 by Leona Ag RN  Safety Promotion/Fall Prevention: safety round/check completed  Taken 4/14/2024 2000 by Leona Ag RN  Safety Promotion/Fall Prevention:   safety round/check completed   nonskid shoes/slippers when out of bed     Problem: Noninvasive Ventilation Acute  Goal: Effective Unassisted Ventilation and Oxygenation  Outcome: Ongoing, Progressing  Intervention: Monitor and Manage Noninvasive Ventilation  Recent Flowsheet Documentation  Taken 4/14/2024 2000 by Leona Ag RN  Airway/Ventilation Management: pulmonary hygiene promoted     Problem: Skin Injury Risk Increased  Goal: Skin Health and Integrity  Outcome: Ongoing, Progressing  Intervention: Optimize Skin Protection  Recent Flowsheet Documentation  Taken 4/14/2024 2000 by Leona Ag RN  Pressure Reduction Techniques:   frequent weight shift encouraged   heels elevated off bed  Pressure Reduction Devices:   pressure-redistributing mattress utilized   heel offloading device utilized  Skin Protection:   adhesive use limited   tubing/devices free from skin contact   Goal Outcome Evaluation:

## 2024-04-15 NOTE — PLAN OF CARE
Goal Outcome Evaluation:  Plan of Care Reviewed With: patient        Progress: no change  Outcome Evaluation: Pt able to complete 1 step utilizing platform step with FWx CGA-Min A with education on backward technique utilizing walker ascending and forward technique descending with cues for NWB and increased safety with sequencing and pacing. Pt continued to present below baseline function warranting IPPT POC. d/c rec home with assist.      Anticipated Discharge Disposition (PT): home with assist

## 2024-04-15 NOTE — PLAN OF CARE
Problem: Adult Inpatient Plan of Care  Goal: Plan of Care Review  Outcome: Met  Goal: Patient-Specific Goal (Individualized)  Outcome: Met  Goal: Absence of Hospital-Acquired Illness or Injury  Outcome: Met  Intervention: Identify and Manage Fall Risk  Description: Perform standard risk assessment on admission using a validated tool or comprehensive approach appropriate to the patient; reassess fall risk frequently, with change in status or transfer to another level of care.  Communicate fall injury risk to interprofessional healthcare team.  Determine need for increased observation, equipment and environmental modification, such as low bed, signage and supportive, nonskid footwear.  Adjust safety measures to individual developmental age, stage and identified risk factors.  Reinforce the importance of safety and physical activity with patient and family.  Perform regular intentional rounding to assess need for position change, pain assessment and personal needs, including assistance with toileting.  Recent Flowsheet Documentation  Taken 4/15/2024 1000 by Lia Hamm RN  Safety Promotion/Fall Prevention:   activity supervised   clutter free environment maintained   fall prevention program maintained   room organization consistent   safety round/check completed  Intervention: Prevent Skin Injury  Description: Perform a screening for skin injury risk, such as pressure or moisture associated skin damage on admission and at regular intervals throughout hospital stay.  Keep all areas of skin (especially folds) clean and dry.  Maintain adequate skin hydration.  Relieve and redistribute pressure and protect bony prominences; implement measures based on patient-specific risk factors.  Match turning and repositioning schedule to clinical condition.  Encourage weight shift frequently; assist with reposition if unable to complete independently.  Float heels off bed; avoid pressure on the Achilles tendon.  Keep skin free  from extended contact with medical devices.  Encourage functional activity and mobility, as early as tolerated.  Use aids (e.g., slide boards, mechanical lift) during transfer.  Recent Flowsheet Documentation  Taken 4/15/2024 1000 by Lia Hamm RN  Body Position:   position changed independently   weight shifting  Skin Protection:   adhesive use limited   incontinence pads utilized   tubing/devices free from skin contact   skin-to-skin areas padded   skin-to-device areas padded  Intervention: Prevent and Manage VTE (Venous Thromboembolism) Risk  Description: Assess for VTE (venous thromboembolism) risk.  Encourage and assist with early ambulation.  Initiate and maintain compression or other therapy, as indicated, based on identified risk in accordance with organizational protocol and provider order.  Encourage both active and passive leg exercises while in bed, if unable to ambulate.  Recent Flowsheet Documentation  Taken 4/15/2024 1000 by Lia Hamm RN  Activity Management: activity encouraged  Intervention: Prevent Infection  Description: Maintain skin and mucous membrane integrity; promote hand, oral and pulmonary hygiene.  Optimize fluid balance, nutrition, sleep and glycemic control to maximize infection resistance.  Identify potential sources of infection early to prevent or mitigate progression of infection (e.g., wound, lines, devices).  Evaluate ongoing need for invasive devices; remove promptly when no longer indicated.  Recent Flowsheet Documentation  Taken 4/15/2024 1000 by Lia Hamm RN  Infection Prevention:   environmental surveillance performed   rest/sleep promoted  Taken 4/15/2024 0700 by Lia Hamm RN  Infection Prevention:   environmental surveillance performed   rest/sleep promoted  Goal: Optimal Comfort and Wellbeing  Outcome: Met  Goal: Readiness for Transition of Care  Outcome: Met     Problem: Diabetes Comorbidity  Goal: Blood Glucose Level Within Targeted  Range  Outcome: Met  Intervention: Monitor and Manage Glycemia  Description: Establish target blood glucose levels based on patient-specific factors, such as age, diabetes-related complications and illness severity.  Document blood glucose levels and monitor trend; advocate for adjustment to keep within targeted range.  Provide pharmacologic therapy to maintain blood glucose levels within targeted range.  Check blood glucose level if there is a change in mental or cognitive status.  Recognize, treat and document hypoglycemia event and potential cause.  Avoid hypoglycemic episodes by advocating for insulin dose adjustment when there is a change in condition, such as illness severity, decreased oral intake, missed or refused meals and snacks, as well as medication change that may include steroid tape  Recent Flowsheet Documentation  Taken 4/15/2024 0800 by Lia Hamm RN  Glycemic Management: blood glucose monitored     Problem: Hypertension Comorbidity  Goal: Blood Pressure in Desired Range  Outcome: Met  Intervention: Maintain Blood Pressure Management  Description: Evaluate adherence to home antihypertensive regimen (e.g., exercise and activity, diet modification, medication).  Provide scheduled antihypertensive medication; consider administration time and effects (e.g., avoid giving diuretic prior to bedtime).  Monitor response to antihypertensive medication therapy (e.g., blood pressure, electrolyte levels, medication effects).  Minimize risk of orthostatic hypotension; encourage caution with position changes, particularly if elderly.  Recent Flowsheet Documentation  Taken 4/15/2024 1000 by Lia Hamm RN  Medication Review/Management: medications reviewed     Problem: Fall Injury Risk  Goal: Absence of Fall and Fall-Related Injury  Outcome: Met  Intervention: Identify and Manage Contributors  Description: Develop a fall prevention plan with the patient and caregiver/family.  Provide reorientation,  appropriate sensory stimulation and routines with changes in mental status to decrease risk of fall.  Promote use of personal vision and auditory aids.  Assess assistance level required for safe and effective self-care; provide support as needed, such as toileting, mobilization. For age 65 and older, implement timed toileting with assistance.  Encourage physical activity, such as performance of mobility and self-care at highest level of patient ability, multicomponent exercise program and provision of appropriate assistive devices.  If fall occurs, assess the severity of injury; implement fall injury protocol. Determine the cause and revise fall injury prevention plan.  Regularly review medication contribution to fall risk; adjust medication administration times to minimize risk of falling.  Consider risk related to polypharmacy and age.  Balance adequate pain management with potential for oversedation.  Recent Flowsheet Documentation  Taken 4/15/2024 1000 by Lia Hamm RN  Medication Review/Management: medications reviewed  Self-Care Promotion:   independence encouraged   BADL personal objects within reach  Taken 4/15/2024 0700 by Lia Hamm RN  Self-Care Promotion:   independence encouraged   BADL personal objects within reach  Intervention: Promote Injury-Free Environment  Description: Provide a safe, barrier-free environment that encourages independent activity.  Keep care area uncluttered and well-lighted.  Determine need for increased observation or monitoring.  Avoid use of devices that minimize mobility, such as restraints or indwelling urinary catheter.  Recent Flowsheet Documentation  Taken 4/15/2024 1000 by Lia Hamm, RN  Safety Promotion/Fall Prevention:   activity supervised   clutter free environment maintained   fall prevention program maintained   room organization consistent   safety round/check completed     Problem: Noninvasive Ventilation Acute  Goal: Effective Unassisted  Ventilation and Oxygenation  Outcome: Met     Problem: Skin Injury Risk Increased  Goal: Skin Health and Integrity  Outcome: Met  Intervention: Optimize Skin Protection  Description: Perform a full pressure injury risk assessment, as indicated by screening, upon admission to care unit.  Reassess skin (injury risk, full inspection) frequently (e.g., scheduled interval, with change in condition) to provide optimal early detection and prevention.  Maintain adequate tissue perfusion (e.g., encourage fluid balance; avoid crossing legs, constrictive clothing or devices) to promote tissue oxygenation.  Maintain head of bed at lowest degree of elevation tolerated, considering medical condition and other restrictions.  Avoid positioning onto an area that remains reddened.  Minimize incontinence and moisture (e.g., toileting schedule; moisture-wicking pad, diaper or incontinence collection device; skin moisture barrier).  Cleanse skin promptly and gently when soiled utilizing a pH-balanced cleanser.  Relieve and redistribute pressure (e.g., scheduled position changes, weight shifts, use of support surface, medical device repositioning, protective dressing application, use of positioning device, microclimate control, use of pressure-injury-monitor  Encourage increased activity, such as sitting in a chair at the bedside or early mobilization, when able to tolerate.  Recent Flowsheet Documentation  Taken 4/15/2024 1000 by Lia Hamm RN  Pressure Reduction Techniques:   frequent weight shift encouraged   heels elevated off bed   positioned off wounds   pressure points protected  Head of Bed (HOB) Positioning: HOB elevated  Pressure Reduction Devices:   pressure-redistributing mattress utilized   positioning supports utilized   heel offloading device utilized  Skin Protection:   adhesive use limited   incontinence pads utilized   tubing/devices free from skin contact   skin-to-skin areas padded   skin-to-device areas  padded   Goal Outcome Evaluation:

## 2024-04-15 NOTE — NURSING NOTE
Instructed patient/wife on self administration of IV antibiotics via Groshong for home.  Patient returned demonstration with proficiency.  Reviewed side effects, probiotics, line care, labs and 24hrs. RN availability.  [facility] will provide all IV antibiotics and supplies, complete weekly line care and labs.  Patient to f/u with Dr. Jameson on 4/23/24 @ 11AM with labs prior.  Any [facility] at [phone].

## 2024-04-15 NOTE — PROGRESS NOTES
[facility]    INFECTIOUS DISEASE CONSULT/INITIAL HOSPITAL VISIT    Alvaro Sinha  1965  [id number]    Date of consult: 4/3/2024    Admit date: 4/2/2024    Requesting Provider: No ref. provider found  Evaluating physician: Aung Jameson MD  Reason for Consultation: Right foot diabetic wound ulcers with underlying osteomyelitis second and third toe  Chief Complaint: Above      Subjective   History of present illness:  Patient is a  58 y.o.  Yr old male with a history of diabetes mellitus type 2, essential hypertension, hyperlipidemia, CKD stage IIIa, gout, with chronic right foot ulceration being followed at the [facility] which worsened 3/31/2024.  He has been using a boot to offload his foot, but was also working at [employer], although he has been off work recently.  MRI of the foot 4/2/2024 was consistent with possible early osteomyelitis second and third middle and distal phalanges.  He has a plantar draining fistulous track below his second and third metatarsal and an anterior web skin breakdown between his second and third toe on his foot.  He was awaiting possible transfer to the [facility] for further orthopedic evaluation.  He has no other localizing signs or symptoms of infection.  I was consulted on 4/3/2024 for further evaluation and treatment.    4/4/2024 history reviewed.  No high fevers or chills.  Tolerating antibiotics for right foot osteomyelitis.    4/5/2024 history reviewed.  Tolerating his antibiotics for right foot osteomyelitis second and third metatarsal.  No high fever.  Awaits surgical disposition.    4/8/2024 history reviewed.  No high fever.  Tolerating antibiotics for right foot second and third toe osteomyelitis.  Awaits possible second and third metatarsal resection by surgery.    4/9/24 history reviewed.  Status post ray resection second and third right toe by Dr. Hays on 4/8/2024.  No high fever.  Tolerating  antibiotics.  Changing to ceftriaxone and daptomycin for Streptococcus, Eikenella (should be sensitive to ceftriaxone).    4/10/24 history reviewed.  Tolerating antibiotics for right foot osteomyelitis with daptomycin and ceftriaxone until 5/22.  No respiratory complaints or pain.  No fever.    4/11/2024 history reviewed.  Continues on antibiotics for right foot osteomyelitis with ceftriaxone and daptomycin until 5/22.  Awaits revision surgery.    4/12/2024 history reviewed.  Tolerating antibiotics for right foot osteomyelitis with daptomycin and ceftriaxone until 5/22.  No high fever.  Underwent revision surgery today.    4/13/2024 history reviewed.  On antibiotics for right foot osteomyelitis with ceftriaxone and daptomycin with cultures positive for Eikenella and Streptococcus to continue until 5/22.  Status post surgical resection of second and third right toes on 4/8.  No high fever.    4/15/2024 history reviewed.  No high fevers or chills.  Tolerating antibiotics with daptomycin and ceftriaxone to continue until 5/22 for right foot osteomyelitis with cultures positive for Eikenella and Streptococcus status post resection 4/8.  No high fevers or chills.    Past Medical History:   Diagnosis Date    Diabetes mellitus     Gout     Hypertension     Osteomyelitis     right foot    Sleep apnea     CPAP       Past Surgical History:   Procedure Laterality Date    BONE RESECTION, RIB      TRANS METATARSAL AMPUTATION Right 4/8/2024    Procedure: AMPUTATION TRANS METATARSAL RIGHT;  Surgeon: Jeremi Hays Jr., MD;  Location: [facility];  Service: Orthopedics;  Laterality: Right;       Pediatric History   Patient Parents    Not on file     Other Topics Concern    Not on file   Social History Narrative    Not on file   No cigarettes, occasional alcohol, no drug use,  to [place]enita with 1 child.  Works at [employer].    family history is not on file.    Allergies   Allergen Reactions    Statins Myalgia        Immunization History   Administered Date(s) Administered    COVID-19 (MODERNA) 1st,2nd,3rd Dose Monovalent 09/23/2021, 11/02/2021       Medication:  @Scheduled Meds:cefTRIAXone, 2,000 mg, Intravenous, Q24H  colchicine, 0.6 mg, Oral, Daily  DAPTOmycin, 8 mg/kg (Adjusted), Intravenous, Q24H  docusate sodium, 100 mg, Oral, BID  glipizide, 10 mg, Oral, QAM AC  heparin (porcine), 5,000 Units, Subcutaneous, Q8H  insulin detemir, 10 Units, Subcutaneous, Daily  insulin lispro, 2-9 Units, Subcutaneous, 4x Daily AC & at Bedtime  lisinopril, 40 mg, Oral, Daily  metoprolol succinate XL, 100 mg, Oral, Daily  pioglitazone, 30 mg, Oral, Daily  predniSONE, 10 mg, Oral, Daily With Breakfast  sodium chloride, 10 mL, Intravenous, Q12H      Continuous Infusions:lactated ringers, 9 mL/hr, Last Rate: 9 mL/hr (04/12/24 1039)      PRN Meds:.  acetaminophen    senna-docusate sodium **AND** polyethylene glycol **AND** bisacodyl **AND** bisacodyl    Calcium Replacement - Follow Nurse / BPA Driven Protocol    dextrose    dextrose    glucagon (human recombinant)    Magnesium Standard Dose Replacement - Follow Nurse / BPA Driven Protocol    oxyCODONE    Phosphorus Replacement - Follow Nurse / BPA Driven Protocol    Potassium Replacement - Follow Nurse / BPA Driven Protocol    sodium chloride    sodium chloride     Please refer to the medical record for a full medication list    Review of Systems:    Constitutional-- No Fever, chills or sweats.  Appetite good, and no malaise.  Occasional fatigue.  HEENT-- No new vision, hearing or throat complaints.  No epistaxis or oral sores.  Denies odynophagia or dysphagia.  No odynophagia or dysphagia. No headache, photophobia or neck stiffness.  CV-- No chest pain, palpitation or syncope  Resp-- No SOB/cough/Hemoptysis, no wheeze  GI- No nausea, vomiting, or diarrhea.  No hematochezia, melena, or hematemesis. Denies jaundice or chronic liver disease.  -- No dysuria, hematuria, or flank pain.  Denies  "hesitancy, urgency.  Lymph- no swollen lymph nodes in neck/axilla or groin.   Heme- No active bruising or bleeding; no Hx of DVT or PE.  MS-- no swelling or pain in the bones or joints of arms/legs.  No new back pain.  Neuro-- No acute focal weakness or numbness in the arms or legs.  No seizures.  Skin--No rashes or lesions, except right foot as per HPI, no nodules    Physical Exam:   Vital Signs   Temp:  [97 °F (36.1 °C)-98.7 °F (37.1 °C)] 97.9 °F (36.6 °C)  Heart Rate:  [62-78] 74  Resp:  [14-20] 14  BP: ()/(52-90) 116/76    Blood pressure 116/76, pulse 74, temperature 97.9 °F (36.6 °C), temperature source Axillary, resp. rate 14, height 185.4 cm (72.99\"), weight 136 kg (299 lb 13.2 oz), SpO2 100%.  GENERAL: Awake and alert, in minor distress. Appears  stated age.  Resting in bed.    HEENT:  Normocephalic, atraumatic.  Oropharynx without thrush. Dentition in good repair. No cervical adenopathy. No neck masses.  Ears externally normal, Nose externally normal.  EYES:  No conjunctival injection. No icterus. EOM full.  LYMPHATICS: No lymphadenopathy of the neck or axillary or inguinal regions.   HEART: No murmur, gallop, or pericardial friction rub. Reg rate rhythm.    LUNGS: Clear to auscultation and percussion. No respiratory distress, no use of accessory muscles.  ABDOMEN: Soft, nontender, nondistended. No appreciable HSM.  Bowel sounds normal.  Obese.  No mass.  SKIN: Warm and dry without cutaneous eruptions.  No nodules.  Right foot surgical dressing in place.  PSYCHIATRIC: Mental status lucid. No confusion.  EXT:  No cellulitic change.  Normal range of motion.  NEURO: Oriented to name, nonfocal.              4/3/24 right foot wounds prior to ray resections 4/8          Right foot 4/13/24 post surgery    Results Review:   I reviewed the patient's new clinical results.  I reviewed the patient's new imaging results and agree with the interpretation.  I reviewed the patient's other test results and agree with " "the interpretation    Results from last 7 days   Lab Units 04/14/24  0445 04/13/24  0342 04/12/24  0437   WBC 10*3/mm3 8.22 8.04 6.25   HEMOGLOBIN g/dL 11.8* 11.9* 11.6*   HEMATOCRIT % 36.9* 36.5* 35.8*   PLATELETS 10*3/mm3 192 187 194     Results from last 7 days   Lab Units 04/14/24  1328   SODIUM mmol/L 140   POTASSIUM mmol/L 4.8   CHLORIDE mmol/L 106   CO2 mmol/L 24.0   BUN mg/dL 32*   CREATININE mg/dL 1.33*   GLUCOSE mg/dL 185*   CALCIUM mg/dL 9.0     Results from last 7 days   Lab Units 04/10/24  0629   ALK PHOS U/L 126*   BILIRUBIN mg/dL 0.2   ALT (SGPT) U/L 53*   AST (SGOT) U/L 48*                           Estimated Creatinine Clearance: 87.3 mL/min (A) (by C-G formula based on SCr of 1.33 mg/dL (H)).  CPK          4/10/2024    06:29   Common Labs   Creatine Kinase 21       Procalitonin Results:       Brief Urine Lab Results       None           No results found for: \"SITE\", \"ALLENTEST\", \"PHART\", \"GWU7RQF\", \"PO2ART\", \"BED7PPA\", \"BASEEXCESS\", \"T1QCEJUH\", \"HGBBG\", \"HCTABG\", \"OXYHEMOGLOBI\", \"METHHGBN\", \"CARBOXYHGB\", \"CO2CT\", \"BAROMETRIC\", \"MODALITY\", \"FIO2\"     Microbiology:    Right foot wound culture 4/2/2024 with Streptococcus agalactiae, Streptococcus mitis, Eikenella, gram-negative rods on Gram stain, MRSA screen negative, blood cultures x 2 from 4/2 negative.  Streptococcus, group B strep, Eikenella.    Results for orders placed or performed during the hospital encounter of 04/02/24   Blood Culture - Blood, Arm, Right    Specimen: Arm, Right; Blood   Result Value Ref Range    Blood Culture No growth at 5 days          Culture results from last 30 days   Lab 04/12/24  1102 04/08/24  1639 04/02/24  1900   WOUNDCX No growth at 2 days No growth at 3 days Heavy growth (4+) Streptococcus agalactiae (Group B)*  Heavy growth (4+) Streptococcus mitis / oralis*  Light growth (2+) Eikenella corrodens*  Moderate growth (3+) Normal Skin Flor   ANACX  --  No anaerobes isolated at 5 days  --       Brief Urine Lab " Results       None        Blood cultures x 2 from 4/2 are negative.  Wound culture right foot growing group B streptococcus to date, MRSA screen negative.    Radiology:  Imaging Results (Last 72 Hours)       Procedure Component Value Units Date/Time    XR Chest 1 View [[id number]] Collected: 04/14/24 1034     Updated: 04/14/24 1039    Narrative:      XR CHEST 1 VW    Date of Exam: 4/14/2024 10:33 AM EDT    Indication: PNEUMOTHORAX  eval line    Comparison: PET/CT 5/27/2022    FINDINGS:  No consolidations or pleural effusions are observed. There is no evidence for pneumothorax. Chronic changes are noted in the lingula. The cardiac silhouette is within normal limits for size. The mediastinum is unremarkable. No acute osseous abnormalities   are identified on this single view.      Impression:      1.No evidence for acute cardiopulmonary process. No evidence for pneumothorax.      Electronically Signed: Erich Starkey MD    4/14/2024 10:36 AM EDT    Workstation ID: [id number]    FL C Arm During Surgery [[id number]] Resulted: 04/14/24 1014     Updated: 04/14/24 1014    Narrative:      This procedure was auto-finalized with no dictation required.            IMPRESSION:     Second and third distal and middle phalanges MRI changes with bone marrow edema in the setting of diabetes and chronic wounds of the right foot with increased pretest probability for underlying osteomyelitis, versus reactive edema.  I would favor osteomyelitis given the way his right foot looks with a fistulous tract that probes deep to bone.  No erosions in the cortical structure noted.  Usual organisms will be mixed terri.  MRSA screen negative, wound culture Gram stain with gram-negative rods and gram-positive cocci, blood cultures x 2 from 4/2 are negative to date.  Group B streptococcus growing as of 4/4/2024, and Streptococcus mitis.  Status post right second and third ray resection 4/8/2024.  Dr. Hays.  Revision surgery on 4/12.  Right foot  cellulitis and wound infection associated with diabetes, despite use of a surgical boot.  Diabetes mellitus type 2 with increased risk for infection.  Chronic kidney disease stage IIIa, creatinine 1.18 on 4/3/2024.  1.23 on 4/4/2024.  1.47 on 4/5.  1.26 on 4/8.  1.35 on 4/9.  1.17 on 4/10.  1.28 on 4/11, 1.17 on 4/12.  1.21 on 4/13.  1.33 on 4/14, worse.  Anemia, chronic disease.  Continues.  Elevated alkaline phosphatase new 126, ALT 53, AST 48.  Likely related to above issues and medications.    PLAN:    Diagnostically, continue to follow patient's physical exam, CBC, CMP, CRP.  Cultures which were obtained from the foot.    Therapeutically, changed to daptomycin and ceftriaxone on 4/9 in anticipation of outpatient therapy for 6 weeks.  This would provide coverage for Streptococcus and Eikenella.  Then potential oral antibiotics.   Supportive care.  Wound care.  Nonweightbearing on right foot.  His wife was working on getting a scooter.  Status post third and second toe ray resection 4/8/2024..  PICC versus Groshong.  He does have CKD stage IIIa.    I discussed the patient's findings and my recommendations with patient, family, and nursing staff    Case management orders: Please arrange for home antibiotics with [facility] with daptomycin 700 mg IV daily, ceftriaxone 2 g IV daily to continue until 5/22/2024.  Check CBC, CMP, CRP, CPK weekly while on IV antibiotics.  Fax orders to [phone], call [phone] with final arrangements.  Arrange for follow-up with me in 1 week postdischarge.    Thank you for asking me to see Alvaro Sinha.  Our group would be pleased to follow this patient over the course of their hospitalization and assist with outpatient antimicrobial therapy, as indicated.  Further recommendations depend on the results of the cultures and clinical course.  Increased risk for adverse drug reactions, complications of IV access, readmission, loss of limb.  Side effects  discussed.      Aung Jameson MD  4/15/2024

## 2024-04-15 NOTE — DISCHARGE SUMMARY
[facility] Medicine Services  DISCHARGE SUMMARY    Patient Name: Alvaro Sinha  : 1965  MRN: [id number]    Date of Admission: 2024  5:12 PM  Date of Discharge:  4/15/2024  Primary Care Physician: Martha Mckee MD    Consults       Date and Time Order Name Status Description    2024 10:30 AM Inpatient General Surgery Consult Completed     4/3/2024 12:01 AM Inpatient Infectious Diseases Consult Completed           Hospital Course     Active Hospital Problems    Diagnosis  POA    **Osteomyelitis [M86.9]  Yes    Stage 3a chronic kidney disease [N18.31]  Yes    History of myalgia due to HMG CoA reductase inhibitor [Z87.39]  Not Applicable    Microalbuminuric diabetic nephropathy [E11.21]  Yes    Hyperlipidemia [E78.5]  Yes    Type 2 diabetes mellitus with hyperglycemia [E11.65]  Yes    Lymphoma [C85.90]  Yes    Diabetic foot ulcer [E11.621, L97.509]  Yes    Hypertensive disorder [I10]  Yes      Resolved Hospital Problems   No resolved problems to display.      Hospital Course:  Alvaro Sinha is a 58 y.o. male with PMH significant for HTN, HLD (statin intolerant), CKD III, DMII with peripheral neuropathy and gout. He presented to [facility] ED on 24 for evaluation of a progressively worsening R foot wound. MRI was concerning for early osteomyelitis of the 2nd and 3rd metatarsals and distal phalanges. He underwent R 2nd and 3rd toe amputation and wound VAC placement by Dr. Hummel on 24. He returned to the OR on  for debridement and secondary wound closure by Dr. Hays     Nonhealing diabetic ulcer of the right foot with overlying cellulitis   Concern for R 2nd and 3rd toe osteomyelitis  - Presented with chronic foot ulcer with worsening cellulitis  - MRI concerning for early 2nd and 3rd metatarsal and distal phalances  - Arterial duplex and CTA runoff with no evidence of significant peripheral arterial disease  - He is s/p R  second and third toe amputation and wound vac placement by Dr. Hummel on 4/8/24  - s/p debridement and wound closure by Dr. Hays on 4/12/2024  - Appreciate ID assistance.   - Groshong placed by Dr. Preciado on 4/14  - Plan to DC on Daptomycin / Rocephin   - Follow up with Dr. Hays on 4/19  - Follow up with Dr. Ortiz in 1 week     Acute gout flare, resolved  - Did not improve with colchicine monotherapy but significant improvement after starting steroids   - Pain resolved on day of DC - patient requested to stop steroids  - Continue daily Allopurinol and PRN Colchicine at DC    DMII  - A1c 8.5%   - Was on Trulicity but has not filled for a few months because his pharmacy did not have it in stock. Sent Rx to [facility] and filled before DC  - Continue home Actos and Glipizide    CKD III  Hypertension  - Renal function stable  - Continue routine Lisinopril    Lymphadenopathy / elevated free light chains  - Found to have diffuse lymphadenopathy involving mediastinum, upper abdomen, retroperitoneum and inguinal area in 2022 as well as paraproteinemia and elevated free light chains.   - Follows with Dr. Jacome at [facility] - elevated free light chains felt to be related to CKD. Monitoring lymphadenopathy with plan for biopsy should it worsen   - Appointment scheduled 5/6/24 - encouraged to keep appointment. He said he may call and reschedule this     HLD  History of stain intolerance  - Reports myalgias     Follow up with PCP in 1 week     Day of Discharge     HPI:   Sitting up in bed, dressed and ready to go home. Post-surgical pain is well controlled. Denies chest pain, dyspnea, abdominal pain, nausea or vomiting. Pain from gout flare has resolved - he'd like to stop taking prednisone at this point     Review of Systems  Gen- No fevers, chills  CV- No chest pain, palpitations  Resp- No cough, dyspnea  GI- No N/V/D, abd pain    Vital Signs:          Physical Exam:  Constitutional: No acute distress, awake, alert  and conversational. Obese body habitus   HENT: NCAT, mucous membranes moist  Respiratory: Clear to auscultation bilaterally, respiratory effort normal on room air   Cardiovascular: RRR, no murmurs, rubs, or gallops  Gastrointestinal: Positive bowel sounds, soft, nontender, nondistended  Musculoskeletal: No bilateral ankle edema. R foot amputation site dressed - I did not remove dressing for exam   Psychiatric: Appropriate affect, cooperative  Neurologic: Oriented x 3, moves all extremities spontaneously without focal deficits, speech clear    Pertinent  and/or Most Recent Results     LAB RESULTS:      Lab 04/14/24  0445 04/13/24  0342 04/12/24  0437   WBC 8.22 8.04 6.25   HEMOGLOBIN 11.8* 11.9* 11.6*   HEMATOCRIT 36.9* 36.5* 35.8*   PLATELETS 192 187 194   MCV 95.1 93.4 94.0         Lab 04/14/24  1328 04/13/24  0342 04/12/24  0437 04/11/24  0513   SODIUM 140 137 137 140   POTASSIUM 4.8 4.9 4.6 4.5   CHLORIDE 106 102 103 104   CO2 24.0 26.0 25.0 26.0   ANION GAP 10.0 9.0 9.0 10.0   BUN 32* 28* 28* 30*   CREATININE 1.33* 1.21 1.17 1.28*   EGFR 62.0 69.4 72.3 64.9   GLUCOSE 185* 184* 183* 160*   CALCIUM 9.0 9.2 9.3 9.1   MAGNESIUM  --  1.9  --   --      Brief Urine Lab Results       None          Microbiology Results (last 10 days)       Procedure Component Value - Date/Time    AFB Culture - Tissue, Foot, Right [[id number]] Collected: 04/12/24 1102    Lab Status: Preliminary result Specimen: Tissue from Foot, Right Updated: 04/13/24 1109     AFB Stain No acid fast bacilli seen on concentrated smear    Anaerobic Culture 10 Day Incubation - Tissue, Foot, Right [[id number]]  (Normal) Collected: 04/12/24 1102    Lab Status: Preliminary result Specimen: Tissue from Foot, Right Updated: 04/17/24 0714     Anaerobic Culture No anaerobes isolated at 5 days    Wound Culture - Surgical Site, Foot, Right [[id number]] Collected: 04/12/24 1102    Lab Status: Final result Specimen: Surgical Site from Foot, Right Updated: 04/15/24 0724      Wound Culture No growth at 3 days     Gram Stain Rare (1+) WBCs seen      Many (4+) Red blood cells      No organisms seen    Anaerobic Culture - Wound, Toe, Right [[id number]]  (Normal) Collected: 04/08/24 1639    Lab Status: Final result Specimen: Wound from Toe, Right Updated: 04/13/24 1152     Anaerobic Culture No anaerobes isolated at 5 days    Wound Culture - Wound, Toe, Right [[id number]] Collected: 04/08/24 1639    Lab Status: Final result Specimen: Wound from Toe, Right Updated: 04/11/24 0626     Wound Culture No growth at 3 days     Gram Stain No WBCs or organisms seen            XR Chest 1 View    Result Date: 4/14/2024  XR CHEST 1 VW Date of Exam: 4/14/2024 10:33 AM EDT Indication: PNEUMOTHORAX eval line Comparison: PET/CT 5/27/2022 FINDINGS: No consolidations or pleural effusions are observed. There is no evidence for pneumothorax. Chronic changes are noted in the lingula. The cardiac silhouette is within normal limits for size. The mediastinum is unremarkable. No acute osseous abnormalities  are identified on this single view.     1.No evidence for acute cardiopulmonary process. No evidence for pneumothorax. Electronically Signed: Erich Starkey MD  4/14/2024 10:36 AM EDT  Workstation ID: [id number]    FL C Arm During Surgery    Result Date: 4/14/2024  This procedure was auto-finalized with no dictation required.    Peripheral Block    Result Date: 4/12/2024  William Lala CRNA     4/12/2024  9:32 AM Peripheral Block Patient reassessed immediately prior to procedure Patient location during procedure: pre-op Start time: 4/12/2024 9:26 AM Stop time: 4/12/2024 9:29 AM Reason for block: at surgeon's request and post-op pain management Performed by Anesthesiologist: Deborah Mckinney MD Assisted by: Jenny Sanches RN Preanesthetic Checklist Completed: patient identified, IV checked, site marked, risks and benefits discussed, surgical consent, monitors and equipment checked, pre-op evaluation and  "timeout performed Prep: Pt Position: left lateral decubitus Sterile barriers:cap, gloves, mask and washed/disinfected hands Prep: ChloraPrep Patient monitoring: blood pressure monitoring, continuous pulse oximetry and EKG Procedure Sedation: yes Performed under: local infiltration Guidance:ultrasound guided ULTRASOUND INTERPRETATION.  Using ultrasound guidance a 20 G gauge needle was placed in close proximity to the nerve, at which point, under ultrasound guidance anesthetic was injected in the area of the nerve and spread of the anesthesia was seen on ultrasound in close proximity thereto.  There were no abnormalities seen on ultrasound; a digital image was taken; and the patient tolerated the procedure with no complications. Images:still images obtained, printed/placed on chart Laterality:right Block Type:popliteal Injection Technique:single-shot Needle Type:echogenic and short-bevel Needle Gauge:20 G Resistance on Injection: none Medications Used: dexamethasone sodium phosphate injection - Injection  2 mg - 4/12/2024 9:29:00 AM bupivacaine PF (MARCAINE) 0.25 % injection - Injection  30 mL - 4/12/2024 9:29:00 AM Post Assessment Injection Assessment: negative aspiration for heme, no paresthesia on injection and incremental injection Patient Tolerance:comfortable throughout block Complications:no Additional Notes SINGLE shot  A high-frequency linear transducer, with sterile cover, was placed in the popliteal fossa to identify the popliteal artery and vein, Tibial nerve (TN) and Common Peroneal nerve (CP). The transducer was then moved in a cephalad fashion to observe the TN and CP nerve bifurcation to form the Sciatic Nerve. The insertion site was prepped and draped in sterile fashion. Skin and cutaneous tissue was infiltrated with 2-5 ml of 1% Lidocaine. Using ultrasound-guidance, a 20-gauge B-Aguilar 4\" Ultraplex 360 non-stimulating echogenic needle was then inserted and advanced in plane from lateral to medial. " Preservative-free normal saline was utilized for hydro-dissection of tissue, advancement of Touhy, and to confirm final needle placement posterior to the nerves. Local anesthetic injection spread, in incremental 3-5 ml injections, to surround both nerve structures. Aspiration every 5 ml to prevent intravascular injection. Injection was completed with negative aspiration of blood and negative intravascular injection. Injection pressures were normal with minimal resistance     Right Pop SS    Result Date: 4/8/2024  Jeremi Jj CRNA     4/8/2024  3:44 PM Right Pop SS Patient reassessed immediately prior to procedure Patient location during procedure: pre-op Start time: 4/8/2024 3:35 PM Reason for block: at surgeon's request and post-op pain management Performed by CRNA/CAA: Jeremi Jj CRNA Assisted by: Evans Lugo CRNA Preanesthetic Checklist Completed: patient identified, IV checked, site marked, risks and benefits discussed, surgical consent, monitors and equipment checked, pre-op evaluation and timeout performed Prep: Pt Position: left lateral decubitus Sterile barriers:cap, gloves, mask and washed/disinfected hands Prep: ChloraPrep Patient monitoring: blood pressure monitoring, continuous pulse oximetry and EKG Procedure Sedation: yes Performed under: local infiltration Guidance:ultrasound guided ULTRASOUND INTERPRETATION.  Using ultrasound guidance a 20 G gauge needle was placed in close proximity to the sciatic nerve, at which point, under ultrasound guidance anesthetic was injected in the area of the nerve and spread of the anesthesia was seen on ultrasound in close proximity thereto.  There were no abnormalities seen on ultrasound; a digital image was taken; and the patient tolerated the procedure with no complications. Images:still images obtained, printed/placed on chart Laterality:right Block Type:popliteal Injection Technique:single-shot Needle Type:echogenic and Tuohy Needle Gauge:18 G  "Resistance on Injection: none Catheter Size:20 G Medications Used: dexamethasone sodium phosphate injection - Injection  2 mg - 4/8/2024 3:35:00 PM bupivacaine PF (MARCAINE) 0.25 % injection - Injection  30 mL - 4/8/2024 3:35:00 PM fentaNYL citrate (PF) (SUBLIMAZE) injection - Intravenous  100 mcg - 4/8/2024 3:35:00 PM Post Assessment Injection Assessment: negative aspiration for heme, no paresthesia on injection and incremental injection Patient Tolerance:comfortable throughout block Complications:no Additional Notes SINGLE shot  A high-frequency linear transducer, with sterile cover, was placed in the popliteal fossa to identify the popliteal artery and vein, Tibial nerve (TN) and Common Peroneal nerve (CP). The transducer was then moved in a cephalad fashion to observe the TN and CP nerve bifurcation to form the Sciatic Nerve. The insertion site was prepped and draped in sterile fashion. Skin and cutaneous tissue was infiltrated with 2-5 ml of 1% Lidocaine. Using ultrasound-guidance, a 20-gauge B-Aguilar 4\" Ultraplex 360 non-stimulating echogenic needle was then inserted and advanced in plane from lateral to medial. Preservative-free normal saline was utilized for hydro-dissection of tissue, advancement of Touhy, and to confirm final needle placement posterior to the nerves. Local anesthetic injection spread, in incremental 3-5 ml injections, to surround both nerve structures. Aspiration every 5 ml to prevent intravascular injection. Injection was completed with negative aspiration of blood and negative intravascular injection. Injection pressures were normal with minimal resistance      Results for orders placed during the hospital encounter of 04/02/24    Doppler Ankle Brachial Index Single Level CAR    Interpretation Summary    Right Conclusion: The right YRN is unable to be assessed due to vessel incompressibility. Normal digital pressures.    Left Conclusion: The left YRN is unable to be assessed due to vessel " incompressibility. Normal digital pressures.    Waveforms and normal digital pressures suggest no significant flow obstructive PAD    Discharge Details        Discharge Medications        New Medications        Instructions Start Date   cefTRIAXone 2,000 mg in sodium chloride 0.9 % 100 mL IVPB   2,000 mg, Intravenous, Every 24 Hours      DAPTOmycin 700 mg in sodium chloride 0.9 % 50 mL   700 mg, Intravenous, Every 24 Hours      lactobacillus acidophilus capsule capsule   1 capsule, Oral, Daily      naloxone 4 MG/0.1ML nasal spray  Commonly known as: NARCAN   Call 911. Don't prime. [place] in 1 nostril for overdose. Repeat in 2-3 minutes in other nostril if no or minimal breathing/responsiveness.      oxyCODONE 5 MG immediate release tablet  Commonly known as: ROXICODONE   5 mg, Oral, Every 4 Hours PRN      Trulicity 3 MG/0.5ML solution pen-injector  Generic drug: Dulaglutide  Replaces: Trulicity 4.5 MG/0.5ML solution pen-injector   3 mg, Subcutaneous, Weekly             Changes to Medications        Instructions Start Date   colchicine 0.6 MG tablet  What changed:   when to take this  reasons to take this   0.6 mg, Oral, Daily PRN             Continue These Medications        Instructions Start Date   allopurinol 300 MG tablet  Commonly known as: ZYLOPRIM   300 mg, Oral, Daily      ergocalciferol 1.25 MG (60912 UT) capsule  Commonly known as: ERGOCALCIFEROL   1,250 mcg, Oral, Daily      glimepiride 4 MG tablet  Commonly known as: AMARYL   4 mg, Oral, Daily      lisinopril 40 MG tablet  Commonly known as: PRINIVIL,ZESTRIL   40 mg, Oral, Daily      metFORMIN 1000 MG tablet  Commonly known as: GLUCOPHAGE   1,000 mg, Oral, 2 Times Daily With Meals      metoprolol succinate  MG 24 hr tablet  Commonly known as: TOPROL-XL   100 mg, Oral, Daily      ondansetron ODT 4 MG disintegrating tablet  Commonly known as: ZOFRAN-ODT   4 mg, Translingual, Every 6 Hours PRN      pioglitazone 30 MG tablet  Commonly known as: ACTOS    30 mg, Oral, Daily      sildenafil 100 MG tablet  Commonly known as: VIAGRA   100 mg, Oral, Daily PRN             Stop These Medications      HYDROcodone-acetaminophen 7.5-325 MG per tablet  Commonly known as: NORCO     silver sulfadiazine 1 % cream  Commonly known as: SILVADENE, SSD     Trulicity 4.5 MG/0.5ML solution pen-injector  Generic drug: Dulaglutide  Replaced by: Trulicity 3 MG/0.5ML solution pen-injector              Allergies   Allergen Reactions    Statins Myalgia       Discharge Disposition:Home or Self Care    Diet:  Hospital:No active diet order    Activity:  Activity Instructions       Other Activity Instructions      No weightbearing on left lower extremity. Do not get groshong or amputation site wet    Daily dressing change: xeroform, 4x4 gauze, kerlix wrap, ace bandage           CODE STATUS:    Code Status and Medical Interventions:   Ordered at: 04/03/24 0013     Code Status (Patient has no pulse and is not breathing):    CPR (Attempt to Resuscitate)     Medical Interventions (Patient has pulse or is breathing):    Full Support     No future appointments.    Additional Instructions for the Follow-ups that You Need to Schedule       Ambulatory Referral to Home Health   As directed      Face to Face Visit Date: 4/15/2024   Follow-up provider for Plan of Care?: I treated the patient in an acute care facility and will not continue treatment after discharge.   Follow-up provider: MARTHA MCKEE [[id number]]   Reason/Clinical Findings: Dressing Change:Daily dressing change: xeroform, 4x4, kerlix, ace   Describe mobility limitations that make leaving home difficult: Impaired Functional Mobility, Gait, Balance, and Endurance.   Nursing/Therapeutic Services Requested: Skilled Nursing   Skilled nursing orders: Wound care dressing/changes   Frequency: 1 Week 1        Discharge Follow-up with PCP   As directed       Currently Documented PCP:    Martha Mckee MD    PCP Phone Number:     [phone]     Follow Up Details: 1 week        Discharge Follow-up with Specified Provider: Jeremi Hays on [place] 4/19   As directed      To: Jeremi Hays on [place] 4/19        Discharge Follow-up with Specified Provider: Dr. Jameson in 1 week   As directed      To: Dr. Jameson in 1 week              Brandi Kan PA-C  04/17/24    Time Spent on Discharge:  I spent 35 minutes on this discharge activity which included: face-to-face encounter with the patient, reviewing the data in the system, coordination of the care with the nursing staff as well as consultants, documentation, and entering orders.

## 2024-04-15 NOTE — CASE MANAGEMENT/SOCIAL WORK
Case Management Discharge Note      Final Note: Pt is being discharged home today.  confirmed with [facility] that they will be providing pt with home IV antibiotics and teaching, along with doing his dressing change and weekly labs. [facility] accepted pt for skilled nursing. CM spoke with pt, at the bedside. He is agreeable with D/C plan. He states that his wife will transport him home today. No other needs voiced or identified.         Selected Continued Care - Admitted Since 4/2/2024       Destination    No services have been selected for the patient.                Durable Medical Equipment    No services have been selected for the patient.                Dialysis/Infusion Coordination complete.      Service Provider Selected Services Address Phone Fax Patient Preferred    [facility] [address], [place] [id number] [phone] --       Internal Comment last updated by Felicita Pak RN 4/15/2024 0942    4/15: Home IV Antibiotics                         Home Medical Care Coordination complete.      Service Provider Selected Services Address Phone Fax Patient Preferred    [facility] [address], [place] [facility] [place] [address] [phone] --              Therapy    No services have been selected for the patient.                Community Resources    No services have been selected for the patient.                [facility]    No services have been selected for the patient.                         Final Discharge Disposition Code: 06 - home with home health care

## 2024-04-16 ENCOUNTER — READMISSION MANAGEMENT (OUTPATIENT)
Dept: CALL CENTER | Facility: HOSPITAL | Age: 59
End: 2024-04-16
Payer: COMMERCIAL

## 2024-04-16 NOTE — OUTREACH NOTE
Prep Survey      Flowsheet Row Responses   Religious facility patient discharged from? [facility]   Is LACE score < 7 ? No   Eligibility Readm Mgmt   Discharge diagnosis s/p AMPUTATION TRANS METATARSAL RIGHT, foot irrigation, debridement, secondary closure Right   Does the patient have one of the following disease processes/diagnoses(primary or secondary)? General Surgery   Does the patient have Home health ordered? Yes   What is the Home health agency?  [facility]   Is there a DME ordered? Yes   What DME was ordered? home antibiotics per [facility]   Prep survey completed? Yes            Mai JOHANSEN - Registered Nurse           No ST elevations

## 2024-04-19 LAB
FUNGUS WND CULT: NORMAL
MYCOBACTERIUM SPEC CULT: NORMAL
NIGHT BLUE STAIN TISS: NORMAL

## 2024-04-22 LAB — BACTERIA SPEC ANAEROBE CULT: NORMAL

## 2024-04-23 ENCOUNTER — READMISSION MANAGEMENT (OUTPATIENT)
Dept: CALL CENTER | Facility: HOSPITAL | Age: 59
End: 2024-04-23
Payer: COMMERCIAL

## 2024-04-23 ENCOUNTER — LAB (OUTPATIENT)
Dept: LAB | Facility: HOSPITAL | Age: 59
End: 2024-04-23
Payer: COMMERCIAL

## 2024-04-23 ENCOUNTER — TRANSCRIBE ORDERS (OUTPATIENT)
Dept: LAB | Facility: HOSPITAL | Age: 59
End: 2024-04-23
Payer: COMMERCIAL

## 2024-04-23 DIAGNOSIS — M86.171 ACUTE OSTEOMYELITIS OF RIGHT ANKLE OR FOOT: Primary | ICD-10-CM

## 2024-04-23 DIAGNOSIS — M86.171 ACUTE OSTEOMYELITIS OF RIGHT ANKLE OR FOOT: ICD-10-CM

## 2024-04-23 LAB
ALBUMIN SERPL-MCNC: 3.8 G/DL (ref 3.5–5.2)
ALBUMIN/GLOB SERPL: 1.1 G/DL
ALP SERPL-CCNC: 110 U/L (ref 39–117)
ALT SERPL W P-5'-P-CCNC: 31 U/L (ref 1–41)
ANION GAP SERPL CALCULATED.3IONS-SCNC: 11 MMOL/L (ref 5–15)
AST SERPL-CCNC: 25 U/L (ref 1–40)
BASOPHILS # BLD AUTO: 0.02 10*3/MM3 (ref 0–0.2)
BASOPHILS NFR BLD AUTO: 0.5 % (ref 0–1.5)
BILIRUB SERPL-MCNC: 0.3 MG/DL (ref 0–1.2)
BUN SERPL-MCNC: 24 MG/DL (ref 6–20)
BUN/CREAT SERPL: 18.5 (ref 7–25)
CALCIUM SPEC-SCNC: 9.5 MG/DL (ref 8.6–10.5)
CHLORIDE SERPL-SCNC: 106 MMOL/L (ref 98–107)
CK SERPL-CCNC: 75 U/L (ref 20–200)
CO2 SERPL-SCNC: 23 MMOL/L (ref 22–29)
CREAT SERPL-MCNC: 1.3 MG/DL (ref 0.76–1.27)
CRP SERPL-MCNC: 4.02 MG/DL (ref 0–0.5)
DEPRECATED RDW RBC AUTO: 47.6 FL (ref 37–54)
EGFRCR SERPLBLD CKD-EPI 2021: 63.3 ML/MIN/1.73
EOSINOPHIL # BLD AUTO: 0.18 10*3/MM3 (ref 0–0.4)
EOSINOPHIL NFR BLD AUTO: 4.3 % (ref 0.3–6.2)
ERYTHROCYTE [DISTWIDTH] IN BLOOD BY AUTOMATED COUNT: 13.6 % (ref 12.3–15.4)
ERYTHROCYTE [SEDIMENTATION RATE] IN BLOOD: 62 MM/HR (ref 0–20)
GLOBULIN UR ELPH-MCNC: 3.4 GM/DL
GLUCOSE SERPL-MCNC: 119 MG/DL (ref 65–99)
HCT VFR BLD AUTO: 33 % (ref 37.5–51)
HGB BLD-MCNC: 10.6 G/DL (ref 13–17.7)
IMM GRANULOCYTES # BLD AUTO: 0.01 10*3/MM3 (ref 0–0.05)
IMM GRANULOCYTES NFR BLD AUTO: 0.2 % (ref 0–0.5)
LYMPHOCYTES # BLD AUTO: 0.9 10*3/MM3 (ref 0.7–3.1)
LYMPHOCYTES NFR BLD AUTO: 21.4 % (ref 19.6–45.3)
MCH RBC QN AUTO: 30.7 PG (ref 26.6–33)
MCHC RBC AUTO-ENTMCNC: 32.1 G/DL (ref 31.5–35.7)
MCV RBC AUTO: 95.7 FL (ref 79–97)
MONOCYTES # BLD AUTO: 0.31 10*3/MM3 (ref 0.1–0.9)
MONOCYTES NFR BLD AUTO: 7.4 % (ref 5–12)
NEUTROPHILS NFR BLD AUTO: 2.78 10*3/MM3 (ref 1.7–7)
NEUTROPHILS NFR BLD AUTO: 66.2 % (ref 42.7–76)
NRBC BLD AUTO-RTO: 0 /100 WBC (ref 0–0.2)
PLATELET # BLD AUTO: 118 10*3/MM3 (ref 140–450)
PMV BLD AUTO: 10.7 FL (ref 6–12)
POTASSIUM SERPL-SCNC: 4.9 MMOL/L (ref 3.5–5.2)
PROT SERPL-MCNC: 7.2 G/DL (ref 6–8.5)
RBC # BLD AUTO: 3.45 10*6/MM3 (ref 4.14–5.8)
SODIUM SERPL-SCNC: 140 MMOL/L (ref 136–145)
WBC NRBC COR # BLD AUTO: 4.2 10*3/MM3 (ref 3.4–10.8)

## 2024-04-23 PROCEDURE — 85652 RBC SED RATE AUTOMATED: CPT

## 2024-04-23 PROCEDURE — 82550 ASSAY OF CK (CPK): CPT

## 2024-04-23 PROCEDURE — 36415 COLL VENOUS BLD VENIPUNCTURE: CPT

## 2024-04-23 PROCEDURE — 85025 COMPLETE CBC W/AUTO DIFF WBC: CPT

## 2024-04-23 PROCEDURE — 86140 C-REACTIVE PROTEIN: CPT

## 2024-04-23 PROCEDURE — 80053 COMPREHEN METABOLIC PANEL: CPT

## 2024-04-23 NOTE — OUTREACH NOTE
General Surgery Week 1 Survey      Flowsheet Row Responses   [facility] patient discharged from? [place]   Does the patient have one of the following disease processes/diagnoses(primary or secondary)? General Surgery   Week 1 attempt successful? Yes   Call start time [phone]   Call end time 1027   Discharge diagnosis s/p AMPUTATION TRANS METATARSAL RIGHT, foot irrigation, debridement, secondary closure Right   Meds reviewed with patient/caregiver? Yes   Is the patient having any side effects they believe may be caused by any medication additions or changes? No   Does the patient have all medications related to this admission filled (includes all antibiotics, pain medications, etc.) Yes   Is the patient taking all medications as directed (includes completed medication regime)? Yes   Does the patient have a follow up appointment scheduled with their surgeon? Yes   Has the patient kept scheduled appointments due by today? Yes   What is the Home health agency?  [facility]   Has home health visited the patient within 72 hours of discharge? Yes   Psychosocial issues? No   What is the patient's perception of their health status since discharge? Improving   Week 1 call completed? Yes   Graduated Yes   Call end time 1027            Sunshine JOHANSEN - Registered Nurse

## 2024-04-26 LAB
FUNGUS WND CULT: NORMAL
MYCOBACTERIUM SPEC CULT: NORMAL
NIGHT BLUE STAIN TISS: NORMAL

## 2024-04-30 ENCOUNTER — TRANSCRIBE ORDERS (OUTPATIENT)
Dept: LAB | Facility: HOSPITAL | Age: 59
End: 2024-04-30
Payer: COMMERCIAL

## 2024-04-30 ENCOUNTER — LAB (OUTPATIENT)
Dept: LAB | Facility: HOSPITAL | Age: 59
End: 2024-04-30
Payer: COMMERCIAL

## 2024-04-30 DIAGNOSIS — M86.171 ACUTE OSTEOMYELITIS OF RIGHT ANKLE OR FOOT: Primary | ICD-10-CM

## 2024-04-30 DIAGNOSIS — M86.171 ACUTE OSTEOMYELITIS OF RIGHT ANKLE OR FOOT: ICD-10-CM

## 2024-04-30 LAB
ALBUMIN SERPL-MCNC: 4.2 G/DL (ref 3.5–5.2)
ALBUMIN/GLOB SERPL: 1.4 G/DL
ALP SERPL-CCNC: 113 U/L (ref 39–117)
ALT SERPL W P-5'-P-CCNC: 40 U/L (ref 1–41)
ANION GAP SERPL CALCULATED.3IONS-SCNC: 13 MMOL/L (ref 5–15)
AST SERPL-CCNC: 30 U/L (ref 1–40)
BASOPHILS # BLD AUTO: 0.02 10*3/MM3 (ref 0–0.2)
BASOPHILS NFR BLD AUTO: 0.6 % (ref 0–1.5)
BILIRUB SERPL-MCNC: 0.3 MG/DL (ref 0–1.2)
BUN SERPL-MCNC: 19 MG/DL (ref 6–20)
BUN/CREAT SERPL: 16.2 (ref 7–25)
CALCIUM SPEC-SCNC: 10 MG/DL (ref 8.6–10.5)
CHLORIDE SERPL-SCNC: 108 MMOL/L (ref 98–107)
CK SERPL-CCNC: 42 U/L (ref 20–200)
CO2 SERPL-SCNC: 21 MMOL/L (ref 22–29)
CREAT SERPL-MCNC: 1.17 MG/DL (ref 0.76–1.27)
CRP SERPL-MCNC: 0.41 MG/DL (ref 0–0.5)
DEPRECATED RDW RBC AUTO: 48.5 FL (ref 37–54)
EGFRCR SERPLBLD CKD-EPI 2021: 71.8 ML/MIN/1.73
EOSINOPHIL # BLD AUTO: 0.23 10*3/MM3 (ref 0–0.4)
EOSINOPHIL NFR BLD AUTO: 6.4 % (ref 0.3–6.2)
ERYTHROCYTE [DISTWIDTH] IN BLOOD BY AUTOMATED COUNT: 14.1 % (ref 12.3–15.4)
ERYTHROCYTE [SEDIMENTATION RATE] IN BLOOD: 48 MM/HR (ref 0–20)
GLOBULIN UR ELPH-MCNC: 2.9 GM/DL
GLUCOSE SERPL-MCNC: 129 MG/DL (ref 65–99)
HCT VFR BLD AUTO: 34.2 % (ref 37.5–51)
HGB BLD-MCNC: 11.2 G/DL (ref 13–17.7)
IMM GRANULOCYTES # BLD AUTO: 0.01 10*3/MM3 (ref 0–0.05)
IMM GRANULOCYTES NFR BLD AUTO: 0.3 % (ref 0–0.5)
LYMPHOCYTES # BLD AUTO: 0.71 10*3/MM3 (ref 0.7–3.1)
LYMPHOCYTES NFR BLD AUTO: 19.8 % (ref 19.6–45.3)
MCH RBC QN AUTO: 30.9 PG (ref 26.6–33)
MCHC RBC AUTO-ENTMCNC: 32.7 G/DL (ref 31.5–35.7)
MCV RBC AUTO: 94.5 FL (ref 79–97)
MONOCYTES # BLD AUTO: 0.33 10*3/MM3 (ref 0.1–0.9)
MONOCYTES NFR BLD AUTO: 9.2 % (ref 5–12)
NEUTROPHILS NFR BLD AUTO: 2.28 10*3/MM3 (ref 1.7–7)
NEUTROPHILS NFR BLD AUTO: 63.7 % (ref 42.7–76)
NRBC BLD AUTO-RTO: 0 /100 WBC (ref 0–0.2)
PLATELET # BLD AUTO: 139 10*3/MM3 (ref 140–450)
PMV BLD AUTO: 10.3 FL (ref 6–12)
POTASSIUM SERPL-SCNC: 5 MMOL/L (ref 3.5–5.2)
PROT SERPL-MCNC: 7.1 G/DL (ref 6–8.5)
RBC # BLD AUTO: 3.62 10*6/MM3 (ref 4.14–5.8)
SODIUM SERPL-SCNC: 142 MMOL/L (ref 136–145)
WBC NRBC COR # BLD AUTO: 3.58 10*3/MM3 (ref 3.4–10.8)

## 2024-04-30 PROCEDURE — 36415 COLL VENOUS BLD VENIPUNCTURE: CPT

## 2024-04-30 PROCEDURE — 85025 COMPLETE CBC W/AUTO DIFF WBC: CPT

## 2024-04-30 PROCEDURE — 82550 ASSAY OF CK (CPK): CPT

## 2024-04-30 PROCEDURE — 86140 C-REACTIVE PROTEIN: CPT

## 2024-04-30 PROCEDURE — 85652 RBC SED RATE AUTOMATED: CPT

## 2024-04-30 PROCEDURE — 80053 COMPREHEN METABOLIC PANEL: CPT

## 2024-05-03 LAB
FUNGUS WND CULT: NORMAL
MYCOBACTERIUM SPEC CULT: NORMAL
NIGHT BLUE STAIN TISS: NORMAL

## 2024-05-07 ENCOUNTER — LAB (OUTPATIENT)
Dept: LAB | Facility: HOSPITAL | Age: 59
End: 2024-05-07
Payer: COMMERCIAL

## 2024-05-07 ENCOUNTER — TRANSCRIBE ORDERS (OUTPATIENT)
Dept: LAB | Facility: HOSPITAL | Age: 59
End: 2024-05-07
Payer: COMMERCIAL

## 2024-05-07 DIAGNOSIS — M86.171 ACUTE OSTEOMYELITIS OF RIGHT FOOT: Primary | ICD-10-CM

## 2024-05-07 DIAGNOSIS — M86.171 ACUTE OSTEOMYELITIS OF RIGHT FOOT: ICD-10-CM

## 2024-05-07 LAB
ALBUMIN SERPL-MCNC: 4 G/DL (ref 3.5–5.2)
ALBUMIN/GLOB SERPL: 1.3 G/DL
ALP SERPL-CCNC: 115 U/L (ref 39–117)
ALT SERPL W P-5'-P-CCNC: 37 U/L (ref 1–41)
ANION GAP SERPL CALCULATED.3IONS-SCNC: 11 MMOL/L (ref 5–15)
AST SERPL-CCNC: 32 U/L (ref 1–40)
BASOPHILS # BLD AUTO: 0.02 10*3/MM3 (ref 0–0.2)
BASOPHILS NFR BLD AUTO: 0.7 % (ref 0–1.5)
BILIRUB SERPL-MCNC: 0.2 MG/DL (ref 0–1.2)
BUN SERPL-MCNC: 20 MG/DL (ref 6–20)
BUN/CREAT SERPL: 13.9 (ref 7–25)
CALCIUM SPEC-SCNC: 9.9 MG/DL (ref 8.6–10.5)
CHLORIDE SERPL-SCNC: 107 MMOL/L (ref 98–107)
CK SERPL-CCNC: 38 U/L (ref 20–200)
CO2 SERPL-SCNC: 23 MMOL/L (ref 22–29)
CREAT SERPL-MCNC: 1.44 MG/DL (ref 0.76–1.27)
CRP SERPL-MCNC: 0.73 MG/DL (ref 0–0.5)
DEPRECATED RDW RBC AUTO: 47.9 FL (ref 37–54)
EGFRCR SERPLBLD CKD-EPI 2021: 56 ML/MIN/1.73
EOSINOPHIL # BLD AUTO: 0.2 10*3/MM3 (ref 0–0.4)
EOSINOPHIL NFR BLD AUTO: 6.9 % (ref 0.3–6.2)
ERYTHROCYTE [DISTWIDTH] IN BLOOD BY AUTOMATED COUNT: 14.1 % (ref 12.3–15.4)
ERYTHROCYTE [SEDIMENTATION RATE] IN BLOOD: 39 MM/HR (ref 0–20)
GLOBULIN UR ELPH-MCNC: 3 GM/DL
GLUCOSE SERPL-MCNC: 161 MG/DL (ref 65–99)
HCT VFR BLD AUTO: 34 % (ref 37.5–51)
HGB BLD-MCNC: 11.2 G/DL (ref 13–17.7)
IMM GRANULOCYTES # BLD AUTO: 0.02 10*3/MM3 (ref 0–0.05)
IMM GRANULOCYTES NFR BLD AUTO: 0.7 % (ref 0–0.5)
LYMPHOCYTES # BLD AUTO: 0.77 10*3/MM3 (ref 0.7–3.1)
LYMPHOCYTES NFR BLD AUTO: 26.7 % (ref 19.6–45.3)
MCH RBC QN AUTO: 30.7 PG (ref 26.6–33)
MCHC RBC AUTO-ENTMCNC: 32.9 G/DL (ref 31.5–35.7)
MCV RBC AUTO: 93.2 FL (ref 79–97)
MONOCYTES # BLD AUTO: 0.3 10*3/MM3 (ref 0.1–0.9)
MONOCYTES NFR BLD AUTO: 10.4 % (ref 5–12)
NEUTROPHILS NFR BLD AUTO: 1.57 10*3/MM3 (ref 1.7–7)
NEUTROPHILS NFR BLD AUTO: 54.6 % (ref 42.7–76)
NRBC BLD AUTO-RTO: 0 /100 WBC (ref 0–0.2)
PLATELET # BLD AUTO: 137 10*3/MM3 (ref 140–450)
PMV BLD AUTO: 10.9 FL (ref 6–12)
POTASSIUM SERPL-SCNC: 4.7 MMOL/L (ref 3.5–5.2)
PROT SERPL-MCNC: 7 G/DL (ref 6–8.5)
RBC # BLD AUTO: 3.65 10*6/MM3 (ref 4.14–5.8)
SODIUM SERPL-SCNC: 141 MMOL/L (ref 136–145)
WBC NRBC COR # BLD AUTO: 2.88 10*3/MM3 (ref 3.4–10.8)

## 2024-05-07 PROCEDURE — 85025 COMPLETE CBC W/AUTO DIFF WBC: CPT

## 2024-05-07 PROCEDURE — 85652 RBC SED RATE AUTOMATED: CPT

## 2024-05-07 PROCEDURE — 80053 COMPREHEN METABOLIC PANEL: CPT

## 2024-05-07 PROCEDURE — 86140 C-REACTIVE PROTEIN: CPT

## 2024-05-07 PROCEDURE — 82550 ASSAY OF CK (CPK): CPT

## 2024-05-07 PROCEDURE — 36415 COLL VENOUS BLD VENIPUNCTURE: CPT

## 2024-05-09 ENCOUNTER — TRANSCRIBE ORDERS (OUTPATIENT)
Dept: LAB | Facility: HOSPITAL | Age: 59
End: 2024-05-09
Payer: COMMERCIAL

## 2024-05-09 ENCOUNTER — LAB (OUTPATIENT)
Dept: LAB | Facility: HOSPITAL | Age: 59
End: 2024-05-09
Payer: COMMERCIAL

## 2024-05-09 DIAGNOSIS — B95.4 BACTERIAL INFECTION DUE TO STREPTOCOCCUS, GROUP G: ICD-10-CM

## 2024-05-09 DIAGNOSIS — J02.0 GBBS (GROUP B BETA HEMOLYTIC STREPTOCOCCUS) PHARYNGITIS: ICD-10-CM

## 2024-05-09 DIAGNOSIS — M86.171 ACUTE OSTEOMYELITIS OF RIGHT ANKLE OR FOOT: ICD-10-CM

## 2024-05-09 DIAGNOSIS — L03.115 CELLULITIS OF RIGHT FOOT: ICD-10-CM

## 2024-05-09 DIAGNOSIS — B95.1 GBBS (GROUP B BETA HEMOLYTIC STREPTOCOCCUS) PHARYNGITIS: ICD-10-CM

## 2024-05-09 DIAGNOSIS — M86.171 ACUTE OSTEOMYELITIS OF RIGHT ANKLE OR FOOT: Primary | ICD-10-CM

## 2024-05-09 LAB
ALBUMIN SERPL-MCNC: 4.2 G/DL (ref 3.5–5.2)
ALBUMIN/GLOB SERPL: 1.3 G/DL
ALP SERPL-CCNC: 108 U/L (ref 39–117)
ALT SERPL W P-5'-P-CCNC: 30 U/L (ref 1–41)
ANION GAP SERPL CALCULATED.3IONS-SCNC: 12 MMOL/L (ref 5–15)
AST SERPL-CCNC: 26 U/L (ref 1–40)
BASOPHILS # BLD AUTO: 0.01 10*3/MM3 (ref 0–0.2)
BASOPHILS NFR BLD AUTO: 0.3 % (ref 0–1.5)
BILIRUB SERPL-MCNC: 0.3 MG/DL (ref 0–1.2)
BUN SERPL-MCNC: 18 MG/DL (ref 6–20)
BUN/CREAT SERPL: 14.6 (ref 7–25)
CALCIUM SPEC-SCNC: 9.9 MG/DL (ref 8.6–10.5)
CHLORIDE SERPL-SCNC: 104 MMOL/L (ref 98–107)
CO2 SERPL-SCNC: 23 MMOL/L (ref 22–29)
CREAT SERPL-MCNC: 1.23 MG/DL (ref 0.76–1.27)
DEPRECATED RDW RBC AUTO: 47.3 FL (ref 37–54)
EGFRCR SERPLBLD CKD-EPI 2021: 67.6 ML/MIN/1.73
EOSINOPHIL # BLD AUTO: 0.24 10*3/MM3 (ref 0–0.4)
EOSINOPHIL NFR BLD AUTO: 6.8 % (ref 0.3–6.2)
ERYTHROCYTE [DISTWIDTH] IN BLOOD BY AUTOMATED COUNT: 13.9 % (ref 12.3–15.4)
GLOBULIN UR ELPH-MCNC: 3.2 GM/DL
GLUCOSE SERPL-MCNC: 118 MG/DL (ref 65–99)
HCT VFR BLD AUTO: 35 % (ref 37.5–51)
HGB BLD-MCNC: 11.5 G/DL (ref 13–17.7)
IMM GRANULOCYTES # BLD AUTO: 0.01 10*3/MM3 (ref 0–0.05)
IMM GRANULOCYTES NFR BLD AUTO: 0.3 % (ref 0–0.5)
LYMPHOCYTES # BLD AUTO: 0.69 10*3/MM3 (ref 0.7–3.1)
LYMPHOCYTES NFR BLD AUTO: 19.6 % (ref 19.6–45.3)
MCH RBC QN AUTO: 30.6 PG (ref 26.6–33)
MCHC RBC AUTO-ENTMCNC: 32.9 G/DL (ref 31.5–35.7)
MCV RBC AUTO: 93.1 FL (ref 79–97)
MONOCYTES # BLD AUTO: 0.33 10*3/MM3 (ref 0.1–0.9)
MONOCYTES NFR BLD AUTO: 9.4 % (ref 5–12)
NEUTROPHILS NFR BLD AUTO: 2.24 10*3/MM3 (ref 1.7–7)
NEUTROPHILS NFR BLD AUTO: 63.6 % (ref 42.7–76)
NRBC BLD AUTO-RTO: 0 /100 WBC (ref 0–0.2)
PLATELET # BLD AUTO: 136 10*3/MM3 (ref 140–450)
PMV BLD AUTO: 10.1 FL (ref 6–12)
POTASSIUM SERPL-SCNC: 4.5 MMOL/L (ref 3.5–5.2)
PROT SERPL-MCNC: 7.4 G/DL (ref 6–8.5)
RBC # BLD AUTO: 3.76 10*6/MM3 (ref 4.14–5.8)
SODIUM SERPL-SCNC: 139 MMOL/L (ref 136–145)
WBC NRBC COR # BLD AUTO: 3.52 10*3/MM3 (ref 3.4–10.8)

## 2024-05-09 PROCEDURE — 85025 COMPLETE CBC W/AUTO DIFF WBC: CPT

## 2024-05-09 PROCEDURE — 80053 COMPREHEN METABOLIC PANEL: CPT

## 2024-05-09 PROCEDURE — 36415 COLL VENOUS BLD VENIPUNCTURE: CPT

## 2024-05-10 LAB
FUNGUS WND CULT: NORMAL
MYCOBACTERIUM SPEC CULT: NORMAL
NIGHT BLUE STAIN TISS: NORMAL

## 2024-05-14 ENCOUNTER — TRANSCRIBE ORDERS (OUTPATIENT)
Dept: LAB | Facility: HOSPITAL | Age: 59
End: 2024-05-14
Payer: COMMERCIAL

## 2024-05-14 ENCOUNTER — LAB (OUTPATIENT)
Dept: LAB | Facility: HOSPITAL | Age: 59
End: 2024-05-14
Payer: COMMERCIAL

## 2024-05-14 DIAGNOSIS — M86.171 ACUTE OSTEOMYELITIS OF RIGHT ANKLE OR FOOT: Primary | ICD-10-CM

## 2024-05-14 LAB
ALBUMIN SERPL-MCNC: 4.1 G/DL (ref 3.5–5.2)
ALBUMIN/GLOB SERPL: 1.3 G/DL
ALP SERPL-CCNC: 105 U/L (ref 39–117)
ALT SERPL W P-5'-P-CCNC: 26 U/L (ref 1–41)
ANION GAP SERPL CALCULATED.3IONS-SCNC: 13 MMOL/L (ref 5–15)
AST SERPL-CCNC: 23 U/L (ref 1–40)
BASOPHILS # BLD AUTO: 0.01 10*3/MM3 (ref 0–0.2)
BASOPHILS NFR BLD AUTO: 0.2 % (ref 0–1.5)
BILIRUB SERPL-MCNC: 0.5 MG/DL (ref 0–1.2)
BUN SERPL-MCNC: 19 MG/DL (ref 6–20)
BUN/CREAT SERPL: 16 (ref 7–25)
CALCIUM SPEC-SCNC: 9.7 MG/DL (ref 8.6–10.5)
CHLORIDE SERPL-SCNC: 102 MMOL/L (ref 98–107)
CK SERPL-CCNC: 49 U/L (ref 20–200)
CO2 SERPL-SCNC: 22 MMOL/L (ref 22–29)
CREAT SERPL-MCNC: 1.19 MG/DL (ref 0.76–1.27)
CRP SERPL-MCNC: 4.37 MG/DL (ref 0–0.5)
DEPRECATED RDW RBC AUTO: 47.3 FL (ref 37–54)
EGFRCR SERPLBLD CKD-EPI 2021: 70.4 ML/MIN/1.73
EOSINOPHIL # BLD AUTO: 0.07 10*3/MM3 (ref 0–0.4)
EOSINOPHIL NFR BLD AUTO: 1.5 % (ref 0.3–6.2)
ERYTHROCYTE [DISTWIDTH] IN BLOOD BY AUTOMATED COUNT: 14.1 % (ref 12.3–15.4)
ERYTHROCYTE [SEDIMENTATION RATE] IN BLOOD: 50 MM/HR (ref 0–20)
GLOBULIN UR ELPH-MCNC: 3.2 GM/DL
GLUCOSE SERPL-MCNC: 231 MG/DL (ref 65–99)
HCT VFR BLD AUTO: 34.6 % (ref 37.5–51)
HGB BLD-MCNC: 11.4 G/DL (ref 13–17.7)
IMM GRANULOCYTES # BLD AUTO: 0.01 10*3/MM3 (ref 0–0.05)
IMM GRANULOCYTES NFR BLD AUTO: 0.2 % (ref 0–0.5)
LYMPHOCYTES # BLD AUTO: 0.43 10*3/MM3 (ref 0.7–3.1)
LYMPHOCYTES NFR BLD AUTO: 9.1 % (ref 19.6–45.3)
MCH RBC QN AUTO: 30.4 PG (ref 26.6–33)
MCHC RBC AUTO-ENTMCNC: 32.9 G/DL (ref 31.5–35.7)
MCV RBC AUTO: 92.3 FL (ref 79–97)
MONOCYTES # BLD AUTO: 0.33 10*3/MM3 (ref 0.1–0.9)
MONOCYTES NFR BLD AUTO: 7 % (ref 5–12)
NEUTROPHILS NFR BLD AUTO: 3.89 10*3/MM3 (ref 1.7–7)
NEUTROPHILS NFR BLD AUTO: 82 % (ref 42.7–76)
NRBC BLD AUTO-RTO: 0 /100 WBC (ref 0–0.2)
PLATELET # BLD AUTO: 115 10*3/MM3 (ref 140–450)
PMV BLD AUTO: 10.8 FL (ref 6–12)
POTASSIUM SERPL-SCNC: 4.7 MMOL/L (ref 3.5–5.2)
PROT SERPL-MCNC: 7.3 G/DL (ref 6–8.5)
RBC # BLD AUTO: 3.75 10*6/MM3 (ref 4.14–5.8)
SODIUM SERPL-SCNC: 137 MMOL/L (ref 136–145)
WBC NRBC COR # BLD AUTO: 4.74 10*3/MM3 (ref 3.4–10.8)

## 2024-05-14 PROCEDURE — 85025 COMPLETE CBC W/AUTO DIFF WBC: CPT | Performed by: INTERNAL MEDICINE

## 2024-05-14 PROCEDURE — 86140 C-REACTIVE PROTEIN: CPT | Performed by: INTERNAL MEDICINE

## 2024-05-14 PROCEDURE — 36415 COLL VENOUS BLD VENIPUNCTURE: CPT | Performed by: INTERNAL MEDICINE

## 2024-05-14 PROCEDURE — 82550 ASSAY OF CK (CPK): CPT | Performed by: INTERNAL MEDICINE

## 2024-05-14 PROCEDURE — 80053 COMPREHEN METABOLIC PANEL: CPT | Performed by: INTERNAL MEDICINE

## 2024-05-14 PROCEDURE — 85652 RBC SED RATE AUTOMATED: CPT | Performed by: INTERNAL MEDICINE

## 2024-05-17 LAB
FUNGUS WND CULT: NORMAL
MYCOBACTERIUM SPEC CULT: NORMAL
NIGHT BLUE STAIN TISS: NORMAL

## 2024-05-21 ENCOUNTER — LAB (OUTPATIENT)
Dept: LAB | Facility: HOSPITAL | Age: 59
End: 2024-05-21
Payer: COMMERCIAL

## 2024-05-21 ENCOUNTER — TRANSCRIBE ORDERS (OUTPATIENT)
Dept: LAB | Facility: HOSPITAL | Age: 59
End: 2024-05-21
Payer: COMMERCIAL

## 2024-05-21 DIAGNOSIS — B95.4 BACTERIAL INFECTION DUE TO STREPTOCOCCUS, GROUP G: ICD-10-CM

## 2024-05-21 DIAGNOSIS — J02.0 GBBS (GROUP B BETA HEMOLYTIC STREPTOCOCCUS) PHARYNGITIS: ICD-10-CM

## 2024-05-21 DIAGNOSIS — L03.115 CELLULITIS OF RIGHT FOOT: ICD-10-CM

## 2024-05-21 DIAGNOSIS — M86.171 ACUTE OSTEOMYELITIS OF RIGHT ANKLE OR FOOT: ICD-10-CM

## 2024-05-21 DIAGNOSIS — M86.171 ACUTE OSTEOMYELITIS OF RIGHT ANKLE OR FOOT: Primary | ICD-10-CM

## 2024-05-21 DIAGNOSIS — B95.1 GBBS (GROUP B BETA HEMOLYTIC STREPTOCOCCUS) PHARYNGITIS: ICD-10-CM

## 2024-05-21 LAB
ALBUMIN SERPL-MCNC: 4.1 G/DL (ref 3.5–5.2)
ALBUMIN/GLOB SERPL: 1.3 G/DL
ALP SERPL-CCNC: 112 U/L (ref 39–117)
ALT SERPL W P-5'-P-CCNC: 31 U/L (ref 1–41)
ANION GAP SERPL CALCULATED.3IONS-SCNC: 14 MMOL/L (ref 5–15)
AST SERPL-CCNC: 32 U/L (ref 1–40)
BASOPHILS # BLD AUTO: 0.02 10*3/MM3 (ref 0–0.2)
BASOPHILS NFR BLD AUTO: 0.5 % (ref 0–1.5)
BILIRUB SERPL-MCNC: 0.4 MG/DL (ref 0–1.2)
BUN SERPL-MCNC: 18 MG/DL (ref 6–20)
BUN/CREAT SERPL: 16.7 (ref 7–25)
CALCIUM SPEC-SCNC: 10 MG/DL (ref 8.6–10.5)
CHLORIDE SERPL-SCNC: 99 MMOL/L (ref 98–107)
CO2 SERPL-SCNC: 22 MMOL/L (ref 22–29)
CREAT SERPL-MCNC: 1.08 MG/DL (ref 0.76–1.27)
DEPRECATED RDW RBC AUTO: 47.4 FL (ref 37–54)
EGFRCR SERPLBLD CKD-EPI 2021: 79.1 ML/MIN/1.73
EOSINOPHIL # BLD AUTO: 0.36 10*3/MM3 (ref 0–0.4)
EOSINOPHIL NFR BLD AUTO: 8.4 % (ref 0.3–6.2)
ERYTHROCYTE [DISTWIDTH] IN BLOOD BY AUTOMATED COUNT: 13.9 % (ref 12.3–15.4)
GLOBULIN UR ELPH-MCNC: 3.2 GM/DL
GLUCOSE SERPL-MCNC: 143 MG/DL (ref 65–99)
HCT VFR BLD AUTO: 35.9 % (ref 37.5–51)
HGB BLD-MCNC: 12 G/DL (ref 13–17.7)
IMM GRANULOCYTES # BLD AUTO: 0.02 10*3/MM3 (ref 0–0.05)
IMM GRANULOCYTES NFR BLD AUTO: 0.5 % (ref 0–0.5)
LYMPHOCYTES # BLD AUTO: 0.9 10*3/MM3 (ref 0.7–3.1)
LYMPHOCYTES NFR BLD AUTO: 21 % (ref 19.6–45.3)
MCH RBC QN AUTO: 31.3 PG (ref 26.6–33)
MCHC RBC AUTO-ENTMCNC: 33.4 G/DL (ref 31.5–35.7)
MCV RBC AUTO: 93.7 FL (ref 79–97)
MONOCYTES # BLD AUTO: 0.36 10*3/MM3 (ref 0.1–0.9)
MONOCYTES NFR BLD AUTO: 8.4 % (ref 5–12)
NEUTROPHILS NFR BLD AUTO: 2.62 10*3/MM3 (ref 1.7–7)
NEUTROPHILS NFR BLD AUTO: 61.2 % (ref 42.7–76)
NRBC BLD AUTO-RTO: 0 /100 WBC (ref 0–0.2)
PLATELET # BLD AUTO: 126 10*3/MM3 (ref 140–450)
PMV BLD AUTO: 10.6 FL (ref 6–12)
POTASSIUM SERPL-SCNC: 4.2 MMOL/L (ref 3.5–5.2)
PROT SERPL-MCNC: 7.3 G/DL (ref 6–8.5)
RBC # BLD AUTO: 3.83 10*6/MM3 (ref 4.14–5.8)
SODIUM SERPL-SCNC: 135 MMOL/L (ref 136–145)
WBC NRBC COR # BLD AUTO: 4.28 10*3/MM3 (ref 3.4–10.8)

## 2024-05-21 PROCEDURE — 36415 COLL VENOUS BLD VENIPUNCTURE: CPT

## 2024-05-21 PROCEDURE — 85025 COMPLETE CBC W/AUTO DIFF WBC: CPT

## 2024-05-21 PROCEDURE — 80053 COMPREHEN METABOLIC PANEL: CPT

## 2024-05-24 LAB
FUNGUS WND CULT: NORMAL
MYCOBACTERIUM SPEC CULT: NORMAL
NIGHT BLUE STAIN TISS: NORMAL

## 2024-06-11 ENCOUNTER — LAB (OUTPATIENT)
Dept: LAB | Facility: HOSPITAL | Age: 59
End: 2024-06-11
Payer: COMMERCIAL

## 2024-06-11 ENCOUNTER — TRANSCRIBE ORDERS (OUTPATIENT)
Dept: LAB | Facility: HOSPITAL | Age: 59
End: 2024-06-11
Payer: COMMERCIAL

## 2024-06-11 DIAGNOSIS — B95.4 BACTERIAL INFECTION DUE TO STREPTOCOCCUS, GROUP G: ICD-10-CM

## 2024-06-11 DIAGNOSIS — B96.89 BACTERIAL CHOLANGITIS: ICD-10-CM

## 2024-06-11 DIAGNOSIS — L03.115 CELLULITIS OF RIGHT FOOT: ICD-10-CM

## 2024-06-11 DIAGNOSIS — E11.22 TYPE 2 DIABETES MELLITUS WITH ESRD (END-STAGE RENAL DISEASE): ICD-10-CM

## 2024-06-11 DIAGNOSIS — J02.0 GBBS (GROUP B BETA HEMOLYTIC STREPTOCOCCUS) PHARYNGITIS: ICD-10-CM

## 2024-06-11 DIAGNOSIS — M86.171 ACUTE OSTEOMYELITIS OF RIGHT ANKLE OR FOOT: Primary | ICD-10-CM

## 2024-06-11 DIAGNOSIS — B95.1 GBBS (GROUP B BETA HEMOLYTIC STREPTOCOCCUS) PHARYNGITIS: ICD-10-CM

## 2024-06-11 DIAGNOSIS — N18.6 TYPE 2 DIABETES MELLITUS WITH ESRD (END-STAGE RENAL DISEASE): ICD-10-CM

## 2024-06-11 DIAGNOSIS — K83.09 BACTERIAL CHOLANGITIS: ICD-10-CM

## 2024-06-11 LAB
BASOPHILS # BLD AUTO: 0.02 10*3/MM3 (ref 0–0.2)
BASOPHILS NFR BLD AUTO: 0.4 % (ref 0–1.5)
CRP SERPL-MCNC: <0.3 MG/DL (ref 0–0.5)
DEPRECATED RDW RBC AUTO: 46.7 FL (ref 37–54)
EOSINOPHIL # BLD AUTO: 0.18 10*3/MM3 (ref 0–0.4)
EOSINOPHIL NFR BLD AUTO: 3.5 % (ref 0.3–6.2)
ERYTHROCYTE [DISTWIDTH] IN BLOOD BY AUTOMATED COUNT: 13.9 % (ref 12.3–15.4)
HCT VFR BLD AUTO: 37.6 % (ref 37.5–51)
HGB BLD-MCNC: 12.1 G/DL (ref 13–17.7)
IMM GRANULOCYTES # BLD AUTO: 0.02 10*3/MM3 (ref 0–0.05)
IMM GRANULOCYTES NFR BLD AUTO: 0.4 % (ref 0–0.5)
LYMPHOCYTES # BLD AUTO: 0.75 10*3/MM3 (ref 0.7–3.1)
LYMPHOCYTES NFR BLD AUTO: 14.7 % (ref 19.6–45.3)
MCH RBC QN AUTO: 29.7 PG (ref 26.6–33)
MCHC RBC AUTO-ENTMCNC: 32.2 G/DL (ref 31.5–35.7)
MCV RBC AUTO: 92.4 FL (ref 79–97)
MONOCYTES # BLD AUTO: 0.38 10*3/MM3 (ref 0.1–0.9)
MONOCYTES NFR BLD AUTO: 7.5 % (ref 5–12)
NEUTROPHILS NFR BLD AUTO: 3.74 10*3/MM3 (ref 1.7–7)
NEUTROPHILS NFR BLD AUTO: 73.5 % (ref 42.7–76)
NRBC BLD AUTO-RTO: 0 /100 WBC (ref 0–0.2)
PLATELET # BLD AUTO: 143 10*3/MM3 (ref 140–450)
PMV BLD AUTO: 10.7 FL (ref 6–12)
RBC # BLD AUTO: 4.07 10*6/MM3 (ref 4.14–5.8)
WBC NRBC COR # BLD AUTO: 5.09 10*3/MM3 (ref 3.4–10.8)

## 2024-06-11 PROCEDURE — 36415 COLL VENOUS BLD VENIPUNCTURE: CPT | Performed by: INTERNAL MEDICINE

## 2024-06-11 PROCEDURE — 86140 C-REACTIVE PROTEIN: CPT | Performed by: INTERNAL MEDICINE

## 2024-06-11 PROCEDURE — 85025 COMPLETE CBC W/AUTO DIFF WBC: CPT | Performed by: INTERNAL MEDICINE

## 2025-02-25 ENCOUNTER — TRANSCRIBE ORDERS (OUTPATIENT)
Dept: CARDIOLOGY | Facility: HOSPITAL | Age: 60
End: 2025-02-25
Payer: COMMERCIAL

## 2025-02-25 ENCOUNTER — TRANSCRIBE ORDERS (OUTPATIENT)
Dept: GENERAL RADIOLOGY | Facility: HOSPITAL | Age: 60
End: 2025-02-25
Payer: COMMERCIAL

## 2025-02-25 ENCOUNTER — TRANSCRIBE ORDERS (OUTPATIENT)
Dept: LAB | Facility: HOSPITAL | Age: 60
End: 2025-02-25
Payer: COMMERCIAL

## 2025-02-25 ENCOUNTER — HOSPITAL ENCOUNTER (OUTPATIENT)
Dept: CARDIOLOGY | Facility: HOSPITAL | Age: 60
Discharge: HOME | End: 2025-02-25
Payer: COMMERCIAL

## 2025-02-25 ENCOUNTER — LAB (OUTPATIENT)
Dept: LAB | Facility: HOSPITAL | Age: 60
End: 2025-02-25
Payer: COMMERCIAL

## 2025-02-25 ENCOUNTER — HOSPITAL ENCOUNTER (OUTPATIENT)
Dept: GENERAL RADIOLOGY | Facility: HOSPITAL | Age: 60
Discharge: HOME | End: 2025-02-25
Payer: COMMERCIAL

## 2025-02-25 DIAGNOSIS — Z01.818 OTHER SPECIFIED PRE-OPERATIVE EXAMINATION: Primary | ICD-10-CM

## 2025-02-25 DIAGNOSIS — Z01.818 OTHER SPECIFIED PRE-OPERATIVE EXAMINATION: ICD-10-CM

## 2025-02-25 LAB
ANION GAP SERPL CALCULATED.3IONS-SCNC: 14 MMOL/L (ref 5–15)
BUN SERPL-MCNC: 23 MG/DL (ref 6–20)
BUN/CREAT SERPL: 21.3 (ref 7–25)
CALCIUM SPEC-SCNC: 10.3 MG/DL (ref 8.6–10.5)
CHLORIDE SERPL-SCNC: 105 MMOL/L (ref 98–107)
CO2 SERPL-SCNC: 24 MMOL/L (ref 22–29)
CREAT SERPL-MCNC: 1.08 MG/DL (ref 0.76–1.27)
DEPRECATED RDW RBC AUTO: 42.5 FL (ref 37–54)
EGFRCR SERPLBLD CKD-EPI 2021: 79.1 ML/MIN/1.73
ERYTHROCYTE [DISTWIDTH] IN BLOOD BY AUTOMATED COUNT: 13 % (ref 12.3–15.4)
GLUCOSE SERPL-MCNC: 86 MG/DL (ref 65–99)
HCT VFR BLD AUTO: 37.5 % (ref 37.5–51)
HGB BLD-MCNC: 12.8 G/DL (ref 13–17.7)
MCH RBC QN AUTO: 30.8 PG (ref 26.6–33)
MCHC RBC AUTO-ENTMCNC: 34.1 G/DL (ref 31.5–35.7)
MCV RBC AUTO: 90.4 FL (ref 79–97)
PLATELET # BLD AUTO: 156 10*3/MM3 (ref 140–450)
PMV BLD AUTO: 10.4 FL (ref 6–12)
POTASSIUM SERPL-SCNC: 4.3 MMOL/L (ref 3.5–5.2)
QT INTERVAL: 390 MS
QTC INTERVAL: 449 MS
RBC # BLD AUTO: 4.15 10*6/MM3 (ref 4.14–5.8)
SODIUM SERPL-SCNC: 143 MMOL/L (ref 136–145)
WBC NRBC COR # BLD AUTO: 5.08 10*3/MM3 (ref 3.4–10.8)

## 2025-02-25 PROCEDURE — 36415 COLL VENOUS BLD VENIPUNCTURE: CPT

## 2025-02-25 PROCEDURE — 80048 BASIC METABOLIC PNL TOTAL CA: CPT

## 2025-02-25 PROCEDURE — 93005 ELECTROCARDIOGRAM TRACING: CPT | Performed by: ORTHOPAEDIC SURGERY

## 2025-02-25 PROCEDURE — 85027 COMPLETE CBC AUTOMATED: CPT

## 2025-02-25 PROCEDURE — 71046 X-RAY EXAM CHEST 2 VIEWS: CPT

## 2025-02-27 ENCOUNTER — ANESTHESIA EVENT (OUTPATIENT)
Dept: PERIOP | Facility: HOSPITAL | Age: 60
End: 2025-02-27
Payer: COMMERCIAL

## 2025-02-27 RX ORDER — FAMOTIDINE 10 MG/ML
20 INJECTION, SOLUTION INTRAVENOUS ONCE
Status: CANCELLED | OUTPATIENT
Start: 2025-02-27 | End: 2025-02-27

## 2025-02-28 ENCOUNTER — ANESTHESIA (OUTPATIENT)
Dept: PERIOP | Facility: HOSPITAL | Age: 60
End: 2025-02-28
Payer: COMMERCIAL

## 2025-02-28 ENCOUNTER — ANESTHESIA EVENT CONVERTED (OUTPATIENT)
Dept: ANESTHESIOLOGY | Facility: HOSPITAL | Age: 60
End: 2025-02-28
Payer: COMMERCIAL

## 2025-02-28 ENCOUNTER — HOSPITAL ENCOUNTER (OUTPATIENT)
Facility: HOSPITAL | Age: 60
Discharge: HOME | End: 2025-03-02
Attending: ORTHOPAEDIC SURGERY | Admitting: ORTHOPAEDIC SURGERY
Payer: COMMERCIAL

## 2025-02-28 DIAGNOSIS — E11.40 DIABETIC NEUROPATHY: ICD-10-CM

## 2025-02-28 DIAGNOSIS — L97.529 DIABETIC ULCER OF LEFT FOOT: ICD-10-CM

## 2025-02-28 DIAGNOSIS — E11.621 DIABETIC ULCER OF LEFT FOOT: ICD-10-CM

## 2025-02-28 LAB
GLUCOSE BLDC GLUCOMTR-MCNC: 190 MG/DL (ref 70–130)
GLUCOSE BLDC GLUCOMTR-MCNC: 254 MG/DL (ref 70–130)
GLUCOSE BLDC GLUCOMTR-MCNC: 276 MG/DL (ref 70–130)
HBA1C MFR BLD: 7 % (ref 4.8–5.6)

## 2025-02-28 PROCEDURE — 25010000002 VANCOMYCIN 10 G RECONSTITUTED SOLUTION: Performed by: ORTHOPAEDIC SURGERY

## 2025-02-28 PROCEDURE — 25810000003 LACTATED RINGERS PER 1000 ML: Performed by: ANESTHESIOLOGY

## 2025-02-28 PROCEDURE — 87070 CULTURE OTHR SPECIMN AEROBIC: CPT | Performed by: ORTHOPAEDIC SURGERY

## 2025-02-28 PROCEDURE — 25010000002 LIDOCAINE PF 2% 2 % SOLUTION: Performed by: NURSE ANESTHETIST, CERTIFIED REGISTERED

## 2025-02-28 PROCEDURE — 88305 TISSUE EXAM BY PATHOLOGIST: CPT | Performed by: ORTHOPAEDIC SURGERY

## 2025-02-28 PROCEDURE — 87206 SMEAR FLUORESCENT/ACID STAI: CPT | Performed by: ORTHOPAEDIC SURGERY

## 2025-02-28 PROCEDURE — G0378 HOSPITAL OBSERVATION PER HR: HCPCS

## 2025-02-28 PROCEDURE — 25810000003 SODIUM CHLORIDE 0.9 % SOLUTION: Performed by: ORTHOPAEDIC SURGERY

## 2025-02-28 PROCEDURE — 87176 TISSUE HOMOGENIZATION CULTR: CPT | Performed by: ORTHOPAEDIC SURGERY

## 2025-02-28 PROCEDURE — 25010000002 LIDOCAINE PF 1% 1 % SOLUTION: Performed by: NURSE ANESTHETIST, CERTIFIED REGISTERED

## 2025-02-28 PROCEDURE — 99222 1ST HOSP IP/OBS MODERATE 55: CPT | Performed by: HOSPITALIST

## 2025-02-28 PROCEDURE — 25010000002 PROPOFOL 10 MG/ML EMULSION: Performed by: NURSE ANESTHETIST, CERTIFIED REGISTERED

## 2025-02-28 PROCEDURE — 25010000002 FENTANYL CITRATE (PF) 50 MCG/ML SOLUTION: Performed by: NURSE ANESTHETIST, CERTIFIED REGISTERED

## 2025-02-28 PROCEDURE — 87147 CULTURE TYPE IMMUNOLOGIC: CPT | Performed by: ORTHOPAEDIC SURGERY

## 2025-02-28 PROCEDURE — 87102 FUNGUS ISOLATION CULTURE: CPT | Performed by: ORTHOPAEDIC SURGERY

## 2025-02-28 PROCEDURE — 88311 DECALCIFY TISSUE: CPT | Performed by: ORTHOPAEDIC SURGERY

## 2025-02-28 PROCEDURE — 94799 UNLISTED PULMONARY SVC/PX: CPT

## 2025-02-28 PROCEDURE — 25010000002 DEXAMETHASONE SODIUM PHOSPHATE 10 MG/ML SOLUTION: Performed by: NURSE ANESTHETIST, CERTIFIED REGISTERED

## 2025-02-28 PROCEDURE — 25010000002 ROPIVACAINE HCL-NACL 0.2-0.9 % SOLUTION: Performed by: HOSPITALIST

## 2025-02-28 PROCEDURE — 87116 MYCOBACTERIA CULTURE: CPT | Performed by: ORTHOPAEDIC SURGERY

## 2025-02-28 PROCEDURE — 25010000002 VANCOMYCIN 1 G RECONSTITUTED SOLUTION: Performed by: ORTHOPAEDIC SURGERY

## 2025-02-28 PROCEDURE — 82948 REAGENT STRIP/BLOOD GLUCOSE: CPT

## 2025-02-28 PROCEDURE — 25010000002 BUPIVACAINE (PF) 0.25 % SOLUTION: Performed by: NURSE ANESTHETIST, CERTIFIED REGISTERED

## 2025-02-28 PROCEDURE — 83036 HEMOGLOBIN GLYCOSYLATED A1C: CPT | Performed by: HOSPITALIST

## 2025-02-28 PROCEDURE — 87205 SMEAR GRAM STAIN: CPT | Performed by: ORTHOPAEDIC SURGERY

## 2025-02-28 PROCEDURE — 25010000002 LIDOCAINE PF 1% 1 % SOLUTION: Performed by: ANESTHESIOLOGY

## 2025-02-28 PROCEDURE — 25010000002 VANCOMYCIN 2-0.9 GM/500ML-% SOLUTION: Performed by: ORTHOPAEDIC SURGERY

## 2025-02-28 PROCEDURE — 87075 CULTR BACTERIA EXCEPT BLOOD: CPT | Performed by: ORTHOPAEDIC SURGERY

## 2025-02-28 PROCEDURE — 94660 CPAP INITIATION&MGMT: CPT

## 2025-02-28 PROCEDURE — 87015 SPECIMEN INFECT AGNT CONCNTJ: CPT | Performed by: ORTHOPAEDIC SURGERY

## 2025-02-28 PROCEDURE — 63710000001 INSULIN LISPRO (HUMAN) PER 5 UNITS: Performed by: HOSPITALIST

## 2025-02-28 RX ORDER — NALOXONE HCL 0.4 MG/ML
0.4 VIAL (ML) INJECTION AS NEEDED
Status: DISCONTINUED | OUTPATIENT
Start: 2025-02-28 | End: 2025-02-28

## 2025-02-28 RX ORDER — BUPIVACAINE HYDROCHLORIDE 2.5 MG/ML
INJECTION, SOLUTION EPIDURAL; INFILTRATION; INTRACAUDAL
Status: COMPLETED | OUTPATIENT
Start: 2025-02-28 | End: 2025-02-28

## 2025-02-28 RX ORDER — SODIUM CHLORIDE, SODIUM LACTATE, POTASSIUM CHLORIDE, CALCIUM CHLORIDE 600; 310; 30; 20 MG/100ML; MG/100ML; MG/100ML; MG/100ML
9 INJECTION, SOLUTION INTRAVENOUS CONTINUOUS
Status: ACTIVE | OUTPATIENT
Start: 2025-03-01 | End: 2025-03-01

## 2025-02-28 RX ORDER — VANCOMYCIN HYDROCHLORIDE 1 G/20ML
INJECTION, POWDER, LYOPHILIZED, FOR SOLUTION INTRAVENOUS AS NEEDED
Status: DISCONTINUED | OUTPATIENT
Start: 2025-02-28 | End: 2025-02-28 | Stop reason: HOSPADM

## 2025-02-28 RX ORDER — CHOLECALCIFEROL (VITAMIN D3) 25 MCG
1000 TABLET ORAL DAILY
Status: DISCONTINUED | OUTPATIENT
Start: 2025-02-28 | End: 2025-03-02 | Stop reason: HOSPADM

## 2025-02-28 RX ORDER — SODIUM CHLORIDE 0.9 % (FLUSH) 0.9 %
3-10 SYRINGE (ML) INJECTION AS NEEDED
Status: DISCONTINUED | OUTPATIENT
Start: 2025-02-28 | End: 2025-02-28

## 2025-02-28 RX ORDER — MIDAZOLAM HYDROCHLORIDE 1 MG/ML
1 INJECTION, SOLUTION INTRAMUSCULAR; INTRAVENOUS
Status: DISCONTINUED | OUTPATIENT
Start: 2025-02-28 | End: 2025-02-28 | Stop reason: HOSPADM

## 2025-02-28 RX ORDER — SODIUM CHLORIDE 0.9 % (FLUSH) 0.9 %
3 SYRINGE (ML) INJECTION EVERY 12 HOURS SCHEDULED
Status: DISCONTINUED | OUTPATIENT
Start: 2025-02-28 | End: 2025-02-28

## 2025-02-28 RX ORDER — FAMOTIDINE 20 MG/1
20 TABLET, FILM COATED ORAL ONCE
Status: COMPLETED | OUTPATIENT
Start: 2025-02-28 | End: 2025-02-28

## 2025-02-28 RX ORDER — ONDANSETRON 4 MG/1
4 TABLET, ORALLY DISINTEGRATING ORAL EVERY 6 HOURS PRN
Status: DISCONTINUED | OUTPATIENT
Start: 2025-02-28 | End: 2025-03-02 | Stop reason: HOSPADM

## 2025-02-28 RX ORDER — OXYCODONE HYDROCHLORIDE 5 MG/1
5 TABLET ORAL EVERY 4 HOURS PRN
Status: DISCONTINUED | OUTPATIENT
Start: 2025-02-28 | End: 2025-03-02 | Stop reason: HOSPADM

## 2025-02-28 RX ORDER — SODIUM CHLORIDE 0.9 % (FLUSH) 0.9 %
10 SYRINGE (ML) INJECTION AS NEEDED
Status: DISCONTINUED | OUTPATIENT
Start: 2025-02-28 | End: 2025-02-28 | Stop reason: HOSPADM

## 2025-02-28 RX ORDER — MORPHINE SULFATE 2 MG/ML
6 INJECTION, SOLUTION INTRAMUSCULAR; INTRAVENOUS
Status: DISCONTINUED | OUTPATIENT
Start: 2025-02-28 | End: 2025-03-02 | Stop reason: HOSPADM

## 2025-02-28 RX ORDER — ONDANSETRON 2 MG/ML
4 INJECTION INTRAMUSCULAR; INTRAVENOUS EVERY 6 HOURS PRN
Status: DISCONTINUED | OUTPATIENT
Start: 2025-02-28 | End: 2025-03-02 | Stop reason: HOSPADM

## 2025-02-28 RX ORDER — DEXAMETHASONE SODIUM PHOSPHATE 10 MG/ML
INJECTION, SOLUTION INTRAMUSCULAR; INTRAVENOUS
Status: COMPLETED | OUTPATIENT
Start: 2025-02-28 | End: 2025-02-28

## 2025-02-28 RX ORDER — ROPIVACAINE HYDROCHLORIDE 2 MG/ML
INJECTION, SOLUTION EPIDURAL; INFILTRATION; PERINEURAL CONTINUOUS
Status: DISCONTINUED | OUTPATIENT
Start: 2025-02-28 | End: 2025-03-02 | Stop reason: HOSPADM

## 2025-02-28 RX ORDER — MEPERIDINE HYDROCHLORIDE 25 MG/ML
12.5 INJECTION INTRAMUSCULAR; INTRAVENOUS; SUBCUTANEOUS
Status: DISCONTINUED | OUTPATIENT
Start: 2025-02-28 | End: 2025-02-28

## 2025-02-28 RX ORDER — PROMETHAZINE HYDROCHLORIDE 25 MG/1
25 TABLET ORAL ONCE AS NEEDED
Status: DISCONTINUED | OUTPATIENT
Start: 2025-02-28 | End: 2025-02-28

## 2025-02-28 RX ORDER — HYDROCODONE BITARTRATE AND ACETAMINOPHEN 5; 325 MG/1; MG/1
1 TABLET ORAL ONCE AS NEEDED
Status: DISCONTINUED | OUTPATIENT
Start: 2025-02-28 | End: 2025-02-28

## 2025-02-28 RX ORDER — DEXTROSE MONOHYDRATE 25 G/50ML
25 INJECTION, SOLUTION INTRAVENOUS
Status: DISCONTINUED | OUTPATIENT
Start: 2025-02-28 | End: 2025-03-02 | Stop reason: HOSPADM

## 2025-02-28 RX ORDER — LISINOPRIL 40 MG/1
40 TABLET ORAL DAILY
Status: DISCONTINUED | OUTPATIENT
Start: 2025-03-01 | End: 2025-03-02 | Stop reason: HOSPADM

## 2025-02-28 RX ORDER — NICOTINE POLACRILEX 4 MG
15 LOZENGE BUCCAL
Status: DISCONTINUED | OUTPATIENT
Start: 2025-02-28 | End: 2025-03-02 | Stop reason: HOSPADM

## 2025-02-28 RX ORDER — HALOPERIDOL 5 MG/ML
2 INJECTION INTRAMUSCULAR ONCE AS NEEDED
Status: DISCONTINUED | OUTPATIENT
Start: 2025-02-28 | End: 2025-02-28

## 2025-02-28 RX ORDER — HYDROMORPHONE HYDROCHLORIDE 1 MG/ML
0.5 INJECTION, SOLUTION INTRAMUSCULAR; INTRAVENOUS; SUBCUTANEOUS
Status: DISCONTINUED | OUTPATIENT
Start: 2025-02-28 | End: 2025-02-28

## 2025-02-28 RX ORDER — VANCOMYCIN 2 GRAM/500 ML IN 0.9 % SODIUM CHLORIDE INTRAVENOUS
15 ONCE
Status: COMPLETED | OUTPATIENT
Start: 2025-02-28 | End: 2025-02-28

## 2025-02-28 RX ORDER — IBUPROFEN 600 MG/1
1 TABLET ORAL
Status: DISCONTINUED | OUTPATIENT
Start: 2025-02-28 | End: 2025-03-02 | Stop reason: HOSPADM

## 2025-02-28 RX ORDER — ALLOPURINOL 300 MG/1
300 TABLET ORAL DAILY
Status: DISCONTINUED | OUTPATIENT
Start: 2025-02-28 | End: 2025-03-02 | Stop reason: HOSPADM

## 2025-02-28 RX ORDER — ACETAMINOPHEN 160 MG
TABLET,DISINTEGRATING ORAL AS NEEDED
Status: DISCONTINUED | OUTPATIENT
Start: 2025-02-28 | End: 2025-02-28 | Stop reason: HOSPADM

## 2025-02-28 RX ORDER — OXYCODONE AND ACETAMINOPHEN 7.5; 325 MG/1; MG/1
1 TABLET ORAL EVERY 4 HOURS PRN
Status: DISCONTINUED | OUTPATIENT
Start: 2025-02-28 | End: 2025-02-28

## 2025-02-28 RX ORDER — LIDOCAINE HYDROCHLORIDE 10 MG/ML
0.5 INJECTION, SOLUTION EPIDURAL; INFILTRATION; INTRACAUDAL; PERINEURAL ONCE AS NEEDED
Status: DISCONTINUED | OUTPATIENT
Start: 2025-02-28 | End: 2025-03-02 | Stop reason: HOSPADM

## 2025-02-28 RX ORDER — SODIUM CHLORIDE 0.9 % (FLUSH) 0.9 %
10 SYRINGE (ML) INJECTION EVERY 12 HOURS SCHEDULED
Status: DISCONTINUED | OUTPATIENT
Start: 2025-02-28 | End: 2025-02-28 | Stop reason: HOSPADM

## 2025-02-28 RX ORDER — LIDOCAINE HYDROCHLORIDE 10 MG/ML
0.5 INJECTION, SOLUTION EPIDURAL; INFILTRATION; INTRACAUDAL; PERINEURAL ONCE AS NEEDED
Status: COMPLETED | OUTPATIENT
Start: 2025-02-28 | End: 2025-02-28

## 2025-02-28 RX ORDER — PROMETHAZINE HYDROCHLORIDE 25 MG/1
25 SUPPOSITORY RECTAL ONCE AS NEEDED
Status: DISCONTINUED | OUTPATIENT
Start: 2025-02-28 | End: 2025-02-28

## 2025-02-28 RX ORDER — INSULIN LISPRO 100 [IU]/ML
2-7 INJECTION, SOLUTION INTRAVENOUS; SUBCUTANEOUS
Status: DISCONTINUED | OUTPATIENT
Start: 2025-02-28 | End: 2025-03-02 | Stop reason: HOSPADM

## 2025-02-28 RX ORDER — SODIUM CHLORIDE, SODIUM LACTATE, POTASSIUM CHLORIDE, CALCIUM CHLORIDE 600; 310; 30; 20 MG/100ML; MG/100ML; MG/100ML; MG/100ML
9 INJECTION, SOLUTION INTRAVENOUS CONTINUOUS
Status: ACTIVE | OUTPATIENT
Start: 2025-02-28 | End: 2025-03-02

## 2025-02-28 RX ORDER — IPRATROPIUM BROMIDE AND ALBUTEROL SULFATE 2.5; .5 MG/3ML; MG/3ML
3 SOLUTION RESPIRATORY (INHALATION) ONCE AS NEEDED
Status: DISCONTINUED | OUTPATIENT
Start: 2025-02-28 | End: 2025-02-28

## 2025-02-28 RX ORDER — COLCHICINE 0.6 MG/1
0.6 TABLET ORAL DAILY PRN
Status: DISCONTINUED | OUTPATIENT
Start: 2025-02-28 | End: 2025-03-02 | Stop reason: HOSPADM

## 2025-02-28 RX ORDER — LIDOCAINE HYDROCHLORIDE 10 MG/ML
INJECTION, SOLUTION EPIDURAL; INFILTRATION; INTRACAUDAL; PERINEURAL AS NEEDED
Status: DISCONTINUED | OUTPATIENT
Start: 2025-02-28 | End: 2025-02-28 | Stop reason: SURG

## 2025-02-28 RX ORDER — PROPOFOL 10 MG/ML
VIAL (ML) INTRAVENOUS AS NEEDED
Status: DISCONTINUED | OUTPATIENT
Start: 2025-02-28 | End: 2025-02-28 | Stop reason: SURG

## 2025-02-28 RX ORDER — HYDRALAZINE HYDROCHLORIDE 20 MG/ML
5 INJECTION INTRAMUSCULAR; INTRAVENOUS
Status: DISCONTINUED | OUTPATIENT
Start: 2025-02-28 | End: 2025-02-28

## 2025-02-28 RX ORDER — ASPIRIN 81 MG/1
81 TABLET ORAL DAILY
COMMUNITY

## 2025-02-28 RX ORDER — NALOXONE HCL 0.4 MG/ML
0.4 VIAL (ML) INJECTION
Status: DISCONTINUED | OUTPATIENT
Start: 2025-02-28 | End: 2025-03-02 | Stop reason: HOSPADM

## 2025-02-28 RX ORDER — LABETALOL HYDROCHLORIDE 5 MG/ML
5 INJECTION, SOLUTION INTRAVENOUS
Status: DISCONTINUED | OUTPATIENT
Start: 2025-02-28 | End: 2025-02-28

## 2025-02-28 RX ORDER — METOPROLOL SUCCINATE 100 MG/1
100 TABLET, EXTENDED RELEASE ORAL DAILY
Status: DISCONTINUED | OUTPATIENT
Start: 2025-02-28 | End: 2025-03-02 | Stop reason: HOSPADM

## 2025-02-28 RX ORDER — LIDOCAINE HYDROCHLORIDE 20 MG/ML
INJECTION, SOLUTION EPIDURAL; INFILTRATION; INTRACAUDAL; PERINEURAL AS NEEDED
Status: DISCONTINUED | OUTPATIENT
Start: 2025-02-28 | End: 2025-02-28 | Stop reason: SURG

## 2025-02-28 RX ORDER — FENTANYL CITRATE 50 UG/ML
50 INJECTION, SOLUTION INTRAMUSCULAR; INTRAVENOUS
Status: DISCONTINUED | OUTPATIENT
Start: 2025-02-28 | End: 2025-02-28

## 2025-02-28 RX ORDER — ENOXAPARIN SODIUM 100 MG/ML
40 INJECTION SUBCUTANEOUS DAILY
Status: DISCONTINUED | OUTPATIENT
Start: 2025-03-01 | End: 2025-03-02 | Stop reason: HOSPADM

## 2025-02-28 RX ORDER — FENTANYL CITRATE 50 UG/ML
INJECTION, SOLUTION INTRAMUSCULAR; INTRAVENOUS
Status: COMPLETED | OUTPATIENT
Start: 2025-02-28 | End: 2025-02-28

## 2025-02-28 RX ORDER — SODIUM CHLORIDE 9 MG/ML
9 INJECTION, SOLUTION INTRAVENOUS AS NEEDED
Status: DISCONTINUED | OUTPATIENT
Start: 2025-02-28 | End: 2025-02-28

## 2025-02-28 RX ORDER — L.ACID,PARA/B.BIFIDUM/S.THERM 8B CELL
1 CAPSULE ORAL DAILY
Status: DISCONTINUED | OUTPATIENT
Start: 2025-02-28 | End: 2025-03-02 | Stop reason: HOSPADM

## 2025-02-28 RX ORDER — ONDANSETRON 2 MG/ML
4 INJECTION INTRAMUSCULAR; INTRAVENOUS ONCE AS NEEDED
Status: DISCONTINUED | OUTPATIENT
Start: 2025-02-28 | End: 2025-02-28

## 2025-02-28 RX ADMIN — LIDOCAINE HYDROCHLORIDE 50 MG: 10 INJECTION, SOLUTION EPIDURAL; INFILTRATION; INTRACAUDAL; PERINEURAL at 08:14

## 2025-02-28 RX ADMIN — VANCOMYCIN HYDROCHLORIDE 2000 MG: 10 INJECTION, POWDER, LYOPHILIZED, FOR SOLUTION INTRAVENOUS at 20:27

## 2025-02-28 RX ADMIN — Medication 1000 UNITS: at 17:51

## 2025-02-28 RX ADMIN — PROPOFOL 300 MG: 10 INJECTION, EMULSION INTRAVENOUS at 08:14

## 2025-02-28 RX ADMIN — ALLOPURINOL 300 MG: 300 TABLET ORAL at 17:51

## 2025-02-28 RX ADMIN — FAMOTIDINE 20 MG: 20 TABLET, FILM COATED ORAL at 06:29

## 2025-02-28 RX ADMIN — INSULIN LISPRO 3 UNITS: 100 INJECTION, SOLUTION INTRAVENOUS; SUBCUTANEOUS at 20:40

## 2025-02-28 RX ADMIN — METOPROLOL SUCCINATE 100 MG: 100 TABLET, EXTENDED RELEASE ORAL at 17:51

## 2025-02-28 RX ADMIN — LIDOCAINE HYDROCHLORIDE 0.5 ML: 10 INJECTION, SOLUTION EPIDURAL; INFILTRATION; INTRACAUDAL; PERINEURAL at 06:29

## 2025-02-28 RX ADMIN — FENTANYL CITRATE 100 MCG: 50 INJECTION, SOLUTION INTRAMUSCULAR; INTRAVENOUS at 07:09

## 2025-02-28 RX ADMIN — Medication 2000 MG: at 06:29

## 2025-02-28 RX ADMIN — BUPIVACAINE HYDROCHLORIDE 30 ML: 2.5 INJECTION, SOLUTION EPIDURAL; INFILTRATION; INTRACAUDAL; PERINEURAL at 07:20

## 2025-02-28 RX ADMIN — LIDOCAINE HYDROCHLORIDE 50 MG: 20 INJECTION, SOLUTION EPIDURAL; INFILTRATION; INTRACAUDAL; PERINEURAL at 08:14

## 2025-02-28 RX ADMIN — SODIUM CHLORIDE, SODIUM LACTATE, POTASSIUM CHLORIDE, CALCIUM CHLORIDE 9 ML/HR: 20; 30; 600; 310 INJECTION, SOLUTION INTRAVENOUS at 06:29

## 2025-02-28 RX ADMIN — BUPIVACAINE HYDROCHLORIDE 30 ML: 2.5 INJECTION, SOLUTION EPIDURAL; INFILTRATION; INTRACAUDAL at 07:09

## 2025-02-28 RX ADMIN — INSULIN LISPRO 4 UNITS: 100 INJECTION, SOLUTION INTRAVENOUS; SUBCUTANEOUS at 17:50

## 2025-02-28 RX ADMIN — Medication 1 CAPSULE: at 17:51

## 2025-02-28 RX ADMIN — DEXAMETHASONE SODIUM PHOSPHATE 2 MG: 10 INJECTION INTRAMUSCULAR; INTRAVENOUS at 07:09

## 2025-02-28 RX ADMIN — Medication 1000 MG: at 10:06

## 2025-02-28 NOTE — ANESTHESIA PROCEDURE NOTES
Airway  Urgency: elective    Date/Time: 2/28/2025 8:15 AM  Airway not difficult    General Information and Staff    Patient location during procedure: OR  CRNA/CAA: Cain Donnelly CRNA    Indications and Patient Condition  Indications for airway management: airway protection    Preoxygenated: yes  Mask difficulty assessment: 1 - vent by mask    Final Airway Details  Final airway type: supraglottic airway      Successful airway: I-gel  Size 4     Number of attempts at approach: 1  Assessment: lips, teeth, and gum same as pre-op    Additional Comments  LMA placed without difficulty, ventilation with assist, equal breath sounds and symmetric chest rise and fall

## 2025-02-28 NOTE — H&P
[facility]  HISTORY AND PHYSICAL    Patient Name: Alvaro Sinha  : 1965  MRN: [id number]  Primary Care Physician: Martha Mckee MD  Date of admission: 2025      Subjective   Subjective     Chief Complaint: L foot wound    HPI:  Alvaro Sinha is a 59 y.o. male with history of DM insulin dependent, R foot/toe osteomyelitis s/p R second/third toe amputations , gout, CKD IIIa, HTN, HL here with a non-healing L foot wound, evaluated by Dr. Hays, orthopedic surgery, and second ray amputation of L foot and endoscopic gastrocnemius recession. Otherwise in his usual state of health. Noted L foot wound at head of second/third metatarsals in last month that began as a callous, then opened, no drainage and non-healing. No f/c. No n/v. No dyspnea. No new issues beyond recent gout flare of elbow.      Personal History     Past Medical History:   Diagnosis Date    Diabetes mellitus     Gout     Hypertension     Osteomyelitis     right foot    Sleep apnea     CPAP         Oncology Problem List:  Lymphoma (2022; Status: Active)  Oncology/Hematology History     Past Surgical History:   Procedure Laterality Date    BONE RESECTION, RIB      FOOT IRRIGATION, DEBRIDEMENT AND REPAIR Right 2024    Procedure: foot irrigation, debridement, secondary closure Right;  Surgeon: Jeremi Hays Jr., MD;  Location: [facility] OR;  Service: Orthopedics;  Laterality: Right;    TRANS METATARSAL AMPUTATION Right 2024    Procedure: AMPUTATION TRANS METATARSAL RIGHT;  Surgeon: Jeremi Hays Jr., MD;  Location: [facility] OR;  Service: Orthopedics;  Laterality: Right;    VENOUS ACCESS DEVICE (PORT) INSERTION Left 2024    Procedure: INSERTION GROSHONG CATHETER WITH FLUOROSCOPY;  Surgeon: Israel Preciado MD;  Location: [facility] OR;  Service: General;  Laterality: Left;       Family History: family history is not on file.     Social History:   reports that he has never smoked. He has never used smokeless tobacco. He reports current alcohol use. He reports that he does not currently use drugs.  Social History     Social History Narrative    Not on file       Medications:  Available home medication information reviewed.  DAPTOmycin 700 mg in sodium chloride 0.9 % 50 mL, Dulaglutide, allopurinol, aspirin, (cefTRIAXone 2,000 mg in sodium chloride 0.9 % 100 mL IVPB), colchicine, ergocalciferol, glimepiride, lactobacillus acidophilus, lisinopril, metFORMIN, metoprolol succinate XL, naloxone, ondansetron ODT, oxyCODONE, pioglitazone, and sildenafil    Allergies   Allergen Reactions    Statins Myalgia       Objective   Objective     Vital Signs:   Temp:  [98 °F (36.7 °C)] 98 °F (36.7 °C)  Heart Rate:  [70] 70  Resp:  [16] 16  BP: (176)/(97) 176/97       Physical Exam   NAD, alert and oriented  OP clear, dry MM  Neck supple  No LAD  RRR  CTAB  +BS, ND, NT, soft  HERNANDEZ  Normal affect  L foot dressed    Result Review:  I have personally reviewed the results from the time of this admission to 2/28/2025 10:12 EST and agree with these findings:  []  Laboratory list / accordion  []  Microbiology  []  Radiology  []  EKG/Telemetry   []  Cardiology/Vascular   []  Pathology  []  Old records  []  Other:  Most notable findings include: WBC 5, Hgb 12.8 (labs from 2/25)      LAB RESULTS:      Lab 02/25/25  1520   WBC 5.08   HEMOGLOBIN 12.8*   HEMATOCRIT 37.5   PLATELETS 156   MCV 90.4         Lab 02/25/25  1520   SODIUM 143   POTASSIUM 4.3   CHLORIDE 105   CO2 24.0   ANION GAP 14.0   BUN 23*   CREATININE 1.08   EGFR 79.1   GLUCOSE 86   CALCIUM 10.3                             Microbiology Results (last 10 days)       ** No results found for the last 240 hours. **            Peripheral Block    Result Date: 2/28/2025  Cain Donnelly CRNA     2/28/2025  9:48 AM Peripheral Block Pre-sedation assessment completed: 2/28/2025 6:58 AM Patient reassessed immediately prior to procedure  Patient location during procedure: pre-op Start time: 2/28/2025 7:10 AM Stop time: 2/28/2025 7:20 AM Reason for block: at surgeon's request and post-op pain management Performed by CRNA/CAA: Butch Devi, CRNA Assisted by: Bushra Alejandra RN Preanesthetic Checklist Completed: patient identified, IV checked, site marked, risks and benefits discussed, surgical consent, monitors and equipment checked, pre-op evaluation and timeout performed Prep: Sterile barriers:cap, gloves, mask and washed/disinfected hands Prep: ChloraPrep Patient monitoring: blood pressure monitoring, continuous pulse oximetry and EKG Procedure Sedation: yes Performed under: local infiltration Guidance:ultrasound guided ULTRASOUND INTERPRETATION.  Using ultrasound guidance a 20 G gauge needle was placed in close proximity to the nerve, at which point, under ultrasound guidance anesthetic was injected in the area of the nerve and spread of the anesthesia was seen on ultrasound in close proximity thereto.  There were no abnormalities seen on ultrasound; a digital image was taken; and the patient tolerated the procedure with no complications. Images:still images obtained, printed/placed on chart Laterality:left Block Type:popliteal Injection Technique:catheter Needle Type:echogenic and Tuohy Needle Gauge:18 G Resistance on Injection: none Catheter Size:20 G Cath Depth at skin: 10 cm Medications Used: bupivacaine PF (MARCAINE) 0.25 % injection - Injection  30 mL - 2/28/2025 7:20:00 AM Medications Preservative Free Saline:10ml Post Assessment Injection Assessment: negative aspiration for heme, no paresthesia on injection and incremental injection Patient Tolerance:comfortable throughout block Complications:no Additional Notes CATHETER                             A high-frequency linear transducer, with sterile cover, was placed in the popliteal fossa to identify the popliteal artery and vein, Tibial nerve (TN) and Common Peroneal nerve (CP). The  "transducer was then moved in a cephalad fashion to observe the TN and CP nerve bifurcation to form the Sciatic Nerve. The insertion site was prepped and draped in sterile fashion. Skin and cutaneous tissue was infiltrated with 2-5 ml of 1% Lidocaine. Using ultrasound-guidance, an 18-gauge Contiplex Ultra 360 Touhy needle was advanced in plane from lateral to medial. Preservative-free normal saline was utilized for hydro-dissection of tissue, advancement of Touhy needle, and to confirm final needle placement posterior to the nerves. Local anesthetic injection spread, in incremental 3-5 ml injections, to surround both nerve structures. Aspiration every 5 ml to prevent intravascular injection. Injection was completed with negative aspiration of blood and negative intravascular injection. Injection pressures were normal with minimal resistance. A 20-gauge Osperiplex Echo catheter was placed through the needle and advance out the tip of the Touhy 1-3 cm. The Touhy needle was then removed, and final catheter position verified (Below/above or Anterior/Posterior) the nerve structures. The catheter was secured in the usual fashion with skin glue, benzoin, steri-strips, CHG tegaderm and Label noting \"Nerve Block Catheter\". Jerk tape applied at yellow connector and catheter connection. Performed by: Cain Donnelly CRNA     Peripheral Block    Result Date: 2/28/2025  Cain Donnelly CRNA     2/28/2025  8:59 AM Peripheral Block Pre-sedation assessment completed: 2/28/2025 6:57 AM Patient reassessed immediately prior to procedure Start time: 2/28/2025 7:05 AM Stop time: 2/28/2025 7:09 AM Reason for block: at surgeon's request and post-op pain management Performed by CRNA/PEE: Butch Devi CRNA Assisted by: Cain Donnelly CRNA Preanesthetic Checklist Completed: patient identified, IV checked, site marked, risks and benefits discussed, surgical consent, monitors and equipment checked, pre-op evaluation and timeout performed " Prep: Pt Position: supine Sterile barriers:cap, gloves, mask, sterile barriers and washed/disinfected hands Prep: ChloraPrep Patient monitoring: blood pressure monitoring, continuous pulse oximetry and EKG Procedure Performed under: spinal Guidance:ultrasound guided ULTRASOUND INTERPRETATION.  Using ultrasound guidance a 20 G gauge needle was placed in close proximity to the nerve, at which point, under ultrasound guidance anesthetic was injected in the area of the nerve and spread of the anesthesia was seen on ultrasound in close proximity thereto.  There were no abnormalities seen on ultrasound; a digital image was taken; and the patient tolerated the procedure with no complications. Images:still images obtained, printed/placed on chart Laterality:left Block Type:adductor canal block Injection Technique:single-shot Needle Type:echogenic and short-bevel Needle Gauge:20 G Resistance on Injection: none Catheter size: 20g. Cath Depth at skin: 10 cm Medications Used: fentaNYL citrate (PF) (SUBLIMAZE) injection - Intravenous  100 mcg - 2/28/2025 7:09:00 AM dexamethasone sodium phosphate injection - Injection  2 mg - 2/28/2025 7:09:00 AM bupivacaine PF (MARCAINE) 0.25 % injection - Injection  30 mL - 2/28/2025 7:09:00 AM Medications Preservative Free Saline:10ml Post Assessment Injection Assessment: negative aspiration for heme, incremental injection and no paresthesia on injection Patient Tolerance:comfortable throughout block Complications:no Additional Notes SINGLE shot A high-frequency linear transducer, with sterile cover, was placed on the anterior mid-thigh (between the anterior superior iliac spine and patella). The transducer was then moved medially to identify the Sartorius muscle (Taylor), Vastus Medialis muscle (VMM), Superficial Femoral Artery (SFA) and Vein. The transducer was then moved cephalad or caudad to position the SFA in the middle of the Taylor. The insertion site was prepped and draped in sterile  "fashion. Skin and cutaneous tissue was infiltrated with 2-5 ml of 1% Lidocaine. Using ultrasound-guidance, a 20-gauge B-Aguilar 4\" Ultraplex 360 non-stimulating echogenic needle was advanced in plane from lateral to medial. Preservative-free normal saline was utilized for hydro-dissection of tissue, advancement of needle, and to confirm needle placement below the fascial plane of the Taylor where the Nerve to the VMM is located. Local anesthetic (LA) 5 ml deposited here. The needle continues its path lateral to the SFA at the level of the Saphenous Nerve. The remainder of the LA was deposited at the 10-11 o'clock position of the SFA. This injection created a space between the Taylor and the SFA. Aspiration every 5 ml to prevent intravascular injection. Injection was completed with negative aspiration of blood and negative intravascular injection. Injection pressures were normal with minimal resistance. Performed by: Cain Donnelly CRNA          Assessment & Plan   Assessment & Plan       Diabetic foot ulcer    Lymphadenopathy    Lymphoma    Type 2 diabetes mellitus with hyperglycemia    Hypertensive disorder    Hyperlipidemia    Stage 3a chronic kidney disease    L plantar foot wound/second metatarsal head  -s/p second ray amputation, endoscopic gastrocnemius recession  -s/p culture  -ID consulted    DM  -A1C, basal/bolus insulin titration prn    Gout  -resume home allopurinol, prn colchicine    CKD III  -monitor renal function    History of LN/abnormal light chains  -states that he's followed by  for this      VTE Prophylaxis:  Pharmacologic VTE prophylaxis orders are present.          CODE STATUS:    There are no questions and answers to display.       Expected Discharge   Expected discharge date/ time has not been documented.     Josue Wagner MD  02/28/25    "

## 2025-02-28 NOTE — ANESTHESIA PREPROCEDURE EVALUATION
Anesthesia Evaluation     Patient summary reviewed and Nursing notes reviewed   NPO Solid Status: > 8 hours  NPO Liquid Status: > 2 hours           Airway   Mallampati: II  TM distance: >3 FB  Neck ROM: full  No difficulty expected  Dental      Pulmonary     breath sounds clear to auscultation  (+) ,sleep apnea  Cardiovascular     ECG reviewed  Rhythm: regular  Rate: normal    (+) hypertension, hyperlipidemia      Neuro/Psych  GI/Hepatic/Renal/Endo    (+) obesity, diabetes mellitus    Musculoskeletal     Abdominal    Substance History      OB/GYN          Other                      Anesthesia Plan    ASA 3     general with block     intravenous induction     Anesthetic plan, risks, benefits, and alternatives have been provided, discussed and informed consent has been obtained with: patient.    Plan discussed with CRNA.    CODE STATUS:

## 2025-02-28 NOTE — ANESTHESIA POSTPROCEDURE EVALUATION
Patient: Alvaro Sinha    Procedure Summary       Date: 02/28/25 [facility] / [facility]    Anesthesia Start: 0807 Anesthesia Stop: 0943    Procedures:       LEFT ENDOSCOPIC RECESSION GASTROCNEMIUS (Left: Knee)      2ND RAY AMPUTATION (Left: Foot) Diagnosis:     Surgeons: Jeremi Hays Jr., MD Provider: Damon Das MD    Anesthesia Type: general with block ASA Status: 3            Anesthesia Type: general with block    Vitals  No vitals data found for the desired time range.          Post Anesthesia Care and Evaluation    Patient location during evaluation: PACU  Patient participation: complete - patient participated  Level of consciousness: awake and alert  Pain management: adequate    Airway patency: patent  Anesthetic complications: No anesthetic complications  PONV Status: none  Cardiovascular status: hemodynamically stable and acceptable  Respiratory status: nonlabored ventilation, acceptable and nasal cannula  Hydration status: acceptable

## 2025-02-28 NOTE — ANESTHESIA PROCEDURE NOTES
Peripheral Block    Pre-sedation assessment completed: 2/28/2025 6:58 AM    Patient reassessed immediately prior to procedure    Patient location during procedure: pre-op  Start time: 2/28/2025 7:10 AM  Stop time: 2/28/2025 7:20 AM  Reason for block: at surgeon's request and post-op pain management  Performed by  CRNA/CAA: Butch Devi, CRNA  Assisted by: Bushra Alejandra RN  Preanesthetic Checklist  Completed: patient identified, IV checked, site marked, risks and benefits discussed, surgical consent, monitors and equipment checked, pre-op evaluation and timeout performed  Prep:  Sterile barriers:cap, gloves, mask and washed/disinfected hands  Prep: ChloraPrep  Patient monitoring: blood pressure monitoring, continuous pulse oximetry and EKG  Procedure    Sedation: yes  Performed under: local infiltration  Guidance:ultrasound guided    ULTRASOUND INTERPRETATION.  Using ultrasound guidance a 20 G gauge needle was placed in close proximity to the nerve, at which point, under ultrasound guidance anesthetic was injected in the area of the nerve and spread of the anesthesia was seen on ultrasound in close proximity thereto.  There were no abnormalities seen on ultrasound; a digital image was taken; and the patient tolerated the procedure with no complications. Images:still images obtained, printed/placed on chart    Laterality:left  Block Type:popliteal  Injection Technique:catheter  Needle Type:echogenic and Tuohy  Needle Gauge:18 G  Resistance on Injection: none  Catheter Size:20 G  Cath Depth at skin: 10 cm    Medications Used: bupivacaine PF (MARCAINE) 0.25 % injection - Injection   30 mL - 2/28/2025 7:20:00 AM      Medications  Preservative Free Saline:10ml    Post Assessment  Injection Assessment: negative aspiration for heme, no paresthesia on injection and incremental injection  Patient Tolerance:comfortable throughout block  Complications:no  Additional Notes  CATHETER                               A  "high-frequency linear transducer, with sterile cover, was placed in the popliteal fossa to identify the popliteal artery and vein, Tibial nerve (TN) and Common Peroneal nerve (CP). The transducer was then moved in a cephalad fashion to observe the TN and CP nerve bifurcation to form the Sciatic Nerve. The insertion site was prepped and draped in sterile fashion. Skin and cutaneous tissue was infiltrated with 2-5 ml of 1% Lidocaine. Using ultrasound-guidance, an 18-gauge Contiplex Ultra 360 Touhy needle was advanced in plane from lateral to medial. Preservative-free normal saline was utilized for hydro-dissection of tissue, advancement of Touhy needle, and to confirm final needle placement posterior to the nerves. Local anesthetic injection spread, in incremental 3-5 ml injections, to surround both nerve structures. Aspiration every 5 ml to prevent intravascular injection. Injection was completed with negative aspiration of blood and negative intravascular injection. Injection pressures were normal with minimal resistance. A 20-gauge Contiplex Echo catheter was placed through the needle and advance out the tip of the Touhy 1-3 cm. The Touhy needle was then removed, and final catheter position verified (Below/above or Anterior/Posterior) the nerve structures. The catheter was secured in the usual fashion with skin glue, benzoin, steri-strips, CHG tegaderm and Label noting \"Nerve Block Catheter\". Jerk tape applied at yellow connector and catheter connection.    Performed by: Cain Donnelly CRNA            "

## 2025-02-28 NOTE — INTERVAL H&P NOTE
Pre-Op H&P (See Recent Office Note Attached from 2/27/25 encounter for Full H&P)    Chief complaint: diabetic neuropathy    HPI:      Patient is a 59 y.o. male who presents with a history of diabetic neuropathy and diabetic ulcer of left foot. He is s/p right partial second and third ray amputation on 4/8/24 with secondary closure on 4/12/24. He has a 1 month history of left foot wound at the level of the second and third metatarsal heads that has increased in size over time. The wound has failed to adequately heal despite multiple debridements. He presents to the operating room today for surgical management with a left endoscopic gastrocnemius recession and left second ray amputation.     Review of Systems:  General ROS:  no fever, chills, rashes.  No change since last office visit.  No recent sick exposure  Cardiovascular ROS: no chest pain or dyspnea on exertion  Respiratory ROS: no cough, shortness of breath, or wheezing    Meds:    No current facility-administered medications on file prior to encounter.     Current Outpatient Medications on File Prior to Encounter   Medication Sig Dispense Refill    allopurinol (ZYLOPRIM) 300 MG tablet Take 1 tablet by mouth Daily.      glimepiride (AMARYL) 4 MG tablet Take 1 tablet by mouth Daily.      lactobacillus acidophilus (RISAQUAD) capsule capsule Take 1 capsule by mouth Daily. 30 each 1    lisinopril (PRINIVIL,ZESTRIL) 40 MG tablet Take 1 tablet by mouth Daily.      metoprolol succinate XL (TOPROL-XL) 100 MG 24 hr tablet Take 1 tablet by mouth Daily.      oxyCODONE (ROXICODONE) 5 MG immediate release tablet Take 1 tablet by mouth Every 4 (Four) Hours As Needed for Moderate Pain. 42 tablet 0    pioglitazone (ACTOS) 30 MG tablet Take 1 tablet by mouth Daily.      aspirin 81 MG EC tablet Take 1 tablet by mouth Daily.      cefTRIAXone 2,000 mg in sodium chloride 0.9 % 100 mL IVPB Infuse 2,000 mg into a venous catheter Daily. Indications: Bone and/or Joint Infection       "colchicine 0.6 MG tablet Take 1 tablet by mouth Daily As Needed (gout flare). 30 tablet 5    DAPTOmycin 700 mg in sodium chloride 0.9 % 50 mL Infuse 700 mg into a venous catheter Daily. Indications: Bone and/or Joint Infection      Dulaglutide 3 MG/0.5ML solution pen-injector Inject 0.5 mL under the skin into the appropriate area as directed 1 (One) Time Per Week. 2 mL 5    ergocalciferol (ERGOCALCIFEROL) 1.25 MG (95402 UT) capsule Take 1,250 mcg by mouth Daily.      metFORMIN (GLUCOPHAGE) 1000 MG tablet Take 1 tablet by mouth 2 (Two) Times a Day With Meals.      naloxone (NARCAN) 4 MG/0.1ML nasal spray Call 911. Don't prime. [place] in 1 nostril for overdose. Repeat in 2-3 minutes in other nostril if no or minimal breathing/responsiveness. 2 each 0    ondansetron ODT (ZOFRAN-ODT) 4 MG disintegrating tablet Place 1 tablet on the tongue Every 6 (Six) Hours As Needed for Nausea. 20 tablet 0    sildenafil (VIAGRA) 100 MG tablet Take 1 tablet by mouth Daily As Needed.         Vital Signs:  /97 (BP Location: Right arm, Patient Position: Lying)   Pulse 70   Temp 98 °F (36.7 °C) (Temporal)   Resp 16   Ht 185.4 cm (73\")   Wt 136 kg (300 lb)   SpO2 98%   BMI 39.58 kg/m²     Physical Exam:    CV:  S1S2 regular rate and rhythm, no murmur               Resp:  Clear to auscultation; respirations regular, even and unlabored    Results Review:     Lab Results   Component Value Date    WBC 5.08 02/25/2025    HGB 12.8 (L) 02/25/2025    HCT 37.5 02/25/2025    MCV 90.4 02/25/2025     02/25/2025    NEUTROABS 3.74 06/11/2024    GLUCOSE 86 02/25/2025    BUN 23 (H) 02/25/2025    CREATININE 1.08 02/25/2025     02/25/2025    K 4.3 02/25/2025     02/25/2025    CO2 24.0 02/25/2025    MG 1.9 04/13/2024    PHOS 3.3 04/07/2024    CALCIUM 10.3 02/25/2025    ALBUMIN 4.1 05/21/2024    AST 32 05/21/2024    ALT 31 05/21/2024    BILITOT 0.4 05/21/2024       Cancer Staging (if applicable)  Cancer Patient: __ yes _x_no " __unknown; If yes, clinical stage T:__ N:__M:__, stage group or __N/A    Assessment: diabetic neuropathy; diabetic ulcer of left foot    Plan: left endoscopic gastrocnemius recession, left second ray amputation      Krzysztof Jernigan PA-C   2/28/2025   06:53 EST

## 2025-02-28 NOTE — ANESTHESIA PROCEDURE NOTES
Peripheral Block    Pre-sedation assessment completed: 2/28/2025 6:57 AM    Patient reassessed immediately prior to procedure    Start time: 2/28/2025 7:05 AM  Stop time: 2/28/2025 7:09 AM  Reason for block: at surgeon's request and post-op pain management  Performed by  CRNA/CAA: Butch Devi CRNA  Assisted by: Cain Donnelly CRNA  Preanesthetic Checklist  Completed: patient identified, IV checked, site marked, risks and benefits discussed, surgical consent, monitors and equipment checked, pre-op evaluation and timeout performed  Prep:  Pt Position: supine  Sterile barriers:cap, gloves, mask, sterile barriers and washed/disinfected hands  Prep: ChloraPrep  Patient monitoring: blood pressure monitoring, continuous pulse oximetry and EKG  Procedure  Performed under: spinal  Guidance:ultrasound guided    ULTRASOUND INTERPRETATION.  Using ultrasound guidance a 20 G gauge needle was placed in close proximity to the nerve, at which point, under ultrasound guidance anesthetic was injected in the area of the nerve and spread of the anesthesia was seen on ultrasound in close proximity thereto.  There were no abnormalities seen on ultrasound; a digital image was taken; and the patient tolerated the procedure with no complications. Images:still images obtained, printed/placed on chart    Laterality:left  Block Type:adductor canal block  Injection Technique:single-shot  Needle Type:echogenic and short-bevel  Needle Gauge:20 G  Resistance on Injection: none  Catheter size: 20g.  Cath Depth at skin: 10 cm    Medications Used: fentaNYL citrate (PF) (SUBLIMAZE) injection - Intravenous   100 mcg - 2/28/2025 7:09:00 AM  dexamethasone sodium phosphate injection - Injection   2 mg - 2/28/2025 7:09:00 AM  bupivacaine PF (MARCAINE) 0.25 % injection - Injection   30 mL - 2/28/2025 7:09:00 AM      Medications  Preservative Free Saline:10ml    Post Assessment  Injection Assessment: negative aspiration for heme, incremental injection  "and no paresthesia on injection  Patient Tolerance:comfortable throughout block  Complications:no  Additional Notes  SINGLE shot   A high-frequency linear transducer, with sterile cover, was placed on the anterior mid-thigh (between the anterior superior iliac spine and patella). The transducer was then moved medially to identify the Sartorius muscle (Taylor), Vastus Medialis muscle (VMM), Superficial Femoral Artery (SFA) and Vein. The transducer was then moved cephalad or caudad to position the SFA in the middle of the Taylor. The insertion site was prepped and draped in sterile fashion. Skin and cutaneous tissue was infiltrated with 2-5 ml of 1% Lidocaine. Using ultrasound-guidance, a 20-gauge B-Aguilar 4\" Ultraplex 360 non-stimulating echogenic needle was advanced in plane from lateral to medial. Preservative-free normal saline was utilized for hydro-dissection of tissue, advancement of needle, and to confirm needle placement below the fascial plane of the Taylor where the Nerve to the VMM is located. Local anesthetic (LA) 5 ml deposited here. The needle continues its path lateral to the SFA at the level of the Saphenous Nerve. The remainder of the LA was deposited at the 10-11 o'clock position of the SFA. This injection created a space between the Taylor and the SFA. Aspiration every 5 ml to prevent intravascular injection. Injection was completed with negative aspiration of blood and negative intravascular injection. Injection pressures were normal with minimal resistance.   Performed by: Cain Donnelly CRNA            "

## 2025-02-28 NOTE — OP NOTE
[facility], [facility]  [address]  [phone]    OPERATIVE REPORT      PATIENT NAME:  Alvaro Sinha                            YOB: 1965       PREOP DIAGNOSIS:   Left  Left gastrocnemius contracture, plantar ulcer beneath the second metatarsal head.    POSTOP DIAGNOSIS:   Same.    PROCEDURE:   Left  Left    61243:    Endoscopic gastrocnemius recession  83709:    Second ray amputation    SURGEON:     Jeremi Hays MD    OPERATIVE TEAM:   Circulator: Isaura Cradoso RN  Scrub Person: Magda Cardoso  Nursing Assistant: Shauna Aceves; Danny Farias PCT    ANESTHETIST:  Anesthesiologist: Damon Das MD  CRNA: Cain Donnelly CRNA    ANESTHESIA:   General with Block    ESTIM BLOOD LOSS:   50 mL    TOURNIQUET TIME:   28 minutes    FINDINGS:     Diastasis of tendon edges, resolution of positive Silfverskiold.  No deep purulence noted in the foot.    IMPLANTS:     None.    COMPLICATIONS:    None.    DISPOSITION:    Stable to recovery.     INDICATIONS:    59-year-old male status post right second and third ray amputation, with new wound beneath the left second metatarsal head.  This has been refractory to conservative wound care and offloading.  MRI demonstrated early edematous changes in the second metatarsal head.  I had a discussion with him regarding further management including further wound care, debridement with wound VAC.  He had limited ankle dorsiflexion with positive Silfverskiold.  Ultimately, after discussion of risk, benefits, alternatives, he wished to proceed with left endoscopic gastrocnemius recession, second ray amputation.  Risks of surgery were discussed which included but were not limited to pain, bleeding, infection, damage to adjacent structures, need for further surgery, wound healing complications, loss of limb and death.  The patient expressed verbal consent and written consent was obtained for the above procedure.    NARRATIVE:     Patient was identified in preoperative holding.  Operative site was marked in indelible ink.  History, physical, consent were reviewed and updated.  Patient was surrendered to the anesthesia team and transported to the operative suite where anesthesia was induced.  Hip bump was placed on the ipsilateral side and nonsterile thigh tourniquet was placed.  Operative extremity was prepped and draped in the usual sterile fashion.  The operative team donned sterile gowns gloves and a timeout was called.  All in attendance agreed regarding the patient's identity, proposed operative procedure, and operative site.    I made a longitudinal incision approximately 13 cm proximally to the distal calcaneal insertion, dissected through skin and subcutaneous tissue, pierced the fascia.  I inserted a cannula, as well as scope, visualized the gastrocnemius tendon.  Utilizing a rasp and probe, I removed interposed connective tissue, confirmed the sural nerve was protected.  Then, utilizing a hook blade from the [employer] kit, I released the gastrocnemius tendon, noted significant improvement in ankle dorsiflexion, resolution of the Silfverskiold, and diastases of the tendon edges with visible muscle fibers through the scope.    Wound was irrigated with experience wound irrigant then closed in anatomic layers.    I then turned my attention to the foot.    I made a longitudinal incision along the second ray, made a racquet shaped incision incorporating the base of the second toe as well as the plantar ulcer.  I removed the second toe as well as the plantar ulcerative tissue, sent this for gross pathology.  Utilizing a sagittal saw, I transected the second metatarsal proximal to the ulcerative site, sent this along with the toe for gross pathology.  I noted no gross purulence deep.  I took swab specimens of the wound, took down the tourniquet, achieved hemostasis.  I copiously irrigated the wound with experience wound irrigant, applied  vancomycin powder to the wound, then closed in layers with monofilament suture, primarily closing the dorsal and plantar incisions.    Sterile dressing applied.  Anesthesia was concluded and the patient was transported to the PACU in stable condition.  Counts were correct x2.  There were no apparent complications.  I was present scrubbed for the entire case.    Jeremi Hays Jr, MD  2/28/2025

## 2025-03-01 LAB
CK SERPL-CCNC: 69 U/L (ref 20–200)
GLUCOSE BLDC GLUCOMTR-MCNC: 111 MG/DL (ref 70–130)
GLUCOSE BLDC GLUCOMTR-MCNC: 130 MG/DL (ref 70–130)
GLUCOSE BLDC GLUCOMTR-MCNC: 159 MG/DL (ref 70–130)
GLUCOSE BLDC GLUCOMTR-MCNC: 198 MG/DL (ref 70–130)

## 2025-03-01 PROCEDURE — 97162 PT EVAL MOD COMPLEX 30 MIN: CPT

## 2025-03-01 PROCEDURE — C1894 INTRO/SHEATH, NON-LASER: HCPCS

## 2025-03-01 PROCEDURE — 25010000002 ENOXAPARIN PER 10 MG: Performed by: ORTHOPAEDIC SURGERY

## 2025-03-01 PROCEDURE — C1751 CATH, INF, PER/CENT/MIDLINE: HCPCS

## 2025-03-01 PROCEDURE — 63710000001 INSULIN LISPRO (HUMAN) PER 5 UNITS: Performed by: HOSPITALIST

## 2025-03-01 PROCEDURE — 82550 ASSAY OF CK (CPK): CPT | Performed by: NURSE PRACTITIONER

## 2025-03-01 PROCEDURE — 25810000003 LACTATED RINGERS PER 1000 ML: Performed by: HOSPITALIST

## 2025-03-01 PROCEDURE — 25010000002 DAPTOMYCIN PER 1 MG: Performed by: NURSE PRACTITIONER

## 2025-03-01 PROCEDURE — 94799 UNLISTED PULMONARY SVC/PX: CPT

## 2025-03-01 PROCEDURE — 25010000002 CEFTRIAXONE PER 250 MG: Performed by: NURSE PRACTITIONER

## 2025-03-01 PROCEDURE — 63710000001 INSULIN GLARGINE PER 5 UNITS: Performed by: HOSPITALIST

## 2025-03-01 PROCEDURE — 97165 OT EVAL LOW COMPLEX 30 MIN: CPT | Performed by: OCCUPATIONAL THERAPIST

## 2025-03-01 PROCEDURE — 99232 SBSQ HOSP IP/OBS MODERATE 35: CPT | Performed by: HOSPITALIST

## 2025-03-01 PROCEDURE — 82948 REAGENT STRIP/BLOOD GLUCOSE: CPT

## 2025-03-01 RX ORDER — SODIUM CHLORIDE 9 MG/ML
40 INJECTION, SOLUTION INTRAVENOUS AS NEEDED
Status: DISCONTINUED | OUTPATIENT
Start: 2025-03-01 | End: 2025-03-02 | Stop reason: HOSPADM

## 2025-03-01 RX ORDER — SODIUM CHLORIDE 0.9 % (FLUSH) 0.9 %
20 SYRINGE (ML) INJECTION AS NEEDED
Status: DISCONTINUED | OUTPATIENT
Start: 2025-03-01 | End: 2025-03-02 | Stop reason: HOSPADM

## 2025-03-01 RX ORDER — SODIUM CHLORIDE 0.9 % (FLUSH) 0.9 %
10 SYRINGE (ML) INJECTION EVERY 12 HOURS SCHEDULED
Status: DISCONTINUED | OUTPATIENT
Start: 2025-03-01 | End: 2025-03-02 | Stop reason: HOSPADM

## 2025-03-01 RX ORDER — SODIUM CHLORIDE 0.9 % (FLUSH) 0.9 %
10 SYRINGE (ML) INJECTION AS NEEDED
Status: DISCONTINUED | OUTPATIENT
Start: 2025-03-01 | End: 2025-03-02 | Stop reason: HOSPADM

## 2025-03-01 RX ADMIN — ALLOPURINOL 300 MG: 300 TABLET ORAL at 09:32

## 2025-03-01 RX ADMIN — ENOXAPARIN SODIUM 40 MG: 100 INJECTION SUBCUTANEOUS at 09:31

## 2025-03-01 RX ADMIN — Medication 10 ML: at 22:43

## 2025-03-01 RX ADMIN — Medication 1000 UNITS: at 09:32

## 2025-03-01 RX ADMIN — INSULIN LISPRO 2 UNITS: 100 INJECTION, SOLUTION INTRAVENOUS; SUBCUTANEOUS at 12:33

## 2025-03-01 RX ADMIN — METOPROLOL SUCCINATE 100 MG: 100 TABLET, EXTENDED RELEASE ORAL at 09:35

## 2025-03-01 RX ADMIN — SODIUM CHLORIDE, SODIUM LACTATE, POTASSIUM CHLORIDE, CALCIUM CHLORIDE 9 ML/HR: 20; 30; 600; 310 INJECTION, SOLUTION INTRAVENOUS at 07:47

## 2025-03-01 RX ADMIN — Medication 10 ML: at 12:15

## 2025-03-01 RX ADMIN — DAPTOMYCIN 600 MG: 500 INJECTION, POWDER, LYOPHILIZED, FOR SOLUTION INTRAVENOUS at 09:47

## 2025-03-01 RX ADMIN — INSULIN GLARGINE 6 UNITS: 100 INJECTION, SOLUTION SUBCUTANEOUS at 09:31

## 2025-03-01 RX ADMIN — INSULIN LISPRO 2 UNITS: 100 INJECTION, SOLUTION INTRAVENOUS; SUBCUTANEOUS at 22:43

## 2025-03-01 RX ADMIN — SODIUM CHLORIDE 2000 MG: 900 INJECTION INTRAVENOUS at 07:47

## 2025-03-01 RX ADMIN — LISINOPRIL 40 MG: 40 TABLET ORAL at 09:32

## 2025-03-01 RX ADMIN — Medication 1 CAPSULE: at 09:31

## 2025-03-01 NOTE — THERAPY EVALUATION
Patient Name: Alvaro Sinha  : 1965    MRN: [id number]                              Today's Date: 3/1/2025       Admit Date: 2025    Visit Dx:     ICD-10-CM ICD-9-CM   1. Diabetic ulcer of left foot  E11.621 250.80    L97.529 707.15   2. Diabetic neuropathy  E11.40 250.60     357.2     Patient Active Problem List   Diagnosis    Nephrolithiasis    Lymphadenopathy    Lymphoma    Osteomyelitis    Type 2 diabetes mellitus with hyperglycemia    Elevated serum immunoglobulin free light chains    Elevated erythrocyte sedimentation rate    Diabetic foot ulcer    History of myalgia due to HMG CoA reductase inhibitor    Gout    Hypertensive disorder    Hyperlipidemia    Hypercalcemia    Microalbuminuric diabetic nephropathy    Peripheral angiopathy due to diabetes mellitus    Stage 3a chronic kidney disease     Past Medical History:   Diagnosis Date    Diabetes mellitus     Gout     Hypertension     Osteomyelitis     right foot    Sleep apnea     CPAP     Past Surgical History:   Procedure Laterality Date    BONE RESECTION, RIB      FOOT IRRIGATION, DEBRIDEMENT AND REPAIR Right 2024    Procedure: foot irrigation, debridement, secondary closure Right;  Surgeon: Jeremi Hays Jr., MD;  Location:  [facility];  Service: Orthopedics;  Laterality: Right;    TRANS METATARSAL AMPUTATION Right 2024    Procedure: AMPUTATION TRANS METATARSAL RIGHT;  Surgeon: Jeremi Hays Jr., MD;  Location:  [facility];  Service: Orthopedics;  Laterality: Right;    VENOUS ACCESS DEVICE (PORT) INSERTION Left 2024    Procedure: INSERTION GROSHONG CATHETER WITH FLUOROSCOPY;  Surgeon: Israel Preciado MD;  Location:  [facility];  Service: General;  Laterality: Left;      General Information       Row Name 25 1519          Physical Therapy Time and Intention    Document Type evaluation  -AB     Mode of Treatment physical therapy  -AB       Row Name 25 1519          General Information    Patient Profile  Reviewed yes  -AB     Prior Level of Function independent:;all household mobility;community mobility;gait;transfer;ADL's;bed mobility  -AB     Existing Precautions/Restrictions fall;other (see comments)  LLE heel WB in post-op shoe  -AB     Barriers to Rehab medically complex  -AB       Row Name 03/01/25 1519          Living Environment    People in Home spouse  -AB       Row Name 03/01/25 1519          Home Main Entrance    Number of Stairs, Main Entrance two;other (see comments)  1+1  -AB       Row Name 03/01/25 1519          Stairs Within Home, Primary    Number of Stairs, Within Home, Primary none  -AB       Row Name 03/01/25 1519          Cognition    Orientation Status (Cognition) oriented x 4  -AB       Row Name 03/01/25 1519          Safety Issues/Impairments Affecting Functional Mobility    Safety Issues Affecting Function (Mobility) insight into deficits/self-awareness;awareness of need for assistance;safety precaution awareness  -AB     Impairments Affecting Function (Mobility) endurance/activity tolerance;strength;sensation/sensory awareness;balance  -AB               User Key  (r) = Recorded By, (t) = Taken By, (c) = Cosigned By      Initials Name Provider Type    AB Jenny Rollins, PT Physical Therapist                   Mobility       Row Name 03/01/25 1521          Bed Mobility    Bed Mobility supine-sit;scooting/bridging;sit-supine  -AB     Scooting/Bridging [place] (Bed Mobility) standby assist  -AB     Supine-Sit [place] (Bed Mobility) standby assist  -AB     Sit-Supine [place] (Bed Mobility) standby assist  -AB     Assistive Device (Bed Mobility) head of bed elevated  -AB       Row Name 03/01/25 1521          Transfers    Comment, (Transfers) Cues for hand placement, sequencing, and safety. Education provided on WB status. Post-op shoe issued.  -AB       Row Name 03/01/25 1521          Sit-Stand Transfer    Sit-Stand [place] (Transfers) contact guard;1 person assist;verbal  cues  -AB     Assistive Device (Sit-Stand Transfers) walker, front-wheeled  -AB       Row Name 03/01/25 1521          Gait/Stairs (Locomotion)    [place] Level (Gait) contact guard;1 person assist;verbal cues  -AB     Assistive Device (Gait) walker, front-wheeled  -AB     Patient was able to Ambulate yes  -AB     Distance in Feet (Gait) 180  -AB     Deviations/Abnormal Patterns (Gait) bilateral deviations;franklyn decreased;gait speed decreased;stride length decreased  -AB     Bilateral Gait Deviations forward flexed posture  -AB     [place] Level (Stairs) unable to assess  -AB     Comment, (Gait/Stairs) Pt ambulated with step through gait pattern at a slowed pace. Cues provided for maintaining heel WB status. No overt LOB or knee buckling. Further activity limited by fatigue. Pt reports he may prefer crutches over RW.  -AB       Row Name 03/01/25 1521          Mobility    Extremity Weight-bearing Status left lower extremity  -AB     Left Lower Extremity (Weight-bearing Status) other (see comments)  heel WB in post-op shoe  -AB               User Key  (r) = Recorded By, (t) = Taken By, (c) = Cosigned By      Initials Name Provider Type    AB Jenny Rollins, PT Physical Therapist                   Obj/Interventions       Row Name 03/01/25 1523          Range of Motion Comprehensive    General Range of Motion bilateral lower extremity ROM WFL  -AB       Row Name 03/01/25 1523          Strength Comprehensive (MMT)    General Manual Muscle Testing (MMT) Assessment lower extremity strength deficits identified  -AB     Comment, General Manual Muscle Testing (MMT) Assessment BLE grossly 4-/5  -AB       Row Name 03/01/25 1523          Balance    Balance Assessment sitting dynamic balance;standing static balance;standing dynamic balance;sitting static balance  -AB     Static Sitting Balance independent  -AB     Dynamic Sitting Balance standby assist  -AB     Position, Sitting Balance sitting edge of bed;unsupported   -AB     Static Standing Balance contact guard  -AB     Dynamic Standing Balance contact guard;1-person assist;verbal cues  -AB     Position/Device Used, Standing Balance supported;walker, front-wheeled  -AB     Balance Interventions sitting;standing;sit to stand;supported;static;dynamic;occupation based/functional task  -AB     Comment, Balance No overt LOB.  -AB       Row Name 03/01/25 1523          Sensory Assessment (Somatosensory)    Sensory Assessment (Somatosensory) right-sided sensation intact  -AB     Left LE Sensory Assessment light touch awareness;impaired;other (see comments)  impaired sensation secondary to popliteal nerve cath  -AB               User Key  (r) = Recorded By, (t) = Taken By, (c) = Cosigned By      Initials Name Provider Type    AB Jenny Rollins, PT Physical Therapist                   Goals/Plan       Row Name 03/01/25 1527          Bed Mobility Goal 1 (PT)    Activity/Assistive Device (Bed Mobility Goal 1, PT) supine to sit;sit to supine  -AB     [place] Level/Cues Needed (Bed Mobility Goal 1, PT) independent  -AB     Time Frame (Bed Mobility Goal 1, PT) short term goal (STG);5 days  -AB       Row Name 03/01/25 1527          Transfer Goal 1 (PT)    Activity/Assistive Device (Transfer Goal 1, PT) sit-to-stand/stand-to-sit;bed-to-chair/chair-to-bed;walker, rolling  -AB     [place] Level/Cues Needed (Transfer Goal 1, PT) modified independence  -AB     Time Frame (Transfer Goal 1, PT) long term goal (LTG);10 days  -AB       Row Name 03/01/25 1527          Gait Training Goal 1 (PT)    Activity/Assistive Device (Gait Training Goal 1, PT) gait (walking locomotion);assistive device use;walker, rolling  -AB     [place] Level (Gait Training Goal 1, PT) modified independence  -AB     Distance (Gait Training Goal 1, PT) 350  -AB     Time Frame (Gait Training Goal 1, PT) long term goal (LTG);10 days  -AB       Row Name 03/01/25 1527          Stairs Goal 1 (PT)    Activity/Assistive  Device (Stairs Goal 1, PT) ascending stairs;descending stairs;walker, rolling  -AB     [place] Level/Cues Needed (Stairs Goal 1, PT) contact guard required  -AB     Number of Stairs (Stairs Goal 1, PT) 2  -AB     Time Frame (Stairs Goal 1, PT) long term goal (LTG);10 days  -AB       Row Name 03/01/25 1527          Therapy Assessment/Plan (PT)    Planned Therapy Interventions (PT) balance training;bed mobility training;gait training;home exercise program;patient/family education;postural re-education;transfer training;stretching;strengthening;stair training;ROM (range of motion)  -AB               User Key  (r) = Recorded By, (t) = Taken By, (c) = Cosigned By      Initials Name Provider Type    AB Jenny Rollins, PT Physical Therapist                   Clinical Impression       Row Name 03/01/25 1524          Pain    Pretreatment Pain Rating 0/10 - no pain  -AB     Posttreatment Pain Rating 0/10 - no pain  -AB       Row Name 03/01/25 1524          Plan of Care Review    Plan of Care Reviewed With patient;spouse  -AB     Progress no change  -AB     Outcome Evaluation PT initial eval completed. Pt presents below baseline with decreased strength, impaired endurance, and mild balance deficits. Post-op shoe issued w/ education provided on heel WB status. Ambulation of 180' with CGA and RW was well tolerated. Further IPPT is warrented. PT rec d/c home with assist when medically appropriate.  -AB       Row Name 03/01/25 1524          Therapy Assessment/Plan (PT)    Patient/Family Therapy Goals Statement (PT) go home  -AB     Rehab Potential (PT) good  -AB     Criteria for Skilled Interventions Met (PT) yes;meets criteria;skilled treatment is necessary  -AB     Therapy Frequency (PT) daily  -AB     Predicted Duration of Therapy Intervention (PT) 3 days  -AB       Row Name 03/01/25 1524          Vital Signs    Pre Systolic BP Rehab 165  -AB     Pre Treatment Diastolic BP 98  -AB     Pre SpO2 (%) 98  -AB     O2 Delivery  Pre Treatment room air  -AB     O2 Delivery Intra Treatment room air  -AB     Post SpO2 (%) 99  -AB     O2 Delivery Post Treatment room air  -AB     Pre Patient Position Supine  -AB     Intra Patient Position Standing  -AB     Post Patient Position Supine  -AB       Row Name 03/01/25 1524          Positioning and Restraints    Pre-Treatment Position in bed  -AB     Post Treatment Position bed  -AB     In Bed notified nsg;supine;call light within reach;encouraged to call for assist;exit alarm on;with nsg;with family/caregiver  -AB               User Key  (r) = Recorded By, (t) = Taken By, (c) = Cosigned By      Initials Name Provider Type    Jenny Murphy, PT Physical Therapist                   Outcome Measures       Row Name 03/01/25 1528          How much help from another person do you currently need...    Turning from your back to your side while in flat bed without using bedrails? 3  -AB     Moving from lying on back to sitting on the side of a flat bed without bedrails? 3  -AB     Moving to and from a bed to a chair (including a wheelchair)? 3  -AB     Standing up from a chair using your arms (e.g., wheelchair, bedside chair)? 3  -AB     Climbing 3-5 steps with a railing? 3  -AB     To walk in hospital room? 3  -AB     AM-PAC 6 Clicks Score (PT) 18  -AB     Highest Level of Mobility Goal 6 --> Walk 10 steps or more  -AB       Row Name 03/01/25 1528          Functional Assessment    Outcome Measure Options AM-PAC 6 Clicks Basic Mobility (PT)  -AB               User Key  (r) = Recorded By, (t) = Taken By, (c) = Cosigned By      Initials Name Provider Type    Jenny Murphy PT Physical Therapist                                 Physical Therapy Education       Title: PT OT SLP Therapies (In Progress)       Topic: Physical Therapy (In Progress)       Point: Mobility training (Done)       Learning Progress Summary            Patient Acceptance, E,D, VU,DU by AB at 3/1/2025 1528                      Point:  Home exercise program (Not Started)       Learner Progress:  Not documented in this visit.              Point: Body mechanics (Done)       Learning Progress Summary            Patient Acceptance, E,D, VU,DU by AB at 3/1/2025 1528                      Point: Precautions (Done)       Learning Progress Summary            Patient Acceptance, E,D, VU,DU by AB at 3/1/2025 1528                                      User Key       Initials Effective Dates Name Provider Type Discipline    AB 09/22/22 -  Jenny Rollins, PT Physical Therapist PT                  PT Recommendation and Plan  Planned Therapy Interventions (PT): balance training, bed mobility training, gait training, home exercise program, patient/family education, postural re-education, transfer training, stretching, strengthening, stair training, ROM (range of motion)  Progress: no change  Outcome Evaluation: PT initial eval completed. Pt presents below baseline with decreased strength, impaired endurance, and mild balance deficits. Post-op shoe issued w/ education provided on heel WB status. Ambulation of 180' with CGA and RW was well tolerated. Further IPPT is warrented. PT rec d/c home with assist when medically appropriate.     Time Calculation:   PT Evaluation Complexity  History, PT Evaluation Complexity: 1-2 personal factors and/or comorbidities  Examination of Body Systems (PT Eval Complexity): total of 3 or more elements  Clinical Presentation (PT Evaluation Complexity): evolving  Clinical Decision Making (PT Evaluation Complexity): moderate complexity  Overall Complexity (PT Evaluation Complexity): moderate complexity     PT Charges       Row Name 03/01/25 1530             Time Calculation    Start Time 1413  -AB      PT Received On 03/01/25  -AB      PT Goal Re-Cert Due Date 03/11/25  -AB         Untimed Charges    PT Eval/Re-eval Minutes 50  -AB         Total Minutes    Untimed Charges Total Minutes 50  -AB       Total Minutes 50  -AB                 User Key  (r) = Recorded By, (t) = Taken By, (c) = Cosigned By      Initials Name Provider Type    AB Jenny Rollins, PT Physical Therapist                  Therapy Charges for Today       Code Description Service Date Service Provider Modifiers Qty    79063328424 HC PT EVAL MOD COMPLEXITY 4 3/1/2025 Jenny Rollins, PT GP 1            PT G-Codes  Outcome Measure Options: AM-PAC 6 Clicks Basic Mobility (PT)  AM-PAC 6 Clicks Score (PT): 18  PT Discharge Summary  Anticipated Discharge Disposition (PT): home with assist    Jenny Rollins, PT  3/1/2025

## 2025-03-01 NOTE — CONSULTS
INFECTIOUS DISEASE CONSULT/INITIAL HOSPITAL VISIT    Alvaro Sinha  1965  [id number]    Date of Consult: 3/1/2025    Admission Date: 2/28/2025    Requesting Provider: Jeremi Hays Jr., MD  Evaluating Physician: Lester Sanders MD    Reason for Consultation: nonhealing left foot wound     History of present illness:    Patient is a 59 y.o. male, known to Dr. Jameson,  with PMH DM, CKD, HTN, gout, seen today for left foot nonhealing wound. Hospitalized here in April 2024 undergoing a right foot amputation of 2nd and 3rd metatarsals.  Wound cultres with GBS, Strep mitis, and Eikenella. In early January, he developed a callous on his left foot 2nd MT, to which he began applying topical ointment, however, the area opened up.  He was seen in clinic, the area sharply debrided, and he was placed on oral Doxycycline, after which he started taking leftover Augmentin.   The wound failed to heal and he was referred for admission. Taken  to the OR yesterday by Dr. Hays undergoing an endoscopic gastrocnemius recession, 2nd ray amputation, without evidence of deep purulence per op note.  Surgical tissue gram stain with occasional WBCs, no organisms.  He has been afebrile.  Admitting labs with WBC 5.08, hgb 12.8, Scr 10.8, hgbA1c 7.  He received a dose of Vancomycin and we were asked to see for evaluation and treatment.     Past Medical History:   Diagnosis Date    Diabetes mellitus     Gout     Hypertension     Osteomyelitis     right foot    Sleep apnea     CPAP       Past Surgical History:   Procedure Laterality Date    BONE RESECTION, RIB      FOOT IRRIGATION, DEBRIDEMENT AND REPAIR Right 4/12/2024    Procedure: foot irrigation, debridement, secondary closure Right;  Surgeon: eJremi Hays Jr., MD;  Location: [facility];  Service: Orthopedics;  Laterality: Right;    TRANS METATARSAL AMPUTATION Right 4/8/2024    Procedure: AMPUTATION TRANS METATARSAL RIGHT;  Surgeon: Jeremi Hays Jr., MD;   Location:  [facility];  Service: Orthopedics;  Laterality: Right;    VENOUS ACCESS DEVICE (PORT) INSERTION Left 4/14/2024    Procedure: INSERTION GROSHONG CATHETER WITH FLUOROSCOPY;  Surgeon: Israel Preciado MD;  Location:  [facility];  Service: General;  Laterality: Left;       History reviewed. No pertinent family history.    Social History     Socioeconomic History    Marital status:    Tobacco Use    Smoking status: Never    Smokeless tobacco: Never   Vaping Use    Vaping status: Never Used   Substance and Sexual Activity    Alcohol use: Yes     Comment: OCCASIONALLY    Drug use: Not Currently    Sexual activity: Defer       Allergies   Allergen Reactions    Statins Myalgia         Medication:    Current Facility-Administered Medications:     allopurinol (ZYLOPRIM) tablet 300 mg, 300 mg, Oral, Daily, Josue Wagner MD, 300 mg at 02/28/25 1751    cholecalciferol (VITAMIN D3) tablet 1,000 Units, 1,000 Units, Oral, Daily, Josue Wagner MD, 1,000 Units at 02/28/25 1751    colchicine tablet 0.6 mg, 0.6 mg, Oral, Daily PRN, Josue Wagner MD    dextrose (D50W) (25 g/50 mL) IV injection 25 g, 25 g, Intravenous, Q15 Min PRN, Josue Wagner MD    dextrose (GLUTOSE) oral gel 15 g, 15 g, Oral, Q15 Min PRN, Josue Wagner MD    Enoxaparin Sodium (LOVENOX) syringe 40 mg, 40 mg, Subcutaneous, Daily, Jeremi Hays Jr., MD    glucagon (GLUCAGEN) injection 1 mg, 1 mg, Intramuscular, Q15 Min PRN, Josue Wagner MD    Insulin Lispro (humaLOG) injection 2-7 Units, 2-7 Units, Subcutaneous, 4x Daily AC & at Bedtime, Josue Wagner MD, 3 Units at 02/28/25 2040    lactated ringers infusion, 9 mL/hr, Intravenous, Continuous, Josue Wagner MD, Last Rate: 9 mL/hr at 02/28/25 0629, Restarted at 02/28/25 0807    lactated ringers infusion, 9 mL/hr, Intravenous, Continuous, Josue Wagner MD    lactobacillus acidophilus (RISAQUAD) capsule 1 capsule, 1 capsule, Oral, Daily, Josue Wagner MD, 1 capsule at  02/28/25 1751    lidocaine PF 1% (XYLOCAINE) injection 0.5 mL, 0.5 mL, Injection, Once PRN, Jeremi Hays Jr., MD    lisinopril (PRINIVIL,ZESTRIL) tablet 40 mg, 40 mg, Oral, Daily, Josue Wagner MD    metoprolol succinate XL (TOPROL-XL) 24 hr tablet 100 mg, 100 mg, Oral, Daily, Josue Wagner MD, 100 mg at 02/28/25 1751    morphine injection 6 mg, 6 mg, Intravenous, Q2H PRN **AND** naloxone (NARCAN) injection 0.4 mg, 0.4 mg, Intravenous, Q5 Min PRN, Jeremi Hays Jr., MD    ondansetron ODT (ZOFRAN-ODT) disintegrating tablet 4 mg, 4 mg, Oral, Q6H PRN **OR** ondansetron (ZOFRAN) injection 4 mg, 4 mg, Intravenous, Q6H PRN, Jeremi Hays Jr., MD    oxyCODONE (ROXICODONE) immediate release tablet 5 mg, 5 mg, Oral, Q4H PRN, Jeremi Hays Jr., MD    ropivacaine (NAROPIN) 0.2 % infusion (INFUSYSTEM), , Peripheral Nerve, Continuous, Josue Wagner MD, 1,000 mg at 02/28/25 1006    Antibiotics:  Anti-Infectives (From admission, onward)      Ordered     Dose/Rate Route Frequency Start Stop    02/28/25 1010  vancomycin IVPB 2000 mg in 0.9% Sodium Chloride 500 mL        Ordering Provider: Jeremi Hays Jr., MD    15 mg/kg × 136 kg  250 mL/hr over 120 Minutes Intravenous Once 02/28/25 1830 02/28/25 2227    02/28/25 0847  vancomycin (VANCOCIN) powder  Status:  Discontinued        Ordering Provider: Jeremi Hays Jr., MD      As Needed 02/28/25 0847 02/28/25 0944    02/28/25 0556  vancomycin IVPB 2000 mg in 0.9% Sodium Chloride 500 mL        Ordering Provider: Jeremi Hays Jr., MD    15 mg/kg × 136 kg  250 mL/hr over 120 Minutes Intravenous Once 02/28/25 0558 02/28/25 0829              Review of Systems:  Constitutional-- No Fever, chills or sweats.  Appetite good, and no malaise. No fatigue.  HEENT-- No new vision, hearing or throat complaints.  No epistaxis or oral sores.  Denies odynophagia or dysphagia. No headache, photophobia or neck stiffness.  CV-- No chest pain, palpitation or  syncope  Resp-- No SOB/cough/Hemoptysis  GI- No nausea, vomiting, or diarrhea.  No hematochezia, melena, or hematemesis. Denies jaundice or chronic liver disease.  -- No dysuria, hematuria, or flank pain.  Denies hesitancy, urgency or flank pain.  Heme- No active bruising or bleeding; no Hx of DVT or PE.  MS-- no swelling or pain in the bones or joints of arms/legs.  No new back pain.  Neuro-- No acute focal weakness or numbness in the arms or legs.  No seizures.  Skin--Left plantar ulcer      Physical Exam:   Vital Signs  Temp (24hrs), Av.8 °F (36.6 °C), Min:97.4 °F (36.3 °C), Max:98.3 °F (36.8 °C)    Temp  Min: 97.4 °F (36.3 °C)  Max: 98.3 °F (36.8 °C)  BP  Min: 106/68  Max: 176/97  Pulse  Min: 58  Max: 85  Resp  Min: 12  Max: 18  SpO2  Min: 94 %  Max: 100 %    GENERAL: Awake and alert, in no acute distress.   HEENT: Normocephalic, atraumatic.  PERRL. EOMI. No conjunctival injection. No icterus. Oropharynx clear without evidence of thrush or exudate. No evidence of periodontal disease.    NECK: Supple   HEART: RRR; No murmur  LUNGS: Clear to auscultation bilaterally without wheezing, rales, rhonchi. Normal respiratory effort. Nonlabored.   ABDOMEN: Soft, nontender, nondistended.   EXT:  No cyanosis, clubbing or edema. No cord.  :  Without Muhammad catheter.  MSK: No joint effusions or erythema  SKIN: Warm and dry   Left foot with surgical dressing in place, CDI  NEURO: Oriented to PPT.   PSYCHIATRIC: Normal insight and judgment. Cooperative with PE    Laboratory Data    Results from last 7 days   Lab Units 25  1520   WBC 10*3/mm3 5.08   HEMOGLOBIN g/dL 12.8*   HEMATOCRIT % 37.5   PLATELETS 10*3/mm3 156     Results from last 7 days   Lab Units 25  1520   SODIUM mmol/L 143   POTASSIUM mmol/L 4.3   CHLORIDE mmol/L 105   CO2 mmol/L 24.0   BUN mg/dL 23*   CREATININE mg/dL 1.08   GLUCOSE mg/dL 86   CALCIUM mg/dL 10.3                             Estimated Creatinine Clearance: 106.3 mL/min (by C-G  "formula based on SCr of 1.08 mg/dL).      Microbiology:  Microbiology Results (last 10 days)       Procedure Component Value - Date/Time    Tissue / Bone Culture - Tissue, Foot, Left [[id number]] Collected: 02/28/25 0902    Lab Status: Preliminary result Specimen: Tissue from Foot, Left Updated: 02/28/25 1138     Gram Stain Occasional WBCs seen      No organisms seen            Radiology:  Imaging Results (Last 72 Hours)       ** No results found for the last 72 hours. **              Impression:   Diabetic foot ulcer stage IV, over left 2nd metatarsal with soft tissue infection possible osteomyelitis: Outside MRI done at [facility] reported with \"edema in the distal second metatarsal\" concerning for developing osteomyelitis. Sp ray amputation 2nd toe, 2/28.  Culture and pathology pending  Diabetes mellitus, type 2 with peripheral neuropathy  CKD IIIa  History of right foot osteomyelitis, s/p amputations, cultures with GBS, Eikenella, Strep mitis  HTN    PLAN/RECOMMENDATIONS:   - Follow-up pending operative cultures and pathology  - check ESR and CRP, CK  - Follow CBC, CMP, CK     I recommend at least a short course of IV antibiotics postoperatively, pending above studies.  If evidence of residual osteomyelitis he will need 6 to 8 weeks of IV antibiotics.  Discussed risk/benefit of PICC line placement with patient and he understands and agrees to proceed    - Daptomycin 6mg/kg IV daily  - Ceftriaxone 2g IV daily    Discussed outpatient IV antibiotics dispensed directly through [facility] OPAT program.  I will arrange for IV antibiotics to be delivered to bedside once he is cleared by orthopedic surgery and hospital medicine.  Possible discharge as soon as tomorrow.    Discharge Orders:  - IV Daptomycin 700 mg daily  - IV ceftriaxone 2 grams daily  - Follow up in office with me on Tuesday to review pending studies and modify therapy if needed  - Stat labs prior to office visit: CBC, CMP, ESR, CRP, CK  - " Weekly sterile PICC dressing changes at [facility] visits       Margoth CastanedaPatel, APRN  3/1/2025  06:18 EST    I have edited this note to reflect my history, exam, assessment and plan. I alone have made the medical decision making for this complex patient.   Lester Sanders MD    This visit included the following complex service elements:  Complex medical decision-making associated with antimicrobial prescribing.  Managed infection treatment protocol associated with transitions of care for this complex patient.

## 2025-03-01 NOTE — PLAN OF CARE
Goal Outcome Evaluation:  Plan of Care Reviewed With: patient, spouse           Outcome Evaluation: Pt completed bed mobility with modified independence, transfer training with CGA and LB dressing with supervision. He declined need for AE and reports no issues with home safety/bathroom set-up.No AE indicated at this time. Pt required CGA with mobility d/t not having shoe on R foot and he has mild residual numbness LLE from blocks. OT to sign off, will defer mobility to PT. Recommend DC home with family assist when medically stable.    Anticipated Discharge Disposition (OT): home with assist

## 2025-03-01 NOTE — PROGRESS NOTES
[facility]    Acute pain service Inpatient Progress Note    Patient Name: Alvaro Sinha  :  1965  MRN:  [id number]        Acute Pain  Service Inpatient Progress Note:    Analgesia:Good  LOC: alert and awake  Resp Status: room air  Cardiac: VS stable  Side Effects:None  Catheter Site:clean, dressing intact and dry  Cath type: peripheral nerve cath(InfuSystem)  Volume: 1mL,8ml, 8ml InfuSystem Pump.  Catheter Plan:Catheter to remain Insitu and Continue catheter infusion rate unchanged  Comments:

## 2025-03-01 NOTE — PLAN OF CARE
Goal Outcome Evaluation:  Plan of Care Reviewed With: patient, spouse        Progress: no change  Outcome Evaluation: PT initial eval completed. Pt presents below baseline with decreased strength, impaired endurance, and mild balance deficits. Post-op shoe issued w/ education provided on heel WB status. Ambulation of 180' with CGA and RW was well tolerated. Further IPPT is warrented. PT rec d/c home with assist when medically appropriate.    Anticipated Discharge Disposition (PT): home with assist

## 2025-03-01 NOTE — PLAN OF CARE
Goal Outcome Evaluation:                   Patient is alert and oriented x 4; Patient is on room air and CPAP at night; Patient asked to have his nerve blocked removed today as he felt the need for it was no longer there, and did not want his leg to be numb anymore; Nerve block was successfully removed and boxed up and placed in clear plastic bin in soiled room to be shipped to post office; IV was removed this afternoon, as a single lumen PICC line was placed in the patient. Around 1700 patient did say that the PICC line was a little sore, and I informed him that this is common, and there was a little bit of dried blood underneath the dressing, I did pull back and blood return was noted and PICC line flushed beautifully; Dressing on leg is CDI. Patient has been a pleasure to take care of today.

## 2025-03-01 NOTE — PROGRESS NOTES
[facility] Medicine Services  PROGRESS NOTE    Patient Name: Alvaro Sinha  : 1965  MRN: [id number]    Date of Admission: 2025  Primary Care Physician: Martha Mckee MD    Subjective   Subjective     CC: Foot wound    HPI: Up in bed. No f/c/sweats. No n/v. No dyspnea. No foot pain.       Objective   Objective     Vital Signs:   Temp:  [97.4 °F (36.3 °C)-98.3 °F (36.8 °C)] 97.7 °F (36.5 °C)  Heart Rate:  [58-85] 61  Resp:  [12-18] 16  BP: (106-172)/() 149/89     Physical Exam:  NAD, alert  OP clear, dry MM  Neck supple  No LAD  RRR  CTAB  +BS, soft  HERNANDEZ  Normal affect  No rashes  L foot dressed    Results Reviewed:  LAB RESULTS:      Lab 25  1520   WBC 5.08   HEMOGLOBIN 12.8*   HEMATOCRIT 37.5   PLATELETS 156   MCV 90.4         Lab 25  1650 25  1520   SODIUM  --  143   POTASSIUM  --  4.3   CHLORIDE  --  105   CO2  --  24.0   ANION GAP  --  14.0   BUN  --  23*   CREATININE  --  1.08   EGFR  --  79.1   GLUCOSE  --  86   CALCIUM  --  10.3   HEMOGLOBIN A1C 7.00*  --                          Brief Urine Lab Results       None            Microbiology Results Abnormal       None            Peripheral Block    Result Date: 2025  Cain Donnelly CRNA     2025  9:48 AM Peripheral Block Pre-sedation assessment completed: 2025 6:58 AM Patient reassessed immediately prior to procedure Patient location during procedure: pre-op Start time: 2025 7:10 AM Stop time: 2025 7:20 AM Reason for block: at surgeon's request and post-op pain management Performed by CRNA/CAA: Butch Devi CRNA Assisted by: Bushra Alejandra RN Preanesthetic Checklist Completed: patient identified, IV checked, site marked, risks and benefits discussed, surgical consent, monitors and equipment checked, pre-op evaluation and timeout performed Prep: Sterile barriers:cap, gloves, mask and washed/disinfected hands Prep: ChloraPrep Patient monitoring: blood  pressure monitoring, continuous pulse oximetry and EKG Procedure Sedation: yes Performed under: local infiltration Guidance:ultrasound guided ULTRASOUND INTERPRETATION.  Using ultrasound guidance a 20 G gauge needle was placed in close proximity to the nerve, at which point, under ultrasound guidance anesthetic was injected in the area of the nerve and spread of the anesthesia was seen on ultrasound in close proximity thereto.  There were no abnormalities seen on ultrasound; a digital image was taken; and the patient tolerated the procedure with no complications. Images:still images obtained, printed/placed on chart Laterality:left Block Type:popliteal Injection Technique:catheter Needle Type:echogenic and Tuohy Needle Gauge:18 G Resistance on Injection: none Catheter Size:20 G Cath Depth at skin: 10 cm Medications Used: bupivacaine PF (MARCAINE) 0.25 % injection - Injection  30 mL - 2/28/2025 7:20:00 AM Medications Preservative Free Saline:10ml Post Assessment Injection Assessment: negative aspiration for heme, no paresthesia on injection and incremental injection Patient Tolerance:comfortable throughout block Complications:no Additional Notes CATHETER                             A high-frequency linear transducer, with sterile cover, was placed in the popliteal fossa to identify the popliteal artery and vein, Tibial nerve (TN) and Common Peroneal nerve (CP). The transducer was then moved in a cephalad fashion to observe the TN and CP nerve bifurcation to form the Sciatic Nerve. The insertion site was prepped and draped in sterile fashion. Skin and cutaneous tissue was infiltrated with 2-5 ml of 1% Lidocaine. Using ultrasound-guidance, an 18-gauge Contiplex Ultra 360 Touhy needle was advanced in plane from lateral to medial. Preservative-free normal saline was utilized for hydro-dissection of tissue, advancement of Touhy needle, and to confirm final needle placement posterior to the nerves. Local anesthetic  "injection spread, in incremental 3-5 ml injections, to surround both nerve structures. Aspiration every 5 ml to prevent intravascular injection. Injection was completed with negative aspiration of blood and negative intravascular injection. Injection pressures were normal with minimal resistance. A 20-gauge Contiplex Echo catheter was placed through the needle and advance out the tip of the Touhy 1-3 cm. The Touhy needle was then removed, and final catheter position verified (Below/above or Anterior/Posterior) the nerve structures. The catheter was secured in the usual fashion with skin glue, benzoin, steri-strips, CHG tegaderm and Label noting \"Nerve Block Catheter\". Jerk tape applied at yellow connector and catheter connection. Performed by: Cain Donnelly CRNA     Peripheral Block    Result Date: 2/28/2025  Cain Donnelly CRNA     2/28/2025  8:59 AM Peripheral Block Pre-sedation assessment completed: 2/28/2025 6:57 AM Patient reassessed immediately prior to procedure Start time: 2/28/2025 7:05 AM Stop time: 2/28/2025 7:09 AM Reason for block: at surgeon's request and post-op pain management Performed by CRNA/PEE: Butch Devi CRNA Assisted by: Cain Donnelly CRNA Preanesthetic Checklist Completed: patient identified, IV checked, site marked, risks and benefits discussed, surgical consent, monitors and equipment checked, pre-op evaluation and timeout performed Prep: Pt Position: supine Sterile barriers:cap, gloves, mask, sterile barriers and washed/disinfected hands Prep: ChloraPrep Patient monitoring: blood pressure monitoring, continuous pulse oximetry and EKG Procedure Performed under: spinal Guidance:ultrasound guided ULTRASOUND INTERPRETATION.  Using ultrasound guidance a 20 G gauge needle was placed in close proximity to the nerve, at which point, under ultrasound guidance anesthetic was injected in the area of the nerve and spread of the anesthesia was seen on ultrasound in close proximity thereto. " " There were no abnormalities seen on ultrasound; a digital image was taken; and the patient tolerated the procedure with no complications. Images:still images obtained, printed/placed on chart Laterality:left Block Type:adductor canal block Injection Technique:single-shot Needle Type:echogenic and short-bevel Needle Gauge:20 G Resistance on Injection: none Catheter size: 20g. Cath Depth at skin: 10 cm Medications Used: fentaNYL citrate (PF) (SUBLIMAZE) injection - Intravenous  100 mcg - 2/28/2025 7:09:00 AM dexamethasone sodium phosphate injection - Injection  2 mg - 2/28/2025 7:09:00 AM bupivacaine PF (MARCAINE) 0.25 % injection - Injection  30 mL - 2/28/2025 7:09:00 AM Medications Preservative Free Saline:10ml Post Assessment Injection Assessment: negative aspiration for heme, incremental injection and no paresthesia on injection Patient Tolerance:comfortable throughout block Complications:no Additional Notes SINGLE shot A high-frequency linear transducer, with sterile cover, was placed on the anterior mid-thigh (between the anterior superior iliac spine and patella). The transducer was then moved medially to identify the Sartorius muscle (Taylor), Vastus Medialis muscle (VMM), Superficial Femoral Artery (SFA) and Vein. The transducer was then moved cephalad or caudad to position the SFA in the middle of the Taylor. The insertion site was prepped and draped in sterile fashion. Skin and cutaneous tissue was infiltrated with 2-5 ml of 1% Lidocaine. Using ultrasound-guidance, a 20-gauge B-Aguilar 4\" Ultraplex 360 non-stimulating echogenic needle was advanced in plane from lateral to medial. Preservative-free normal saline was utilized for hydro-dissection of tissue, advancement of needle, and to confirm needle placement below the fascial plane of the Taylor where the Nerve to the VMM is located. Local anesthetic (LA) 5 ml deposited here. The needle continues its path lateral to the SFA at the level of the Saphenous Nerve. The " remainder of the LA was deposited at the 10-11 o'clock position of the SFA. This injection created a space between the Taylor and the SFA. Aspiration every 5 ml to prevent intravascular injection. Injection was completed with negative aspiration of blood and negative intravascular injection. Injection pressures were normal with minimal resistance. Performed by: Cain Dnonelly CRNA          Current medications:  Scheduled Meds:allopurinol, 300 mg, Oral, Daily  cefTRIAXone, 2,000 mg, Intravenous, Q24H  cholecalciferol, 1,000 Units, Oral, Daily  DAPTOmycin, 6 mg/kg (Adjusted), Intravenous, Q24H  enoxaparin sodium, 40 mg, Subcutaneous, Daily  insulin glargine, 6 Units, Subcutaneous, Daily  insulin lispro, 2-7 Units, Subcutaneous, 4x Daily AC & at Bedtime  lactobacillus acidophilus, 1 capsule, Oral, Daily  lisinopril, 40 mg, Oral, Daily  metoprolol succinate XL, 100 mg, Oral, Daily      Continuous Infusions:lactated ringers, 9 mL/hr  ropivacaine,       PRN Meds:.  colchicine    dextrose    dextrose    glucagon (human recombinant)    lidocaine PF 1%    Morphine **AND** naloxone    ondansetron ODT **OR** ondansetron    oxyCODONE    Assessment & Plan   Assessment & Plan     Active Hospital Problems    Diagnosis  POA    **Diabetic foot ulcer [E11.621, L97.509]  Yes    Stage 3a chronic kidney disease [N18.31]  Yes    Type 2 diabetes mellitus with hyperglycemia [E11.65]  Yes    Hyperlipidemia [E78.5]  Yes    Lymphoma [C85.90]  Yes    Lymphadenopathy [R59.1]  Yes    Hypertensive disorder [I10]  Yes      Resolved Hospital Problems   No resolved problems to display.        Brief Hospital Course to date:  Alvaro Sinha is a 59 y.o. male here with L MT ulcer s/p second ray amputation    Left second MT ulcer, s/p ray amputation  -monitor cultures  -abx per ID    History of R foot amputations/osteomyelitis    DM  -titrate basal insulin    CKD III  -monitor renal function    HTN  -monitor      Expected Discharge Location and  Transportation: Home  Expected Discharge   Expected Discharge Date: 3/3/2025; Expected Discharge Time:      VTE Prophylaxis:  Pharmacologic VTE prophylaxis orders are present.         AM-PAC 6 Clicks Score (PT): 17 (02/28/25 2027)    CODE STATUS:   Code Status and Medical Interventions: CPR (Attempt to Resuscitate); Full Support   Ordered at: 03/01/25 0720     Code Status (Patient has no pulse and is not breathing):    CPR (Attempt to Resuscitate)     Medical Interventions (Patient has pulse or is breathing):    Full Support       Josue Wagner MD  03/01/25

## 2025-03-01 NOTE — THERAPY DISCHARGE NOTE
Acute Care - Occupational Therapy Discharge  [facility]    Patient Name: Alvaro Sinha  : 1965    MRN: [id number]                              Today's Date: 3/1/2025       Admit Date: 2025    Visit Dx:     ICD-10-CM ICD-9-CM   1. Diabetic ulcer of left foot  E11.621 250.80    L97.529 707.15   2. Diabetic neuropathy  E11.40 250.60     357.2     Patient Active Problem List   Diagnosis    Nephrolithiasis    Lymphadenopathy    Lymphoma    Osteomyelitis    Type 2 diabetes mellitus with hyperglycemia    Elevated serum immunoglobulin free light chains    Elevated erythrocyte sedimentation rate    Diabetic foot ulcer    History of myalgia due to HMG CoA reductase inhibitor    Gout    Hypertensive disorder    Hyperlipidemia    Hypercalcemia    Microalbuminuric diabetic nephropathy    Peripheral angiopathy due to diabetes mellitus    Stage 3a chronic kidney disease     Past Medical History:   Diagnosis Date    Diabetes mellitus     Gout     Hypertension     Osteomyelitis     right foot    Sleep apnea     CPAP     Past Surgical History:   Procedure Laterality Date    BONE RESECTION, RIB      FOOT IRRIGATION, DEBRIDEMENT AND REPAIR Right 2024    Procedure: foot irrigation, debridement, secondary closure Right;  Surgeon: Jeremi Hays Jr., MD;  Location:  [facility];  Service: Orthopedics;  Laterality: Right;    TRANS METATARSAL AMPUTATION Right 2024    Procedure: AMPUTATION TRANS METATARSAL RIGHT;  Surgeon: Jeremi Hays Jr., MD;  Location: [facility] OR;  Service: Orthopedics;  Laterality: Right;    VENOUS ACCESS DEVICE (PORT) INSERTION Left 2024    Procedure: INSERTION GROSHONG CATHETER WITH FLUOROSCOPY;  Surgeon: Israel Preciado MD;  Location: [facility] OR;  Service: General;  Laterality: Left;      General Information       Row Name 25 1532          OT Time and Intention    Document Type evaluation  -AR     Mode of Treatment occupational therapy;individual therapy  -AR       Row  Name 03/01/25 1532          General Information    Patient Profile Reviewed yes  -AR     Prior Level of Function independent:;all household mobility;community mobility;gait;transfer;ADL's;bed mobility;work  -AR     Existing Precautions/Restrictions fall;other (see comments);left;weight bearing  L heel WB in post-op shoe, residual numbness LLE from nerve catheter which was removed by RN at end of session per pt request  -AR     Barriers to Rehab medically complex  -AR       Row Name 03/01/25 1532          Living Environment    People in Home spouse  -AR       Row Name 03/01/25 1532          Home Main Entrance    Number of Stairs, Main Entrance two  1 + 1  -AR       Row Name 03/01/25 1532          Stairs Within Home, Primary    Number of Stairs, Within Home, Primary none  -AR       Row Name 03/01/25 1532          Cognition    Orientation Status (Cognition) oriented x 4  -AR       Row Name 03/01/25 1532          Safety Issues/Impairments Affecting Functional Mobility    Safety Issues Affecting Function (Mobility) awareness of need for assistance;insight into deficits/self-awareness;safety precaution awareness  -AR     Impairments Affecting Function (Mobility) endurance/activity tolerance;strength;sensation/sensory awareness;balance  -AR               User Key  (r) = Recorded By, (t) = Taken By, (c) = Cosigned By      Initials Name Provider Type    AR Emeli Haq OT Occupational Therapist                   Mobility/ADL's       Row Name 03/01/25 1537          Bed Mobility    Bed Mobility supine-sit;sit-supine;scooting/bridging  -AR     Scooting/Bridging [place] (Bed Mobility) modified independence  -AR     Supine-Sit [place] (Bed Mobility) modified independence  -AR     Sit-Supine [place] (Bed Mobility) modified independence  -AR     Assistive Device (Bed Mobility) head of bed elevated  -AR     Comment, (Bed Mobility) Good ability to maintain L heel WB in shoe  -AR       Row Name 03/01/25 1537           Transfers    Transfers sit-stand transfer;stand-sit transfer  -AR     Comment, (Transfers) Cues for hand placement and bed approach. Post-op shoe applied prior to mobility.  -AR       Row Name 03/01/25 1537          Sit-Stand Transfer    Sit-Stand [place] (Transfers) verbal cues;contact guard  -AR     Assistive Device (Sit-Stand Transfers) walker, front-wheeled  -AR       Row Name 03/01/25 1537          Stand-Sit Transfer    Stand-Sit [place] (Transfers) contact guard;verbal cues  -AR     Assistive Device (Stand-Sit Transfers) walker, front-wheeled  -AR       Row Name 03/01/25 1537          Activities of Daily Living    BADL Assessment/Intervention lower body dressing  -AR       Row Name 03/01/25 1537          Mobility    Extremity Weight-bearing Status left lower extremity  -AR     Left Lower Extremity (Weight-bearing Status) other (see comments)  heel WBAT in post-op shoe  -AR       Row Name 03/01/25 1537          Lower Body Dressing Assessment/Training    [place] Level (Lower Body Dressing) don;pants/bottoms;supervision  -AR     Position (Lower Body Dressing) edge of bed sitting;supported standing  -AR               User Key  (r) = Recorded By, (t) = Taken By, (c) = Cosigned By      Initials Name Provider Type    AR Emeli Haq OT Occupational Therapist                   Obj/Interventions       Row Name 03/01/25 1539          Sensory Assessment (Somatosensory)    Sensory Assessment (Somatosensory) UE sensation intact  -AR       Row Name 03/01/25 1539          Vision Assessment/Intervention    Visual Impairment/Limitations WNL  -AR       Row Name 03/01/25 1539          Range of Motion Comprehensive    General Range of Motion no range of motion deficits identified  -AR       Row Name 03/01/25 1539          Strength Comprehensive (MMT)    General Manual Muscle Testing (MMT) Assessment no strength deficits identified  -AR       Row Name 03/01/25 1539          Balance    Balance  Assessment sitting static balance;sitting dynamic balance;standing static balance;standing dynamic balance  -AR     Static Sitting Balance independent  -AR     Dynamic Sitting Balance independent  -AR     Position, Sitting Balance unsupported;sitting edge of bed  -AR     Static Standing Balance supervision  -AR     Dynamic Standing Balance contact guard  -AR     Position/Device Used, Standing Balance walker, rolling  -AR               User Key  (r) = Recorded By, (t) = Taken By, (c) = Cosigned By      Initials Name Provider Type    Emeli Chou, OT Occupational Therapist                   Goals/Plan    No documentation.                  Clinical Impression       Row Name 03/01/25 1539          Pain Assessment    Pretreatment Pain Rating 0/10 - no pain  -AR     Posttreatment Pain Rating 0/10 - no pain  -AR       Row Name 03/01/25 1533          Plan of Care Review    Plan of Care Reviewed With patient;spouse  -AR     Outcome Evaluation Pt completed bed mobility with modified independence, transfer training with CGA and LB dressing with supervision. He declined need for AE and reports no issues with home safety/bathroom set-up.No AE indicated at this time. Pt required CGA with mobility d/t not having shoe on R foot and he has mild residual numbness LLE from blocks. OT to sign off, will defer mobility to PT. Recommend DC home with family assist when medically stable.  -AR       Row Name 03/01/25 153          Therapy Assessment/Plan (OT)    Rehab Potential (OT) other (see comments)  -AR     Criteria for Skilled Therapeutic Interventions Met (OT) no problems identified which require skilled intervention  -AR     Therapy Frequency (OT) evaluation only  -AR       Row Name 03/01/25 1531          Therapy Plan Review/Discharge Plan (OT)    Anticipated Discharge Disposition (OT) home with assist  -AR       Row Name 03/01/25 1535          Vital Signs    Pre Patient Position Supine  -AR     Intra Patient Position  Standing  -AR     Post Patient Position Supine  -AR       Row Name 03/01/25 1539          Positioning and Restraints    Pre-Treatment Position in bed  -AR     Post Treatment Position bed  -AR     In Bed notified nsg;supine;call light within reach;encouraged to call for assist;exit alarm on;with family/caregiver;with nsg  -AR               User Key  (r) = Recorded By, (t) = Taken By, (c) = Cosigned By      Initials Name Provider Type    Emeli Chou, OT Occupational Therapist                   Outcome Measures       Row Name 03/01/25 1546          How much help from another is currently needed...    Putting on and taking off regular lower body clothing? 3  -AR     Bathing (including washing, rinsing, and drying) 3  -AR     Toileting (which includes using toilet bed pan or urinal) 3  -AR     Putting on and taking off regular upper body clothing 4  -AR     Taking care of personal grooming (such as brushing teeth) 4  -AR     Eating meals 4  -AR     AM-PAC 6 Clicks Score (OT) 21  -AR       Row Name 03/01/25 1528          How much help from another person do you currently need...    Turning from your back to your side while in flat bed without using bedrails? 3  -AB     Moving from lying on back to sitting on the side of a flat bed without bedrails? 3  -AB     Moving to and from a bed to a chair (including a wheelchair)? 3  -AB     Standing up from a chair using your arms (e.g., wheelchair, bedside chair)? 3  -AB     Climbing 3-5 steps with a railing? 3  -AB     To walk in hospital room? 3  -AB     AM-PAC 6 Clicks Score (PT) 18  -AB     Highest Level of Mobility Goal 6 --> Walk 10 steps or more  -AB       Row Name 03/01/25 1546 03/01/25 1528       Functional Assessment    Outcome Measure Options AM-PAC 6 Clicks Daily Activity (OT)  -AR AM-PAC 6 Clicks Basic Mobility (PT)  -AB              User Key  (r) = Recorded By, (t) = Taken By, (c) = Cosigned By      Initials Name Provider Type    Emeli Chou, OT  Occupational Therapist    Jenny Murphy, PT Physical Therapist                  Occupational Therapy Education       Title: PT OT SLP Therapies (In Progress)       Topic: Occupational Therapy (In Progress)       Point: ADL training (Done)       Description:   Instruct learner(s) on proper safety adaptation and remediation techniques during self care or transfers.   Instruct in proper use of assistive devices.                  Learning Progress Summary            Patient Eager, E, VU by AR at 3/1/2025 1546   Family Eager, E, VU by AR at 3/1/2025 1546                      Point: Home exercise program (Not Started)       Description:   Instruct learner(s) on appropriate technique for monitoring, assisting and/or progressing therapeutic exercises/activities.                  Learner Progress:  Not documented in this visit.              Point: Precautions (Done)       Description:   Instruct learner(s) on prescribed precautions during self-care and functional transfers.                  Learning Progress Summary            Patient Eager, E, VU by AR at 3/1/2025 1546   Family Eager, E, VU by AR at 3/1/2025 1546                      Point: Body mechanics (Done)       Description:   Instruct learner(s) on proper positioning and spine alignment during self-care, functional mobility activities and/or exercises.                  Learning Progress Summary            Patient Eager, E, VU by AR at 3/1/2025 1546   Family Eager, E, VU by AR at 3/1/2025 1546                                      User Key       Initials Effective Dates Name Provider Type Discipline    AR 07/11/23 -  Emeli Haq OT Occupational Therapist OT                  OT Recommendation and Plan  Therapy Frequency (OT): evaluation only  Plan of Care Review  Plan of Care Reviewed With: patient, spouse  Outcome Evaluation: Pt completed bed mobility with modified independence, transfer training with CGA and LB dressing with supervision. He declined need  for AE and reports no issues with home safety/bathroom set-up.No AE indicated at this time. Pt required CGA with mobility d/t not having shoe on R foot and he has mild residual numbness LLE from blocks. OT to sign off, will defer mobility to PT. Recommend DC home with family assist when medically stable.  Plan of Care Reviewed With: patient, spouse  Outcome Evaluation: Pt completed bed mobility with modified independence, transfer training with CGA and LB dressing with supervision. He declined need for AE and reports no issues with home safety/bathroom set-up.No AE indicated at this time. Pt required CGA with mobility d/t not having shoe on R foot and he has mild residual numbness LLE from blocks. OT to sign off, will defer mobility to PT. Recommend DC home with family assist when medically stable.     Time Calculation:   Evaluation Complexity (OT)  Review Occupational Profile/Medical/Therapy History Complexity: brief/low complexity  Assessment, Occupational Performance/Identification of Deficit Complexity: 1-3 performance deficits  Clinical Decision Making Complexity (OT): problem focused assessment/low complexity  Overall Complexity of Evaluation (OT): low complexity     Time Calculation- OT       Row Name 03/01/25 1547             Time Calculation- OT    OT Start Time 1400  -AR      OT Received On 03/01/25  -AR      OT Goal Re-Cert Due Date 03/11/25  -AR         Untimed Charges    OT Eval/Re-eval Minutes 45  -AR         Total Minutes    Untimed Charges Total Minutes 45  -AR       Total Minutes 45  -AR                User Key  (r) = Recorded By, (t) = Taken By, (c) = Cosigned By      Initials Name Provider Type    Emeli Chou OT Occupational Therapist                  Therapy Charges for Today       Code Description Service Date Service Provider Modifiers Qty    [id number]  OT EVAL LOW COMPLEXITY 3 3/1/2025 Emeli Haq OT GO 1               OT Discharge Summary  Anticipated Discharge  Disposition (OT): home with assist    Emeli Haq, OT  3/1/2025

## 2025-03-02 VITALS
SYSTOLIC BLOOD PRESSURE: 143 MMHG | HEIGHT: 73 IN | DIASTOLIC BLOOD PRESSURE: 111 MMHG | RESPIRATION RATE: 18 BRPM | WEIGHT: 300 LBS | BODY MASS INDEX: 39.76 KG/M2 | HEART RATE: 86 BPM | TEMPERATURE: 98.4 F | OXYGEN SATURATION: 95 %

## 2025-03-02 LAB
BACTERIA SPEC AEROBE CULT: ABNORMAL
GLUCOSE BLDC GLUCOMTR-MCNC: 107 MG/DL (ref 70–130)
GLUCOSE BLDC GLUCOMTR-MCNC: 153 MG/DL (ref 70–130)
GRAM STN SPEC: ABNORMAL
GRAM STN SPEC: ABNORMAL

## 2025-03-02 PROCEDURE — 25010000002 DAPTOMYCIN PER 1 MG: Performed by: NURSE PRACTITIONER

## 2025-03-02 PROCEDURE — 94660 CPAP INITIATION&MGMT: CPT

## 2025-03-02 PROCEDURE — 63710000001 INSULIN GLARGINE PER 5 UNITS: Performed by: HOSPITALIST

## 2025-03-02 PROCEDURE — 25010000002 ENOXAPARIN PER 10 MG: Performed by: ORTHOPAEDIC SURGERY

## 2025-03-02 PROCEDURE — 25010000002 CEFTRIAXONE PER 250 MG: Performed by: NURSE PRACTITIONER

## 2025-03-02 PROCEDURE — 99239 HOSP IP/OBS DSCHRG MGMT >30: CPT | Performed by: HOSPITALIST

## 2025-03-02 PROCEDURE — 94799 UNLISTED PULMONARY SVC/PX: CPT

## 2025-03-02 PROCEDURE — 82948 REAGENT STRIP/BLOOD GLUCOSE: CPT

## 2025-03-02 PROCEDURE — 63710000001 INSULIN LISPRO (HUMAN) PER 5 UNITS: Performed by: HOSPITALIST

## 2025-03-02 RX ADMIN — INSULIN LISPRO 2 UNITS: 100 INJECTION, SOLUTION INTRAVENOUS; SUBCUTANEOUS at 13:08

## 2025-03-02 RX ADMIN — ENOXAPARIN SODIUM 40 MG: 100 INJECTION SUBCUTANEOUS at 10:18

## 2025-03-02 RX ADMIN — METOPROLOL SUCCINATE 100 MG: 100 TABLET, EXTENDED RELEASE ORAL at 08:57

## 2025-03-02 RX ADMIN — DAPTOMYCIN 600 MG: 500 INJECTION, POWDER, LYOPHILIZED, FOR SOLUTION INTRAVENOUS at 10:11

## 2025-03-02 RX ADMIN — Medication 1 CAPSULE: at 08:56

## 2025-03-02 RX ADMIN — SODIUM CHLORIDE 2000 MG: 900 INJECTION INTRAVENOUS at 08:58

## 2025-03-02 RX ADMIN — ALLOPURINOL 300 MG: 300 TABLET ORAL at 08:57

## 2025-03-02 RX ADMIN — LISINOPRIL 40 MG: 40 TABLET ORAL at 08:56

## 2025-03-02 RX ADMIN — INSULIN GLARGINE 6 UNITS: 100 INJECTION, SOLUTION SUBCUTANEOUS at 08:57

## 2025-03-02 RX ADMIN — Medication 1000 UNITS: at 08:57

## 2025-03-02 RX ADMIN — Medication 10 ML: at 10:15

## 2025-03-02 NOTE — DISCHARGE SUMMARY
[facility] Medicine Services  DISCHARGE SUMMARY    Patient Name: Alvaro Sinha  : 1965  MRN: [id number]    Date of Admission: 2025  5:31 AM  Date of Discharge:  3/2/2025  Primary Care Physician: Martha Mckee MD    Consults       Date and Time Order Name Status Description    2025  9:56 AM Inpatient Infectious Diseases Consult Completed             Hospital Course     Presenting Problem: Foot ulcer    Active Hospital Problems    Diagnosis  POA    **Diabetic foot ulcer [E11.621, L97.509]  Yes    Stage 3a chronic kidney disease [N18.31]  Yes    Type 2 diabetes mellitus with hyperglycemia [E11.65]  Yes    Hyperlipidemia [E78.5]  Yes    Lymphoma [C85.90]  Yes    Lymphadenopathy [R59.1]  Yes    Hypertensive disorder [I10]  Yes      Resolved Hospital Problems   No resolved problems to display.          Hospital Course:  Alvaro Sinha is a 59 y.o. male here with L MT ulcer s/p second ray amputation     Left second MT ulcer, s/p ray amputation  -Daily dressing changes and follow up with Dr. Hays  -JOANN PICC placed, out on rocephin/daptomycin per ID, Dr. Sanders and follow up [facility] 3/4  -Cultures, NGTD     History of R foot amputations/osteomyelitis     DM  -resume home medications at discharge, A1C 7       Discharge Follow Up Recommendations for outpatient labs/diagnostics:  As written    Day of Discharge     HPI:  No pain currently. No f/c. Hopes to go home.    Review of Systems  As above    Vital Signs:   Temp:  [97.5 °F (36.4 °C)-98.6 °F (37 °C)] 97.5 °F (36.4 °C)  Heart Rate:  [64-84] 84  Resp:  [16-18] 18  BP: (132-170)/() 156/89      Physical Exam:  NAD, alert and oriented  OP clear, dry MM  Neck supple  No LAD  RRR  CTAB  +BS, ND, NT, soft  HERNANDEZ  L foot dressed    Pertinent  and/or Most Recent Results     LAB RESULTS:      Lab 25  1520   WBC 5.08   HEMOGLOBIN 12.8*   HEMATOCRIT 37.5   PLATELETS 156   MCV 90.4         Lab  02/28/25  1650 02/25/25  1520   SODIUM  --  143   POTASSIUM  --  4.3   CHLORIDE  --  105   CO2  --  24.0   ANION GAP  --  14.0   BUN  --  23*   CREATININE  --  1.08   EGFR  --  79.1   GLUCOSE  --  86   CALCIUM  --  10.3   HEMOGLOBIN A1C 7.00*  --                          Brief Urine Lab Results       None          Microbiology Results (last 10 days)       Procedure Component Value - Date/Time    Tissue / Bone Culture - Tissue, Foot, Left [[id number]] Collected: 02/28/25 0902    Lab Status: Preliminary result Specimen: Tissue from Foot, Left Updated: 03/01/25 1057     Tissue Culture Culture in progress     Gram Stain Occasional WBCs seen      No organisms seen    AFB Culture - Tissue, Foot, Left [[id number]] Collected: 02/28/25 0902    Lab Status: Preliminary result Specimen: Tissue from Foot, Left Updated: 03/01/25 1152     AFB Stain No acid fast bacilli seen on concentrated smear            Peripheral Block    Result Date: 2/28/2025  Cain Donnelly CRNA     2/28/2025  9:48 AM Peripheral Block Pre-sedation assessment completed: 2/28/2025 6:58 AM Patient reassessed immediately prior to procedure Patient location during procedure: pre-op Start time: 2/28/2025 7:10 AM Stop time: 2/28/2025 7:20 AM Reason for block: at surgeon's request and post-op pain management Performed by CRNA/CAA: Butch Devi CRNA Assisted by: Bushra Alejandra RN Preanesthetic Checklist Completed: patient identified, IV checked, site marked, risks and benefits discussed, surgical consent, monitors and equipment checked, pre-op evaluation and timeout performed Prep: Sterile barriers:cap, gloves, mask and washed/disinfected hands Prep: ChloraPrep Patient monitoring: blood pressure monitoring, continuous pulse oximetry and EKG Procedure Sedation: yes Performed under: local infiltration Guidance:ultrasound guided ULTRASOUND INTERPRETATION.  Using ultrasound guidance a 20 G gauge needle was placed in close proximity to the nerve, at which point,  under ultrasound guidance anesthetic was injected in the area of the nerve and spread of the anesthesia was seen on ultrasound in close proximity thereto.  There were no abnormalities seen on ultrasound; a digital image was taken; and the patient tolerated the procedure with no complications. Images:still images obtained, printed/placed on chart Laterality:left Block Type:popliteal Injection Technique:catheter Needle Type:echogenic and Tuohy Needle Gauge:18 G Resistance on Injection: none Catheter Size:20 G Cath Depth at skin: 10 cm Medications Used: bupivacaine PF (MARCAINE) 0.25 % injection - Injection  30 mL - 2/28/2025 7:20:00 AM Medications Preservative Free Saline:10ml Post Assessment Injection Assessment: negative aspiration for heme, no paresthesia on injection and incremental injection Patient Tolerance:comfortable throughout block Complications:no Additional Notes CATHETER                             A high-frequency linear transducer, with sterile cover, was placed in the popliteal fossa to identify the popliteal artery and vein, Tibial nerve (TN) and Common Peroneal nerve (CP). The transducer was then moved in a cephalad fashion to observe the TN and CP nerve bifurcation to form the Sciatic Nerve. The insertion site was prepped and draped in sterile fashion. Skin and cutaneous tissue was infiltrated with 2-5 ml of 1% Lidocaine. Using ultrasound-guidance, an 18-gauge Contiplex Ultra 360 Touhy needle was advanced in plane from lateral to medial. Preservative-free normal saline was utilized for hydro-dissection of tissue, advancement of Touhy needle, and to confirm final needle placement posterior to the nerves. Local anesthetic injection spread, in incremental 3-5 ml injections, to surround both nerve structures. Aspiration every 5 ml to prevent intravascular injection. Injection was completed with negative aspiration of blood and negative intravascular injection. Injection pressures were normal with  "minimal resistance. A 20-gauge Contiplex Echo catheter was placed through the needle and advance out the tip of the Touhy 1-3 cm. The Touhy needle was then removed, and final catheter position verified (Below/above or Anterior/Posterior) the nerve structures. The catheter was secured in the usual fashion with skin glue, benzoin, steri-strips, CHG tegaderm and Label noting \"Nerve Block Catheter\". Jerk tape applied at yellow connector and catheter connection. Performed by: Cain Donnelly CRNA     Peripheral Block    Result Date: 2/28/2025  Cain Donnelly CRNA     2/28/2025  8:59 AM Peripheral Block Pre-sedation assessment completed: 2/28/2025 6:57 AM Patient reassessed immediately prior to procedure Start time: 2/28/2025 7:05 AM Stop time: 2/28/2025 7:09 AM Reason for block: at surgeon's request and post-op pain management Performed by CRNA/CAA: Butch Devi CRNA Assisted by: Cain Donnelly CRNA Preanesthetic Checklist Completed: patient identified, IV checked, site marked, risks and benefits discussed, surgical consent, monitors and equipment checked, pre-op evaluation and timeout performed Prep: Pt Position: supine Sterile barriers:cap, gloves, mask, sterile barriers and washed/disinfected hands Prep: ChloraPrep Patient monitoring: blood pressure monitoring, continuous pulse oximetry and EKG Procedure Performed under: spinal Guidance:ultrasound guided ULTRASOUND INTERPRETATION.  Using ultrasound guidance a 20 G gauge needle was placed in close proximity to the nerve, at which point, under ultrasound guidance anesthetic was injected in the area of the nerve and spread of the anesthesia was seen on ultrasound in close proximity thereto.  There were no abnormalities seen on ultrasound; a digital image was taken; and the patient tolerated the procedure with no complications. Images:still images obtained, printed/placed on chart Laterality:left Block Type:adductor canal block Injection Technique:single-shot " "Needle Type:echogenic and short-bevel Needle Gauge:20 G Resistance on Injection: none Catheter size: 20g. Cath Depth at skin: 10 cm Medications Used: fentaNYL citrate (PF) (SUBLIMAZE) injection - Intravenous  100 mcg - 2/28/2025 7:09:00 AM dexamethasone sodium phosphate injection - Injection  2 mg - 2/28/2025 7:09:00 AM bupivacaine PF (MARCAINE) 0.25 % injection - Injection  30 mL - 2/28/2025 7:09:00 AM Medications Preservative Free Saline:10ml Post Assessment Injection Assessment: negative aspiration for heme, incremental injection and no paresthesia on injection Patient Tolerance:comfortable throughout block Complications:no Additional Notes SINGLE shot A high-frequency linear transducer, with sterile cover, was placed on the anterior mid-thigh (between the anterior superior iliac spine and patella). The transducer was then moved medially to identify the Sartorius muscle (Taylor), Vastus Medialis muscle (VMM), Superficial Femoral Artery (SFA) and Vein. The transducer was then moved cephalad or caudad to position the SFA in the middle of the Taylor. The insertion site was prepped and draped in sterile fashion. Skin and cutaneous tissue was infiltrated with 2-5 ml of 1% Lidocaine. Using ultrasound-guidance, a 20-gauge B-Aguilar 4\" Ultraplex 360 non-stimulating echogenic needle was advanced in plane from lateral to medial. Preservative-free normal saline was utilized for hydro-dissection of tissue, advancement of needle, and to confirm needle placement below the fascial plane of the Taylor where the Nerve to the VMM is located. Local anesthetic (LA) 5 ml deposited here. The needle continues its path lateral to the SFA at the level of the Saphenous Nerve. The remainder of the LA was deposited at the 10-11 o'clock position of the SFA. This injection created a space between the Taylor and the SFA. Aspiration every 5 ml to prevent intravascular injection. Injection was completed with negative aspiration of blood and negative " intravascular injection. Injection pressures were normal with minimal resistance. Performed by: Cain Donnelly CRNA     XR Chest PA & Lateral    Result Date: 2/25/2025  XR CHEST PA AND LATERAL Date of Exam: 2/25/2025 3:30 PM EST Indication: Z01.818 preoperative evaluation for toe amputation. Comparison: AP chest 4/14/2024 Findings: No acute airspace disease. Linear scarring in the left base. Benign calcified granuloma in the left upper lobe. Normal heart size. No pleural effusion, pneumothorax or acute osseous abnormality.     Impression: No acute chest finding. Electronically Signed: Denisa Robertson MD  2/25/2025 3:47 PM EST  Workstation ID: [id number]     Results for orders placed during the hospital encounter of 04/02/24    Doppler Ankle Brachial Index Single Level CAR    Interpretation Summary    Right Conclusion: The right YRN is unable to be assessed due to vessel incompressibility. Normal digital pressures.    Left Conclusion: The left YRN is unable to be assessed due to vessel incompressibility. Normal digital pressures.    Waveforms and normal digital pressures suggest no significant flow obstructive PAD      Results for orders placed during the hospital encounter of 04/02/24    Doppler Ankle Brachial Index Single Level CAR    Interpretation Summary    Right Conclusion: The right YRN is unable to be assessed due to vessel incompressibility. Normal digital pressures.    Left Conclusion: The left YRN is unable to be assessed due to vessel incompressibility. Normal digital pressures.    Waveforms and normal digital pressures suggest no significant flow obstructive PAD          Plan for Follow-up of Pending Labs/Results:   Pending Labs       Order Current Status    Anaerobic Culture 10 Day Incubation - Tissue, Foot, Left In process    Fungus Culture - Tissue, Foot, Left In process    Tissue Pathology Exam In process    AFB Culture - Tissue, Foot, Left Preliminary result    Tissue / Bone Culture - Tissue, Foot,  Left Preliminary result          Discharge Details        Discharge Medications        Continue These Medications        Instructions Start Date   allopurinol 300 MG tablet  Commonly known as: ZYLOPRIM   300 mg, Daily      aspirin 81 MG EC tablet   81 mg, Oral, Daily      cefTRIAXone 2,000 mg in sodium chloride 0.9 % 100 mL IVPB   2,000 mg, Intravenous, Every 24 Hours      colchicine 0.6 MG tablet   0.6 mg, Oral, Daily PRN      DAPTOmycin 700 mg in sodium chloride 0.9 % 50 mL   700 mg, Intravenous, Every 24 Hours      ergocalciferol 1.25 MG (08018 UT) capsule  Commonly known as: ERGOCALCIFEROL   1,250 mcg, Oral, Daily      glimepiride 4 MG tablet  Commonly known as: AMARYL   4 mg, Daily      lactobacillus acidophilus capsule capsule   1 capsule, Oral, Daily      lisinopril 40 MG tablet  Commonly known as: PRINIVIL,ZESTRIL   40 mg, Daily      metFORMIN 1000 MG tablet  Commonly known as: GLUCOPHAGE   1,000 mg, Oral, 2 Times Daily With Meals      metoprolol succinate  MG 24 hr tablet  Commonly known as: TOPROL-XL   100 mg, Daily      naloxone 4 MG/0.1ML nasal spray  Commonly known as: NARCAN   Call 911. Don't prime. [place] in 1 nostril for overdose. Repeat in 2-3 minutes in other nostril if no or minimal breathing/responsiveness.      ondansetron ODT 4 MG disintegrating tablet  Commonly known as: ZOFRAN-ODT   4 mg, Translingual, Every 6 Hours PRN      oxyCODONE 5 MG immediate release tablet  Commonly known as: ROXICODONE   5 mg, Oral, Every 4 Hours PRN      pioglitazone 30 MG tablet  Commonly known as: ACTOS   30 mg, Daily      sildenafil 100 MG tablet  Commonly known as: VIAGRA   100 mg, Oral, Daily PRN      Trulicity 3 MG/0.5ML solution pen-injector  Generic drug: Dulaglutide   3 mg, Subcutaneous, Weekly               Allergies   Allergen Reactions    Statins Myalgia         Discharge Disposition:  Home or Self Care    Diet:  Hospital:  Diet Order   Procedures    Diet: Diabetic; Consistent Carbohydrate; Fluid  Consistency: Thin (IDDSI 0)            Activity:      Restrictions or Other Recommendations:         CODE STATUS:    Code Status and Medical Interventions: CPR (Attempt to Resuscitate); Full Support   Ordered at: 03/01/25 0720     Code Status (Patient has no pulse and is not breathing):    CPR (Attempt to Resuscitate)     Medical Interventions (Patient has pulse or is breathing):    Full Support       No future appointments.    Additional Instructions for the Follow-ups that You Need to Schedule       Discharge Follow-up with PCP   As directed       Currently Documented PCP:    Martha Mckee MD    PCP Phone Number:    [phone]     Follow Up Details: 1 week        Discharge Follow-up with Specified Provider: Dr. Hays as scheduled   As directed      To: Dr. Hays as scheduled        Discharge Follow-up with Specified Provider: TYLER Chamberlain, Dr. Sanders   As directed      To: TYLER 3/4Dr. Tommy MD  03/02/25      Time Spent on Discharge:  I spent  40  minutes on this discharge activity which included: face-to-face encounter with the patient, reviewing the data in the system, coordination of the care with the nursing staff as well as consultants, documentation, and entering orders.

## 2025-03-02 NOTE — PLAN OF CARE
Problem: Adult Inpatient Plan of Care  Goal: Optimal Comfort and Wellbeing  Outcome: Progressing  Intervention: Provide Person-Centered Care  Recent Flowsheet Documentation  Taken 3/1/2025 2000 by Katharine Jacobsen RN  Trust Relationship/Rapport:   care explained   choices provided   questions answered  Goal: Readiness for Transition of Care  Outcome: Progressing     Problem: Fall Injury Risk  Goal: Absence of Fall and Fall-Related Injury  Outcome: Progressing  Intervention: Identify and Manage Contributors  Recent Flowsheet Documentation  Taken 3/1/2025 2200 by Katharine Jacobsen RN  Self-Care Promotion: independence encouraged  Taken 3/1/2025 2000 by Katharine Jacobsen RN  Self-Care Promotion: independence encouraged  Intervention: Promote Injury-Free Environment  Recent Flowsheet Documentation  Taken 3/2/2025 0400 by Katharine Jacobsen RN  Safety Promotion/Fall Prevention:   activity supervised   room organization consistent   safety round/check completed  Taken 3/2/2025 0200 by Katharine Jacobsen RN  Safety Promotion/Fall Prevention:   activity supervised   safety round/check completed  Taken 3/2/2025 0000 by Katharine Jacobsen RN  Safety Promotion/Fall Prevention:   fall prevention program maintained   safety round/check completed  Taken 3/1/2025 2200 by Katharine Jacobsen RN  Safety Promotion/Fall Prevention:   activity supervised   fall prevention program maintained   safety round/check completed   room organization consistent  Taken 3/1/2025 2000 by Katharine Jacobsen RN  Safety Promotion/Fall Prevention:   activity supervised   assistive device/personal items within reach   safety round/check completed   Goal Outcome Evaluation:

## 2025-03-02 NOTE — PROGRESS NOTES
INFECTIOUS DISEASE CONSULT/INITIAL HOSPITAL VISIT    Alvaro Sinha  1965  [id number]    Reason for Consultation: nonhealing left foot wound     History of present illness:    Patient is a 59 y.o. male, known to Dr. Jameson,  with PMH DM, CKD, HTN, gout, seen today for left foot nonhealing wound. Hospitalized here in April 2024 undergoing a right foot amputation of 2nd and 3rd metatarsals.  Wound cultres with GBS, Strep mitis, and Eikenella. In early January, he developed a callous on his left foot 2nd MT, to which he began applying topical ointment, however, the area opened up.  He was seen in clinic, the area sharply debrided, and he was placed on oral Doxycycline, after which he started taking leftover Augmentin.   The wound failed to heal and he was referred for admission. Taken  to the OR yesterday by Dr. Hays undergoing an endoscopic gastrocnemius recession, 2nd ray amputation, without evidence of deep purulence per op note.  Surgical tissue gram stain with occasional WBCs, no organisms.  He has been afebrile.  Admitting labs with WBC 5.08, hgb 12.8, Scr 10.8, hgbA1c 7.  He received a dose of Vancomycin and we were asked to see for evaluation and treatment.     3/2/25: PICC placed yesterday. Stable overnight. Afebrile. Foot pain controlled. No worsening drainage     ROS:  Afebrile and hemodynamically stable. No nausea, diarrhea. No rash. See above, otherwise negative.        Allergies   Allergen Reactions    Statins Myalgia         Medication:    Current Facility-Administered Medications:     allopurinol (ZYLOPRIM) tablet 300 mg, 300 mg, Oral, Daily, Josue Wagner MD, 300 mg at 03/02/25 0857    cefTRIAXone (ROCEPHIN) 2,000 mg in sodium chloride 0.9 % 100 mL MBP, 2,000 mg, Intravenous, Q24H, Margoth Hernandez APRN, Last Rate: 200 mL/hr at 03/02/25 0858, 2,000 mg at 03/02/25 0858    cholecalciferol (VITAMIN D3) tablet 1,000 Units, 1,000 Units, Oral, Daily, Josue Wagner MD, 1,000 Units  at 03/02/25 0857    colchicine tablet 0.6 mg, 0.6 mg, Oral, Daily PRN, Josue Wagner MD    DAPTOmycin (CUBICIN) 600 mg in sodium chloride 0.9 % 50 mL IVPB, 6 mg/kg (Adjusted), Intravenous, Q24H, Margoth Hernandez APRN, Last Rate: 100 mL/hr at 03/01/25 0947, 600 mg at 03/01/25 0947    dextrose (D50W) (25 g/50 mL) IV injection 25 g, 25 g, Intravenous, Q15 Min PRN, Jsoue Wagner MD    dextrose (GLUTOSE) oral gel 15 g, 15 g, Oral, Q15 Min PRN, Josue Wagner MD    Enoxaparin Sodium (LOVENOX) syringe 40 mg, 40 mg, Subcutaneous, Daily, Jeremi Hays Jr., MD, 40 mg at 03/01/25 0931    glucagon (GLUCAGEN) injection 1 mg, 1 mg, Intramuscular, Q15 Min PRN, Josue Wagner MD    insulin glargine (LANTUS, SEMGLEE) injection 6 Units, 6 Units, Subcutaneous, Daily, Josue Wagner MD, 6 Units at 03/02/25 0857    Insulin Lispro (humaLOG) injection 2-7 Units, 2-7 Units, Subcutaneous, 4x Daily AC & at Bedtime, Josue Wagner MD, 2 Units at 03/01/25 2243    lactobacillus acidophilus (RISAQUAD) capsule 1 capsule, 1 capsule, Oral, Daily, Josue Wagner MD, 1 capsule at 03/02/25 0856    lidocaine PF 1% (XYLOCAINE) injection 0.5 mL, 0.5 mL, Injection, Once PRN, Jeremi Hays Jr., MD    lisinopril (PRINIVIL,ZESTRIL) tablet 40 mg, 40 mg, Oral, Daily, Josue Wagner MD, 40 mg at 03/02/25 0856    metoprolol succinate XL (TOPROL-XL) 24 hr tablet 100 mg, 100 mg, Oral, Daily, Josue Wagner MD, 100 mg at 03/02/25 0857    morphine injection 6 mg, 6 mg, Intravenous, Q2H PRN **AND** naloxone (NARCAN) injection 0.4 mg, 0.4 mg, Intravenous, Q5 Min PRN, Jeremi Hays Jr., MD    ondansetron ODT (ZOFRAN-ODT) disintegrating tablet 4 mg, 4 mg, Oral, Q6H PRN **OR** ondansetron (ZOFRAN) injection 4 mg, 4 mg, Intravenous, Q6H PRN, Jeremi Hays Jr., MD    oxyCODONE (ROXICODONE) immediate release tablet 5 mg, 5 mg, Oral, Q4H PRN, Jeremi Hays Jr., MD    ropivacaine (NAROPIN) 0.2 % infusion (INFUSYSTEM), , Peripheral  Nerve, Continuous, Josue Wagner MD, 1,000 mg at 25 1006    sodium chloride 0.9 % flush 10 mL, 10 mL, Intravenous, Q12H, Lester Sanders MD, 10 mL at 25 2243    sodium chloride 0.9 % flush 10 mL, 10 mL, Intravenous, PRN, Lester Sanders MD    sodium chloride 0.9 % flush 20 mL, 20 mL, Intravenous, PRN, Lester Sanders MD    sodium chloride 0.9 % infusion 40 mL, 40 mL, Intravenous, PRN, Lester Sanders MD    Antibiotics:  Anti-Infectives (From admission, onward)      Ordered     Dose/Rate Route Frequency Start Stop    25 0655  DAPTOmycin (CUBICIN) 600 mg in sodium chloride 0.9 % 50 mL IVPB        Ordering Provider: Margoth Hernandez APRN    6 mg/kg × 102 kg (Adjusted)  100 mL/hr over 30 Minutes Intravenous Every 24 Hours 25 0900 25 0859    25 0655  cefTRIAXone (ROCEPHIN) 2,000 mg in sodium chloride 0.9 % 100 mL MBP        Ordering Provider: Margoth Hernandez APRN    2,000 mg  200 mL/hr over 30 Minutes Intravenous Every 24 Hours 25 0800 25 0759    25 1010  vancomycin IVPB 2000 mg in 0.9% Sodium Chloride 500 mL        Ordering Provider: Jeremi Hays Jr., MD    15 mg/kg × 136 kg  250 mL/hr over 120 Minutes Intravenous Once 25 1830 25 2227    25 0847  vancomycin (VANCOCIN) powder  Status:  Discontinued        Ordering Provider: Jeremi Hays Jr., MD      As Needed 25 0847 25 0944    25 0556  vancomycin IVPB 2000 mg in 0.9% Sodium Chloride 500 mL        Ordering Provider: Jeremi Hays Jr., MD    15 mg/kg × 136 kg  250 mL/hr over 120 Minutes Intravenous Once 25 0558 25 0829            Physical Exam:   Vital Signs  Temp (24hrs), Av °F (36.7 °C), Min:97.5 °F (36.4 °C), Max:98.6 °F (37 °C)    Temp  Min: 97.5 °F (36.4 °C)  Max: 98.6 °F (37 °C)  BP  Min: 132/87  Max: 170/122  Pulse  Min: 64  Max: 86  Resp  Min: 16  Max: 18  SpO2  Min: 95 %  Max: 99 %    GENERAL: Awake and alert, in no  acute distress.   HEENT: Normocephalic, atraumatic.  PERRL. EOMI. No conjunctival injection. No icterus. Oropharynx clear without evidence of thrush or exudate. No evidence of periodontal disease.    NECK: Supple   HEART: RRR; No murmur  LUNGS: Clear to auscultation bilaterally without wheezing, rales, rhonchi. Normal respiratory effort. Nonlabored.   ABDOMEN: Soft, nontender, nondistended.   EXT:  No cyanosis, clubbing or edema. No cord.  :  Without Muhammad catheter.  MSK: No joint effusions or erythema  SKIN: Warm and dry   Left foot with surgical dressing in place, CDI  NEURO: Oriented to PPT.   PSYCHIATRIC: Normal insight and judgment. Cooperative with PE    Laboratory Data    Results from last 7 days   Lab Units 02/25/25  1520   WBC 10*3/mm3 5.08   HEMOGLOBIN g/dL 12.8*   HEMATOCRIT % 37.5   PLATELETS 10*3/mm3 156     Results from last 7 days   Lab Units 02/25/25  1520   SODIUM mmol/L 143   POTASSIUM mmol/L 4.3   CHLORIDE mmol/L 105   CO2 mmol/L 24.0   BUN mg/dL 23*   CREATININE mg/dL 1.08   GLUCOSE mg/dL 86   CALCIUM mg/dL 10.3                     Results from last 7 days   Lab Units 03/01/25  0659   CK TOTAL U/L 69         Estimated Creatinine Clearance: 106.3 mL/min (by C-G formula based on SCr of 1.08 mg/dL).      Microbiology:  Microbiology Results (last 10 days)       Procedure Component Value - Date/Time    Tissue / Bone Culture - Tissue, Foot, Left [[id number]] Collected: 02/28/25 0902    Lab Status: Preliminary result Specimen: Tissue from Foot, Left Updated: 03/01/25 1057     Tissue Culture Culture in progress     Gram Stain Occasional WBCs seen      No organisms seen    AFB Culture - Tissue, Foot, Left [[id number]] Collected: 02/28/25 0902    Lab Status: Preliminary result Specimen: Tissue from Foot, Left Updated: 03/01/25 1152     AFB Stain No acid fast bacilli seen on concentrated smear            Radiology:  Imaging Results (Last 72 Hours)       ** No results found for the last 72 hours. **       "        Impression:   Diabetic foot ulcer stage IV, over left 2nd metatarsal with soft tissue infection possible osteomyelitis: Outside [facility] done at [facility] reported with \"edema in the distal second metatarsal\" concerning for developing osteomyelitis. S/p ray amputation 2nd toe, 2/28.  Gram stain negative, culture in process. Pathology pending  Diabetes mellitus, type 2 with peripheral neuropathy  CKD IIIa  History of right foot osteomyelitis, s/p amputations, cultures with GBS, Eikenella, Strep mitis  HTN    PLAN/RECOMMENDATIONS:   - Follow-up pending operative cultures and pathology     I recommend at least a short course of IV antibiotics postoperatively, pending above studies.  If evidence of residual osteomyelitis he will need 6 to 8 weeks of IV antibiotics.  Discussed risk/benefit of PICC line placement with patient and he understands and agrees to proceed    - Daptomycin 6 mg/kg IV daily  - Ceftriaxone 2g IV daily    Discussed outpatient IV antibiotics dispensed directly through [facility] OPAT program. Cleared for discharge today. I have arranged for IV antibiotics to be delivered to bedside later this morning. PICC dressing change prior to discharge. Discussed with RN and [facility] staff.     Discharge Orders:  - IV Daptomycin 700 mg daily  - IV ceftriaxone 2 grams daily  - Follow up in office with me on Tuesday to review pending studies and modify therapy if needed  - Stat labs prior to office visit: CBC, CMP, ESR, CRP, CK  - Weekly sterile PICC dressing changes at [facility] visits       Lester Sanders MD  3/2/2025  09:02 EST     This visit included the following complex service elements:  Complex medical decision-making associated with antimicrobial prescribing.  Managed infection treatment protocol associated with transitions of care for this complex patient.          "

## 2025-03-02 NOTE — NURSING NOTE
[facility] Education Progress Note    Alvaro Sinha  1965 - male    Education Date:  03/02/25  Provider: Laney Reeder RN  Location:        Summary of Education Session:  Met with Alvaro Sinha to review assembly and administration of IV antibiotic.  Demonstrated with proficiency.  Reviewed side effects of medication, care and complications of central line, use of probiotics, and 24 hour availability of RN for support.  Reviewed appointment date and time to include lab arrangements and providing supplies at each appointment.    Anticipated Discharge Date: 3/2/2025      Follow Up Date: 3/4/2025    Laney Reeder RN, 03/02/25 - 10:34 EST

## 2025-03-02 NOTE — PROGRESS NOTES
Alvaro Sinha       LOS: 0 days   Patient Care Team:  Martha Mckee MD as PCP - General (Family Medicine)    Chief Complaint:  s/p left 2nd ray amputation / gastroc recession    Subjective     Interval History:     Pain tolerable, block catheter removed.    Review of Systems:      Gen- No fevers, chills  CV- No chest pain, palpitations  Resp- No cough, dyspnea  GI- No N/V/D, abd pain    Objective     Vital Signs  Vital Signs (last 24 hours)         02/28 0700  03/01 0659 03/01 0700  03/02 0653   Most Recent      Temp (°F) 97.4 -  98.3    97.5 -  98.6     98.5 (36.9) 03/02 0257    Heart Rate 58 -  85    64 -  79     72 03/02 0257    Resp 12 -  18    16 -  18     16 03/02 0257    /68 -  172/106    132/87 -  170/122     132/87 03/02 0257    SpO2 (%) 94 -  100    95 -  99     95 03/02 0257    Oxygen Concentration (%)   21      21     21 03/02 0135              Physical Exam:     Alert, oriented.  No acute distress.  Nonlabored respirations.  Regular rate and rhythm.  Abdomen nondistended.  Left lower extremity: Foot wound and calf wound inspected, well-approximated, no erythema, drainage, warmth.     Results Review:     I reviewed the patient's new clinical results.    Medication Review:   Hospital Medications (active)         Dose Frequency Start End    allopurinol (ZYLOPRIM) tablet 300 mg 300 mg Daily 2/28/2025 --    Admin Instructions: ([facility]) Take with food if GI upset occurs.    Route: Oral    cefTRIAXone (ROCEPHIN) 2,000 mg in sodium chloride 0.9 % 100 mL MBP 2,000 mg Every 24 Hours 3/1/2025 3/8/2025    Admin Instructions: LR should be paused and flushing of the line with NS is recommended prior to and after completion of ceftriaxone infusion due to incompatibility. Do not co-adminster with calcium-containing solutions.  Caution: Look alike/sound alike drug alert    Route: Intravenous    cholecalciferol (VITAMIN D3) tablet 1,000 Units 1,000 Units Daily 2/28/2025 --    Route: Oral     colchicine tablet 0.6 mg 0.6 mg Daily PRN 2/28/2025 --    Admin Instructions: Group 2 (Pink) Hazardous Drug - Reproductive Risk Only - See Handling Guide    Route: Oral    DAPTOmycin (CUBICIN) 600 mg in sodium chloride 0.9 % 50 mL IVPB 6 mg/kg × 102 kg (Adjusted) Every 24 Hours 3/1/2025 3/8/2025    Admin Instructions: Caution: Look alike/sound alike drug alert.  Refrigerate. Do not shake.    Route: Intravenous    dextrose (D50W) (25 g/50 mL) IV injection 25 g 25 g Every 15 Minutes PRN 2/28/2025 --    Admin Instructions: Blood sugar less than 70; patient has IV access - Unresponsive, NPO or Unable To Safely Swallow    Route: Intravenous    dextrose (GLUTOSE) oral gel 15 g 15 g Every 15 Minutes PRN 2/28/2025 --    Admin Instructions: BS<70, Patient Alert, Is not NPO, Can safely swallow.    Route: Oral    Enoxaparin Sodium (LOVENOX) syringe 40 mg 40 mg Daily 3/1/2025 --    Admin Instructions: Give subcutaneous in abdomen only. Do not massage site after injection.    Route: Subcutaneous    glucagon (GLUCAGEN) injection 1 mg 1 mg Every 15 Minutes PRN 2/28/2025 --    Admin Instructions: Blood Glucose Less Than 70 - Patient Without IV Access - Unresponsive, NPO or Unable To Safely Swallow  Reconstitute powder for injection by adding 1 mL of -supplied sterile diluent or sterile water for injection to a vial containing 1 mg of the drug, to provide solutions containing 1 mg/mL. Shake vial gently to dissolve.    Route: Intramuscular    insulin glargine (LANTUS, SEMGLEE) injection 6 Units 6 Units Daily 3/1/2025 --    Admin Instructions: Do not hold basal insulin without an order. Consider requesting a dose edit, if needed.      Route: Subcutaneous    Insulin Lispro (humaLOG) injection 2-7 Units 2-7 Units 4 Times Daily Before Meals & Nightly 2/28/2025 --    Admin Instructions: Correction Insulin - Low Dose - Total Insulin Dose Less Than 40 units/day (Lean, Elderly or Renal Patients)    Blood Glucose 150-199 mg/dL - 2  "units  Blood Glucose 200-249 mg/dL - 3 units  Blood Glucose 250-299 mg/dL - 4 units  Blood Glucose 300-349 mg/dL - 5 units  Blood Glucose 350-400 mg/dL - 6 units  Blood Glucose Greater Than 400 mg/dL - 7 units & Call Provider   Caution: Look alike/sound alike drug alert    Route: Subcutaneous    lactated ringers infusion 9 mL/hr Continuous 2/28/2025 3/2/2025    Route: Intravenous    lactobacillus acidophilus (RISAQUAD) capsule 1 capsule 1 capsule Daily 2/28/2025 --    Route: Oral    lidocaine PF 1% (XYLOCAINE) injection 0.5 mL 0.5 mL Once As Needed 2/28/2025 --    Route: Injection    lisinopril (PRINIVIL,ZESTRIL) tablet 40 mg 40 mg Daily 3/1/2025 --    Admin Instructions: Hold for SBP less than 100, DBP less than 60.    Route: Oral    metoprolol succinate XL (TOPROL-XL) 24 hr tablet 100 mg 100 mg Daily 2/28/2025 --    Admin Instructions: Hold for SBP less than 100, DBP less than 60, or heart rate less than 50    Do not crush or chew the capsules or tablets. The drug may not work as designed if the capsule or tablet is crushed or chewed. Swallow whole.  Do not crush or chew.    Route: Oral    morphine injection 6 mg 6 mg Every 2 Hours PRN 2/28/2025 3/5/2025    Admin Instructions: Based on patient request - if ordered for moderate or severe pain, provider allows for administration of a medication prescribed for a lower pain scale.  If given for pain, use the following pain scale:  Mild Pain = Pain Score of 1-3, CPOT 1-2  Moderate Pain = Pain Score of 4-6, CPOT 3-4  Severe Pain = Pain Score of 7-10, CPOT 5-8    Route: Intravenous    Linked Group 1: Placed in \"And\" Linked Group        naloxone (NARCAN) injection 0.4 mg 0.4 mg Every 5 Minutes PRN 2/28/2025 --    Admin Instructions: If respiratory rate is less than 8 breaths/minute or patient is difficult to arouse stop any narcotics and contact physician.   Administer slow IV push. Repeat as ordered until patient's respiratory rate is greater than 12 breaths/minute.    " "Route: Intravenous    Linked Group 1: Placed in \"And\" Linked Group        ondansetron (ZOFRAN) injection 4 mg 4 mg Every 6 Hours PRN 2/28/2025 --    Admin Instructions: If BOTH ondansetron (ZOFRAN) and promethazine (PHENERGAN) are ordered use ondansetron first and THEN promethazine IF ondansetron is ineffective.    Route: Intravenous    Linked Group 2: Placed in \"Or\" Linked Group        ondansetron ODT (ZOFRAN-ODT) disintegrating tablet 4 mg 4 mg Every 6 Hours PRN 2/28/2025 --    Admin Instructions: If BOTH ondansetron (ZOFRAN) and promethazine (PHENERGAN) are ordered use ondansetron first and THEN promethazine IF ondansetron is ineffective.  Place on tongue and allow to dissolve.    Route: Oral    Linked Group 2: Placed in \"Or\" Linked Group        oxyCODONE (ROXICODONE) immediate release tablet 5 mg 5 mg Every 4 Hours PRN 2/28/2025 3/5/2025    Admin Instructions: Based on patient request - if ordered for moderate or severe pain, provider allows for administration of a medication prescribed for a lower pain scale.      If given for pain, use the following pain scale:  Mild Pain = Pain Score of 1-3, CPOT 1-2  Moderate Pain = Pain Score of 4-6, CPOT 3-4  Severe Pain = Pain Score of 7-10, CPOT 5-8    Route: Oral    ropivacaine (NAROPIN) 0.2 % infusion (INFUSYSTEM)  Continuous 2/28/2025 --    Admin Instructions: If pain greater than 7 out of 10, please call [phone] or [phone].  Thank you    Route: Peripheral Nerve    sodium chloride 0.9 % flush 10 mL 10 mL Every 12 Hours Scheduled 3/1/2025 --    Route: Intravenous    Cosign for Ordering: Accepted by Lester Sanders MD on 3/1/2025  1:42 PM    sodium chloride 0.9 % flush 10 mL 10 mL As Needed 3/1/2025 --    Route: Intravenous    Cosign for Ordering: Accepted by Lester Sanders MD on 3/1/2025  1:42 PM    sodium chloride 0.9 % flush 20 mL 20 mL As Needed 3/1/2025 --    Route: Intravenous    Cosign for Ordering: Accepted by Lester Sanders MD on " 3/1/2025  1:42 PM    sodium chloride 0.9 % infusion 40 mL 40 mL As Needed 3/1/2025 --    Admin Instructions: Following administration of an IV intermittent medication, flush line with 40mL NS at 100mL/hr.    Route: Intravenous    Cosign for Ordering: Accepted by Lester Sanders MD on 3/1/2025  1:42 PM              Assessment & Plan     59-year-old male status post left second ray amputation, endoscopic gastrocnemius recession February 28, 2025.      Diabetic foot ulcer    Lymphadenopathy    Lymphoma    Type 2 diabetes mellitus with hyperglycemia    Hypertensive disorder    Hyperlipidemia    Stage 3a chronic kidney disease    Heel weightbearing left lower extremity.  Follow cultures.  Appreciate infectious disease evaluation.  Follow-up 1 to 2 weeks for wound check and x-rays.    To begin postoperative day three:    Daily dressing change:    Xeroform  4x4  Kerlix  Ace     Follow up with Lexi Bronson PA-C.    [facility]  [address], [address]  [place], [address]  Please schedule at [phone]      Jeremi Hays Jr, MD  03/02/25  06:53 EST

## 2025-03-03 LAB
BACTERIA SPEC ANAEROBE CULT: NORMAL
CYTO UR: NORMAL
LAB AP CASE REPORT: NORMAL
LAB AP CLINICAL INFORMATION: NORMAL
PATH REPORT.FINAL DX SPEC: NORMAL
PATH REPORT.GROSS SPEC: NORMAL

## 2025-03-04 ENCOUNTER — TRANSCRIBE ORDERS (OUTPATIENT)
Dept: LAB | Facility: HOSPITAL | Age: 60
End: 2025-03-04
Payer: COMMERCIAL

## 2025-03-04 ENCOUNTER — LAB (OUTPATIENT)
Dept: LAB | Facility: HOSPITAL | Age: 60
End: 2025-03-04
Payer: COMMERCIAL

## 2025-03-04 DIAGNOSIS — M86.171 ACUTE OSTEOMYELITIS OF RIGHT ANKLE OR FOOT: Primary | ICD-10-CM

## 2025-03-04 DIAGNOSIS — M86.171 ACUTE OSTEOMYELITIS OF RIGHT ANKLE OR FOOT: ICD-10-CM

## 2025-03-04 LAB
ALBUMIN SERPL-MCNC: 3.8 G/DL (ref 3.5–5.2)
ALBUMIN/GLOB SERPL: 1.3 G/DL
ALP SERPL-CCNC: 94 U/L (ref 39–117)
ALT SERPL W P-5'-P-CCNC: 32 U/L (ref 1–41)
ANION GAP SERPL CALCULATED.3IONS-SCNC: 12 MMOL/L (ref 5–15)
AST SERPL-CCNC: 21 U/L (ref 1–40)
BASOPHILS # BLD AUTO: 0.02 10*3/MM3 (ref 0–0.2)
BASOPHILS NFR BLD AUTO: 0.3 % (ref 0–1.5)
BILIRUB SERPL-MCNC: 0.3 MG/DL (ref 0–1.2)
BUN SERPL-MCNC: 23 MG/DL (ref 6–20)
BUN/CREAT SERPL: 17 (ref 7–25)
CALCIUM SPEC-SCNC: 9 MG/DL (ref 8.6–10.5)
CHLORIDE SERPL-SCNC: 105 MMOL/L (ref 98–107)
CK SERPL-CCNC: 45 U/L (ref 20–200)
CO2 SERPL-SCNC: 23 MMOL/L (ref 22–29)
CREAT SERPL-MCNC: 1.35 MG/DL (ref 0.76–1.27)
CRP SERPL-MCNC: 0.85 MG/DL (ref 0–0.5)
DEPRECATED RDW RBC AUTO: 42.5 FL (ref 37–54)
EGFRCR SERPLBLD CKD-EPI 2021: 60.5 ML/MIN/1.73
EOSINOPHIL # BLD AUTO: 0.19 10*3/MM3 (ref 0–0.4)
EOSINOPHIL NFR BLD AUTO: 2.8 % (ref 0.3–6.2)
ERYTHROCYTE [DISTWIDTH] IN BLOOD BY AUTOMATED COUNT: 12.8 % (ref 12.3–15.4)
ERYTHROCYTE [SEDIMENTATION RATE] IN BLOOD: 43 MM/HR (ref 0–20)
GLOBULIN UR ELPH-MCNC: 2.9 GM/DL
GLUCOSE SERPL-MCNC: 154 MG/DL (ref 65–99)
HCT VFR BLD AUTO: 35.3 % (ref 37.5–51)
HGB BLD-MCNC: 11.9 G/DL (ref 13–17.7)
IMM GRANULOCYTES # BLD AUTO: 0.04 10*3/MM3 (ref 0–0.05)
IMM GRANULOCYTES NFR BLD AUTO: 0.6 % (ref 0–0.5)
LYMPHOCYTES # BLD AUTO: 1.26 10*3/MM3 (ref 0.7–3.1)
LYMPHOCYTES NFR BLD AUTO: 18.5 % (ref 19.6–45.3)
MCH RBC QN AUTO: 30.6 PG (ref 26.6–33)
MCHC RBC AUTO-ENTMCNC: 33.7 G/DL (ref 31.5–35.7)
MCV RBC AUTO: 90.7 FL (ref 79–97)
MONOCYTES # BLD AUTO: 0.53 10*3/MM3 (ref 0.1–0.9)
MONOCYTES NFR BLD AUTO: 7.8 % (ref 5–12)
NEUTROPHILS NFR BLD AUTO: 4.76 10*3/MM3 (ref 1.7–7)
NEUTROPHILS NFR BLD AUTO: 70 % (ref 42.7–76)
NRBC BLD AUTO-RTO: 0 /100 WBC (ref 0–0.2)
PLATELET # BLD AUTO: 169 10*3/MM3 (ref 140–450)
PMV BLD AUTO: 10.6 FL (ref 6–12)
POTASSIUM SERPL-SCNC: 4.1 MMOL/L (ref 3.5–5.2)
PROT SERPL-MCNC: 6.7 G/DL (ref 6–8.5)
RBC # BLD AUTO: 3.89 10*6/MM3 (ref 4.14–5.8)
SODIUM SERPL-SCNC: 140 MMOL/L (ref 136–145)
WBC NRBC COR # BLD AUTO: 6.8 10*3/MM3 (ref 3.4–10.8)

## 2025-03-04 PROCEDURE — 86140 C-REACTIVE PROTEIN: CPT

## 2025-03-04 PROCEDURE — 82550 ASSAY OF CK (CPK): CPT

## 2025-03-04 PROCEDURE — 80053 COMPREHEN METABOLIC PANEL: CPT

## 2025-03-04 PROCEDURE — 85652 RBC SED RATE AUTOMATED: CPT

## 2025-03-04 PROCEDURE — 85025 COMPLETE CBC W/AUTO DIFF WBC: CPT

## 2025-03-04 PROCEDURE — 36415 COLL VENOUS BLD VENIPUNCTURE: CPT

## 2025-03-07 LAB
FUNGUS WND CULT: NORMAL
MYCOBACTERIUM SPEC CULT: NORMAL
NIGHT BLUE STAIN TISS: NORMAL

## 2025-03-11 ENCOUNTER — LAB (OUTPATIENT)
Dept: LAB | Facility: HOSPITAL | Age: 60
End: 2025-03-11
Payer: COMMERCIAL

## 2025-03-11 ENCOUNTER — TRANSCRIBE ORDERS (OUTPATIENT)
Dept: LAB | Facility: HOSPITAL | Age: 60
End: 2025-03-11
Payer: COMMERCIAL

## 2025-03-11 DIAGNOSIS — M86.171 ACUTE OSTEOMYELITIS OF RIGHT ANKLE OR FOOT: Primary | ICD-10-CM

## 2025-03-11 DIAGNOSIS — M86.171 ACUTE OSTEOMYELITIS OF RIGHT ANKLE OR FOOT: ICD-10-CM

## 2025-03-11 LAB
ALBUMIN SERPL-MCNC: 3.9 G/DL (ref 3.5–5.2)
ALBUMIN/GLOB SERPL: 1.4 G/DL
ALP SERPL-CCNC: 105 U/L (ref 39–117)
ALT SERPL W P-5'-P-CCNC: 37 U/L (ref 1–41)
ANION GAP SERPL CALCULATED.3IONS-SCNC: 11 MMOL/L (ref 5–15)
AST SERPL-CCNC: 24 U/L (ref 1–40)
BASOPHILS # BLD AUTO: 0.02 10*3/MM3 (ref 0–0.2)
BASOPHILS NFR BLD AUTO: 0.4 % (ref 0–1.5)
BILIRUB SERPL-MCNC: 0.3 MG/DL (ref 0–1.2)
BUN SERPL-MCNC: 19 MG/DL (ref 6–20)
BUN/CREAT SERPL: 17.8 (ref 7–25)
CALCIUM SPEC-SCNC: 9 MG/DL (ref 8.6–10.5)
CHLORIDE SERPL-SCNC: 107 MMOL/L (ref 98–107)
CO2 SERPL-SCNC: 21 MMOL/L (ref 22–29)
CREAT SERPL-MCNC: 1.07 MG/DL (ref 0.76–1.27)
CRP SERPL-MCNC: 0.62 MG/DL (ref 0–0.5)
DEPRECATED RDW RBC AUTO: 44.4 FL (ref 37–54)
EGFRCR SERPLBLD CKD-EPI 2021: 79.9 ML/MIN/1.73
EOSINOPHIL # BLD AUTO: 0.22 10*3/MM3 (ref 0–0.4)
EOSINOPHIL NFR BLD AUTO: 4.5 % (ref 0.3–6.2)
ERYTHROCYTE [DISTWIDTH] IN BLOOD BY AUTOMATED COUNT: 13.3 % (ref 12.3–15.4)
ERYTHROCYTE [SEDIMENTATION RATE] IN BLOOD: 37 MM/HR (ref 0–20)
GLOBULIN UR ELPH-MCNC: 2.8 GM/DL
GLUCOSE SERPL-MCNC: 144 MG/DL (ref 65–99)
HCT VFR BLD AUTO: 35.6 % (ref 37.5–51)
HGB BLD-MCNC: 11.7 G/DL (ref 13–17.7)
IMM GRANULOCYTES # BLD AUTO: 0.02 10*3/MM3 (ref 0–0.05)
IMM GRANULOCYTES NFR BLD AUTO: 0.4 % (ref 0–0.5)
LYMPHOCYTES # BLD AUTO: 0.87 10*3/MM3 (ref 0.7–3.1)
LYMPHOCYTES NFR BLD AUTO: 17.9 % (ref 19.6–45.3)
MCH RBC QN AUTO: 30.3 PG (ref 26.6–33)
MCHC RBC AUTO-ENTMCNC: 32.9 G/DL (ref 31.5–35.7)
MCV RBC AUTO: 92.2 FL (ref 79–97)
MONOCYTES # BLD AUTO: 0.42 10*3/MM3 (ref 0.1–0.9)
MONOCYTES NFR BLD AUTO: 8.6 % (ref 5–12)
NEUTROPHILS NFR BLD AUTO: 3.31 10*3/MM3 (ref 1.7–7)
NEUTROPHILS NFR BLD AUTO: 68.2 % (ref 42.7–76)
NRBC BLD AUTO-RTO: 0 /100 WBC (ref 0–0.2)
PLATELET # BLD AUTO: 149 10*3/MM3 (ref 140–450)
PMV BLD AUTO: 11 FL (ref 6–12)
POTASSIUM SERPL-SCNC: 4.2 MMOL/L (ref 3.5–5.2)
PROT SERPL-MCNC: 6.7 G/DL (ref 6–8.5)
RBC # BLD AUTO: 3.86 10*6/MM3 (ref 4.14–5.8)
SODIUM SERPL-SCNC: 139 MMOL/L (ref 136–145)
WBC NRBC COR # BLD AUTO: 4.86 10*3/MM3 (ref 3.4–10.8)

## 2025-03-11 PROCEDURE — 80053 COMPREHEN METABOLIC PANEL: CPT

## 2025-03-11 PROCEDURE — 85652 RBC SED RATE AUTOMATED: CPT

## 2025-03-11 PROCEDURE — 85025 COMPLETE CBC W/AUTO DIFF WBC: CPT

## 2025-03-11 PROCEDURE — 36415 COLL VENOUS BLD VENIPUNCTURE: CPT

## 2025-03-11 PROCEDURE — 86140 C-REACTIVE PROTEIN: CPT

## 2025-03-14 LAB
FUNGUS WND CULT: NORMAL
MYCOBACTERIUM SPEC CULT: NORMAL
NIGHT BLUE STAIN TISS: NORMAL

## 2025-03-21 LAB
FUNGUS WND CULT: NORMAL
MYCOBACTERIUM SPEC CULT: NORMAL
NIGHT BLUE STAIN TISS: NORMAL

## 2025-03-22 LAB — BACTERIA SPEC ANAEROBE CULT: NORMAL

## 2025-03-28 LAB
FUNGUS WND CULT: NORMAL
MYCOBACTERIUM SPEC CULT: NORMAL
NIGHT BLUE STAIN TISS: NORMAL

## 2025-04-04 LAB
FUNGUS WND CULT: NORMAL
MYCOBACTERIUM SPEC CULT: NORMAL
NIGHT BLUE STAIN TISS: NORMAL

## 2025-04-11 LAB
FUNGUS WND CULT: NORMAL
MYCOBACTERIUM SPEC CULT: NORMAL
NIGHT BLUE STAIN TISS: NORMAL

## (undated) DEVICE — GLV SURG SENSICARE PI ORTHO SZ9 LF STRL

## (undated) DEVICE — SUT PDS 2/0 CT2 27IN VIL PDP333H

## (undated) DEVICE — PATIENT RETURN ELECTRODE, SINGLE-USE, CONTACT QUALITY MONITORING, ADULT, WITH 9FT CORD, FOR PATIENTS WEIGING OVER 33LBS. (15KG): Brand: MEGADYNE

## (undated) DEVICE — SYR LL TP 10ML STRL

## (undated) DEVICE — PK EXTREM LOWR 10

## (undated) DEVICE — CULT AERO SGL CA/50

## (undated) DEVICE — X-LARGE COTTON GLOVE: Brand: DEROYAL

## (undated) DEVICE — GLV SURG SENSICARE PI MIC PF SZ7 LF STRL

## (undated) DEVICE — PK MINOR SPLT 10

## (undated) DEVICE — DRESSING,GAUZE,XEROFORM,CURAD,1"X8",ST: Brand: CURAD

## (undated) DEVICE — 450 ML BOTTLE OF 0.05% CHLORHEXIDINE GLUCONATE IN 99.95% STERILE WATER FOR IRRIGATION, USP AND APPLICATOR.: Brand: IRRISEPT ANTIMICROBIAL WOUND LAVAGE

## (undated) DEVICE — SNAP KOVER: Brand: UNBRANDED

## (undated) DEVICE — BNDG ELAS CO-FLEX SLF ADHR 4IN5YD LF STRL

## (undated) DEVICE — GOWN SURG ORBIS LVL3 2XL STRL

## (undated) DEVICE — SYR CONTRL PRESS/LO FIX/M/LL W/THMB/RNG 10ML

## (undated) DEVICE — PAD,ARMBOARD,CONV,FOAM,2X8X20",12PR/CS: Brand: MEDLINE

## (undated) DEVICE — BANDAGE,GAUZE,BULKEE II,4.5"X4.1YD,STRL: Brand: MEDLINE

## (undated) DEVICE — INTENDED FOR TISSUE SEPARATION, AND OTHER PROCEDURES THAT REQUIRE A SHARP SURGICAL BLADE TO PUNCTURE OR CUT.: Brand: BARD-PARKER ® STAINLESS STEEL BLADES

## (undated) DEVICE — BLANKT WARM UPPR/BDY ARM/OUT 57X196CM

## (undated) DEVICE — SYR LUERLOK 20CC BX/50

## (undated) DEVICE — GLV SURG SENSICARE PI MIC PF SZ9 LF STRL

## (undated) DEVICE — GLV SURG SENSICARE PI MIC PF SZ7.5 LF STRL

## (undated) DEVICE — PRECISION THIN (9.0 X 0.38 X 18.5MM)

## (undated) DEVICE — ADHS LIQ MASTISOL 2/3ML

## (undated) DEVICE — SPNG GZ WOVN 4X4IN 12PLY 10/BX STRL

## (undated) DEVICE — ENDOSCOPIC CARPAL TUNNEL RELEASE, 4.0MM BASIC SET: Brand: SEGWAY ECTR-D

## (undated) DEVICE — GOWN,SIRUS,POLYRNF,XLN/3XL,18/CS: Brand: MEDLINE

## (undated) DEVICE — BNDG,ELSTC,MATRIX,STRL,4"X5YD,LF,HOOK&LP: Brand: MEDLINE

## (undated) DEVICE — SUT ETHLN 2/0 PS 18IN 585H

## (undated) DEVICE — CONTAINER,SPECIMEN,OR STERILE,4OZ: Brand: MEDLINE

## (undated) DEVICE — DRAPE,TOP,102X53,STERILE: Brand: MEDLINE

## (undated) DEVICE — KT CANSTR VAC WND W/ISOLYSER SENSATRAC 500CC 5CS

## (undated) DEVICE — TBG PENCL TELESCP MEGADYNE SMOKE EVAC 10FT

## (undated) DEVICE — SUT PROLN 3/0 SH D/A 36IN 8522H

## (undated) DEVICE — DRSNG PAD ABD 8X10IN STRL

## (undated) DEVICE — TRAP FLD MINIVAC MEGADYNE 100ML

## (undated) DEVICE — THIN OFFSET (9.0 X 0.38 X 25.0MM)

## (undated) DEVICE — CULT ANAERB

## (undated) DEVICE — SPNG LAP PREWSH SFTPK 18X18IN STRL PK/5

## (undated) DEVICE — CULT AERO SGL

## (undated) DEVICE — ANTIBACTERIAL UNDYED BRAIDED (POLYGLACTIN 910), SYNTHETIC ABSORBABLE SUTURE: Brand: COATED VICRYL

## (undated) DEVICE — KT PUMP INFUBLOCK MDL 2100 PMKITSOLIS

## (undated) DEVICE — PENCL SMOKE/EVAC MEGADYNE TELESCP 10FT

## (undated) DEVICE — COVER,MAYO STAND,XL,STERILE: Brand: MEDLINE

## (undated) DEVICE — HYPODERMIC SAFETY NEEDLE: Brand: MONOJECT

## (undated) DEVICE — DRSNG SURESITE WNDW 2.38X2.75

## (undated) DEVICE — DRSNG WND VAC GRANUFOAM SENSATRAC LG

## (undated) DEVICE — TUBING, SUCTION, 1/4" X 10', STRAIGHT: Brand: MEDLINE

## (undated) DEVICE — 3M™ IOBAN™ 2 ANTIMICROBIAL INCISE DRAPE 6650EZ: Brand: IOBAN™ 2

## (undated) DEVICE — Device: Brand: XPERIENCE

## (undated) DEVICE — DRSNG GZ CURAD XEROFORM NONADHR OVERWRAP 5X9IN

## (undated) DEVICE — SUT SILK 3/0 TIES 18IN A184H

## (undated) DEVICE — SYR LL W/SCALE/MARK 3ML STRL